# Patient Record
Sex: FEMALE | Race: WHITE | Employment: OTHER | RURAL
[De-identification: names, ages, dates, MRNs, and addresses within clinical notes are randomized per-mention and may not be internally consistent; named-entity substitution may affect disease eponyms.]

---

## 2017-01-27 ENCOUNTER — CLINICAL SUPPORT (OUTPATIENT)
Dept: FAMILY MEDICINE CLINIC | Age: 82
End: 2017-01-27

## 2017-01-27 DIAGNOSIS — R74.8 ELEVATED LIVER ENZYMES: Primary | ICD-10-CM

## 2017-01-27 NOTE — MR AVS SNAPSHOT
Visit Information Date & Time Provider Department Dept. Phone Encounter #  
 1/27/2017  1:00 PM [facility] [address] [facility] [phone] [id number] Your Appointments 5/9/2017 10:20 AM  
ESTABLISHED PATIENT with Alethea Leon MD  
[address] [place] [facility]) Appt Note: 7 mo fu $0cp [address] [facility] 67 [address] [phone]  
  
   
 [address] [address]  
  
    
 6/21/2017 10:30 AM  
ESTABLISHED PATIENT with Trinh Umana NP  
[address] ([facility]) Appt Note: 6 mo F/U  
 [address] [address]  
[address] [facility] [address] [address] Upcoming Health Maintenance Date Due ZOSTER VACCINE AGE 60> 3/7/1990 GLAUCOMA SCREENING Q2Y 3/7/1995 OSTEOPOROSIS SCREENING (DEXA) 3/7/1995 MEDICARE YEARLY EXAM 3/7/1995 DTaP/Tdap/Td series (1 - Tdap) 11/30/2011 Pneumococcal 65+ Low/Medium Risk (2 of 2 - PPSV23) 11/11/2016 Allergies as of 1/27/2017  Review Complete On: 12/20/2016 By: Ashley Arteaga RN Severity Noted Reaction Type Reactions Ambien [Zolpidem]  03/08/2016    Other (comments) Legs got heavy Bactrim [Sulfamethoprim Ds]  12/20/2016    Unknown (comments) Patient cant remember her reaction Hydroxyzine  06/23/2016    Other (comments) Legs got heavy Current Immunizations  Never Reviewed Name Date Influenza High Dose Vaccine PF 10/3/2016 Pneumococcal Conjugate (PCV-13) 11/11/2015 Td 11/29/2011 Not reviewed this visit You Were Diagnosed With   
  
 Codes Comments Elevated liver enzymes    -  Primary ICD-10-CM: R74.8 ICD-9-CM: 790.5 Vitals OB Status Smoking Status Menopause Never Smoker Preferred Pharmacy Pharmacy Name Phone [facility], [place] - [address] [phone] Your Updated Medication List  
  
   
This list is accurate as of: 1/27/17  1:05 PM.  Always use your most recent med list.  
  
  
  
  
 aspirin 81 mg chewable tablet Take 1 Tab by mouth daily. atenolol 50 mg tablet Commonly known as:  TENORMIN Take 1 Tab by mouth daily. biotin 2,500 mcg Tab Take  by mouth. BOSWELLIA AVA XT (BULK) Take  by mouth. CALCITRATE-VITAMIN D PO Take  by mouth. cetirizine 10 mg tablet Commonly known as:  ZYRTEC Take  by mouth. CO Q-10 100 mg capsule Generic drug:  co-enzyme Q-10 Take 100 mg by mouth daily. lovastatin 20 mg tablet Commonly known as:  MEVACOR Take 1 Tab by mouth nightly.  
  
 magnesium 250 mg Tab Take  by mouth. Pt thinks she takes 200 mg  
  
 melatonin 3 mg tablet Take  by mouth. METAMUCIL 0.52 gram capsule Generic drug:  psyllium Take 1-2 Caps by mouth daily. Indications: CONSTIPATION  
  
 multivitamin tablet Commonly known as:  ONE A DAY Take 1 Tab by mouth daily. naproxen sodium 220 mg Cap Take  by mouth as needed. Omega-3-DHA-EPA-Fish Oil 1,000 mg (120 mg-180 mg) Cap Take  by mouth. OTHER  
VEGGIES FOR LIFE FRUIT FOR LIFE  
  
 PROBIOTIC AND ACIDOPHILUS PO Take  by mouth.  
  
 triamcinolone 0.5 % topical cream  
Commonly known as:  ARISTOCORT Apply  to affected area two (2) times a day. As needed for itching VITAMIN B-12 1,000 mcg tablet Generic drug:  cyanocobalamin Take 1,000 mcg by mouth daily. VITAMIN C WITH YOUSIF HIPS PO Take  by mouth. VITAMIN D3 2,000 unit Tab Generic drug:  cholecalciferol (vitamin D3) Take  by mouth. We Performed the Following METABOLIC PANEL, COMPREHENSIVE [07788 CPT(R)] DE COLLECTION VENOUS BLOOD,VENIPUNCTURE [id number] CPT(R)] Introducing \[facility] [place] [facility]\"" & HEALTH SERVICES! Shani Guajardo introduces Hipmunk patient portal. Now you can access parts of your medical record, email your doctor's office, and request medication refills online. 1. In your internet browser, go to [email or web address] 2. Click on the First Time User? Click Here link in the Sign In box. You will see the New Member Sign Up page. 3. Enter your Hipmunk Access Code exactly as it appears below. You will not need to use this code after youve completed the sign-up process. If you do not sign up before the expiration date, you must request a new code. · Hipmunk Access Code: [email or web address] Expires: 4/27/2017  1:00 PM 
 
4. Enter the last four digits of your Social Security Number (xxxx) and Date of Birth (mm/dd/yyyy) as indicated and click Submit. You will be taken to the next sign-up page. 5. Create a Hipmunk ID. This will be your Hipmunk login ID and cannot be changed, so think of one that is secure and easy to remember. 6. Create a Hipmunk password. You can change your password at any time. 7. Enter your Password Reset Question and Answer. This can be used at a later time if you forget your password. 8. Enter your e-mail address. You will receive e-mail notification when new information is available in [address] 9. Click Sign Up. You can now view and download portions of your medical record. 10. Click the Download Summary menu link to download a portable copy of your medical information. If you have questions, please visit the Frequently Asked Questions section of the Hipmunk website. Remember, Hipmunk is NOT to be used for urgent needs. For medical emergencies, dial 911. Now available from your iPhone and Android! Please provide this summary of care documentation to your next provider. Your primary care clinician is listed as Alcides Wilcox. If you have any questions after today's visit, please call [phone].

## 2017-01-28 LAB
ALBUMIN SERPL-MCNC: 4 G/DL (ref 3.5–4.7)
ALBUMIN/GLOB SERPL: 1.3 {RATIO} (ref 1.1–2.5)
ALP SERPL-CCNC: 137 IU/L (ref 39–117)
ALT SERPL-CCNC: 16 IU/L (ref 0–32)
AST SERPL-CCNC: 26 IU/L (ref 0–40)
BILIRUB SERPL-MCNC: 0.5 MG/DL (ref 0–1.2)
BUN SERPL-MCNC: 13 MG/DL (ref 8–27)
BUN/CREAT SERPL: 19 (ref 11–26)
CALCIUM SERPL-MCNC: 9.4 MG/DL (ref 8.7–10.3)
CHLORIDE SERPL-SCNC: 99 MMOL/L (ref 96–106)
CO2 SERPL-SCNC: 25 MMOL/L (ref 18–29)
CREAT SERPL-MCNC: 0.67 MG/DL (ref 0.57–1)
GLOBULIN SER CALC-MCNC: 3.1 G/DL (ref 1.5–4.5)
GLUCOSE SERPL-MCNC: 109 MG/DL (ref 65–99)
POTASSIUM SERPL-SCNC: 4 MMOL/L (ref 3.5–5.2)
PROT SERPL-MCNC: 7.1 G/DL (ref 6–8.5)
SODIUM SERPL-SCNC: 139 MMOL/L (ref 134–144)

## 2017-01-30 NOTE — PROGRESS NOTES
Glucose remains elevated 109  Liver enzyme improving  Healthy Eating Plan  A healthy eating plan gives your body the nutrients it needs every day while staying within your daily calorie goal for weight loss. A healthy eating plan also will lower your risk for heart disease and other health conditions.    A healthy eating plan:  \" Emphasizes vegetables, fruits, whole grains, and fat-free or low-fat dairy products  \" Includes lean meats, poultry, fish, beans, eggs, and nuts  \" Limits saturated and trans fats, sodium, and added sugars  \" Controls portion sizes

## 2017-02-14 ENCOUNTER — TELEPHONE (OUTPATIENT)
Dept: FAMILY MEDICINE CLINIC | Age: 82
End: 2017-02-14

## 2017-02-14 DIAGNOSIS — E78.00 PURE HYPERCHOLESTEROLEMIA: ICD-10-CM

## 2017-02-14 DIAGNOSIS — I10 ESSENTIAL HYPERTENSION: ICD-10-CM

## 2017-02-14 RX ORDER — ATENOLOL 50 MG/1
50 TABLET ORAL DAILY
Qty: 90 TAB | Refills: 1 | Status: SHIPPED | OUTPATIENT
Start: 2017-02-14 | End: 2017-06-21 | Stop reason: SDUPTHER

## 2017-02-14 RX ORDER — LOVASTATIN 20 MG/1
20 TABLET ORAL
Qty: 90 TAB | Refills: 1 | Status: SHIPPED | OUTPATIENT
Start: 2017-02-14 | End: 2017-06-21 | Stop reason: SDUPTHER

## 2017-05-09 ENCOUNTER — OFFICE VISIT (OUTPATIENT)
Dept: CARDIOLOGY CLINIC | Age: 82
End: 2017-05-09

## 2017-05-09 VITALS
RESPIRATION RATE: 18 BRPM | OXYGEN SATURATION: 97 % | BODY MASS INDEX: 27.83 KG/M2 | DIASTOLIC BLOOD PRESSURE: 86 MMHG | HEART RATE: 54 BPM | WEIGHT: 163 LBS | HEIGHT: 64 IN | SYSTOLIC BLOOD PRESSURE: 122 MMHG

## 2017-05-09 DIAGNOSIS — E78.00 PURE HYPERCHOLESTEROLEMIA: ICD-10-CM

## 2017-05-09 DIAGNOSIS — I49.3 PVC'S (PREMATURE VENTRICULAR CONTRACTIONS): ICD-10-CM

## 2017-05-09 DIAGNOSIS — I10 ESSENTIAL HYPERTENSION: ICD-10-CM

## 2017-05-09 DIAGNOSIS — M19.91 PRIMARY OSTEOARTHRITIS, UNSPECIFIED SITE: ICD-10-CM

## 2017-05-09 DIAGNOSIS — I49.3 VENTRICULAR ECTOPY: ICD-10-CM

## 2017-05-09 DIAGNOSIS — I49.1 PAC (PREMATURE ATRIAL CONTRACTION): Primary | ICD-10-CM

## 2017-05-09 NOTE — PROGRESS NOTES
Lavera Favre Spindle is a 80 y.o. female is here for routine f/u. Hx hypertension, dyslipidemia, PVC's, PAC's, DJD without known hx CAD/MI/CHF/valvular heart disease. Hx DJD, s/p prior TKR and hip fracture, s/p sgy. Had pre-op EKG last fall abnormal (PAC's, PVC's, possible old ASMI. Has intermittent CP, non-exertional/atypical episode about 6 weeks ago at night, brief. Occasional palpitations. Had Cori Dopp MPI l10/16 with minimally reversible apical defect, normal LVEF. Occasional palpitations. The patient denies chest pain/ shortness of breath, orthopnea, PND, LE edema, syncope, presyncope or fatigue.        Patient Active Problem List    Diagnosis Date Noted    Abnormal EKG 10/13/2016    PAC (premature atrial contraction) 10/13/2016    Primary osteoarthritis 10/03/2016    HTN (hypertension)     DJD (degenerative joint disease)     Colitis     Ventricular ectopy     Hyperlipemia       Carlos Pollock NP  Past Medical History:   Diagnosis Date    Colitis 2013    C diff; no recent flare, normal colonoscopy 2014    DJD (degenerative joint disease)     HTN (hypertension)     Hyperlipemia     Ventricular ectopy     no symptoms      Past Surgical History:   Procedure Laterality Date    HX CATARACT REMOVAL      bilat    HX COLONOSCOPY  2014    WNL    HX ORTHOPAEDIC  2005    right femur fx    HX ORTHOPAEDIC  2012    Lt TKA     Allergies   Allergen Reactions    Ambien [Zolpidem] Other (comments)     Legs got heavy    Bactrim [Sulfamethoprim Ds] Unknown (comments)     Patient cant remember her reaction    Hydroxyzine Other (comments)     Legs got heavy      Family History   Problem Relation Age of Onset    Cancer Mother      Leukemia    Heart Disease Mother     Cancer Sister      Ovarian    No Known Problems Brother     Heart Failure Brother     Cancer Brother      Brain tumor      Social History     Social History    Marital status:      Spouse name: N/A    Number of children: N/A    Years of education: N/A     Occupational History    Not on file. Social History Main Topics    Smoking status: Never Smoker    Smokeless tobacco: Not on file    Alcohol use No    Drug use: Not on file    Sexual activity: Not on file     Other Topics Concern    Not on file     Social History Narrative      Current Outpatient Prescriptions   Medication Sig    OTHER Folate po    LORATADINE (CLARITIN PO) Take  by mouth as needed.  MELATONIN PO Take  by mouth.  atenolol (TENORMIN) 50 mg tablet Take 1 Tab by mouth daily.  lovastatin (MEVACOR) 20 mg tablet Take 1 Tab by mouth nightly.  triamcinolone (ARISTOCORT) 0.5 % topical cream Apply  to affected area two (2) times a day. As needed for itching    co-enzyme Q-10 (CO Q-10) 100 mg capsule Take 100 mg by mouth daily.  multivitamin (ONE A DAY) tablet Take 1 Tab by mouth daily.  cholecalciferol, vitamin D3, (VITAMIN D3) 2,000 unit tab Take  by mouth.  cyanocobalamin (VITAMIN B-12) 1,000 mcg tablet Take 1,000 mcg by mouth daily.  BOSWELLIA AVA EXTRACT (BOSWELLIA AVA XT, BULK,) Take  by mouth.  OTHER VEGGIES FOR LIFE  FRUIT FOR LIFE    magnesium 250 mg tab Take  by mouth. Pt thinks she takes 200 mg    CALCIUM CITRATE/VITAMIN D3 (CALCITRATE-VITAMIN D PO) Take  by mouth.  Omega-3-DHA-EPA-Fish Oil 1,000 mg (120 mg-180 mg) cap Take  by mouth.  ASCORBIC ACID (VITAMIN C WITH YOUSIF HIPS PO) Take  by mouth.  LACTOBAC CMB #3/FOS/PANTETHINE (PROBIOTIC AND ACIDOPHILUS PO) Take  by mouth.  biotin 2,500 mcg tab Take  by mouth.  aspirin 81 mg chewable tablet Take 1 Tab by mouth daily. No current facility-administered medications for this visit. Review of Symptoms:    CONST  No weight change. No fever, chills, sweats    ENT No visual changes, URI sx, sore throat    CV  See HPI   RESP  No cough, or sputum, wheezing. Also see HPI   GI  No abdominal pain or change in bowel habits. No heartburn or dysphagia.    No melena or rectal bleeding.   No dysuria, urgency, frequency, hematuria   MSKEL  No joint pain, swelling. No muscle pain. SKIN  No rash or lesions. NEURO  No headache, syncope, or seizure. No weakness, loss of sensation, or paresthesias. PSYCH  No low mood or depression  No anxiety. HE/LYMPH  No easy bruising, abnormal bleeding, or enlarged glands. Physical ExamPhysical Exam:    Visit Vitals    /86 (BP 1 Location: Left arm, BP Patient Position: Sitting)    Pulse (!) 54    Resp 18    Ht 5' 4\" (1.626 m)    Wt 163 lb (73.9 kg)    SpO2 97%    BMI 27.98 kg/m2     Gen: NAD  HEENT:  PERRL, throat clear  Neck: no mass or thyromegaly, no JVD   Heart:  sl irregular,Nl S1S2,  no murmur, gallop or rub.   Lungs:  clear  Abdomen:   Soft, non-tender, bowel sounds are active.   Extremities:  No edema  Pulse: symmetric  Neuro: A&O times 3, WNL      Cardiographics    ECG: NSR, PVC's, PRWP, no acute changes    CARDIAC TESTING:    Lexiscan MPI 10/17/16--minimal reversible apical defect, LVEF 55%.       Labs:   Lab Results   Component Value Date/Time    Sodium 139 01/27/2017 01:02 PM    Sodium 141 12/20/2016 11:12 AM    Sodium 141 06/23/2016 10:23 AM    Potassium 4.0 01/27/2017 01:02 PM    Potassium 4.0 12/20/2016 11:12 AM    Potassium 4.9 06/23/2016 10:23 AM    Chloride 99 01/27/2017 01:02 PM    Chloride 100 12/20/2016 11:12 AM    Chloride 101 06/23/2016 10:23 AM    CO2 25 01/27/2017 01:02 PM    CO2 25 12/20/2016 11:12 AM    CO2 27 06/23/2016 10:23 AM    Glucose 109 01/27/2017 01:02 PM    Glucose 108 12/20/2016 11:12 AM    Glucose 104 06/23/2016 10:23 AM    BUN 13 01/27/2017 01:02 PM    BUN 15 12/20/2016 11:12 AM    BUN 17 06/23/2016 10:23 AM    Creatinine 0.67 01/27/2017 01:02 PM    Creatinine 0.72 12/20/2016 11:12 AM    Creatinine 0.79 06/23/2016 10:23 AM    BUN/Creatinine ratio 19 01/27/2017 01:02 PM    BUN/Creatinine ratio 21 12/20/2016 11:12 AM    BUN/Creatinine ratio 22 06/23/2016 10:23 AM GFR est AA 92 01/27/2017 01:02 PM    GFR est AA 88 12/20/2016 11:12 AM    GFR est AA 78 06/23/2016 10:23 AM    GFR est non-AA 80 01/27/2017 01:02 PM    GFR est non-AA 76 12/20/2016 11:12 AM    GFR est non-AA 68 06/23/2016 10:23 AM    Calcium 9.4 01/27/2017 01:02 PM    Calcium 9.4 12/20/2016 11:12 AM    Calcium 9.5 06/23/2016 10:23 AM    Bilirubin, total 0.5 01/27/2017 01:02 PM    Bilirubin, total 0.5 12/20/2016 11:12 AM    Bilirubin, total 0.5 06/23/2016 10:23 AM    AST (SGOT) 26 01/27/2017 01:02 PM    AST (SGOT) 22 12/20/2016 11:12 AM    AST (SGOT) 20 06/23/2016 10:23 AM    Alk. phosphatase 137 01/27/2017 01:02 PM    Alk. phosphatase 154 12/20/2016 11:12 AM    Alk. phosphatase 97 06/23/2016 10:23 AM    Protein, total 7.1 01/27/2017 01:02 PM    Protein, total 7.1 12/20/2016 11:12 AM    Protein, total 7.2 06/23/2016 10:23 AM    Albumin 4.0 01/27/2017 01:02 PM    Albumin 4.0 12/20/2016 11:12 AM    Albumin 4.3 06/23/2016 10:23 AM    A-G Ratio 1.3 01/27/2017 01:02 PM    A-G Ratio 1.3 12/20/2016 11:12 AM    A-G Ratio 1.5 06/23/2016 10:23 AM    ALT (SGPT) 16 01/27/2017 01:02 PM    ALT (SGPT) 17 12/20/2016 11:12 AM    ALT (SGPT) 14 06/23/2016 10:23 AM     No results found for: CPK, CPKX, CPX  Lab Results   Component Value Date/Time    Cholesterol, total 159 12/20/2016 11:12 AM    Cholesterol, total 178 06/23/2016 10:23 AM    HDL Cholesterol 45 12/20/2016 11:12 AM    HDL Cholesterol 45 06/23/2016 10:23 AM    LDL, calculated 94 12/20/2016 11:12 AM    LDL, calculated 112 06/23/2016 10:23 AM    Triglyceride 100 12/20/2016 11:12 AM    Triglyceride 104 06/23/2016 10:23 AM     No results found for this or any previous visit.     Assessment:         Patient Active Problem List    Diagnosis Date Noted    Abnormal EKG 10/13/2016    PAC (premature atrial contraction) 10/13/2016    Primary osteoarthritis 10/03/2016    HTN (hypertension)     DJD (degenerative joint disease)     Colitis     Ventricular ectopy     Hyperlipemia Plan:     Doing well with no adverse cardiac symptoms. Lipids and labs followed by PCP. Continue current care and f/u in 6 months.     Monique Montalvo MD

## 2017-05-09 NOTE — MR AVS SNAPSHOT
Visit Information Date & Time Provider Department Dept. Phone Encounter #  
 5/9/2017 10:20 AM Mary Mejia, [address] [place] [address] Cardiology [phone] Follow-up Instructions Return in about 6 months (around 11/9/2017). Follow-up and Disposition History Your Appointments 6/20/2017  8:00 AM  
ESTABLISHED PATIENT with Gina Tolbert NP  
[address] [facility] Appt Note: 6 mo F/U; 6 mo F/U  
 [address] [address]  
[address] [facility] [address] [address] Upcoming Health Maintenance Date Due ZOSTER VACCINE AGE 60> 3/7/1990 GLAUCOMA SCREENING Q2Y 3/7/1995 OSTEOPOROSIS SCREENING (DEXA) 3/7/1995 MEDICARE YEARLY EXAM 3/7/1995 DTaP/Tdap/Td series (1 - Tdap) 11/30/2011 Pneumococcal 65+ Low/Medium Risk (2 of 2 - PPSV23) 11/11/2016 INFLUENZA AGE 9 TO ADULT 8/1/2017 Allergies as of 5/9/2017  Review Complete On: 5/9/2017 By: Mary Mejia MD  
  
 Severity Noted Reaction Type Reactions Ambien [Zolpidem]  03/08/2016    Other (comments) Legs got heavy Bactrim [Sulfamethoprim Ds]  12/20/2016    Unknown (comments) Patient cant remember her reaction Hydroxyzine  06/23/2016    Other (comments) Legs got heavy Current Immunizations  Never Reviewed Name Date Influenza High Dose Vaccine PF 10/3/2016 Pneumococcal Conjugate (PCV-13) 11/11/2015 Td 11/29/2011 Not reviewed this visit You Were Diagnosed With   
  
 Codes Comments PAC (premature atrial contraction)    -  Primary ICD-10-CM: I49.1 ICD-9-CM: 427.61 Essential hypertension     ICD-10-CM: I10 
ICD-9-CM: 401.9 Ventricular ectopy     ICD-10-CM: I49.3 ICD-9-CM: 427.69 PVC's (premature ventricular contractions)     ICD-10-CM: I49.3 ICD-9-CM: 427.69 Pure hypercholesterolemia     ICD-10-CM: E78.00 ICD-9-CM: 272.0 Primary osteoarthritis, unspecified site     ICD-10-CM: M19.91 
ICD-9-CM: 715.10 Vitals BP Pulse Resp Height(growth percentile) Weight(growth percentile) SpO2  
 122/86 (BP 1 Location: Left arm, BP Patient Position: Sitting) (!) 54 18 5' 4\" (1.626 m) 163 lb (73.9 kg) 97% BMI OB Status Smoking Status 27.98 kg/m2 Menopause Never Smoker Vitals History BMI and BSA Data Body Mass Index Body Surface Area  
 27.98 kg/m 2 1.83 m 2 Preferred Pharmacy Pharmacy Name Phone [address] [facility], [address] [facility] [address] [place] Your Updated Medication List  
  
   
This list is accurate as of: 5/9/17 10:51 AM.  Always use your most recent med list.  
  
  
  
  
 aspirin 81 mg chewable tablet Take 1 Tab by mouth daily. atenolol 50 mg tablet Commonly known as:  TENORMIN Take 1 Tab by mouth daily. biotin 2,500 mcg Tab Take  by mouth. BOSWELLIA AVA XT (BULK) Take  by mouth. CALCITRATE-VITAMIN D PO Take  by mouth. CLARITIN PO Take  by mouth as needed. CO Q-10 100 mg capsule Generic drug:  co-enzyme Q-10 Take 100 mg by mouth daily. lovastatin 20 mg tablet Commonly known as:  MEVACOR Take 1 Tab by mouth nightly.  
  
 magnesium 250 mg Tab Take  by mouth. Pt thinks she takes 200 mg  
  
 MELATONIN PO Take  by mouth.  
  
 multivitamin tablet Commonly known as:  ONE A DAY Take 1 Tab by mouth daily. Omega-3-DHA-EPA-Fish Oil 1,000 mg (120 mg-180 mg) Cap Take  by mouth. * OTHER  
VEGGIES FOR LIFE FRUIT FOR LIFE  
  
 * OTHER Folate po PROBIOTIC AND ACIDOPHILUS PO Take  by mouth.  
  
 triamcinolone 0.5 % topical cream  
Commonly known as:  ARISTOCORT Apply  to affected area two (2) times a day. As needed for itching VITAMIN B-12 1,000 mcg tablet Generic drug:  cyanocobalamin Take 1,000 mcg by mouth daily.   
  
 VITAMIN C WITH YOUSIF HIPS PO  
 Take  by mouth. VITAMIN D3 2,000 unit Tab Generic drug:  cholecalciferol (vitamin D3) Take  by mouth. * Notice: This list has 2 medication(s) that are the same as other medications prescribed for you. Read the directions carefully, and ask your doctor or other care provider to review them with you. We Performed the Following AMB POC EKG ROUTINE W/ 12 LEADS, INTER & REP [71021 CPT(R)] Follow-up Instructions Return in about 6 months (around 11/9/2017). Introducing [facility] [place] & HEALTH SERVICES! [employer] introduces Anonymous You patient portal. Now you can access parts of your medical record, email your doctor's office, and request medication refills online. 1. In your internet browser, go to [email or web address] 2. Click on the First Time User? Click Here link in the Sign In box. You will see the New Member Sign Up page. 3. Enter your Anonymous You Access Code exactly as it appears below. You will not need to use this code after youve completed the sign-up process. If you do not sign up before the expiration date, you must request a new code. · Anonymous You Access Code: [email or web address] Expires: 8/7/2017 10:51 AM 
 
4. Enter the last four digits of your Social Security Number (xxxx) and Date of Birth (mm/dd/yyyy) as indicated and click Submit. You will be taken to the next sign-up page. 5. Create a Anonymous You ID. This will be your Anonymous You login ID and cannot be changed, so think of one that is secure and easy to remember. 6. Create a Anonymous You password. You can change your password at any time. 7. Enter your Password Reset Question and Answer. This can be used at a later time if you forget your password. 8. Enter your e-mail address. You will receive e-mail notification when new information is available in [address] 9. Click Sign Up. You can now view and download portions of your medical record.  
10. Click the Download Summary menu link to download a portable copy of your medical information. If you have questions, please visit the Frequently Asked Questions section of the [email or web address] website. Remember, Bit9 is NOT to be used for urgent needs. For medical emergencies, dial 911. Now available from your iPhone and Android! Please provide this summary of care documentation to your next provider. Your primary care clinician is listed as Yelena Gentile. If you have any questions after today's visit, please call [phone].

## 2017-05-09 NOTE — PROGRESS NOTES
Verified patient with two patient identifiers. Medications reviewed/approved by Dr. Ila Maher. Verbal from Dr. Ila Maher to remove the medications that were deleted during the visit.

## 2017-06-20 ENCOUNTER — OFFICE VISIT (OUTPATIENT)
Dept: FAMILY MEDICINE CLINIC | Age: 82
End: 2017-06-20

## 2017-06-20 VITALS
WEIGHT: 161 LBS | BODY MASS INDEX: 27.49 KG/M2 | HEART RATE: 68 BPM | TEMPERATURE: 45.3 F | RESPIRATION RATE: 18 BRPM | SYSTOLIC BLOOD PRESSURE: 130 MMHG | OXYGEN SATURATION: 96 % | HEIGHT: 64 IN | DIASTOLIC BLOOD PRESSURE: 86 MMHG

## 2017-06-20 DIAGNOSIS — H61.23 BILATERAL IMPACTED CERUMEN: ICD-10-CM

## 2017-06-20 DIAGNOSIS — I10 ESSENTIAL HYPERTENSION: ICD-10-CM

## 2017-06-20 DIAGNOSIS — E78.00 PURE HYPERCHOLESTEROLEMIA: ICD-10-CM

## 2017-06-20 DIAGNOSIS — Z23 ENCOUNTER FOR IMMUNIZATION: Primary | ICD-10-CM

## 2017-06-20 DIAGNOSIS — Z00.00 ENCOUNTER FOR MEDICARE ANNUAL WELLNESS EXAM: ICD-10-CM

## 2017-06-20 DIAGNOSIS — K52.9 COLITIS: ICD-10-CM

## 2017-06-20 NOTE — MR AVS SNAPSHOT
Visit Information Date & Time Provider Department Dept. Phone Encounter #  
 6/20/2017  8:00 AM Herberth Medrano NP [facility] [address] [facility] [phone] [id number] Your Appointments 11/15/2017 10:20 AM  
ESTABLISHED PATIENT with Galina Reeder MD  
[id number] [facility] [place] [address]) Appt Note: 6 MO FU $0CP  
 [address] [id number] [phone]  
  
   
 [address] [address] Upcoming Health Maintenance Date Due ZOSTER VACCINE AGE 60> 3/7/1990 GLAUCOMA SCREENING Q2Y 3/7/1995 Pneumococcal 65+ Low/Medium Risk (2 of 2 - PPSV23) 11/11/2016 INFLUENZA AGE 9 TO ADULT 8/1/2017 MEDICARE YEARLY EXAM 6/21/2018 DTaP/Tdap/Td series (2 - Td) 6/20/2027 Allergies as of 6/20/2017  Review Complete On: 6/20/2017 By: Wendi Tavera RN Severity Noted Reaction Type Reactions Ambien [Zolpidem]  03/08/2016    Other (comments) Legs got heavy Bactrim [Sulfamethoprim Ds]  12/20/2016    Unknown (comments) Patient cant remember her reaction Hydroxyzine  06/23/2016    Other (comments) Legs got heavy Current Immunizations  Never Reviewed Name Date Influenza High Dose Vaccine PF 10/3/2016 Pneumococcal Conjugate (PCV-13) 11/11/2015 Pneumococcal Polysaccharide (PPSV-23) 6/20/2017 Td 11/29/2011 Not reviewed this visit You Were Diagnosed With   
  
 Codes Comments Encounter for immunization    -  Primary ICD-10-CM: P62 ICD-9-CM: V03.89 Encounter for Medicare annual wellness exam     ICD-10-CM: Z00.00 ICD-9-CM: V70.0 Essential hypertension     ICD-10-CM: I10 
ICD-9-CM: 401.9 Colitis     ICD-10-CM: K52.9 ICD-9-CM: 558.9 Pure hypercholesterolemia     ICD-10-CM: E78.00 ICD-9-CM: 272.0 Bilateral impacted cerumen     ICD-10-CM: H61.23 
ICD-9-CM: 380.4 Vitals BP Pulse Temp Resp Height(growth percentile) Weight(growth percentile) 130/86 (BP 1 Location: Left arm, BP Patient Position: Sitting) 68 (!) 45.3 °F (7.4 °C) (Temporal) 18 5' 4\" (1.626 m) 161 lb (73 kg) SpO2 BMI OB Status Smoking Status 96% 27.64 kg/m2 Menopause Never Smoker BMI and BSA Data Body Mass Index Body Surface Area  
 27.64 kg/m 2 1.82 m 2 Preferred Pharmacy Pharmacy Name Phone [address] [facility], [address] [facility] [address] [place] Your Updated Medication List  
  
   
This list is accurate as of: 6/20/17  9:36 AM.  Always use your most recent med list.  
  
  
  
  
 aspirin 81 mg chewable tablet Take 1 Tab by mouth daily. atenolol 50 mg tablet Commonly known as:  TENORMIN Take 1 Tab by mouth daily. biotin 2,500 mcg Tab Take  by mouth. BOSWELLIA AVA XT (BULK) Take  by mouth. CALCITRATE-VITAMIN D PO Take  by mouth. CLARITIN PO Take  by mouth as needed. CO Q-10 100 mg capsule Generic drug:  co-enzyme Q-10 Take 100 mg by mouth daily. lovastatin 20 mg tablet Commonly known as:  MEVACOR Take 1 Tab by mouth nightly.  
  
 magnesium 250 mg Tab Take  by mouth. Pt thinks she takes 200 mg  
  
 MELATONIN PO Take  by mouth.  
  
 multivitamin tablet Commonly known as:  ONE A DAY Take 1 Tab by mouth daily. Omega-3-DHA-EPA-Fish Oil 1,000 mg (120 mg-180 mg) Cap Take  by mouth. * OTHER  
VEGGIES FOR LIFE FRUIT FOR LIFE  
  
 * OTHER Folate po PROBIOTIC AND ACIDOPHILUS PO Take  by mouth.  
  
 triamcinolone 0.5 % topical cream  
Commonly known as:  ARISTOCORT Apply  to affected area two (2) times a day. As needed for itching  
  
 varicella zoster vacine live 19,400 unit/0.65 mL Susr injection Commonly known as:  varicella-zoster vacine live 1 Vial by SubCUTAneous route once for 1 dose. VITAMIN B-12 1,000 mcg tablet Generic drug:  cyanocobalamin Take 1,000 mcg by mouth daily. VITAMIN C WITH YOUSIF HIPS PO Take  by mouth. VITAMIN D3 2,000 unit Tab Generic drug:  cholecalciferol (vitamin D3) Take  by mouth. * Notice: This list has 2 medication(s) that are the same as other medications prescribed for you. Read the directions carefully, and ask your doctor or other care provider to review them with you. Prescriptions Printed Refills  
 varicella zoster vacine live (VARICELLA-ZOSTER VACINE LIVE) 19,400 unit/0.65 mL susr injection 0 Si Vial by SubCUTAneous route once for 1 dose. Class: Print Route: SubCUTAneous We Performed the Following CBC WITH AUTOMATED DIFF [67186 CPT(R)] COLLECTION VENOUS BLOOD,VENIPUNCTURE D041387 CPT(R)] LIPID PANEL [55099 CPT(R)] METABOLIC PANEL, COMPREHENSIVE [90654 CPT(R)] PNEUMOCOCCAL POLYSACCHARIDE VACCINE, 23-VALENT, ADULT OR IMMUNOSUPPRESSED PT DOSE, [75043 CPT(R)] ME IMMUNIZ ADMIN,1 SINGLE/COMB VAC/TOXOID [id number] CPT(R)] REMOVAL IMPACTED CERUMEN IRRIGATION/LVG UNILAT [id number] CPT(R)] TSH 3RD GENERATION [75161 CPT(R)] Patient Instructions Irritable Bowel Syndrome: Care Instructions Your Care Instructions Irritable bowel syndrome, or IBS, is a problem with the intestines that causes belly pain, bloating, gas, constipation, and diarrhea. The cause of IBS is not well known. IBS can last for many years, but it does not get worse over time or lead to serious disease. Most people can control their symptoms by changing their diet and reducing stress. Follow-up care is a key part of your treatment and safety. Be sure to make and go to all appointments, and call your doctor if you are having problems. It's also a good idea to know your test results and keep a list of the medicines you take. How can you care for yourself at home? · For constipation: ¨ Include fruits, vegetables, beans, and whole grains in your diet each day. These foods are high in fiber. ¨ Drink plenty of fluids, enough so that your urine is light yellow or clear like water. If you have kidney, heart, or liver disease and have to limit fluids, talk with your doctor before you increase the amount of fluids you drink. ¨ Get some exercise every day. Build up slowly to 30 to 60 minutes a day on 5 or more days of the week. ¨ Take a fiber supplement, such as Citrucel or Metamucil, every day if needed. Read and follow all instructions on the label. ¨ Schedule time each day for a bowel movement. Having a daily routine may help. Take your time and do not strain when having a bowel movement. · If you often have diarrhea, limit foods and drinks that make it worse. These are different for each person but may include caffeine (found in coffee, tea, chocolate, and cola drinks), alcohol, fatty foods, gas-producing foods (such as beans, cabbage, and broccoli), some dairy products, and spicy foods. Do not eat candy or gum that contains sorbitol. · Keep a daily diary of what you eat and what symptoms you have. This may help find foods that cause you problems. · Eat slowly. Try to make mealtime relaxing. · Find ways to reduce stress. · Get at least 30 minutes of exercise on most days of the week. Exercise can help reduce tension and prevent constipation. Walking is a good choice. You also may want to do other activities, such as running, swimming, cycling, or playing tennis or team sports. When should you call for help? Call your doctor now or seek immediate medical care if: 
· Your pain is different than usual or occurs with fever. · You lose weight without trying, or you lose your appetite and you do not know why. · Your symptoms often wake you from sleep. · Your stools are black and tarlike or have streaks of blood.  
Watch closely for changes in your health, and be sure to contact your doctor if: 
· Your IBS symptoms get worse or begin to disrupt your day-to-day life. · You become more tired than usual. 
· Your home treatment stops working. Where can you learn more? Go to http://julia-carmina.info/. Enter R829 in the search box to learn more about \"Irritable Bowel Syndrome: Care Instructions. \" Current as of: August 9, 2016 Content Version: 11.3 © 2017-2035 TechShop. Care instructions adapted under license by WePow (which disclaims liability or warranty for this information). If you have questions about a medical condition or this instruction, always ask your healthcare professional. [address] [address] any warranty or liability for your use of this information. Diet for Irritable Bowel Syndrome: Care Instructions Your Care Instructions Irritable bowel syndrome, or IBS, is a problem with the intestines. IBS can cause belly pain, bloating, gas, constipation, and diarrhea. Most people can control their symptoms by changing their diet and easing stress. No specific foods cause everyone with IBS to have symptoms. Doctors don't offer a specific diet to manage symptoms. But many people find that they feel better when they stop eating certain foods. A high-fiber diet may help if you have constipation. Follow-up care is a key part of your treatment and safety. Be sure to make and go to all appointments, and call your doctor if you are having problems. It's also a good idea to know your test results and keep a list of the medicines you take. How can you care for yourself at home? To reduce constipation · Include fruits, vegetables, beans, and whole grains in your diet each day. These foods are high in fiber. Slowly increase the amount of fiber you eat. This helps you avoid a lot of gas.  
· Drink plenty of fluids, enough so that your urine is light yellow or clear like water. If you have kidney, heart, or liver disease and have to limit fluids, talk with your doctor before you increase the amount of fluids you drink. · Get some exercise every day. Build up slowly to 30 to 60 minutes a day on 5 or more days of the week. · Take a fiber supplement, such as Citrucel or Metamucil, every day if needed. Read and follow all instructions on the label. · Schedule time each day for a bowel movement. Having a daily routine may help. Take your time and do not strain when having a bowel movement. · Check with your doctor before you increase the amount of fiber in your diet. For some people who have IBS, eating more fiber may make some symptoms worse. This includes bloating. To reduce diarrhea You may try giving up foods or drinks one at a time to see whether symptoms improve. Limit or avoid the following: · Alcohol · Caffeine, which is found in coffee, tea, cola drinks, and chocolate · Nicotine, from smoking or chewing tobacco 
· Gas-producing foods, such as beans, broccoli, cabbage, and apples · Dairy products that contain lactose (milk sugar), such as ice cream, milk, cheese, and sour cream 
· Foods and drinks high in sugar, especially fruit juice, soda, candy, and other packaged sweets (such as cookies) · Foods high in fat, including esposito, sausage, butter, oils, and anything deep-fried · Sorbitol and xylitol, artificial sweeteners found in some sugarless candies and chewing gum Keep track of foods · Some people with IBS use a daily food diary to keep track of what they eat and whether they have any symptoms after eating certain foods. The diary also can be a good way to record what is going on in your life. · Stress plays a role in IBS. So if you are aware that certain stresses bring on symptoms, you can try to reduce those stresses. Keep mealtimes pleasant · Try to maintain a pleasant environment when you eat.  This may reduce stress that can make symptoms likely to occur. · Give yourself plenty of time to eat, rather than eating on the go. Chew your food slowly. Try not to swallow air, which can cause bloating. Where can you learn more? Go to http://julia-carmina.info/. Enter E684 in the search box to learn more about \"Diet for Irritable Bowel Syndrome: Care Instructions. \" Current as of: July 26, 2016 Content Version: 11.3 © 6289-0094 ItrybeforeIbuy. Care instructions adapted under license by Tugg (which disclaims liability or warranty for this information). If you have questions about a medical condition or this instruction, always ask your healthcare professional. [address] any warranty or liability for your use of this information. Introducing [facility]! Alvaro Cunha introduces [employer] patient portal. Now you can access parts of your medical record, email your doctor's office, and request medication refills online. 1. In your internet browser, go to [email or web address] 2. Click on the First Time User? Click Here link in the Sign In box. You will see the New Member Sign Up page. 3. Enter your [employer] Access Code exactly as it appears below. You will not need to use this code after youve completed the sign-up process. If you do not sign up before the expiration date, you must request a new code. · [employer] Access Code: [email or web address] Expires: 8/7/2017 10:51 AM 
 
4. Enter the last four digits of your Social Security Number (xxxx) and Date of Birth (mm/dd/yyyy) as indicated and click Submit. You will be taken to the next sign-up page. 5. Create a [employer] ID. This will be your [employer] login ID and cannot be changed, so think of one that is secure and easy to remember. 6. Create a [employer] password. You can change your password at any time. 7. Enter your Password Reset Question and Answer.  This can be used at a later time if you forget your password. 8. Enter your e-mail address. You will receive e-mail notification when new information is available in [address] 9. Click Sign Up. You can now view and download portions of your medical record. 10. Click the Download Summary menu link to download a portable copy of your medical information. If you have questions, please visit the Frequently Asked Questions section of the Contacts+ website. Remember, Contacts+ is NOT to be used for urgent needs. For medical emergencies, dial 911. Now available from your iPhone and Android! Please provide this summary of care documentation to your next provider. Your primary care clinician is listed as Katelin Robins. If you have any questions after today's visit, please call [phone].

## 2017-06-20 NOTE — LETTER
6/22/2017 9:56 AM 
 
[facility] 
[address] [place] [facility]: 
 
Please find your most recent results below. Resulted Orders METABOLIC PANEL, COMPREHENSIVE Result Value Ref Range Glucose 105 (H) 65 - 99 mg/dL BUN 21 8 - 27 mg/dL Creatinine 0.74 0.57 - 1.00 mg/dL GFR est non-AA 73 >59 mL/min/1.73 GFR est AA 84 >59 mL/min/1.73  
 BUN/Creatinine ratio 28 12 - 28 Sodium 141 134 - 144 mmol/L Potassium 4.3 3.5 - 5.2 mmol/L Chloride 104 96 - 106 mmol/L  
 CO2 25 18 - 29 mmol/L Calcium 9.0 8.7 - 10.3 mg/dL Protein, total 7.0 6.0 - 8.5 g/dL Albumin 4.3 3.5 - 4.7 g/dL GLOBULIN, TOTAL 2.7 1.5 - 4.5 g/dL A-G Ratio 1.6 1.2 - 2.2 Bilirubin, total 0.6 0.0 - 1.2 mg/dL Alk. phosphatase 101 39 - 117 IU/L  
 AST (SGOT) 21 0 - 40 IU/L  
 ALT (SGPT) 20 0 - 32 IU/L Narrative Performed at:  [address] [place] [address]  [id number] : Elizabeth Noe MD, Phone:  [phone] LIPID PANEL Result Value Ref Range Cholesterol, total 154 100 - 199 mg/dL Triglyceride 98 0 - 149 mg/dL HDL Cholesterol 47 >39 mg/dL VLDL, calculated 20 5 - 40 mg/dL LDL, calculated 87 0 - 99 mg/dL Narrative Performed at:  [id number] [place] [address]  [id number] : Elizabeth Noe MD, Phone:  [phone] TSH 3RD GENERATION Result Value Ref Range TSH 1.370 0.450 - 4.500 uIU/mL Narrative Performed at:  [address] [address]  [id number] : Elizabeth Noe MD, Phone:  [phone] CBC WITH AUTOMATED DIFF Result Value Ref Range WBC 8.4 3.4 - 10.8 x10E3/uL  
 RBC 3.91 3.77 - 5.28 x10E6/uL HGB 12.1 11.1 - 15.9 g/dL HCT 36.9 34.0 - 46.6 % MCV 94 79 - 97 fL  
 MCH 30.9 26.6 - 33.0 pg  
 MCHC 32.8 31.5 - 35.7 g/dL  
 RDW 14.0 12.3 - 15.4 % PLATELET 549 726 - 074 x10E3/uL NEUTROPHILS 66 % Lymphocytes 27 % MONOCYTES 5 % EOSINOPHILS 2 % BASOPHILS 0 %  
 ABS. NEUTROPHILS 5.6 1.4 - 7.0 x10E3/uL Abs Lymphocytes 2.2 0.7 - 3.1 x10E3/uL  
 ABS. MONOCYTES 0.4 0.1 - 0.9 x10E3/uL  
 ABS. EOSINOPHILS 0.2 0.0 - 0.4 x10E3/uL  
 ABS. BASOPHILS 0.0 0.0 - 0.2 x10E3/uL IMMATURE GRANULOCYTES 0 %  
 ABS. IMM. GRANS. 0.0 0.0 - 0.1 x10E3/uL Narrative Performed at:  [address]  [id number] : Nisha Castro MD, Phone:  [phone] RECOMMENDATIONS: 
 
CBC no anemia Lipid panel WNL  
TSH WNL no changes Metabolic panel liver and kidneys are good Please call me if you have any questions: [phone] Sincerely, 
 
 
Lashonda Maguire NP

## 2017-06-20 NOTE — ACP (ADVANCE CARE PLANNING)
Advance care planning discussed with patient has one and instructed to bring a copy to file in chart.

## 2017-06-20 NOTE — PATIENT INSTRUCTIONS
Irritable Bowel Syndrome: Care Instructions  Your Care Instructions  Irritable bowel syndrome, or IBS, is a problem with the intestines that causes belly pain, bloating, gas, constipation, and diarrhea. The cause of IBS is not well known. IBS can last for many years, but it does not get worse over time or lead to serious disease. Most people can control their symptoms by changing their diet and reducing stress. Follow-up care is a key part of your treatment and safety. Be sure to make and go to all appointments, and call your doctor if you are having problems. It's also a good idea to know your test results and keep a list of the medicines you take. How can you care for yourself at home? · For constipation:  ¨ Include fruits, vegetables, beans, and whole grains in your diet each day. These foods are high in fiber. ¨ Drink plenty of fluids, enough so that your urine is light yellow or clear like water. If you have kidney, heart, or liver disease and have to limit fluids, talk with your doctor before you increase the amount of fluids you drink. ¨ Get some exercise every day. Build up slowly to 30 to 60 minutes a day on 5 or more days of the week. ¨ Take a fiber supplement, such as Citrucel or Metamucil, every day if needed. Read and follow all instructions on the label. ¨ Schedule time each day for a bowel movement. Having a daily routine may help. Take your time and do not strain when having a bowel movement. · If you often have diarrhea, limit foods and drinks that make it worse. These are different for each person but may include caffeine (found in coffee, tea, chocolate, and cola drinks), alcohol, fatty foods, gas-producing foods (such as beans, cabbage, and broccoli), some dairy products, and spicy foods. Do not eat candy or gum that contains sorbitol. · Keep a daily diary of what you eat and what symptoms you have. This may help find foods that cause you problems. · Eat slowly.  Try to make mealtime relaxing. · Find ways to reduce stress. · Get at least 30 minutes of exercise on most days of the week. Exercise can help reduce tension and prevent constipation. Walking is a good choice. You also may want to do other activities, such as running, swimming, cycling, or playing tennis or team sports. When should you call for help? Call your doctor now or seek immediate medical care if:  · Your pain is different than usual or occurs with fever. · You lose weight without trying, or you lose your appetite and you do not know why. · Your symptoms often wake you from sleep. · Your stools are black and tarlike or have streaks of blood. Watch closely for changes in your health, and be sure to contact your doctor if:  · Your IBS symptoms get worse or begin to disrupt your day-to-day life. · You become more tired than usual.  · Your home treatment stops working. Where can you learn more? Go to http://juliaRevolt Technologycarmina.info/. Enter L538 in the search box to learn more about \"Irritable Bowel Syndrome: Care Instructions. \"  Current as of: August 9, 2016  Content Version: 11.3  © 4489-1814 Foodcloud. Care instructions adapted under license by Uni-Pixel (which disclaims liability or warranty for this information). If you have questions about a medical condition or this instruction, always ask your healthcare professional. [address] any warranty or liability for your use of this information. Diet for Irritable Bowel Syndrome: Care Instructions  Your Care Instructions  Irritable bowel syndrome, or IBS, is a problem with the intestines. IBS can cause belly pain, bloating, gas, constipation, and diarrhea. Most people can control their symptoms by changing their diet and easing stress. No specific foods cause everyone with IBS to have symptoms. Doctors don't offer a specific diet to manage symptoms.  But many people find that they feel better when they stop eating certain foods. A high-fiber diet may help if you have constipation. Follow-up care is a key part of your treatment and safety. Be sure to make and go to all appointments, and call your doctor if you are having problems. It's also a good idea to know your test results and keep a list of the medicines you take. How can you care for yourself at home? To reduce constipation  · Include fruits, vegetables, beans, and whole grains in your diet each day. These foods are high in fiber. Slowly increase the amount of fiber you eat. This helps you avoid a lot of gas. · Drink plenty of fluids, enough so that your urine is light yellow or clear like water. If you have kidney, heart, or liver disease and have to limit fluids, talk with your doctor before you increase the amount of fluids you drink. · Get some exercise every day. Build up slowly to 30 to 60 minutes a day on 5 or more days of the week. · Take a fiber supplement, such as Citrucel or Metamucil, every day if needed. Read and follow all instructions on the label. · Schedule time each day for a bowel movement. Having a daily routine may help. Take your time and do not strain when having a bowel movement. · Check with your doctor before you increase the amount of fiber in your diet. For some people who have IBS, eating more fiber may make some symptoms worse. This includes bloating. To reduce diarrhea  You may try giving up foods or drinks one at a time to see whether symptoms improve.  Limit or avoid the following:  · Alcohol  · Caffeine, which is found in coffee, tea, cola drinks, and chocolate  · Nicotine, from smoking or chewing tobacco  · Gas-producing foods, such as beans, broccoli, cabbage, and apples  · Dairy products that contain lactose (milk sugar), such as ice cream, milk, cheese, and sour cream  · Foods and drinks high in sugar, especially fruit juice, soda, candy, and other packaged sweets (such as cookies)  · Foods high in fat, including esposito, sausage, butter, oils, and anything deep-fried  · Sorbitol and xylitol, artificial sweeteners found in some sugarless candies and chewing gum  Keep track of foods  · Some people with IBS use a daily food diary to keep track of what they eat and whether they have any symptoms after eating certain foods. The diary also can be a good way to record what is going on in your life. · Stress plays a role in IBS. So if you are aware that certain stresses bring on symptoms, you can try to reduce those stresses. Keep mealtimes pleasant  · Try to maintain a pleasant environment when you eat. This may reduce stress that can make symptoms likely to occur. · Give yourself plenty of time to eat, rather than eating on the go. Chew your food slowly. Try not to swallow air, which can cause bloating. Where can you learn more? Go to http://julia-carmina.info/. Enter S164 in the search box to learn more about \"Diet for Irritable Bowel Syndrome: Care Instructions. \"  Current as of: July 26, 2016  Content Version: 11.3  © 6731-5067 Reds10. Care instructions adapted under license by Udacity (which disclaims liability or warranty for this information). If you have questions about a medical condition or this instruction, always ask your healthcare professional. [address] any warranty or liability for your use of this information.

## 2017-06-21 DIAGNOSIS — E78.00 PURE HYPERCHOLESTEROLEMIA: ICD-10-CM

## 2017-06-21 DIAGNOSIS — I10 ESSENTIAL HYPERTENSION: ICD-10-CM

## 2017-06-21 LAB
ALBUMIN SERPL-MCNC: 4.3 G/DL (ref 3.5–4.7)
ALBUMIN/GLOB SERPL: 1.6 {RATIO} (ref 1.2–2.2)
ALP SERPL-CCNC: 101 IU/L (ref 39–117)
ALT SERPL-CCNC: 20 IU/L (ref 0–32)
AST SERPL-CCNC: 21 IU/L (ref 0–40)
BASOPHILS # BLD AUTO: 0 X10E3/UL (ref 0–0.2)
BASOPHILS NFR BLD AUTO: 0 %
BILIRUB SERPL-MCNC: 0.6 MG/DL (ref 0–1.2)
BUN SERPL-MCNC: 21 MG/DL (ref 8–27)
BUN/CREAT SERPL: 28 (ref 12–28)
CALCIUM SERPL-MCNC: 9 MG/DL (ref 8.7–10.3)
CHLORIDE SERPL-SCNC: 104 MMOL/L (ref 96–106)
CHOLEST SERPL-MCNC: 154 MG/DL (ref 100–199)
CO2 SERPL-SCNC: 25 MMOL/L (ref 18–29)
CREAT SERPL-MCNC: 0.74 MG/DL (ref 0.57–1)
EOSINOPHIL # BLD AUTO: 0.2 X10E3/UL (ref 0–0.4)
EOSINOPHIL NFR BLD AUTO: 2 %
ERYTHROCYTE [DISTWIDTH] IN BLOOD BY AUTOMATED COUNT: 14 % (ref 12.3–15.4)
GLOBULIN SER CALC-MCNC: 2.7 G/DL (ref 1.5–4.5)
GLUCOSE SERPL-MCNC: 105 MG/DL (ref 65–99)
HCT VFR BLD AUTO: 36.9 % (ref 34–46.6)
HDLC SERPL-MCNC: 47 MG/DL
HGB BLD-MCNC: 12.1 G/DL (ref 11.1–15.9)
IMM GRANULOCYTES # BLD: 0 X10E3/UL (ref 0–0.1)
IMM GRANULOCYTES NFR BLD: 0 %
LDLC SERPL CALC-MCNC: 87 MG/DL (ref 0–99)
LYMPHOCYTES # BLD AUTO: 2.2 X10E3/UL (ref 0.7–3.1)
LYMPHOCYTES NFR BLD AUTO: 27 %
MCH RBC QN AUTO: 30.9 PG (ref 26.6–33)
MCHC RBC AUTO-ENTMCNC: 32.8 G/DL (ref 31.5–35.7)
MCV RBC AUTO: 94 FL (ref 79–97)
MONOCYTES # BLD AUTO: 0.4 X10E3/UL (ref 0.1–0.9)
MONOCYTES NFR BLD AUTO: 5 %
NEUTROPHILS # BLD AUTO: 5.6 X10E3/UL (ref 1.4–7)
NEUTROPHILS NFR BLD AUTO: 66 %
PLATELET # BLD AUTO: 231 X10E3/UL (ref 150–379)
POTASSIUM SERPL-SCNC: 4.3 MMOL/L (ref 3.5–5.2)
PROT SERPL-MCNC: 7 G/DL (ref 6–8.5)
RBC # BLD AUTO: 3.91 X10E6/UL (ref 3.77–5.28)
SODIUM SERPL-SCNC: 141 MMOL/L (ref 134–144)
TRIGL SERPL-MCNC: 98 MG/DL (ref 0–149)
TSH SERPL DL<=0.005 MIU/L-ACNC: 1.37 UIU/ML (ref 0.45–4.5)
VLDLC SERPL CALC-MCNC: 20 MG/DL (ref 5–40)
WBC # BLD AUTO: 8.4 X10E3/UL (ref 3.4–10.8)

## 2017-06-21 RX ORDER — ATENOLOL 50 MG/1
TABLET ORAL
Qty: 90 TAB | Refills: 1 | Status: SHIPPED | OUTPATIENT
Start: 2017-06-21 | End: 2017-12-25 | Stop reason: SDUPTHER

## 2017-06-21 RX ORDER — LOVASTATIN 20 MG/1
TABLET ORAL
Qty: 90 TAB | Refills: 1 | Status: SHIPPED | OUTPATIENT
Start: 2017-06-21 | End: 2017-12-25 | Stop reason: SDUPTHER

## 2017-06-21 NOTE — PROGRESS NOTES
Subjective:     Mary Inman is a 80 y.o. female who presents today with the following:  Chief Complaint   Patient presents with    Annual Wellness Visit     subsequent   6 month f/u for chronic medical conditions. Samantha Pan enjoys spending time with her grand children and daughter. COMPLIANT WITH MEDICATION:     HTN; Denies chest pain, dyspnea, palpitations, headache and blurred vision. Blood pressure  Some readings  are normotensive . IBS: No recent flare ups. Discussed diet and s/s to seek urgent care. HM: followed by Dr. Nehemias Lubin will request records  Declines Tdap. Will get pneumonia vaccine today and RX for zoster vaccine. ROS:  Gen: denies fever, chills, fatigue, weight loss, weight gain  HEENT:denies blurry vision, nasal congestion, sore throat  Resp: denies dypsnea, cough, wheezing  CV: denies chest pain radiating to the jaws or arms, palpitations, lower extremity edema  Abd: denies nausea, vomiting, diarrhea, constipation  Neuro: denies numbness/tingling  Endo: denies polyuria, polydipsia, heat/cold intolerance  Heme: no lymphadenopathy    Allergies   Allergen Reactions    Ambien [Zolpidem] Other (comments)     Legs got heavy    Bactrim [Sulfamethoprim Ds] Unknown (comments)     Patient cant remember her reaction    Hydroxyzine Other (comments)     Legs got heavy         Current Outpatient Prescriptions:     varicella zoster vacine live (VARICELLA-ZOSTER VACINE LIVE) 19,400 unit/0.65 mL susr injection, 1 Vial by SubCUTAneous route once for 1 dose., Disp: 0.65 mL, Rfl: 0    OTHER, Folate po, Disp: , Rfl:     LORATADINE (CLARITIN PO), Take  by mouth as needed. , Disp: , Rfl:     MELATONIN PO, Take  by mouth., Disp: , Rfl:     atenolol (TENORMIN) 50 mg tablet, Take 1 Tab by mouth daily. , Disp: 90 Tab, Rfl: 1    lovastatin (MEVACOR) 20 mg tablet, Take 1 Tab by mouth nightly., Disp: 90 Tab, Rfl: 1    triamcinolone (ARISTOCORT) 0.5 % topical cream, Apply  to affected area two (2) times a day. As needed for itching, Disp: 45 g, Rfl: 0    co-enzyme Q-10 (CO Q-10) 100 mg capsule, Take 100 mg by mouth daily. , Disp: , Rfl:     multivitamin (ONE A DAY) tablet, Take 1 Tab by mouth daily. , Disp: , Rfl:     cholecalciferol, vitamin D3, (VITAMIN D3) 2,000 unit tab, Take  by mouth., Disp: , Rfl:     cyanocobalamin (VITAMIN B-12) 1,000 mcg tablet, Take 1,000 mcg by mouth daily. , Disp: , Rfl:     BOSWELLIA AVA EXTRACT (BOSWELLIA AVA XT, BULK,), Take  by mouth., Disp: , Rfl:     OTHER, VEGGIES FOR LIFE FRUIT FOR LIFE, Disp: , Rfl:     magnesium 250 mg tab, Take  by mouth. Pt thinks she takes 200 mg, Disp: , Rfl:     CALCIUM CITRATE/VITAMIN D3 (CALCITRATE-VITAMIN D PO), Take  by mouth., Disp: , Rfl:     Omega-3-DHA-EPA-Fish Oil 1,000 mg (120 mg-180 mg) cap, Take  by mouth., Disp: , Rfl:     ASCORBIC ACID (VITAMIN C WITH YOUSIF HIPS PO), Take  by mouth., Disp: , Rfl:     LACTOBAC CMB #3/FOS/PANTETHINE (PROBIOTIC AND ACIDOPHILUS PO), Take  by mouth., Disp: , Rfl:     biotin 2,500 mcg tab, Take  by mouth., Disp: , Rfl:     aspirin 81 mg chewable tablet, Take 1 Tab by mouth daily. , Disp: 100 Tab, Rfl: 2    Past Medical History:   Diagnosis Date    Colitis 2013    C diff; no recent flare, normal colonoscopy 2014    DJD (degenerative joint disease)     HTN (hypertension)     Hyperlipemia     Ventricular ectopy     no symptoms       Past Surgical History:   Procedure Laterality Date    HX CATARACT REMOVAL      bilat    HX COLONOSCOPY  2014    WNL    HX ORTHOPAEDIC  2005    right femur fx    HX ORTHOPAEDIC  2012    Lt TKA    HX REFRACTIVE SURGERY  06/15/2017    rt       History   Smoking Status    Never Smoker   Smokeless Tobacco    Not on file       Social History     Social History    Marital status:      Spouse name: N/A    Number of children: N/A    Years of education: N/A     Social History Main Topics    Smoking status: Never Smoker    Smokeless tobacco: None    Alcohol use No    Drug use: None    Sexual activity: Not Asked     Other Topics Concern     Service No    Blood Transfusions Yes    Caffeine Concern No    Occupational Exposure No    Hobby Hazards No    Sleep Concern Yes     insomnia    Stress Concern No    Weight Concern Yes     over weight    Special Diet No    Back Care No    Exercise Yes    Bike Helmet No    Seat Belt Yes    Self-Exams Yes     Social History Narrative       Family History   Problem Relation Age of Onset    Cancer Mother      Leukemia    Heart Disease Mother     Cancer Sister      Ovarian    No Known Problems Brother     Heart Failure Brother     Cancer Brother      Brain tumor         Objective:     Visit Vitals    /86 (BP 1 Location: Left arm, BP Patient Position: Sitting)    Pulse 68    Temp (!) 45.3 °F (7.4 °C) (Temporal)    Resp 18    Ht 5' 4\" (1.626 m)    Wt 161 lb (73 kg)    SpO2 96%    BMI 27.64 kg/m2     Body mass index is 27.64 kg/(m^2). General: Alert and oriented. No acute distress. Well nourished  HEENT :  Ears:TMs are normal. Canals are clear. Eyes: pupils equal, round, react to light and accommodation. Extra ocular movements intact. Nose: patent. Mouth and throat is clear. Neck:supple full range of motion no thyromegaly. Trachea midline, No carotid bruits. No significant lymphadenopathy  Lungs[de-identified] clear to auscultation without wheezes, rales, or rhonchi. Heart :RRR, S1 & S2 are normal intensity. No murmur; no gallop  Abdomen: bowel sounds active. No tenderness, guarding, rebound, masses, hepatic or spleen enlargement  Back: no CVA tenderness. Extremities: without clubbing, cyanosis, or edema  Pulses: radial and femoral pulses are normal  Neuro: HMF intact.  Cranial nerves II through XII grossly normal.  Motor: is 5 over 5 and symmetrical.   Deep tendon reflexes: +2 equal    Results for orders placed or performed in visit on 70/45/21   METABOLIC PANEL, COMPREHENSIVE   Result Value Ref Range Glucose 109 (H) 65 - 99 mg/dL    BUN 13 8 - 27 mg/dL    Creatinine 0.67 0.57 - 1.00 mg/dL    GFR est non-AA 80 >59 mL/min/1.73    GFR est AA 92 >59 mL/min/1.73    BUN/Creatinine ratio 19 11 - 26    Sodium 139 134 - 144 mmol/L    Potassium 4.0 3.5 - 5.2 mmol/L    Chloride 99 96 - 106 mmol/L    CO2 25 18 - 29 mmol/L    Calcium 9.4 8.7 - 10.3 mg/dL    Protein, total 7.1 6.0 - 8.5 g/dL    Albumin 4.0 3.5 - 4.7 g/dL    GLOBULIN, TOTAL 3.1 1.5 - 4.5 g/dL    A-G Ratio 1.3 1.1 - 2.5    Bilirubin, total 0.5 0.0 - 1.2 mg/dL    Alk. phosphatase 137 (H) 39 - 117 IU/L    AST (SGOT) 26 0 - 40 IU/L    ALT (SGPT) 16 0 - 32 IU/L       No results found for this visit on 06/20/17. Assessment/ Plan:     Mariia Torres was seen today for annual wellness visit.     Diagnoses and all orders for this visit:    Encounter for immunization  -     PNEUMOCOCCAL POLYSACCHARIDE VACCINE, 23-VALENT, ADULT OR IMMUNOSUPPRESSED PT DOSE,  -     NV IMMUNIZ ADMIN,1 SINGLE/COMB VAC/TOXOID  -     METABOLIC PANEL, COMPREHENSIVE  -     LIPID PANEL  -     TSH 3RD GENERATION  -     COLLECTION VENOUS BLOOD,VENIPUNCTURE    Encounter for Medicare annual wellness exam  -     PNEUMOCOCCAL POLYSACCHARIDE VACCINE, 23-VALENT, ADULT OR IMMUNOSUPPRESSED PT DOSE,  -     NV IMMUNIZ ADMIN,1 SINGLE/COMB VAC/TOXOID  -     METABOLIC PANEL, COMPREHENSIVE  -     LIPID PANEL  -     TSH 3RD GENERATION  -     COLLECTION VENOUS BLOOD,VENIPUNCTURE    Essential hypertension  -     METABOLIC PANEL, COMPREHENSIVE  -     LIPID PANEL  -     TSH 3RD GENERATION  -     COLLECTION VENOUS BLOOD,VENIPUNCTURE    Colitis  -     METABOLIC PANEL, COMPREHENSIVE  -     LIPID PANEL  -     TSH 3RD GENERATION  -     COLLECTION VENOUS BLOOD,VENIPUNCTURE  -     CBC WITH AUTOMATED DIFF    Pure hypercholesterolemia  -     METABOLIC PANEL, COMPREHENSIVE  -     LIPID PANEL  -     TSH 3RD GENERATION  -     COLLECTION VENOUS BLOOD,VENIPUNCTURE    Bilateral impacted cerumen  -     REMOVAL IMPACTED CERUMEN IRRIGATION/LVG UNILAT    Other orders  -     varicella zoster vacine live (VARICELLA-ZOSTER VACINE LIVE) 19,400 unit/0.65 mL susr injection; 1 Vial by SubCUTAneous route once for 1 dose. 1. Encounter for immunization    2. Encounter for Medicare annual wellness exam    3. Essential hypertension    4. Colitis    5. Pure hypercholesterolemia    6. Bilateral impacted cerumen        Orders Placed This Encounter    COLLECTION VENOUS BLOOD,VENIPUNCTURE    REMOVAL IMPACTED CERUMEN IRRIGATION/LVG UNILAT    PNEUMOCOCCAL POLYSACCHARIDE VACCINE, 23-VALENT, ADULT OR IMMUNOSUPPRESSED PT DOSE,    METABOLIC PANEL, COMPREHENSIVE    LIPID PANEL    TSH 3RD GENERATION    CBC WITH AUTOMATED DIFF    MT IMMUNIZ ADMIN,1 SINGLE/COMB VAC/TOXOID    varicella zoster vacine live (VARICELLA-ZOSTER VACINE LIVE) 19,400 unit/0.65 mL susr injection     Si Vial by SubCUTAneous route once for 1 dose. Dispense:  0.65 mL     Refill:  0         Verbal and written instructions (see AVS) provided.  Patient expresses understanding of diagnosis and treatment plan. Ediliachanning WrightDIGNA king        Dennise Inman is a 80 y.o. female and presents for annual Medicare Wellness Visit. Problem List: Reviewed with patient and discussed risk factors.     Patient Active Problem List   Diagnosis Code    HTN (hypertension) I10    DJD (degenerative joint disease) M19.90    Colitis K52.9    Ventricular ectopy I49.3    Hyperlipemia E78.5    Primary osteoarthritis M19.91    Abnormal EKG R94.31    PAC (premature atrial contraction) I49.1       Current medical providers:  Patient Care Team:  Poncho Clark NP as PCP - General (Nurse Practitioner)  Milo Ellis MD (Cardiology)  Gaurav Quezada MD (Orthopedic Surgery)    [address] [place] [address] [place]: Reviewed with patient  Past Surgical History:   Procedure Laterality Date    HX CATARACT REMOVAL      bilat    HX COLONOSCOPY      WNL    HX ORTHOPAEDIC      right femur fx    HX ORTHOPAEDIC      Lt TKA    HX REFRACTIVE SURGERY  06/15/2017    rt        SH: Reviewed with patient  Social History   Substance Use Topics    Smoking status: Never Smoker    Smokeless tobacco: None    Alcohol use No       FH: Reviewed with patient  Family History   Problem Relation Age of Onset    Cancer Mother      Leukemia    Heart Disease Mother     Cancer Sister      Ovarian    No Known Problems Brother     Heart Failure Brother     Cancer Brother      Brain tumor       Medications/Allergies: Reviewed with patient  Current Outpatient Prescriptions on File Prior to Visit   Medication Sig Dispense Refill    OTHER Folate po      LORATADINE (CLARITIN PO) Take  by mouth as needed.  MELATONIN PO Take  by mouth.  atenolol (TENORMIN) 50 mg tablet Take 1 Tab by mouth daily. 90 Tab 1    lovastatin (MEVACOR) 20 mg tablet Take 1 Tab by mouth nightly. 90 Tab 1    triamcinolone (ARISTOCORT) 0.5 % topical cream Apply  to affected area two (2) times a day. As needed for itching 45 g 0    co-enzyme Q-10 (CO Q-10) 100 mg capsule Take 100 mg by mouth daily.  multivitamin (ONE A DAY) tablet Take 1 Tab by mouth daily.  cholecalciferol, vitamin D3, (VITAMIN D3) 2,000 unit tab Take  by mouth.  cyanocobalamin (VITAMIN B-12) 1,000 mcg tablet Take 1,000 mcg by mouth daily.  BOSWELLIA AVA EXTRACT (BOSWELLIA AVA XT, BULK,) Take  by mouth.  OTHER VEGGIES FOR LIFE  FRUIT FOR LIFE      magnesium 250 mg tab Take  by mouth. Pt thinks she takes 200 mg      CALCIUM CITRATE/VITAMIN D3 (CALCITRATE-VITAMIN D PO) Take  by mouth.  Omega-3-DHA-EPA-Fish Oil 1,000 mg (120 mg-180 mg) cap Take  by mouth.  ASCORBIC ACID (VITAMIN C WITH YOUSIF HIPS PO) Take  by mouth.  LACTOBAC CMB #3/FOS/PANTETHINE (PROBIOTIC AND ACIDOPHILUS PO) Take  by mouth.  biotin 2,500 mcg tab Take  by mouth.  aspirin 81 mg chewable tablet Take 1 Tab by mouth daily.  100 Tab 2     No current facility-administered medications on file prior to visit. Allergies   Allergen Reactions    Ambien [Zolpidem] Other (comments)     Legs got heavy    Bactrim [Sulfamethoprim Ds] Unknown (comments)     Patient cant remember her reaction    Hydroxyzine Other (comments)     Legs got heavy       Objective:  Visit Vitals    /86 (BP 1 Location: Left arm, BP Patient Position: Sitting)    Pulse 68    Temp (!) 45.3 °F (7.4 °C) (Temporal)    Resp 18    Ht 5' 4\" (1.626 m)    Wt 161 lb (73 kg)    SpO2 96%    BMI 27.64 kg/m2    Body mass index is 27.64 kg/(m^2). Assessment of cognitive impairment: Alert and oriented x 3    Depression Screen:   PHQ over the last two weeks 6/20/2017   Little interest or pleasure in doing things Not at all   Feeling down, depressed or hopeless Not at all   Total Score PHQ 2 0       Fall Risk Assessment:    Fall Risk Assessment, last 12 mths 6/20/2017   Able to walk? Yes   Fall in past 12 months? No       Functional Ability:   Does the patient exhibit a steady gait? yes   How long did it take the patient to get up and walk from a sitting position? 4 seconds   Is the patient self reliant?  (ie can do own laundry, meals, household chores)  yes     Does the patient handle his/her own medications? yes     Does the patient handle his/her own money? yes     Is the patients home safe (ie good lighting, handrails on stairs and bath, etc.)? yes     Did you notice or did patient express any hearing difficulties? no     Did you notice or did patient express any vision difficulties? yes   recent laser surgery rt eye   Were distance and reading eye charts used? no       Advance Care Planning:   Patient was offered the opportunity to discuss advance care planning:  yes     Does patient have an Advance Directive:  no   If no, did you provide information on C[facility] Connections?   yes       Plan:      Orders Placed This Encounter    COLLECTION VENOUS BLOOD,VENIPUNCTURE    REMOVAL IMPACTED CERUMEN IRRIGATION/LVG UNILAT    PNEUMOCOCCAL POLYSACCHARIDE VACCINE, 23-VALENT, ADULT OR IMMUNOSUPPRESSED PT DOSE,    METABOLIC PANEL, COMPREHENSIVE    LIPID PANEL    TSH 3RD GENERATION    CBC WITH AUTOMATED DIFF    PA IMMUNIZ ADMIN,1 SINGLE/COMB VAC/TOXOID    varicella zoster vacine live (VARICELLA-ZOSTER VACINE LIVE) 19,400 unit/0.65 mL susr injection       Health Maintenance   Topic Date Due    ZOSTER VACCINE AGE 60>  03/07/1990    GLAUCOMA SCREENING Q2Y  03/07/1995    Pneumococcal 65+ Low/Medium Risk (2 of 2 - PPSV23) 11/11/2016    INFLUENZA AGE 9 TO ADULT  08/01/2017    MEDICARE YEARLY EXAM  06/21/2018    DTaP/Tdap/Td series (2 - Td) 06/20/2027    OSTEOPOROSIS SCREENING (DEXA)  Addressed       *Patient verbalized understanding and agreement with the plan. A copy of the After Visit Summary with personalized health plan was given to the patient today. RTO in 6 months or sooner as needed.     [employer] FNP-C

## 2017-06-21 NOTE — PROGRESS NOTES
Bilateral cerumen impaction irrigation with  partial clearing. Discussed using debrox with f/u nursing visit.      [employer] NP-C

## 2017-10-27 ENCOUNTER — CLINICAL SUPPORT (OUTPATIENT)
Dept: FAMILY MEDICINE CLINIC | Age: 82
End: 2017-10-27

## 2017-10-27 DIAGNOSIS — Z23 ENCOUNTER FOR IMMUNIZATION: ICD-10-CM

## 2017-10-27 NOTE — PATIENT INSTRUCTIONS
Vaccine Information Statement    Influenza (Flu) Vaccine (Inactivated or Recombinant): What you need to know    Many Vaccine Information Statements are available in [place] and other languages. See www.immunize.org/vis  Hojas de Información Sobre Vacunas están disponibles en Español y en muchos otros idiomas. Visite www.immunize.org/vis    1. Why get vaccinated? Influenza (flu) is a contagious disease that spreads around the [place] every year, usually between October and May. Flu is caused by influenza viruses, and is spread mainly by coughing, sneezing, and close contact. Anyone can get flu. Flu strikes suddenly and can last several days. Symptoms vary by age, but can include:   fever/chills   sore throat   muscle aches   fatigue   cough   headache    runny or stuffy nose    Flu can also lead to pneumonia and blood infections, and cause diarrhea and seizures in children. If you have a medical condition, such as heart or lung disease, flu can make it worse. Flu is more dangerous for some people. Infants and young children, people 72years of age and older, pregnant women, and people with certain health conditions or a weakened immune system are at greatest risk. Each year thousands of people in the [facility] die from flu, and many more are hospitalized. Flu vaccine can:   keep you from getting flu,   make flu less severe if you do get it, and   keep you from spreading flu to your family and other people. 2. Inactivated and recombinant flu vaccines    A dose of flu vaccine is recommended every flu season. Children 6 months through 6years of age may need two doses during the same flu season. Everyone else needs only one dose each flu season.        Some inactivated flu vaccines contain a very small amount of a mercury-based preservative called thimerosal. Studies have not shown thimerosal in vaccines to be harmful, but flu vaccines that do not contain thimerosal are available. There is no live flu virus in flu shots. They cannot cause the flu. There are many flu viruses, and they are always changing. Each year a new flu vaccine is made to protect against three or four viruses that are likely to cause disease in the upcoming flu season. But even when the vaccine doesnt exactly match these viruses, it may still provide some protection    Flu vaccine cannot prevent:   flu that is caused by a virus not covered by the vaccine, or   illnesses that look like flu but are not. It takes about 2 weeks for protection to develop after vaccination, and protection lasts through the flu season. 3. Some people should not get this vaccine    Tell the person who is giving you the vaccine:     If you have any severe, life-threatening allergies. If you ever had a life-threatening allergic reaction after a dose of flu vaccine, or have a severe allergy to any part of this vaccine, you may be advised not to get vaccinated. Most, but not all, types of flu vaccine contain a small amount of egg protein.  If you ever had Guillain-Barré Syndrome (also called GBS). Some people with a history of GBS should not get this vaccine. This should be discussed with your doctor.  If you are not feeling well. It is usually okay to get flu vaccine when you have a mild illness, but you might be asked to come back when you feel better. 4. Risks of a vaccine reaction    With any medicine, including vaccines, there is a chance of reactions. These are usually mild and go away on their own, but serious reactions are also possible. Most people who get a flu shot do not have any problems with it.      Minor problems following a flu shot include:    soreness, redness, or swelling where the shot was given     hoarseness   sore, red or itchy eyes   cough   fever   aches   headache   itching   fatigue  If these problems occur, they usually begin soon after the shot and last 1 or 2 days. More serious problems following a flu shot can include the following:     There may be a small increased risk of Guillain-Barré Syndrome (GBS) after inactivated flu vaccine. This risk has been estimated at 1 or 2 additional cases per million people vaccinated. This is much lower than the risk of severe complications from flu, which can be prevented by flu vaccine.  Young children who get the flu shot along with pneumococcal vaccine (PCV13) and/or DTaP vaccine at the same time might be slightly more likely to have a seizure caused by fever. Ask your doctor for more information. Tell your doctor if a child who is getting flu vaccine has ever had a seizure. Problems that could happen after any injected vaccine:      People sometimes faint after a medical procedure, including vaccination. Sitting or lying down for about 15 minutes can help prevent fainting, and injuries caused by a fall. Tell your doctor if you feel dizzy, or have vision changes or ringing in the ears.  Some people get severe pain in the shoulder and have difficulty moving the arm where a shot was given. This happens very rarely.  Any medication can cause a severe allergic reaction. Such reactions from a vaccine are very rare, estimated at about 1 in a million doses, and would happen within a few minutes to a few hours after the vaccination. As with any medicine, there is a very remote chance of a vaccine causing a serious injury or death. The safety of vaccines is always being monitored. For more information, visit: www.cdc.gov/vaccinesafety/    5. What if there is a serious reaction? What should I look for?  Look for anything that concerns you, such as signs of a severe allergic reaction, very high fever, or unusual behavior.     Signs of a severe allergic reaction can include hives, swelling of the face and throat, difficulty breathing, a fast heartbeat, dizziness, and weakness - usually within a few minutes to a few hours after the vaccination. What should I do?  If you think it is a severe allergic reaction or other emergency that cant wait, call 9-1-1 and get the person to the nearest hospital. Otherwise, call your doctor.  Reactions should be reported to the Vaccine Adverse Event Reporting System (VAERS). Your doctor should file this report, or you can do it yourself through  the VAERS web site at www.vaers. [facility].gov, or by calling 8-717.937.2506. VAERS does not give medical advice. 6. The National Vaccine Injury Compensation Program    The [facility] Vaccine Injury Compensation Program (VICP) is a federal program that was created to compensate people who may have been injured by certain vaccines. Persons who believe they may have been injured by a vaccine can learn about the program and about filing a claim by calling 9-831.492.7359 or visiting the [employer] website at www.[facility].gov/vaccinecompensation. There is a time limit to file a claim for compensation. 7. How can I learn more?  Ask your healthcare provider. He or she can give you the vaccine package insert or suggest other sources of information.  Call your local or state health department.  Contact the Centers for Disease Control and Prevention (CDC):  - Call 3-958.507.5496 (1-800-CDC-INFO) or  - Visit CDCs website at www.cdc.gov/flu    Vaccine Information Statement   Inactivated Influenza Vaccine   8/7/2015  [id number]    Department of Health and Human Services  Centers for Disease Control and Prevention    Office Use Only

## 2017-10-27 NOTE — MR AVS SNAPSHOT
Visit Information Date & Time Provider Department Dept. Phone Encounter #  
 10/27/2017  1:00 PM [facility] [address] [facility] [phone] [id number] Your Appointments 11/15/2017 10:20 AM  
ESTABLISHED PATIENT with Alethea Leon MD  
[address] [facility] [place] [facility] [address]) Appt Note: 6 MO FU $0CP  
 [address] [facility] [address] [id number] [phone]  
  
   
 [address] [place] [address] [address] [address]  
  
    
 12/19/2017  8:30 AM  
ESTABLISHED PATIENT with Trinh Umana NP  
[address] [place] ([address]) Appt Note: 6 mo F/U  
 [address] [address]  
[address] [facility] [address] [facility] [address] Upcoming Health Maintenance Date Due ZOSTER VACCINE AGE 60> 1/7/1990 INFLUENZA AGE 9 TO ADULT 8/1/2017 MEDICARE YEARLY EXAM 6/21/2018 GLAUCOMA SCREENING Q2Y 6/2/2019 DTaP/Tdap/Td series (2 - Td) 6/20/2027 Allergies as of 10/27/2017  Review Complete On: 6/20/2017 By: Trinh Umana NP Severity Noted Reaction Type Reactions Ambien [Zolpidem]  03/08/2016    Other (comments) Legs got heavy Bactrim [Sulfamethoprim Ds]  12/20/2016    Unknown (comments) Patient cant remember her reaction Hydroxyzine  06/23/2016    Other (comments) Legs got heavy Current Immunizations  Never Reviewed Name Date Influenza High Dose Vaccine PF 10/3/2016, 10/9/2015 12:00 AM  
 Influenza Vaccine 10/6/2014 12:00 AM, 9/18/2013 12:00 AM, 11/26/2012 12:00 AM, 10/2/2012 12:00 AM, 11/10/2011 12:00 AM, 10/26/2010 12:00 AM, 9/17/2009 12:00 AM, 10/8/2008 12:00 AM  
 Pneumococcal Conjugate (PCV-13) 11/11/2015 12:00 AM  
 Pneumococcal Polysaccharide (PPSV-23) 6/20/2017, 11/11/2011 12:00 AM, 4/11/2011 12:00 AM  
 Td 11/29/2011 Tdap 11/29/2011 12:00 AM  
  
 Not reviewed this visit Vitals OB Status Smoking Status Menopause Never Smoker Your Updated Medication List  
  
   
This list is accurate as of: 10/27/17  1:11 PM.  Always use your most recent med list.  
  
  
  
  
 aspirin 81 mg chewable tablet Take 1 Tab by mouth daily. atenolol 50 mg tablet Commonly known as:  TENORMIN Take 1 tablet by mouth  daily  
  
 biotin 2,500 mcg Tab Take  by mouth. BOSWELLIA AVA XT (BULK) Take  by mouth. CALCITRATE-VITAMIN D PO Take  by mouth. CLARITIN PO Take  by mouth as needed. CO Q-10 100 mg capsule Generic drug:  co-enzyme Q-10 Take 100 mg by mouth daily. lovastatin 20 mg tablet Commonly known as:  MEVACOR Take 1 tablet by mouth  nightly  
  
 magnesium 250 mg Tab Take  by mouth. Pt thinks she takes 200 mg  
  
 MELATONIN PO Take  by mouth.  
  
 multivitamin tablet Commonly known as:  ONE A DAY Take 1 Tab by mouth daily. Omega-3-DHA-EPA-Fish Oil 1,000 mg (120 mg-180 mg) Cap Take  by mouth. * OTHER  
VEGGIES FOR LIFE FRUIT FOR LIFE  
  
 * OTHER Folate po PROBIOTIC AND ACIDOPHILUS PO Take  by mouth.  
  
 triamcinolone 0.5 % topical cream  
Commonly known as:  ARISTOCORT Apply  to affected area two (2) times a day. As needed for itching VITAMIN B-12 1,000 mcg tablet Generic drug:  cyanocobalamin Take 1,000 mcg by mouth daily. VITAMIN C WITH YOUSIF HIPS PO Take  by mouth. VITAMIN D3 2,000 unit Tab Generic drug:  cholecalciferol (vitamin D3) Take  by mouth. * Notice: This list has 2 medication(s) that are the same as other medications prescribed for you. Read the directions carefully, and ask your doctor or other care provider to review them with you. Introducing [facility] & HEALTH SERVICES! Brett Glover introduces Voxel.pl patient portal. Now you can access parts of your medical record, email your doctor's office, and request medication refills online. 1. In your internet browser, go to [email or web address] 2. Click on the First Time User? Click Here link in the Sign In box. You will see the New Member Sign Up page. 3. Enter your Sifteo Access Code exactly as it appears below. You will not need to use this code after youve completed the sign-up process. If you do not sign up before the expiration date, you must request a new code. · Sifteo Access Code: [email or web address] Expires: 1/25/2018  1:11 PM 
 
4. Enter the last four digits of your Social Security Number (xxxx) and Date of Birth (mm/dd/yyyy) as indicated and click Submit. You will be taken to the next sign-up page. 5. Create a Yurpyt ID. This will be your Sifteo login ID and cannot be changed, so think of one that is secure and easy to remember. 6. Create a Sifteo password. You can change your password at any time. 7. Enter your Password Reset Question and Answer. This can be used at a later time if you forget your password. 8. Enter your e-mail address. You will receive e-mail notification when new information is available in [address] 9. Click Sign Up. You can now view and download portions of your medical record. 10. Click the Download Summary menu link to download a portable copy of your medical information. If you have questions, please visit the Frequently Asked Questions section of the Sifteo website. Remember, Sifteo is NOT to be used for urgent needs. For medical emergencies, dial 911. Now available from your iPhone and Android! Please provide this summary of care documentation to your next provider. Your primary care clinician is listed as Alonso Ochoa. If you have any questions after today's visit, please call [phone].

## 2017-11-15 ENCOUNTER — OFFICE VISIT (OUTPATIENT)
Dept: CARDIOLOGY CLINIC | Age: 82
End: 2017-11-15

## 2017-11-15 VITALS
HEIGHT: 64 IN | SYSTOLIC BLOOD PRESSURE: 134 MMHG | WEIGHT: 164 LBS | DIASTOLIC BLOOD PRESSURE: 82 MMHG | RESPIRATION RATE: 14 BRPM | HEART RATE: 64 BPM | BODY MASS INDEX: 28 KG/M2 | OXYGEN SATURATION: 97 %

## 2017-11-15 DIAGNOSIS — R94.31 ABNORMAL EKG: ICD-10-CM

## 2017-11-15 DIAGNOSIS — I49.3 PVC'S (PREMATURE VENTRICULAR CONTRACTIONS): ICD-10-CM

## 2017-11-15 DIAGNOSIS — M19.91 PRIMARY OSTEOARTHRITIS, UNSPECIFIED SITE: ICD-10-CM

## 2017-11-15 DIAGNOSIS — I10 ESSENTIAL HYPERTENSION: Primary | ICD-10-CM

## 2017-11-15 DIAGNOSIS — E78.00 PURE HYPERCHOLESTEROLEMIA: ICD-10-CM

## 2017-11-15 DIAGNOSIS — I49.1 PAC (PREMATURE ATRIAL CONTRACTION): ICD-10-CM

## 2017-11-15 NOTE — PROGRESS NOTES
Ashley Inman is a 80 y.o. female is here for routine f/u. Hx hypertension, dyslipidemia, PVC's, PAC's, DJD without known hx CAD/MI/CHF/valvular heart disease. Hx DJD, s/p prior TKR and hip fracture, s/p sgy. Had pre-op EKG last fall abnormal (PAC's, PVC's, possible old ASMI. Has rare, brief intermittent CP, non-exertional/atypical episode. Occasional palpitations. Had [place] MPI l10/16 with minimally reversible apical defect, normal LVEF. . The patient denies exertional chest pain,  shortness of breath, orthopnea, PND, LE edema, syncope, presyncope or fatigue.        Patient Active Problem List    Diagnosis Date Noted    Abnormal EKG 10/13/2016    PAC (premature atrial contraction) 10/13/2016    Primary osteoarthritis 10/03/2016    HTN (hypertension)     DJD (degenerative joint disease)     Colitis     Ventricular ectopy     Hyperlipemia       Dev Leahy, HANNAH  Past Medical History:   Diagnosis Date    Colitis 2013    C diff; no recent flare, normal colonoscopy 2014    DJD (degenerative joint disease)     HTN (hypertension)     Hyperlipemia     Ventricular ectopy     no symptoms      Past Surgical History:   Procedure Laterality Date    HX CATARACT REMOVAL      bilat    HX COLONOSCOPY  2014    WNL    HX ORTHOPAEDIC  2005    right femur fx    HX ORTHOPAEDIC  2012    Lt TKA    HX REFRACTIVE SURGERY  06/15/2017    rt     Allergies   Allergen Reactions    Ambien [Zolpidem] Other (comments)     Legs got heavy    Bactrim [Sulfamethoprim Ds] Unknown (comments)     Patient cant remember her reaction    Hydroxyzine Other (comments)     Legs got heavy      Family History   Problem Relation Age of Onset    Cancer Mother      Leukemia    Heart Disease Mother     Cancer Sister      Ovarian    No Known Problems Brother     Heart Failure Brother     Cancer Brother      Brain tumor      Social History     Social History    Marital status:      Spouse name: N/A    Number of children: N/A    Years of education: N/A     Occupational History    Not on file. Social History Main Topics    Smoking status: Never Smoker    Smokeless tobacco: Not on file    Alcohol use No    Drug use: Not on file    Sexual activity: Not on file     Other Topics Concern     Service No    Blood Transfusions Yes    Caffeine Concern No    Occupational Exposure No    Hobby Hazards No    Sleep Concern Yes     insomnia    Stress Concern No    Weight Concern Yes     over weight    Special Diet No    Back Care No    Exercise Yes    Bike Helmet No    Seat Belt Yes    Self-Exams Yes     Social History Narrative      Current Outpatient Prescriptions   Medication Sig    atenolol (TENORMIN) 50 mg tablet Take 1 tablet by mouth  daily    lovastatin (MEVACOR) 20 mg tablet Take 1 tablet by mouth  nightly    OTHER daily. Folate po     LORATADINE (CLARITIN PO) Take  by mouth as needed.  MELATONIN PO Take  by mouth.  triamcinolone (ARISTOCORT) 0.5 % topical cream Apply  to affected area two (2) times a day. As needed for itching    co-enzyme Q-10 (CO Q-10) 100 mg capsule Take 100 mg by mouth daily.  multivitamin (ONE A DAY) tablet Take 1 Tab by mouth daily.  cholecalciferol, vitamin D3, (VITAMIN D3) 2,000 unit tab Take  by mouth daily.  cyanocobalamin (VITAMIN B-12) 1,000 mcg tablet Take 1,000 mcg by mouth daily.  BOSWELLIA AVA EXTRACT (BOSWELLIA AVA XT, BULK,) Take  by mouth daily.  OTHER VEGGIES FOR LIFE AND FRUIT FOR LIFE , TAKES ONE A DAY EACH    magnesium 250 mg tab Take  by mouth. Pt thinks she takes 200 mg    CALCIUM CITRATE/VITAMIN D3 (CALCITRATE-VITAMIN D PO) Take  by mouth daily.  Omega-3-DHA-EPA-Fish Oil 1,000 mg (120 mg-180 mg) cap Take  by mouth daily.  ASCORBIC ACID (VITAMIN C WITH YOUSIF HIPS PO) Take  by mouth daily.  biotin 2,500 mcg tab Take  by mouth daily.  aspirin 81 mg chewable tablet Take 1 Tab by mouth daily.      No current facility-administered medications for this visit. Review of Symptoms:    CONST  No weight change. No fever, chills, sweats    ENT No visual changes, URI sx, sore throat    CV  See HPI   RESP  No cough, or sputum, wheezing. Also see HPI   GI  No abdominal pain or change in bowel habits. No heartburn or dysphagia. No melena or rectal bleeding.   No dysuria, urgency, frequency, hematuria   MSKEL  No joint pain, swelling. No muscle pain. SKIN  No rash or lesions. NEURO  No headache, syncope, or seizure. No weakness, loss of sensation, or paresthesias. PSYCH  No low mood or depression  No anxiety. HE/LYMPH  No easy bruising, abnormal bleeding, or enlarged glands.         Physical ExamPhysical Exam:    Visit Vitals    Ht 5' 4\" (1.626 m)     Gen: NAD  HEENT:  PERRL, throat clear  Neck: no adenopathy, no thyromegaly, no JVD   Heart:  Regular,Nl S1S2,  no murmur, gallop or rub.   Lungs:  clear  Abdomen:   Soft, non-tender, bowel sounds are active.   Extremities:  No edema  Pulse: symmetric  Neuro: A&O times 3, No focal neuro deficits    Labs:   Lab Results   Component Value Date/Time    Sodium 141 06/20/2017 09:29 AM    Sodium 139 01/27/2017 01:02 PM    Sodium 141 12/20/2016 11:12 AM    Sodium 141 06/23/2016 10:23 AM    Potassium 4.3 06/20/2017 09:29 AM    Potassium 4.0 01/27/2017 01:02 PM    Potassium 4.0 12/20/2016 11:12 AM    Potassium 4.9 06/23/2016 10:23 AM    Chloride 104 06/20/2017 09:29 AM    Chloride 99 01/27/2017 01:02 PM    Chloride 100 12/20/2016 11:12 AM    Chloride 101 06/23/2016 10:23 AM    CO2 25 06/20/2017 09:29 AM    CO2 25 01/27/2017 01:02 PM    CO2 25 12/20/2016 11:12 AM    CO2 27 06/23/2016 10:23 AM    Glucose 105 06/20/2017 09:29 AM    Glucose 109 01/27/2017 01:02 PM    Glucose 108 12/20/2016 11:12 AM    Glucose 104 06/23/2016 10:23 AM    BUN 21 06/20/2017 09:29 AM    BUN 13 01/27/2017 01:02 PM    BUN 15 12/20/2016 11:12 AM    BUN 17 06/23/2016 10:23 AM    Creatinine 0.74 06/20/2017 09:29 AM    Creatinine 0.67 01/27/2017 01:02 PM    Creatinine 0.72 12/20/2016 11:12 AM    Creatinine 0.79 06/23/2016 10:23 AM    BUN/Creatinine ratio 28 06/20/2017 09:29 AM    BUN/Creatinine ratio 19 01/27/2017 01:02 PM    BUN/Creatinine ratio 21 12/20/2016 11:12 AM    BUN/Creatinine ratio 22 06/23/2016 10:23 AM    GFR est AA 84 06/20/2017 09:29 AM    GFR est AA 92 01/27/2017 01:02 PM    GFR est AA 88 12/20/2016 11:12 AM    GFR est AA 78 06/23/2016 10:23 AM    GFR est non-AA 73 06/20/2017 09:29 AM    GFR est non-AA 80 01/27/2017 01:02 PM    GFR est non-AA 76 12/20/2016 11:12 AM    GFR est non-AA 68 06/23/2016 10:23 AM    Calcium 9.0 06/20/2017 09:29 AM    Calcium 9.4 01/27/2017 01:02 PM    Calcium 9.4 12/20/2016 11:12 AM    Calcium 9.5 06/23/2016 10:23 AM    Bilirubin, total 0.6 06/20/2017 09:29 AM    Bilirubin, total 0.5 01/27/2017 01:02 PM    Bilirubin, total 0.5 12/20/2016 11:12 AM    Bilirubin, total 0.5 06/23/2016 10:23 AM    AST (SGOT) 21 06/20/2017 09:29 AM    AST (SGOT) 26 01/27/2017 01:02 PM    AST (SGOT) 22 12/20/2016 11:12 AM    AST (SGOT) 20 06/23/2016 10:23 AM    Alk. phosphatase 101 06/20/2017 09:29 AM    Alk. phosphatase 137 01/27/2017 01:02 PM    Alk. phosphatase 154 12/20/2016 11:12 AM    Alk.  phosphatase 97 06/23/2016 10:23 AM    Protein, total 7.0 06/20/2017 09:29 AM    Protein, total 7.1 01/27/2017 01:02 PM    Protein, total 7.1 12/20/2016 11:12 AM    Protein, total 7.2 06/23/2016 10:23 AM    Albumin 4.3 06/20/2017 09:29 AM    Albumin 4.0 01/27/2017 01:02 PM    Albumin 4.0 12/20/2016 11:12 AM    Albumin 4.3 06/23/2016 10:23 AM    A-G Ratio 1.6 06/20/2017 09:29 AM    A-G Ratio 1.3 01/27/2017 01:02 PM    A-G Ratio 1.3 12/20/2016 11:12 AM    A-G Ratio 1.5 06/23/2016 10:23 AM    ALT (SGPT) 20 06/20/2017 09:29 AM    ALT (SGPT) 16 01/27/2017 01:02 PM    ALT (SGPT) 17 12/20/2016 11:12 AM    ALT (SGPT) 14 06/23/2016 10:23 AM     No results found for: CPK, CPKX, CPX  Lab Results Component Value Date/Time    Cholesterol, total 154 06/20/2017 09:29 AM    Cholesterol, total 159 12/20/2016 11:12 AM    Cholesterol, total 178 06/23/2016 10:23 AM    HDL Cholesterol 47 06/20/2017 09:29 AM    HDL Cholesterol 45 12/20/2016 11:12 AM    HDL Cholesterol 45 06/23/2016 10:23 AM    LDL, calculated 87 06/20/2017 09:29 AM    LDL, calculated 94 12/20/2016 11:12 AM    LDL, calculated 112 06/23/2016 10:23 AM    Triglyceride 98 06/20/2017 09:29 AM    Triglyceride 100 12/20/2016 11:12 AM    Triglyceride 104 06/23/2016 10:23 AM     No results found for this or any previous visit. Assessment:         Patient Active Problem List    Diagnosis Date Noted    Abnormal EKG 10/13/2016    PAC (premature atrial contraction) 10/13/2016    Primary osteoarthritis 10/03/2016    HTN (hypertension)     DJD (degenerative joint disease)     Colitis     Ventricular ectopy     Hyperlipemia      Hx hypertension, dyslipidemia, PVC's, PAC's, DJD without known hx CAD/MI/CHF/valvular heart disease. Hx DJD, s/p prior TKR and hip fracture, s/p sgy. Had pre-op EKG last fall abnormal (PAC's, PVC's, possible old ASMI. Has rare, brief intermittent CP, non-exertional/atypical episode. Occasional palpitations. Had [place] MPI l10/16 with minimally reversible apical defect, normal LVEF. Plan:     Doing well with no adverse cardiac symptoms. Lipids and labs followed by PCP. Continue current care and f/u in 6 months. Ok to stop ASA (bleeding/bruising).      Chelsi Aguirre MD

## 2017-11-15 NOTE — MR AVS SNAPSHOT
Visit Information Date & Time Provider Department Dept. Phone Encounter #  
 11/15/2017 10:20 AM Dania Degroot, [address] [facility]  Follow-up Instructions Return in about 6 months (around 5/15/2018). Follow-up and Disposition History Your Appointments 12/19/2017  8:30 AM  
ESTABLISHED PATIENT with Soledad Silva NP  
[address] [place] ([address]) Appt Note: 6 mo F/U  
 [address] [address]  
[address] [facility] [address] [address] Upcoming Health Maintenance Date Due ZOSTER VACCINE AGE 60> 1/7/1990 MEDICARE YEARLY EXAM 6/21/2018 GLAUCOMA SCREENING Q2Y 6/2/2019 DTaP/Tdap/Td series (2 - Td) 6/20/2027 Allergies as of 11/15/2017  Review Complete On: 11/15/2017 By: Dania Degroot MD  
  
 Severity Noted Reaction Type Reactions Ambien [Zolpidem]  03/08/2016    Other (comments) Legs got heavy Bactrim [Sulfamethoprim Ds]  12/20/2016    Unknown (comments) Patient cant remember her reaction Hydroxyzine  06/23/2016    Other (comments) Legs got heavy Current Immunizations  Never Reviewed Name Date Influenza High Dose Vaccine PF 10/27/2017, 10/3/2016, 10/9/2015 12:00 AM  
 Influenza Vaccine 10/6/2014 12:00 AM, 9/18/2013 12:00 AM, 11/26/2012 12:00 AM, 10/2/2012 12:00 AM, 11/10/2011 12:00 AM, 10/26/2010 12:00 AM, 9/17/2009 12:00 AM, 10/8/2008 12:00 AM  
 Pneumococcal Conjugate (PCV-13) 11/11/2015 12:00 AM  
 Pneumococcal Polysaccharide (PPSV-23) 6/20/2017, 11/11/2011 12:00 AM, 4/11/2011 12:00 AM  
 Td 11/29/2011 Tdap 11/29/2011 12:00 AM  
  
 Not reviewed this visit You Were Diagnosed With   
  
 Codes Comments Essential hypertension    -  Primary ICD-10-CM: I10 
ICD-9-CM: 401.9 PAC (premature atrial contraction)     ICD-10-CM: I49.1 ICD-9-CM: 427.61   
 Abnormal EKG     ICD-10-CM: R94.31 
ICD-9-CM: 794.31 PVC's (premature ventricular contractions)     ICD-10-CM: I49.3 ICD-9-CM: 427.69 Pure hypercholesterolemia     ICD-10-CM: E78.00 ICD-9-CM: 272.0 Primary osteoarthritis, unspecified site     ICD-10-CM: M19.91 
ICD-9-CM: 715.10 Vitals BP Pulse Resp Height(growth percentile) Weight(growth percentile) SpO2  
 134/82 (BP 1 Location: Left arm, BP Patient Position: Sitting) 64 14 5' 4\" (1.626 m) 164 lb (74.4 kg) 97% BMI OB Status Smoking Status 28.15 kg/m2 Menopause Never Smoker Vitals History BMI and BSA Data Body Mass Index Body Surface Area  
 28.15 kg/m 2 1.83 m 2 Preferred Pharmacy Pharmacy Name Phone [address] [facility], [address] [place] [address] [address] Your Updated Medication List  
  
   
This list is accurate as of: 11/15/17 11:01 AM.  Always use your most recent med list.  
  
  
  
  
 aspirin 81 mg chewable tablet Take 1 Tab by mouth daily. atenolol 50 mg tablet Commonly known as:  TENORMIN Take 1 tablet by mouth  daily  
  
 biotin 2,500 mcg Tab Take  by mouth daily. BOSWELLIA AVA XT (BULK) Take  by mouth daily. CALCITRATE-VITAMIN D PO Take  by mouth daily. CLARITIN PO Take  by mouth as needed. CO Q-10 100 mg capsule Generic drug:  co-enzyme Q-10 Take 100 mg by mouth daily. lovastatin 20 mg tablet Commonly known as:  MEVACOR Take 1 tablet by mouth  nightly  
  
 magnesium 250 mg Tab Take  by mouth. Pt thinks she takes 200 mg  
  
 MELATONIN PO Take  by mouth.  
  
 multivitamin tablet Commonly known as:  ONE A DAY Take 1 Tab by mouth daily. Omega-3-DHA-EPA-Fish Oil 1,000 mg (120 mg-180 mg) Cap Take  by mouth daily. * OTHER  
VEGGIES FOR LIFE AND FRUIT FOR LIFE , TAKES ONE A DAY EACH  
  
 * OTHER  
daily.  Folate po  
  
 triamcinolone 0.5 % topical cream  
 Commonly known as:  ARISTOCORT Apply  to affected area two (2) times a day. As needed for itching VITAMIN B-12 1,000 mcg tablet Generic drug:  cyanocobalamin Take 1,000 mcg by mouth daily. VITAMIN C WITH YOUSIF HIPS PO Take  by mouth daily. VITAMIN D3 2,000 unit Tab Generic drug:  cholecalciferol (vitamin D3) Take  by mouth daily. * Notice: This list has 2 medication(s) that are the same as other medications prescribed for you. Read the directions carefully, and ask your doctor or other care provider to review them with you. Follow-up Instructions Return in about 6 months (around 5/15/2018). Introducing [facility] & HEALTH SERVICES! Heather Gannon introduces [employer]ound patient portal. Now you can access parts of your medical record, email your doctor's office, and request medication refills online. 1. In your internet browser, go to [email or web address] 2. Click on the First Time User? Click Here link in the Sign In box. You will see the New Member Sign Up page. 3. Enter your NanoCompound Access Code exactly as it appears below. You will not need to use this code after youve completed the sign-up process. If you do not sign up before the expiration date, you must request a new code. · Nano[employer]ound Access Code: [email or web address] Expires: 1/25/2018 12:11 PM 
 
4. Enter the last four digits of your Social Security Number (xxxx) and Date of Birth (mm/dd/yyyy) as indicated and click Submit. You will be taken to the next sign-up page. 5. Create a Yellow Pagest ID. This will be your NanoCompound login ID and cannot be changed, so think of one that is secure and easy to remember. 6. Create a NanoCompound password. You can change your password at any time. 7. Enter your Password Reset Question and Answer. This can be used at a later time if you forget your password. 8. Enter your e-mail address. You will receive e-mail notification when new information is available in [address] 9. Click Sign Up. You can now view and download portions of your medical record. 10. Click the Download Summary menu link to download a portable copy of your medical information. If you have questions, please visit the Frequently Asked Questions section of the [employer] Materials website. Remember, [employer] is NOT to be used for urgent needs. For medical emergencies, dial 911. Now available from your iPhone and Android! Please provide this summary of care documentation to your next provider. Your primary care clinician is listed as Zay Leal. If you have any questions after today's visit, please call [phone].

## 2017-11-15 NOTE — PROGRESS NOTES
PATIENT ID VERIFIED WITH TWO PATIENT IDENTIFIERS. MEDICATION REVIEWED AND APPROVED BY DR. Darlyn Yap.

## 2017-12-19 ENCOUNTER — OFFICE VISIT (OUTPATIENT)
Dept: FAMILY MEDICINE CLINIC | Age: 82
End: 2017-12-19

## 2017-12-19 VITALS
DIASTOLIC BLOOD PRESSURE: 92 MMHG | HEART RATE: 59 BPM | RESPIRATION RATE: 20 BRPM | HEIGHT: 64 IN | WEIGHT: 162.4 LBS | SYSTOLIC BLOOD PRESSURE: 160 MMHG | OXYGEN SATURATION: 97 % | TEMPERATURE: 97.1 F | BODY MASS INDEX: 27.72 KG/M2

## 2017-12-19 DIAGNOSIS — E78.00 PURE HYPERCHOLESTEROLEMIA: ICD-10-CM

## 2017-12-19 DIAGNOSIS — I10 ESSENTIAL HYPERTENSION: Primary | ICD-10-CM

## 2017-12-19 NOTE — MR AVS SNAPSHOT
Visit Information Date & Time Provider Department Dept. Phone Encounter #  
 12/19/2017  8:30 AM Jennie Humphrey NP [address] [place] [address] [phone] [id number] Your Appointments 6/18/2018 11:00 AM  
ESTABLISHED PATIENT with Tiana Real MD  
[address] [facility] Decker - [facility] [place] [facility] [facility]) Appt Note: 6 MO F/U $0CP  
 [address] [id number] [phone]  
  
   
 [address] [place] [address] Upcoming Health Maintenance Date Due ZOSTER VACCINE AGE 60> 1[birth year]7/1990 MEDICARE YEARLY EXAM 6/21/2018 GLAUCOMA SCREENING Q2Y 6/2/2019 DTaP/Tdap/Td series (2 - Td) 6/20/2027 Allergies as of 12/19/2017  Review Complete On: 12/19/2017 By: Jennie Humphrey NP Severity Noted Reaction Type Reactions Ambien [Zolpidem]  03/08/2016    Other (comments) Legs got heavy Bactrim [Sulfamethoprim Ds]  12/20/2016    Unknown (comments) Patient cant remember her reaction Hydroxyzine  06/23/2016    Other (comments) Legs got heavy Current Immunizations  Never Reviewed Name Date Influenza High Dose Vaccine PF 10/27/2017, 10/3/2016, 10/9/2015 12:00 AM  
 Influenza Vaccine 10/6/2014 12:00 AM, 9/18/2013 12:00 AM, 11/26/2012 12:00 AM, 10/2/2012 12:00 AM, 11/10/2011 12:00 AM, 10/26/2010 12:00 AM, 9/17/2009 12:00 AM, 10/8/2008 12:00 AM  
 Pneumococcal Conjugate (PCV-13) 11/11/2015 12:00 AM  
 Pneumococcal Polysaccharide (PPSV-23) 6/20/2017, 11/11/2011 12:00 AM, 4/11/2011 12:00 AM  
 Td 11/29/2011 Tdap 11/29/2011 12:00 AM  
  
 Not reviewed this visit You Were Diagnosed With   
  
 Codes Comments Essential hypertension    -  Primary ICD-10-CM: I10 
ICD-9-CM: 401.9 BMI 27.0-27.9,adult     ICD-10-CM: C06.92 ICD-9-CM: V85.23 Pure hypercholesterolemia     ICD-10-CM: E78.00 ICD-9-CM: 272.0 Vitals BP Pulse Temp Resp Height(growth percentile) (!) 160/92 (BP 1 Location: Left arm, BP Patient Position: Sitting) (!) 59 97.1 °F (36.2 °C) (Temporal) 20 5' 4\" (1.626 m) Weight(growth percentile) SpO2 BMI OB Status Smoking Status 162 lb 6.4 oz (73.7 kg) 97% 27.88 kg/m2 Menopause Never Smoker Vitals History BMI and BSA Data Body Mass Index Body Surface Area  
 27.88 kg/m 2 1.82 m 2 Preferred Pharmacy Pharmacy Name Phone [address] [facility], [address] [address] Your Updated Medication List  
  
   
This list is accurate as of: 12/19/17  9:17 AM.  Always use your most recent med list.  
  
  
  
  
 aspirin 81 mg chewable tablet Take 1 Tab by mouth daily. atenolol 50 mg tablet Commonly known as:  TENORMIN Take 1 tablet by mouth  daily  
  
 biotin 2,500 mcg Tab Take  by mouth daily. BOSWELLIA AVA XT (BULK) Take  by mouth daily. CALCITRATE-VITAMIN D PO Take  by mouth daily. CLARITIN PO Take  by mouth as needed. CO Q-10 100 mg capsule Generic drug:  co-enzyme Q-10 Take 100 mg by mouth daily. lovastatin 20 mg tablet Commonly known as:  MEVACOR Take 1 tablet by mouth  nightly  
  
 magnesium 250 mg Tab Take  by mouth. Pt thinks she takes 200 mg  
  
 MELATONIN PO Take  by mouth.  
  
 multivitamin tablet Commonly known as:  ONE A DAY Take 1 Tab by mouth daily. Omega-3-DHA-EPA-Fish Oil 1,000 mg (120 mg-180 mg) Cap Take  by mouth daily. * OTHER  
VEGGIES FOR LIFE AND FRUIT FOR LIFE , TAKES ONE A DAY EACH  
  
 * OTHER  
daily. Folate po  
  
 triamcinolone 0.5 % topical cream  
Commonly known as:  ARISTOCORT Apply  to affected area two (2) times a day. As needed for itching VITAMIN B-12 1,000 mcg tablet Generic drug:  cyanocobalamin Take 1,000 mcg by mouth daily. VITAMIN C WITH YOUSIF HIPS PO Take  by mouth daily. VITAMIN D3 2,000 unit Tab Generic drug:  cholecalciferol (vitamin D3) Take  by mouth daily. * Notice: This list has 2 medication(s) that are the same as other medications prescribed for you. Read the directions carefully, and ask your doctor or other care provider to review them with you. We Performed the Following COLLECTION VENOUS BLOOD,VENIPUNCTURE N1548340 CPT(R)] LIPID PANEL [23426 CPT(R)] METABOLIC PANEL, COMPREHENSIVE [71346 CPT(R)] Introducing [facility] SERVICES! Filippo Iraheta introduces [employer] patient portal. Now you can access parts of your medical record, email your doctor's office, and request medication refills online. 1. In your internet browser, go to [email or web address] 2. Click on the First Time User? Click Here link in the Sign In box. You will see the New Member Sign Up page. 3. Enter your [employer] Access Code exactly as it appears below. You will not need to use this code after youve completed the sign-up process. If you do not sign up before the expiration date, you must request a new code. · [employer] Access Code: [email or web address] Expires: 1/25/2018 12:11 PM 
 
4. Enter the last four digits of your Social Security Number (xxxx) and Date of Birth (mm/dd/yyyy) as indicated and click Submit. You will be taken to the next sign-up page. 5. Create a [employer] ID. This will be your [employer] login ID and cannot be changed, so think of one that is secure and easy to remember. 6. Create a [employer] password. You can change your password at any time. 7. Enter your Password Reset Question and Answer. This can be used at a later time if you forget your password. 8. Enter your e-mail address. You will receive e-mail notification when new information is available in [address] 9. Click Sign Up. You can now view and download portions of your medical record. 10. Click the Download Summary menu link to download a portable copy of your medical information. If you have questions, please visit the Frequently Asked Questions section of the [employer] website. Remember, [employer] is NOT to be used for urgent needs. For medical emergencies, dial 911. Now available from your iPhone and Android! Please provide this summary of care documentation to your next provider. Your primary care clinician is listed as Leslie Young. If you have any questions after today's visit, please call [phone].

## 2017-12-20 LAB
ALBUMIN SERPL-MCNC: 4.4 G/DL (ref 3.5–4.7)
ALBUMIN/GLOB SERPL: 1.6 {RATIO} (ref 1.2–2.2)
ALP SERPL-CCNC: 96 IU/L (ref 39–117)
ALT SERPL-CCNC: 25 IU/L (ref 0–32)
AST SERPL-CCNC: 28 IU/L (ref 0–40)
BILIRUB SERPL-MCNC: 0.8 MG/DL (ref 0–1.2)
BUN SERPL-MCNC: 18 MG/DL (ref 8–27)
BUN/CREAT SERPL: 24 (ref 12–28)
CALCIUM SERPL-MCNC: 9.2 MG/DL (ref 8.7–10.3)
CHLORIDE SERPL-SCNC: 101 MMOL/L (ref 96–106)
CHOLEST SERPL-MCNC: 163 MG/DL (ref 100–199)
CO2 SERPL-SCNC: 25 MMOL/L (ref 18–29)
CREAT SERPL-MCNC: 0.76 MG/DL (ref 0.57–1)
GLOBULIN SER CALC-MCNC: 2.8 G/DL (ref 1.5–4.5)
GLUCOSE SERPL-MCNC: 104 MG/DL (ref 65–99)
HDLC SERPL-MCNC: 51 MG/DL
LDLC SERPL CALC-MCNC: 95 MG/DL (ref 0–99)
POTASSIUM SERPL-SCNC: 4.8 MMOL/L (ref 3.5–5.2)
PROT SERPL-MCNC: 7.2 G/DL (ref 6–8.5)
SODIUM SERPL-SCNC: 139 MMOL/L (ref 134–144)
TRIGL SERPL-MCNC: 85 MG/DL (ref 0–149)
VLDLC SERPL CALC-MCNC: 17 MG/DL (ref 5–40)

## 2017-12-21 NOTE — PROGRESS NOTES
Lipid panel cholesterol levels look great! Keep moving in the right direction! Liver and kidney functions look good!

## 2017-12-25 DIAGNOSIS — E78.00 PURE HYPERCHOLESTEROLEMIA: ICD-10-CM

## 2017-12-25 DIAGNOSIS — I10 ESSENTIAL HYPERTENSION: ICD-10-CM

## 2017-12-25 RX ORDER — LOVASTATIN 20 MG/1
TABLET ORAL
Qty: 90 TAB | Refills: 1 | Status: SHIPPED | OUTPATIENT
Start: 2017-12-25 | End: 2018-01-17 | Stop reason: SDUPTHER

## 2017-12-25 RX ORDER — ATENOLOL 50 MG/1
TABLET ORAL
Qty: 90 TAB | Refills: 1 | Status: SHIPPED | OUTPATIENT
Start: 2017-12-25 | End: 2018-01-17 | Stop reason: SDUPTHER

## 2017-12-25 NOTE — PROGRESS NOTES
Subjective:     Sudheer Inman is a 80 y.o. female who presents today with the following:  Chief Complaint   Patient presents with    Hypertension    Cholesterol Problem     Haley Inman presents for follow up for chronic medical conditions. Considering taking Gingko Balboa. Discussed reviewing medication interactions prior to starting the herbal supplement. COMPLIANT WITH MEDICATION:   HTN; Denies chest pain, dyspnea, palpitations, headache and blurred vision. Blood pressure usually normotensive elevated today. Discussed checking BP at home for 2 weeks . Bring readings to the office. BP are usually 711'F systolic and 62-62 diastolic. Hyperlipidemia: on Mevacor check fasting labs today. BMI:Discussed the patient's BMI with her. The BMI follow up plan is as follows:     dietary management education, guidance, and counseling  encourage exercise  monitor weight  prescribed dietary intake    An After Visit Summary was printed and given to the patient. ROS:  Gen: denies fever, chills, fatigue, weight loss, weight gain  HEENT:denies blurry vision, nasal congestion, sore throat  Resp: denies dypsnea, cough, wheezing  CV: denies chest pain radiating to the jaws or arms, palpitations, lower extremity edema  Abd: denies nausea, vomiting, diarrhea, constipation  Neuro: denies numbness/tingling  Endo: denies polyuria, polydipsia, heat/cold intolerance  Heme: no lymphadenopathy    Allergies   Allergen Reactions    Ambien [Zolpidem] Other (comments)     Legs got heavy    Bactrim [Sulfamethoprim Ds] Unknown (comments)     Patient cant remember her reaction    Hydroxyzine Other (comments)     Legs got heavy         Current Outpatient Prescriptions:     OTHER, daily. Folate po , Disp: , Rfl:     LORATADINE (CLARITIN PO), Take  by mouth as needed. , Disp: , Rfl:     MELATONIN PO, Take  by mouth., Disp: , Rfl:     triamcinolone (ARISTOCORT) 0.5 % topical cream, Apply  to affected area two (2) times a day. As needed for itching, Disp: 45 g, Rfl: 0    co-enzyme Q-10 (CO Q-10) 100 mg capsule, Take 100 mg by mouth daily. , Disp: , Rfl:     multivitamin (ONE A DAY) tablet, Take 1 Tab by mouth daily. , Disp: , Rfl:     cholecalciferol, vitamin D3, (VITAMIN D3) 2,000 unit tab, Take  by mouth daily. , Disp: , Rfl:     cyanocobalamin (VITAMIN B-12) 1,000 mcg tablet, Take 1,000 mcg by mouth daily. , Disp: , Rfl:     BOSWELLIA AVA EXTRACT (BOSWELLIA AVA XT, BULK,), Take  by mouth daily. , Disp: , Rfl:     OTHER, VEGGIES FOR LIFE AND FRUIT FOR LIFE , TAKES ONE A DAY EACH, Disp: , Rfl:     magnesium 250 mg tab, Take  by mouth. Pt thinks she takes 200 mg, Disp: , Rfl:     CALCIUM CITRATE/VITAMIN D3 (CALCITRATE-VITAMIN D PO), Take  by mouth daily. , Disp: , Rfl:     Omega-3-DHA-EPA-Fish Oil 1,000 mg (120 mg-180 mg) cap, Take  by mouth daily. , Disp: , Rfl:     ASCORBIC ACID (VITAMIN C WITH YOUSIF HIPS PO), Take  by mouth daily. , Disp: , Rfl:     biotin 2,500 mcg tab, Take  by mouth daily. , Disp: , Rfl:     atenolol (TENORMIN) 50 mg tablet, TAKE 1 TABLET BY MOUTH  DAILY, Disp: 90 Tab, Rfl: 1    lovastatin (MEVACOR) 20 mg tablet, TAKE 1 TABLET BY MOUTH  NIGHTLY, Disp: 90 Tab, Rfl: 1    aspirin 81 mg chewable tablet, Take 1 Tab by mouth daily. , Disp: 100 Tab, Rfl: 2    Past Medical History:   Diagnosis Date    Colitis 2013    C diff; no recent flare, normal colonoscopy 2014    DJD (degenerative joint disease)     HTN (hypertension)     Hyperlipemia     Ventricular ectopy     no symptoms       Past Surgical History:   Procedure Laterality Date    HX CATARACT REMOVAL      bilat    HX COLONOSCOPY  2014    WNL    HX ORTHOPAEDIC  2005    right femur fx    HX ORTHOPAEDIC  2012    Lt TKA    HX REFRACTIVE SURGERY  06/15/2017    rt       History   Smoking Status    Never Smoker   Smokeless Tobacco    Never Used       Social History     Social History    Marital status:      Spouse name: N/A    Number of children: N/A    Years of education: N/A     Social History Main Topics    Smoking status: Never Smoker    Smokeless tobacco: Never Used    Alcohol use No    Drug use: None    Sexual activity: Not Asked     Other Topics Concern     Service No    Blood Transfusions Yes    Caffeine Concern No    Occupational Exposure No    Hobby Hazards No    Sleep Concern Yes     insomnia    Stress Concern No    Weight Concern Yes     over weight    Special Diet No    Back Care No    Exercise Yes    Bike Helmet No    Seat Belt Yes    Self-Exams Yes     Social History Narrative       Family History   Problem Relation Age of Onset    Cancer Mother      Leukemia    Heart Disease Mother     Cancer Sister      Ovarian    No Known Problems Brother     Heart Failure Brother     Cancer Brother      Brain tumor         Objective:     Visit Vitals    BP (!) 160/92 (BP 1 Location: Left arm, BP Patient Position: Sitting)    Pulse (!) 59    Temp 97.1 °F (36.2 °C) (Temporal)    Resp 20    Ht 5' 4\" (1.626 m)    Wt 162 lb 6.4 oz (73.7 kg)    SpO2 97%    BMI 27.88 kg/m2     Body mass index is 27.88 kg/(m^2). General: Alert and oriented. No acute distress. Well nourished  HEENT :  Ears:TMs are normal. Canals are clear. Eyes: pupils equal, round, react to light and accommodation. Extra ocular movements intact. Nose: patent. Mouth and throat is clear. Neck:supple full range of motion no thyromegaly. Trachea midline, No carotid bruits. No significant lymphadenopathy  Lungs[de-identified] clear to auscultation without wheezes, rales, or rhonchi. Heart :RRR, S1 & S2 are normal intensity. No murmur; no gallop  Abdomen: bowel sounds active. No tenderness, guarding, rebound, masses, hepatic or spleen enlargement  Back: no CVA tenderness. Extremities: without clubbing, cyanosis, or edema  Pulses: radial and femoral pulses are normal  Neuro: HMF intact.  Cranial nerves II through XII grossly normal.  Motor: is 5 over 5 and symmetrical.   Deep tendon reflexes: +2 equal    Results for orders placed or performed in visit on 12/19/17   LIPID PANEL   Result Value Ref Range    Cholesterol, total 163 100 - 199 mg/dL    Triglyceride 85 0 - 149 mg/dL    HDL Cholesterol 51 >39 mg/dL    VLDL, calculated 17 5 - 40 mg/dL    LDL, calculated 95 0 - 99 mg/dL   METABOLIC PANEL, COMPREHENSIVE   Result Value Ref Range    Glucose 104 (H) 65 - 99 mg/dL    BUN 18 8 - 27 mg/dL    Creatinine 0.76 0.57 - 1.00 mg/dL    GFR est non-AA 71 >59 mL/min/1.73    GFR est AA 82 >59 mL/min/1.73    BUN/Creatinine ratio 24 12 - 28    Sodium 139 134 - 144 mmol/L    Potassium 4.8 3.5 - 5.2 mmol/L    Chloride 101 96 - 106 mmol/L    CO2 25 18 - 29 mmol/L    Calcium 9.2 8.7 - 10.3 mg/dL    Protein, total 7.2 6.0 - 8.5 g/dL    Albumin 4.4 3.5 - 4.7 g/dL    GLOBULIN, TOTAL 2.8 1.5 - 4.5 g/dL    A-G Ratio 1.6 1.2 - 2.2    Bilirubin, total 0.8 0.0 - 1.2 mg/dL    Alk.  phosphatase 96 39 - 117 IU/L    AST (SGOT) 28 0 - 40 IU/L    ALT (SGPT) 25 0 - 32 IU/L       Results for orders placed or performed in visit on 12/19/17   LIPID PANEL   Result Value Ref Range    Cholesterol, total 163 100 - 199 mg/dL    Triglyceride 85 0 - 149 mg/dL    HDL Cholesterol 51 >39 mg/dL    VLDL, calculated 17 5 - 40 mg/dL    LDL, calculated 95 0 - 99 mg/dL    Narrative    Performed at:  [address]  [id number]  : Nguyen Galan MD, Phone:  [phone]   METABOLIC PANEL, COMPREHENSIVE   Result Value Ref Range    Glucose 104 (H) 65 - 99 mg/dL    BUN 18 8 - 27 mg/dL    Creatinine 0.76 0.57 - 1.00 mg/dL    GFR est non-AA 71 >59 mL/min/1.73    GFR est AA 82 >59 mL/min/1.73    BUN/Creatinine ratio 24 12 - 28    Sodium 139 134 - 144 mmol/L    Potassium 4.8 3.5 - 5.2 mmol/L    Chloride 101 96 - 106 mmol/L    CO2 25 18 - 29 mmol/L    Calcium 9.2 8.7 - 10.3 mg/dL    Protein, total 7.2 6.0 - 8.5 g/dL    Albumin 4.4 3.5 - 4.7 g/dL    GLOBULIN, TOTAL 2.8 1.5 - 4.5 g/dL    A-G Ratio 1.6 1.2 - 2.2    Bilirubin, total 0.8 0.0 - 1.2 mg/dL    Alk. phosphatase 96 39 - 117 IU/L    AST (SGOT) 28 0 - 40 IU/L    ALT (SGPT) 25 0 - 32 IU/L    Narrative    Performed at:  [address]  [id number]  : Leonor Arzola MD, Phone:  [phone]       Assessment/ Plan:     Diagnoses and all orders for this visit:    1. Essential hypertension  -     LIPID PANEL  -     METABOLIC PANEL, COMPREHENSIVE  -     COLLECTION VENOUS BLOOD,VENIPUNCTURE    2. BMI 27.0-27.9,adult  -     LIPID PANEL  -     METABOLIC PANEL, COMPREHENSIVE  -     COLLECTION VENOUS BLOOD,VENIPUNCTURE    3. Pure hypercholesterolemia  -     LIPID PANEL  -     METABOLIC PANEL, COMPREHENSIVE  -     COLLECTION VENOUS BLOOD,VENIPUNCTURE         1. Essential hypertension    2. BMI 27.0-27.9,adult    3. Pure hypercholesterolemia        Orders Placed This Encounter    COLLECTION VENOUS BLOOD,VENIPUNCTURE    LIPID PANEL    METABOLIC PANEL, COMPREHENSIVE         Verbal and written instructions (see AVS) provided.  Patient expresses understanding of diagnosis and treatment plan. Follow-up Disposition:  Return in about 6 months (around 6/19/2018). Sooner if BP readings are elevated at home.        DIGNA Baptiste

## 2018-01-17 DIAGNOSIS — E78.00 PURE HYPERCHOLESTEROLEMIA: ICD-10-CM

## 2018-01-17 DIAGNOSIS — I10 ESSENTIAL HYPERTENSION: ICD-10-CM

## 2018-01-17 RX ORDER — LOVASTATIN 20 MG/1
TABLET ORAL
Qty: 90 TAB | Refills: 3 | Status: SHIPPED | OUTPATIENT
Start: 2018-01-17 | End: 2019-03-05 | Stop reason: SDUPTHER

## 2018-01-17 RX ORDER — ATENOLOL 50 MG/1
TABLET ORAL
Qty: 90 TAB | Refills: 3 | Status: SHIPPED | OUTPATIENT
Start: 2018-01-17 | End: 2018-03-07 | Stop reason: SDUPTHER

## 2018-01-25 ENCOUNTER — TELEPHONE (OUTPATIENT)
Dept: CARDIOLOGY CLINIC | Age: 83
End: 2018-01-25

## 2018-01-25 NOTE — TELEPHONE ENCOUNTER
Pt notified (after 2 identifiers) per Dr Duglas Bermudez \"Let's try increasing the atenolol to 50mg bid, continue to monitor\". Pt is to also keep record of daily am and pm BP readings as well as HR. Pt acknowledged understanding and had no further questions.

## 2018-01-25 NOTE — TELEPHONE ENCOUNTER
Pt called stating concerns of elevated BP readings - worse in p.m. Night time readings have been 177/75, 140/95, 160/82, 165/75. She says it is still high in the mornings, but after taking morning medication, it goes down to readings of 121/73, 140/74. She said reading this morning prior to taking med was 156/96. Pt currently on Atenolol 50 mg - once daily dosing. She takes this in the mornings. Requesting c/b.     [facility]

## 2018-02-19 ENCOUNTER — OFFICE VISIT (OUTPATIENT)
Dept: CARDIOLOGY CLINIC | Age: 83
End: 2018-02-19

## 2018-02-19 VITALS
BODY MASS INDEX: 26.82 KG/M2 | OXYGEN SATURATION: 98 % | RESPIRATION RATE: 18 BRPM | WEIGHT: 161 LBS | DIASTOLIC BLOOD PRESSURE: 82 MMHG | HEIGHT: 65 IN | SYSTOLIC BLOOD PRESSURE: 142 MMHG | HEART RATE: 64 BPM

## 2018-02-19 DIAGNOSIS — I10 ESSENTIAL HYPERTENSION: Primary | ICD-10-CM

## 2018-02-19 NOTE — PROGRESS NOTES
Anna Inman is a 80 y.o. female is here for routine f/u. Hx hypertension, dyslipidemia, PVC's, PAC's, DJD without known hx CAD/MI/CHF/valvular heart disease. Hx DJD, s/p prior TKR and hip fracture, s/p sgy. Had pre-op EKG last fall abnormal (PAC's, PVC's, possible old ASMI. Has intermittent CP, non-exertional/atypical episode about 6 weeks ago at night, brief. Occasional palpitations. Had [facility] MPI l10/16 with minimally reversible apical defect, normal LVEF. Recent BP elevations--called and advised to increase the atenolol to 50mg bid and seen in ED on 2/13/18 with /90's (checking BP up to 20x/day). HCTZ 12.5mg added. The patient denies chest pain/ shortness of breath, orthopnea, PND, LE edema, syncope, presyncope or fatigue.        Patient Active Problem List    Diagnosis Date Noted    Abnormal EKG 10/13/2016    PAC (premature atrial contraction) 10/13/2016    Primary osteoarthritis 10/03/2016    HTN (hypertension)     DJD (degenerative joint disease)     Colitis     Ventricular ectopy     Hyperlipemia       Geraline Ratel, NP  Past Medical History:   Diagnosis Date    Colitis 2013    C diff; no recent flare, normal colonoscopy 2014    DJD (degenerative joint disease)     HTN (hypertension)     Hyperlipemia     Ventricular ectopy     no symptoms      Past Surgical History:   Procedure Laterality Date    HX CATARACT REMOVAL      bilat    HX COLONOSCOPY  2014    WNL    HX ORTHOPAEDIC  2005    right femur fx    HX ORTHOPAEDIC  2012    Lt TKA    HX REFRACTIVE SURGERY  06/15/2017    rt     Allergies   Allergen Reactions    Ambien [Zolpidem] Other (comments)     Legs got heavy    Bactrim [Sulfamethoprim Ds] Unknown (comments)     Patient cant remember her reaction    Hydroxyzine Other (comments)     Legs got heavy      Family History   Problem Relation Age of Onset    Cancer Mother      Leukemia    Heart Disease Mother     Cancer Sister      Ovarian    No Known Problems Brother     Heart Failure Brother     Cancer Brother      Brain tumor      Social History     Social History    Marital status:      Spouse name: N/A    Number of children: N/A    Years of education: N/A     Occupational History    Not on file. Social History Main Topics    Smoking status: Never Smoker    Smokeless tobacco: Never Used    Alcohol use No    Drug use: Not on file    Sexual activity: Not on file     Other Topics Concern                                                                                     Social History Narrative      Current Outpatient Prescriptions   Medication Sig    hydroCHLOROthiazide (HYDRODIURIL) 25 mg tablet Take 0.5 Tabs by mouth daily for 30 days. Indications: hypertension    atenolol (TENORMIN) 50 mg tablet TAKE 1 TABLET BY MOUTH  DAILY    lovastatin (MEVACOR) 20 mg tablet TAKE 1 TABLET BY MOUTH  NIGHTLY    OTHER daily. Folate po     LORATADINE (CLARITIN PO) Take  by mouth as needed.  MELATONIN PO Take  by mouth.  triamcinolone (ARISTOCORT) 0.5 % topical cream Apply  to affected area two (2) times a day. As needed for itching    co-enzyme Q-10 (CO Q-10) 100 mg capsule Take 100 mg by mouth daily.  multivitamin (ONE A DAY) tablet Take 1 Tab by mouth daily.  cholecalciferol, vitamin D3, (VITAMIN D3) 2,000 unit tab Take  by mouth daily.  cyanocobalamin (VITAMIN B-12) 1,000 mcg tablet Take 1,000 mcg by mouth daily.  BOSWELLIA AVA EXTRACT (BOSWELLIA AVA XT, BULK,) Take  by mouth daily.  OTHER VEGGIES FOR LIFE AND FRUIT FOR LIFE , TAKES ONE A DAY EACH    magnesium 250 mg tab Take  by mouth. Pt thinks she takes 200 mg    CALCIUM CITRATE/VITAMIN D3 (CALCITRATE-VITAMIN D PO) Take  by mouth daily.  Omega-3-DHA-EPA-Fish Oil 1,000 mg (120 mg-180 mg) cap Take  by mouth daily.  ASCORBIC ACID (VITAMIN C WITH YOUSIF HIPS PO) Take  by mouth daily.  biotin 2,500 mcg tab Take  by mouth daily.     aspirin 81 mg chewable tablet Take 1 Tab by mouth daily. No current facility-administered medications for this visit. Review of Symptoms:    CONST  No weight change. No fever, chills, sweats    ENT No visual changes, URI sx, sore throat    CV  See HPI   RESP  No cough, or sputum, wheezing. Also see HPI   GI  No abdominal pain or change in bowel habits. No heartburn or dysphagia. No melena or rectal bleeding.   No dysuria, urgency, frequency, hematuria   MSKEL  No joint pain, swelling. No muscle pain. SKIN  No rash or lesions. NEURO  No headache, syncope, or seizure. No weakness, loss of sensation, or paresthesias. PSYCH  No low mood or depression  No anxiety. HE/LYMPH  No easy bruising, abnormal bleeding, or enlarged glands.         Physical ExamPhysical Exam:    Visit Vitals    Resp 18    Ht 5' 5\" (1.651 m)    Wt 161 lb (73 kg)    BMI 26.79 kg/m2     Gen: NAD  HEENT:  PERRL, throat clear  Neck: no adenopathy, no thyromegaly, no JVD   Heart:  Regular,Nl S1S2,  no murmur, gallop or rub.   Lungs:  clear  Abdomen:   Soft, non-tender, bowel sounds are active.   Extremities:  No edema  Pulse: symmetric  Neuro: A&O times 3, No focal neuro deficits    Cardiographics    Labs:   Lab Results   Component Value Date/Time    Sodium 139 12/19/2017 09:09 AM    Sodium 141 06/20/2017 09:29 AM    Sodium 139 01/27/2017 01:02 PM    Sodium 141 12/20/2016 11:12 AM    Sodium 141 06/23/2016 10:23 AM    Potassium 4.8 12/19/2017 09:09 AM    Potassium 4.3 06/20/2017 09:29 AM    Potassium 4.0 01/27/2017 01:02 PM    Potassium 4.0 12/20/2016 11:12 AM    Potassium 4.9 06/23/2016 10:23 AM    Chloride 101 12/19/2017 09:09 AM    Chloride 104 06/20/2017 09:29 AM    Chloride 99 01/27/2017 01:02 PM    Chloride 100 12/20/2016 11:12 AM    Chloride 101 06/23/2016 10:23 AM    CO2 25 12/19/2017 09:09 AM    CO2 25 06/20/2017 09:29 AM    CO2 25 01/27/2017 01:02 PM    CO2 25 12/20/2016 11:12 AM    CO2 27 06/23/2016 10:23 AM    Glucose 104 (H) 12/19/2017 09:09 AM    Glucose 105 (H) 06/20/2017 09:29 AM    Glucose 109 (H) 01/27/2017 01:02 PM    Glucose 108 (H) 12/20/2016 11:12 AM    Glucose 104 (H) 06/23/2016 10:23 AM    BUN 18 12/19/2017 09:09 AM    BUN 21 06/20/2017 09:29 AM    BUN 13 01/27/2017 01:02 PM    BUN 15 12/20/2016 11:12 AM    BUN 17 06/23/2016 10:23 AM    Creatinine 0.76 12/19/2017 09:09 AM    Creatinine 0.74 06/20/2017 09:29 AM    Creatinine 0.67 01/27/2017 01:02 PM    Creatinine 0.72 12/20/2016 11:12 AM    Creatinine 0.79 06/23/2016 10:23 AM    BUN/Creatinine ratio 24 12/19/2017 09:09 AM    BUN/Creatinine ratio 28 06/20/2017 09:29 AM    BUN/Creatinine ratio 19 01/27/2017 01:02 PM    BUN/Creatinine ratio 21 12/20/2016 11:12 AM    BUN/Creatinine ratio 22 06/23/2016 10:23 AM    GFR est AA 82 12/19/2017 09:09 AM    GFR est AA 84 06/20/2017 09:29 AM    GFR est AA 92 01/27/2017 01:02 PM    GFR est AA 88 12/20/2016 11:12 AM    GFR est AA 78 06/23/2016 10:23 AM    GFR est non-AA 71 12/19/2017 09:09 AM    GFR est non-AA 73 06/20/2017 09:29 AM    GFR est non-AA 80 01/27/2017 01:02 PM    GFR est non-AA 76 12/20/2016 11:12 AM    GFR est non-AA 68 06/23/2016 10:23 AM    Calcium 9.2 12/19/2017 09:09 AM    Calcium 9.0 06/20/2017 09:29 AM    Calcium 9.4 01/27/2017 01:02 PM    Calcium 9.4 12/20/2016 11:12 AM    Calcium 9.5 06/23/2016 10:23 AM    Bilirubin, total 0.8 12/19/2017 09:09 AM    Bilirubin, total 0.6 06/20/2017 09:29 AM    Bilirubin, total 0.5 01/27/2017 01:02 PM    Bilirubin, total 0.5 12/20/2016 11:12 AM    Bilirubin, total 0.5 06/23/2016 10:23 AM    AST (SGOT) 28 12/19/2017 09:09 AM    AST (SGOT) 21 06/20/2017 09:29 AM    AST (SGOT) 26 01/27/2017 01:02 PM    AST (SGOT) 22 12/20/2016 11:12 AM    AST (SGOT) 20 06/23/2016 10:23 AM    Alk. phosphatase 96 12/19/2017 09:09 AM    Alk. phosphatase 101 06/20/2017 09:29 AM    Alk. phosphatase 137 (H) 01/27/2017 01:02 PM    Alk. phosphatase 154 (H) 12/20/2016 11:12 AM    Alk.  phosphatase 97 06/23/2016 10:23 AM Protein, total 7.2 12/19/2017 09:09 AM    Protein, total 7.0 06/20/2017 09:29 AM    Protein, total 7.1 01/27/2017 01:02 PM    Protein, total 7.1 12/20/2016 11:12 AM    Protein, total 7.2 06/23/2016 10:23 AM    Albumin 4.4 12/19/2017 09:09 AM    Albumin 4.3 06/20/2017 09:29 AM    Albumin 4.0 01/27/2017 01:02 PM    Albumin 4.0 12/20/2016 11:12 AM    Albumin 4.3 06/23/2016 10:23 AM    A-G Ratio 1.6 12/19/2017 09:09 AM    A-G Ratio 1.6 06/20/2017 09:29 AM    A-G Ratio 1.3 01/27/2017 01:02 PM    A-G Ratio 1.3 12/20/2016 11:12 AM    A-G Ratio 1.5 06/23/2016 10:23 AM    ALT (SGPT) 25 12/19/2017 09:09 AM    ALT (SGPT) 20 06/20/2017 09:29 AM    ALT (SGPT) 16 01/27/2017 01:02 PM    ALT (SGPT) 17 12/20/2016 11:12 AM    ALT (SGPT) 14 06/23/2016 10:23 AM     No results found for: CPK, CPKX, CPX  Lab Results   Component Value Date/Time    Cholesterol, total 163 12/19/2017 09:09 AM    Cholesterol, total 154 06/20/2017 09:29 AM    Cholesterol, total 159 12/20/2016 11:12 AM    Cholesterol, total 178 06/23/2016 10:23 AM    HDL Cholesterol 51 12/19/2017 09:09 AM    HDL Cholesterol 47 06/20/2017 09:29 AM    HDL Cholesterol 45 12/20/2016 11:12 AM    HDL Cholesterol 45 06/23/2016 10:23 AM    LDL, calculated 95 12/19/2017 09:09 AM    LDL, calculated 87 06/20/2017 09:29 AM    LDL, calculated 94 12/20/2016 11:12 AM    LDL, calculated 112 (H) 06/23/2016 10:23 AM    Triglyceride 85 12/19/2017 09:09 AM    Triglyceride 98 06/20/2017 09:29 AM    Triglyceride 100 12/20/2016 11:12 AM    Triglyceride 104 06/23/2016 10:23 AM     No results found for this or any previous visit.     Assessment:         Patient Active Problem List    Diagnosis Date Noted    Abnormal EKG 10/13/2016    PAC (premature atrial contraction) 10/13/2016    Primary osteoarthritis 10/03/2016    HTN (hypertension)     DJD (degenerative joint disease)     Colitis     Ventricular ectopy     Hyperlipemia         Plan:     Home BP readings reviewed--will continue the HCTZ 12.5 and atenolol 50mg bid  Will continue current rx for now  May need to add low dose ACEI or ARB if remains above target (currently 244'W to 624'R systolic, target 196)    Stephanie Bryant MD

## 2018-02-19 NOTE — PROGRESS NOTES
Verified patient with two patient identifiers. Medications reviewed/approved by Dr. Ruel Kulkarni. Verbal from Dr. Ruel Kulkarni to remove the medications that were deleted during the visit. Chief Complaint   Patient presents with    Hypertension     Follow up post [facility] ER     1. Have you been to the ER, urgent care clinic since your last visit? Hospitalized since your last visit? Yes, [facility].    2. Have you seen or consulted any other health care providers outside of the [address] [place] [address] since your last visit? Include any pap smears or colon screening.  No.

## 2018-02-19 NOTE — MR AVS SNAPSHOT
[address] 
 
 
 [address] [id number] [phone] Patient: Samaria Inman MRN: [id number] SGN:[birth year] Visit Information Date & Time Provider Department Dept. Phone Encounter #  
 2/19/2018  9:20 AM Mark Jaramillo, [address] [facility] Cardiology [facility] [id number] Your Appointments 6/13/2018  9:30 AM  
ESTABLISHED PATIENT with Gris Hammond NP  
[address] ([facility]) Appt Note: 6 mo F/U  
 [address] [address]  
[address] [facility] [address] [address] 6/18/2018 11:00 AM  
ESTABLISHED PATIENT with Mark Jaramillo MD  
[address] [facility]) Appt Note: 6 MO F/U $0CP  
 [address] [facility] [phone]  
  
   
 [address] [place] [address] Upcoming Health Maintenance Date Due ZOSTER VACCINE AGE 60> 1[birth year]7/1990 MEDICARE YEARLY EXAM 6/21/2018 GLAUCOMA SCREENING Q2Y 6/2/2019 DTaP/Tdap/Td series (2 - Td) 6/20/2027 Allergies as of 2/19/2018  Review Complete On: 2/19/2018 By: Mark Jaramillo MD  
  
 Severity Noted Reaction Type Reactions Ambien [Zolpidem]  03/08/2016    Other (comments) Legs got heavy Bactrim [Sulfamethoprim Ds]  12/20/2016    Unknown (comments) Patient cant remember her reaction Hydroxyzine  06/23/2016    Other (comments) Legs got heavy Current Immunizations  Never Reviewed Name Date  Influenza High Dose Vaccine PF 10/27/2017, 10/3/2016, 10/9/2015 12:00 AM  
 Influenza Vaccine 10/6/2014 12:00 AM, 9/18/2013 12:00 AM, 11/26/2012 12:00 AM, 10/2/2012 12:00 AM, 11/10/2011 12:00 AM, 10/26/2010 12:00 AM, 9/17/2009 12:00 AM, 10/8/2008 12:00 AM  
 Pneumococcal Conjugate (PCV-13) 11/11/2015 12:00 AM  
 Pneumococcal Polysaccharide (PPSV-23) 6/20/2017, 11/11/2011 12:00 AM, 4/11/2011 12:00 AM  
 Td 11/29/2011 Tdap 11/29/2011 12:00 AM  
  
 Not reviewed this visit You Were Diagnosed With   
  
 Codes Comments Essential hypertension    -  Primary ICD-10-CM: I10 
ICD-9-CM: 401.9 Vitals BP Pulse Resp Height(growth percentile) Weight(growth percentile) SpO2  
 142/82 (BP 1 Location: Right arm, BP Patient Position: Sitting) 64 18 5' 5\" (1.651 m) 161 lb (73 kg) 98% BMI OB Status Smoking Status 26.79 kg/m2 Menopause Never Smoker Vitals History BMI and BSA Data Body Mass Index Body Surface Area  
 26.79 kg/m 2 1.83 m 2 Preferred Pharmacy Pharmacy Name Phone  [address] [phone] Your Updated Medication List  
  
   
This list is accurate as of: 2/19/18  9:53 AM.  Always use your most recent med list.  
  
  
  
  
 atenolol 50 mg tablet Commonly known as:  TENORMIN  
TAKE 1 TABLET BY MOUTH  DAILY  
  
 biotin 2,500 mcg Tab Take  by mouth daily. BOSWELLIA AVA XT (BULK) Take  by mouth daily. CALCITRATE-VITAMIN D PO Take  by mouth daily. CO Q-10 100 mg capsule Generic drug:  co-enzyme Q-10 Take 100 mg by mouth daily. hydroCHLOROthiazide 25 mg tablet Commonly known as:  HYDRODIURIL Take 0.5 Tabs by mouth daily for 30 days. Indications: hypertension  
  
 lovastatin 20 mg tablet Commonly known as:  MEVACOR  
TAKE 1 TABLET BY MOUTH  NIGHTLY  
  
 magnesium 250 mg Tab Take  by mouth. Pt thinks she takes 200 mg  
  
 MELATONIN PO Take  by mouth. Omega-3-DHA-EPA-Fish Oil 1,000 mg (120 mg-180 mg) Cap Take  by mouth daily. * OTHER  
VEGGIES FOR LIFE AND FRUIT FOR LIFE , TAKES ONE A DAY EACH  
  
 * OTHER  
daily. Folate po PEPPERMINT OIL Take  by mouth. PROBIOTIC PO Take  by mouth.  
  
 triamcinolone 0.5 % topical cream  
 Commonly known as:  ARISTOCORT Apply  to affected area two (2) times a day. As needed for itching TYLENOL PM PO Take  by mouth. VITAMIN B-12 1,000 mcg tablet Generic drug:  cyanocobalamin Take 1,000 mcg by mouth daily. VITAMIN C WITH YOUSIF HIPS PO Take  by mouth daily. VITAMIN D3 2,000 unit Tab Generic drug:  cholecalciferol (vitamin D3) Take  by mouth daily. * Notice: This list has 2 medication(s) that are the same as other medications prescribed for you. Read the directions carefully, and ask your doctor or other care provider to review them with you. Introducing [facility] & HEALTH SERVICES! [employer] introduces [employer] patient portal. Now you can access parts of your medical record, email your doctor's office, and request medication refills online. 1. In your internet browser, go to [email or web address] 2. Click on the First Time User? Click Here link in the Sign In box. You will see the New Member Sign Up page. 3. Enter your VitalTrax Access Code exactly as it appears below. You will not need to use this code after youve completed the sign-up process. If you do not sign up before the expiration date, you must request a new code. · Vital[employer]x Access Code: [email or web address] Expires: 5/14/2018  3:28 AM 
 
4. Enter the last four digits of your Social Security Number (xxxx) and Date of Birth (mm/dd/yyyy) as indicated and click Submit. You will be taken to the next sign-up page. 5. Create a Zoonat ID. This will be your VitalTrax login ID and cannot be changed, so think of one that is secure and easy to remember. 6. Create a VitalTrax password. You can change your password at any time. 7. Enter your Password Reset Question and Answer. This can be used at a later time if you forget your password. 8. Enter your e-mail address. You will receive e-mail notification when new information is available in [address] 9. Click Sign Up. You can now view and download portions of your medical record. 10. Click the Download Summary menu link to download a portable copy of your medical information. If you have questions, please visit the Frequently Asked Questions section of the KaChing! website. Remember, [employer]Ch[employer]! is NOT to be used for urgent needs. For medical emergencies, dial 911. Now available from your iPhone and Android! Please provide this summary of care documentation to your next provider. Your primary care clinician is listed as Maria A Reyez. If you have any questions after today's visit, please call [phone].

## 2018-02-27 ENCOUNTER — OFFICE VISIT (OUTPATIENT)
Dept: FAMILY MEDICINE CLINIC | Age: 83
End: 2018-02-27

## 2018-02-27 VITALS
TEMPERATURE: 97.5 F | DIASTOLIC BLOOD PRESSURE: 84 MMHG | RESPIRATION RATE: 24 BRPM | OXYGEN SATURATION: 97 % | BODY MASS INDEX: 26.66 KG/M2 | SYSTOLIC BLOOD PRESSURE: 166 MMHG | WEIGHT: 160 LBS | HEIGHT: 65 IN | HEART RATE: 60 BPM

## 2018-02-27 DIAGNOSIS — I10 ESSENTIAL HYPERTENSION: Primary | ICD-10-CM

## 2018-02-27 DIAGNOSIS — E78.5 HYPERLIPIDEMIA, UNSPECIFIED HYPERLIPIDEMIA TYPE: ICD-10-CM

## 2018-02-27 DIAGNOSIS — K52.9 COLITIS: ICD-10-CM

## 2018-02-27 NOTE — MR AVS SNAPSHOT
[address] [facility] 
 
 
 [address] 
[phone] Patient: Rosario Inman MRN: [id number] [facility]:[birth year] Visit Information Date & Time Provider Department Dept. Phone Encounter #  
 2/27/2018  2:00 PM Naty Barnes NP [facility] [address] [facility] [phone] [id number] Your Appointments 3/27/2018 10:15 AM  
ESTABLISHED PATIENT with Naty Barnes, NP  
[address] ([facility]) Appt Note: 1 mo f/u  
 [address] [address]  
30[address] [facility] [address] [address] 6/13/2018  9:30 AM  
ESTABLISHED PATIENT with Naty Barnes, NP  
[address] ([facility]) Appt Note: 6 mo F/U  
 [address]  
[phone]  
  
    
 6/18/2018 11:00 AM  
ESTABLISHED PATIENT with Stephanie Bryant MD  
[address] [place] [facility]) Appt Note: 6 MO F/U $0CP  
 [address] [facility] [address] [phone]  
  
   
 [address] [place] [address] Upcoming Health Maintenance Date Due ZOSTER VACCINE AGE 60> 1/7/1990 MEDICARE YEARLY EXAM 6/21/2018 GLAUCOMA SCREENING Q2Y 6/2/2019 DTaP/Tdap/Td series (2 - Td) 6/20/2027 Allergies as of 2/27/2018  Review Complete On: 2/27/2018 By: Bony Claudio RN Severity Noted Reaction Type Reactions Ambien [Zolpidem]  03/08/2016    Other (comments) Legs got heavy Bactrim [Sulfamethoprim Ds]  12/20/2016    Unknown (comments) Patient cant remember her reaction Hydroxyzine  06/23/2016    Other (comments) Legs got heavy Current Immunizations  Never Reviewed Name Date  Influenza High Dose Vaccine PF 10/27/2017, 10/3/2016, 10/9/2015 12:00 AM  
 Influenza Vaccine 10/6/2014 12:00 AM, 9/18/2013 12:00 AM, 11/26/2012 12:00 AM, 10/2/2012 12:00 AM, 11/10/2011 12:00 AM, 10/26/2010 12:00 AM, 9/17/2009 12:00 AM, 10/8/2008 12:00 AM  
 Pneumococcal Conjugate (PCV-13) 11/11/2015 12:00 AM  
 Pneumococcal Polysaccharide (PPSV-23) 6/20/2017, 11/11/2011 12:00 AM, 4/11/2011 12:00 AM  
 Td 11/29/2011 Tdap 11/29/2011 12:00 AM  
  
 Not reviewed this visit You Were Diagnosed With   
  
 Codes Comments BMI 26.0-26.9,adult    -  Primary ICD-10-CM: Y51.60 
ICD-9-CM: V85.22 Vitals BP Pulse Temp Resp Height(growth percentile) 166/84 (BP 1 Location: Left arm, BP Patient Position: Sitting) 60 97.5 °F (36.4 °C) (Temporal) 24 5' 5\" (1.651 m) Weight(growth percentile) SpO2 BMI OB Status Smoking Status 160 lb (72.6 kg) 97% 26.63 kg/m2 Menopause Never Smoker BMI and BSA Data Body Mass Index Body Surface Area  
 26.63 kg/m 2 1.82 m 2 Preferred Pharmacy Pharmacy Name Phone  [address] [phone] Your Updated Medication List  
  
   
This list is accurate as of 2/27/18  3:21 PM.  Always use your most recent med list.  
  
  
  
  
 atenolol 50 mg tablet Commonly known as:  TENORMIN  
TAKE 1 TABLET BY MOUTH  DAILY  
  
 biotin 2,500 mcg Tab Take  by mouth daily. BOSWELLIA AVA XT (BULK) Take  by mouth daily. CALCITRATE-VITAMIN D PO Take  by mouth daily. CO Q-10 100 mg capsule Generic drug:  co-enzyme Q-10 Take 100 mg by mouth daily. hydroCHLOROthiazide 25 mg tablet Commonly known as:  HYDRODIURIL Take 0.5 Tabs by mouth daily for 30 days. Indications: hypertension  
  
 lovastatin 20 mg tablet Commonly known as:  MEVACOR  
TAKE 1 TABLET BY MOUTH  NIGHTLY  
  
 magnesium 250 mg Tab Take  by mouth. Pt thinks she takes 200 mg  
  
 MELATONIN PO Take  by mouth. Omega-3-DHA-EPA-Fish Oil 1,000 mg (120 mg-180 mg) Cap Take  by mouth daily. * OTHER  
VEGGIES FOR LIFE AND FRUIT FOR LIFE , TAKES ONE A DAY EACH  
  
 * OTHER  
daily. Folate po PEPPERMINT OIL Take  by mouth. PROBIOTIC 4X 10-15 mg Tbec Generic drug:  B.infantis-B.ani-B.long-B.bifi Take  by mouth. PROBIOTIC PO Take  by mouth.  
  
 triamcinolone 0.5 % topical cream  
Commonly known as:  ARISTOCORT Apply  to affected area two (2) times a day. As needed for itching TYLENOL PM PO Take  by mouth. VITAMIN B-12 1,000 mcg tablet Generic drug:  cyanocobalamin Take 1,000 mcg by mouth daily. VITAMIN C WITH YOUSIF HIPS PO Take  by mouth daily. VITAMIN D3 2,000 unit Tab Generic drug:  cholecalciferol (vitamin D3) Take  by mouth daily. * Notice: This list has 2 medication(s) that are the same as other medications prescribed for you. Read the directions carefully, and ask your doctor or other care provider to review them with you. Introducing [facility] & HEALTH SERVICES! [employer]ef[employer] Patel introduces [employer] patient portal. Now you can access parts of your medical record, email your doctor's office, and request medication refills online. 1. In your internet browser, go to [email or web address] 2. Click on the First Time User? Click Here link in the Sign In box. You will see the New Member Sign Up page. 3. Enter your FastHealth Access Code exactly as it appears below. You will not need to use this code after youve completed the sign-up process. If you do not sign up before the expiration date, you must request a new code. · [employer] Access Code: [email or web address] Expires: 5/14/2018  3:28 AM 
 
4. Enter the last four digits of your Social Security Number (xxxx) and Date of Birth (mm/dd/yyyy) as indicated and click Submit. You will be taken to the next sign-up page. 5. Create a Teamleadert ID. This will be your FastHealth login ID and cannot be changed, so think of one that is secure and easy to remember. 6. Create a Spectrum Bridge password. You can change your password at any time. 7. Enter your Password Reset Question and Answer. This can be used at a later time if you forget your password. 8. Enter your e-mail address. You will receive e-mail notification when new information is available in [address] 9. Click Sign Up. You can now view and download portions of your medical record. 10. Click the Download Summary menu link to download a portable copy of your medical information. If you have questions, please visit the Frequently Asked Questions section of the Spectrum [employer] website. Remember, [employer] is NOT to be used for urgent needs. For medical emergencies, dial 911. Now available from your iPhone and Android! Please provide this summary of care documentation to your next provider. Your primary care clinician is listed as Melanie Primrose. If you have any questions after today's visit, please call [phone].

## 2018-02-27 NOTE — PROGRESS NOTES
Subjective:     Zahraa Inman is a 80 y.o. female who presents today with the following:  Chief Complaint   Patient presents with    Hypertension     f/u [facility] ER 2-       Patient Active Problem List   Diagnosis Code    HTN (hypertension) I10    DJD (degenerative joint disease) M19.90    Colitis K52.9    Ventricular ectopy I49.3    Hyperlipemia E78.5    Primary osteoarthritis M19.91    Abnormal EKG R94.31    PAC (premature atrial contraction) I49.1         COMPLIANT WITH MEDICATION:   HTN; Denies chest pain, dyspnea, palpitations, headache and blurred vision. Blood pressure elevated today. Checking at home since ER visit systolic 981'O to 154 diastolic in the 54'K . Taking BP at home twice a day. Colitis: diarrhea in the early hours. Would like to try decreasing gluten and wheat intake for a few weeks. BMI:Discussed the patient's BMI with her.     ROS:  Gen: denies fever, chills, fatigue, weight loss, weight gain  HEENT:denies blurry vision, nasal congestion, sore throat  Resp: denies dypsnea, cough, wheezing  CV: denies chest pain radiating to the jaws or arms, palpitations, lower extremity edema  Abd: denies nausea, vomiting, diarrhea, constipation  Neuro: denies numbness/tingling  Endo: denies polyuria, polydipsia, heat/cold intolerance  Heme: no lymphadenopathy    Allergies   Allergen Reactions    Ambien [Zolpidem] Other (comments)     Legs got heavy    Bactrim [Sulfamethoprim Ds] Unknown (comments)     Patient cant remember her reaction    Hydroxyzine Other (comments)     Legs got heavy         Current Outpatient Prescriptions:     B.infantis-B.ani-B.long-B.bifi (PROBIOTIC 4X) 10-15 mg TbEC, Take  by mouth., Disp: , Rfl:     LACTOBACILLUS ACIDOPHILUS (PROBIOTIC PO), Take  by mouth., Disp: , Rfl:     PEPPERMINT OIL, Take  by mouth., Disp: , Rfl:     ACETAMINOPHEN/DIPHENHYDRAMINE (TYLENOL PM PO), Take  by mouth., Disp: , Rfl:     hydroCHLOROthiazide (HYDRODIURIL) 25 mg tablet, Take 0.5 Tabs by mouth daily for 30 days. Indications: hypertension, Disp: 15 Tab, Rfl: 0    atenolol (TENORMIN) 50 mg tablet, TAKE 1 TABLET BY MOUTH  DAILY (Patient taking differently: two (2) times a day. TAKE 1 TABLET BY MOUTH  DAILY), Disp: 90 Tab, Rfl: 3    lovastatin (MEVACOR) 20 mg tablet, TAKE 1 TABLET BY MOUTH  NIGHTLY, Disp: 90 Tab, Rfl: 3    OTHER, daily. Folate po , Disp: , Rfl:     MELATONIN PO, Take  by mouth., Disp: , Rfl:     triamcinolone (ARISTOCORT) 0.5 % topical cream, Apply  to affected area two (2) times a day. As needed for itching, Disp: 45 g, Rfl: 0    co-enzyme Q-10 (CO Q-10) 100 mg capsule, Take 100 mg by mouth daily. , Disp: , Rfl:     cholecalciferol, vitamin D3, (VITAMIN D3) 2,000 unit tab, Take  by mouth daily. , Disp: , Rfl:     cyanocobalamin (VITAMIN B-12) 1,000 mcg tablet, Take 1,000 mcg by mouth daily. , Disp: , Rfl:     BOSWELLIA AVA EXTRACT (BOSWELLIA AVA XT, BULK,), Take  by mouth daily. , Disp: , Rfl:     OTHER, VEGGIES FOR LIFE AND FRUIT FOR LIFE , TAKES ONE A DAY EACH, Disp: , Rfl:     magnesium 250 mg tab, Take  by mouth. Pt thinks she takes 200 mg, Disp: , Rfl:     CALCIUM CITRATE/VITAMIN D3 (CALCITRATE-VITAMIN D PO), Take  by mouth daily. , Disp: , Rfl:     Omega-3-DHA-EPA-Fish Oil 1,000 mg (120 mg-180 mg) cap, Take  by mouth daily. , Disp: , Rfl:     ASCORBIC ACID (VITAMIN C WITH YOUSIF HIPS PO), Take  by mouth daily. , Disp: , Rfl:     biotin 2,500 mcg tab, Take  by mouth daily. , Disp: , Rfl:     Past Medical History:   Diagnosis Date    Colitis 2013    C diff; no recent flare, normal colonoscopy 2014    DJD (degenerative joint disease)     HTN (hypertension)     Hyperlipemia     Ventricular ectopy     no symptoms       Past Surgical History:   Procedure Laterality Date    HX CATARACT REMOVAL      bilat    HX COLONOSCOPY  2014    WNL    HX ORTHOPAEDIC  2005    right femur fx    HX ORTHOPAEDIC  2012    Lt TKA    HX REFRACTIVE SURGERY  06/15/2017 rt       History   Smoking Status    Never Smoker   Smokeless Tobacco    Never Used       Social History     Social History    Marital status:      Spouse name: N/A    Number of children: N/A    Years of education: N/A     Social History Main Topics    Smoking status: Never Smoker    Smokeless tobacco: Never Used    Alcohol use No    Drug use: None    Sexual activity: Not Asked     Other Topics Concern     Service No    Blood Transfusions Yes    Caffeine Concern No    Occupational Exposure No    Hobby Hazards No    Sleep Concern Yes     insomnia    Stress Concern No    Weight Concern Yes     over weight    Special Diet No    Back Care No    Exercise Yes    Bike Helmet No    Seat Belt Yes    Self-Exams Yes     Social History Narrative       Family History   Problem Relation Age of Onset    Cancer Mother      Leukemia    Heart Disease Mother     Cancer Sister      Ovarian    No Known Problems Brother     Heart Failure Brother     Cancer Brother      Brain tumor         Objective:     Visit Vitals    /84 (BP 1 Location: Left arm, BP Patient Position: Sitting)    Pulse 60    Temp 97.5 °F (36.4 °C) (Temporal)    Resp 24    Ht 5' 5\" (1.651 m)    Wt 160 lb (72.6 kg)    SpO2 97%    BMI 26.63 kg/m2     Body mass index is 26.63 kg/(m^2). General: Alert and oriented. No acute distress. Well nourished  HEENT :  Ears:TMs are normal. Canals are clear. Eyes: pupils equal, round, react to light and accommodation. Extra ocular movements intact. Nose: patent. Mouth and throat is clear. Neck:supple full range of motion no thyromegaly. Trachea midline, No carotid bruits. No significant lymphadenopathy  Lungs[de-identified] clear to auscultation without wheezes, rales, or rhonchi. Heart :RRR, S1 & S2 are normal intensity. No murmur; no gallop  Abdomen: bowel sounds active. No tenderness, guarding, rebound, masses, hepatic or spleen enlargement  Back: no CVA tenderness.   Extremities: without clubbing, cyanosis, or edema  Pulses: radial and femoral pulses are normal  Neuro: HMF intact. Cranial nerves II through XII grossly normal.  Motor: is 5 over 5 and symmetrical.   Deep tendon reflexes: +2 equal    Results for orders placed or performed in visit on 12/19/17   LIPID PANEL   Result Value Ref Range    Cholesterol, total 163 100 - 199 mg/dL    Triglyceride 85 0 - 149 mg/dL    HDL Cholesterol 51 >39 mg/dL    VLDL, calculated 17 5 - 40 mg/dL    LDL, calculated 95 0 - 99 mg/dL   METABOLIC PANEL, COMPREHENSIVE   Result Value Ref Range    Glucose 104 (H) 65 - 99 mg/dL    BUN 18 8 - 27 mg/dL    Creatinine 0.76 0.57 - 1.00 mg/dL    GFR est non-AA 71 >59 mL/min/1.73    GFR est AA 82 >59 mL/min/1.73    BUN/Creatinine ratio 24 12 - 28    Sodium 139 134 - 144 mmol/L    Potassium 4.8 3.5 - 5.2 mmol/L    Chloride 101 96 - 106 mmol/L    CO2 25 18 - 29 mmol/L    Calcium 9.2 8.7 - 10.3 mg/dL    Protein, total 7.2 6.0 - 8.5 g/dL    Albumin 4.4 3.5 - 4.7 g/dL    GLOBULIN, TOTAL 2.8 1.5 - 4.5 g/dL    A-G Ratio 1.6 1.2 - 2.2    Bilirubin, total 0.8 0.0 - 1.2 mg/dL    Alk. phosphatase 96 39 - 117 IU/L    AST (SGOT) 28 0 - 40 IU/L    ALT (SGPT) 25 0 - 32 IU/L       No results found for this visit on 02/27/18. Assessment/ Plan:     1. BMI 26.0-26.9,adult  The BMI follow up plan is as follows:     dietary management education, guidance, and counseling  encourage exercise  monitor weight  prescribed dietary intake    An After Visit Summary was printed and given to the patient. 2. Essential hypertension  Continue to monitor BP 2 x per day    3. Colitis  Decreasing gluten in diet. 4. Hyperlipidemia, unspecified hyperlipidemia type  Reinforced grapefruit and grapefruit juice should not be taken with Mevacor. Orders Placed This Encounter    B.infantis-B.ani-B.long-B.bifi (PROBIOTIC 4X) 10-15 mg TbEC     Sig: Take  by mouth.          Verbal and written instructions (see AVS) provided.  Patient expresses understanding of diagnosis and treatment plan. Health Maintenance Due   Topic Date Due    ZOSTER VACCINE AGE 60>  01[birth year]07/1990         Follow-up Disposition:  Return in about 3 months (around 5/27/2018). to evaluate BP and gluten free diet.       DIGNA Sherman

## 2018-02-27 NOTE — ACP (ADVANCE CARE PLANNING)
Has advanced medical directive scanned into chart. Reviewed at this visit. No changes.   Mikey Runner NP-C

## 2018-03-07 DIAGNOSIS — I10 ESSENTIAL HYPERTENSION: ICD-10-CM

## 2018-03-08 RX ORDER — ATENOLOL 50 MG/1
50 TABLET ORAL 2 TIMES DAILY
Qty: 60 TAB | Refills: 3 | Status: SHIPPED | OUTPATIENT
Start: 2018-03-08 | End: 2018-03-27 | Stop reason: SDUPTHER

## 2018-03-08 RX ORDER — HYDROCHLOROTHIAZIDE 25 MG/1
12.5 TABLET ORAL DAILY
Qty: 15 TAB | Refills: 3 | Status: SHIPPED | OUTPATIENT
Start: 2018-03-08 | End: 2018-04-07

## 2018-03-27 ENCOUNTER — OFFICE VISIT (OUTPATIENT)
Dept: FAMILY MEDICINE CLINIC | Age: 83
End: 2018-03-27

## 2018-03-27 VITALS
HEIGHT: 65 IN | BODY MASS INDEX: 26.27 KG/M2 | HEART RATE: 68 BPM | SYSTOLIC BLOOD PRESSURE: 150 MMHG | RESPIRATION RATE: 18 BRPM | OXYGEN SATURATION: 98 % | TEMPERATURE: 97.2 F | WEIGHT: 157.7 LBS | DIASTOLIC BLOOD PRESSURE: 80 MMHG

## 2018-03-27 DIAGNOSIS — I10 ESSENTIAL HYPERTENSION: Primary | ICD-10-CM

## 2018-03-27 DIAGNOSIS — K52.9 COLITIS: ICD-10-CM

## 2018-03-27 DIAGNOSIS — K59.04 CHRONIC IDIOPATHIC CONSTIPATION: ICD-10-CM

## 2018-03-27 RX ORDER — ATENOLOL 50 MG/1
50 TABLET ORAL DAILY
Qty: 90 TAB | Refills: 3
Start: 2018-03-27 | End: 2018-06-21 | Stop reason: SDUPTHER

## 2018-03-27 NOTE — MR AVS SNAPSHOT
[facility] 
 
 
 [address] [employer] [address] 
[phone] Patient: Fernando Inman MRN: [id number] Visit Information Date & Time Provider Department Dept. Phone Encounter #  
 3/27/2018 10:15 AM Sharla Banuelos NP [facility] [address] [facility] [phone] [id number] Your Appointments 6/13/2018  9:30 AM  
ESTABLISHED PATIENT with Sharla Banuelos NP  
[address] [place] ([address]) Appt Note: 6 mo F/U  
 [address] [address]  
[address] [facility] [address] [address] 6/18/2018 11:00 AM  
ESTABLISHED PATIENT with Ysabel Hernandez MD  
[address] [facility] [address]) Appt Note: 6 MO F/U $0CP  
 [address] [facility] [address] [id number] [phone]  
  
   
 [address] [place] [address] Upcoming Health Maintenance Date Due ZOSTER VACCINE AGE 60> 1[birth year]7/1990 MEDICARE YEARLY EXAM 6/21/2018 GLAUCOMA SCREENING Q2Y 6/2/2019 DTaP/Tdap/Td series (2 - Td) 6/20/2027 Allergies as of 3/27/2018  Review Complete On: 3/27/2018 By: Sharla Banuelos NP Severity Noted Reaction Type Reactions Ambien [Zolpidem]  03/08/2016    Other (comments) Legs got heavy Bactrim [Sulfamethoprim Ds]  12/20/2016    Unknown (comments) Patient cant remember her reaction Hydroxyzine  06/23/2016    Other (comments) Legs got heavy Current Immunizations  Never Reviewed Name Date  Influenza High Dose Vaccine PF 10/27/2017, 10/3/2016, 10/9/2015 12:00 AM  
 Influenza Vaccine 10/6/2014 12:00 AM, 9/18/2013 12:00 AM, 11/26/2012 12:00 AM, 10/2/2012 12:00 AM, 11/10/2011 12:00 AM, 10/26/2010 12:00 AM, 9/17/2009 12:00 AM, 10/8/2008 12:00 AM  
 Pneumococcal Conjugate (PCV-13) 11/11/2015 12:00 AM  
 Pneumococcal Polysaccharide (PPSV-23) 6/20/2017, 11/11/2011 12:00 AM, 4/11/2011 12:00 AM  
 Td 11/29/2011 Tdap 11/29/2011 12:00 AM  
  
 Not reviewed this visit You Were Diagnosed With   
  
 Codes Comments Essential hypertension    -  Primary ICD-10-CM: I10 
ICD-9-CM: 401.9 BMI 26.0-26.9,adult     ICD-10-CM: C59.02 
ICD-9-CM: V85.22 Vitals BP Pulse Temp Resp Height(growth percentile) 150/80 (BP 1 Location: Right arm, BP Patient Position: Sitting) 68 97.2 °F (36.2 °C) (Temporal) 18 5' 5\" (1.651 m) Weight(growth percentile) SpO2 BMI OB Status Smoking Status 157 lb 11.2 oz (71.5 kg) 98% 26.24 kg/m2 Menopause Never Smoker Vitals History BMI and BSA Data Body Mass Index Body Surface Area  
 26.24 kg/m 2 1.81 m 2 Preferred Pharmacy Pharmacy Name Phone [id number], [address] [phone] Your Updated Medication List  
  
   
This list is accurate as of 3/27/18 11:05 AM.  Always use your most recent med list.  
  
  
  
  
 atenolol 50 mg tablet Commonly known as:  TENORMIN Take 1 Tab by mouth daily. biotin 2,500 mcg Tab Take  by mouth daily. BOSWELLIA AVA XT (BULK) Take  by mouth daily. CALCITRATE-VITAMIN D PO Take  by mouth daily. CO Q-10 100 mg capsule Generic drug:  co-enzyme Q-10 Take 100 mg by mouth daily. hydroCHLOROthiazide 25 mg tablet Commonly known as:  HYDRODIURIL Take 0.5 Tabs by mouth daily for 30 days. Indications: hypertension  
  
 lovastatin 20 mg tablet Commonly known as:  MEVACOR  
TAKE 1 TABLET BY MOUTH  NIGHTLY  
  
 magnesium 250 mg Tab Take  by mouth. Pt thinks she takes 200 mg  
  
 MELATONIN PO Take  by mouth. omega 3-DHA-EPA-fish oil 1,000 mg (120 mg-180 mg) capsule Take  by mouth daily. * OTHER  
VEGGIES FOR LIFE AND FRUIT FOR LIFE , TAKES ONE A DAY EACH  
  
 * OTHER  
daily. Folate po  PEPPERMINT OIL  
 Take  by mouth. PROBIOTIC 4X 10-15 mg Tbec Generic drug:  B.infantis-B.ani-B.long-B.bifi Take  by mouth. PROBIOTIC PO Take  by mouth.  
  
 triamcinolone 0.5 % topical cream  
Commonly known as:  ARISTOCORT Apply  to affected area two (2) times a day. As needed for itching TYLENOL PM PO Take  by mouth. VITAMIN B-12 1,000 mcg tablet Generic drug:  cyanocobalamin Take 1,000 mcg by mouth daily. VITAMIN C WITH YOUSIF HIPS PO Take  by mouth daily. VITAMIN D3 2,000 unit Tab Generic drug:  cholecalciferol (vitamin D3) Take  by mouth daily. * Notice: This list has 2 medication(s) that are the same as other medications prescribed for you. Read the directions carefully, and ask your doctor or other care provider to review them with you. Introducing [facility]! [facility] introduces Clipik patient portal. Now you can access parts of your medical record, email your doctor's office, and request medication refills online. 1. In your internet browser, go to [email or web address] 2. Click on the First Time User? Click Here link in the Sign In box. You will see the New Member Sign Up page. 3. Enter your Clipik Access Code exactly as it appears below. You will not need to use this code after youve completed the sign-up process. If you do not sign up before the expiration date, you must request a new code. · Clipik Access Code: [email or web address] Expires: 5/14/2018  4:28 AM 
 
4. Enter the last four digits of your Social Security Number (xxxx) and Date of Birth (mm/dd/yyyy) as indicated and click Submit. You will be taken to the next sign-up page. 5. Create a CardioFocust ID. This will be your Clipik login ID and cannot be changed, so think of one that is secure and easy to remember. 6. Create a Clipik password. You can change your password at any time. 7. Enter your Password Reset Question and Answer.  This can be used at a later time if you forget your password. 8. Enter your e-mail address. You will receive e-mail notification when new information is available in [address] 9. Click Sign Up. You can now view and download portions of your medical record. 10. Click the Download Summary menu link to download a portable copy of your medical information. If you have questions, please visit the Frequently Asked Questions section of the [employer] website. Remember, [employer] is NOT to be used for urgent needs. For medical emergencies, dial 911. Now available from your iPhone and Android! Please provide this summary of care documentation to your next provider. Your primary care clinician is listed as Melanie Primrose. If you have any questions after today's visit, please call [phone].

## 2018-03-27 NOTE — ACP (ADVANCE CARE PLANNING)
Has advanced medical directive scanned into chart. Reviewed at this visit. No changes.     Henri LAMAR

## 2018-03-28 NOTE — PROGRESS NOTES
Subjective:     Graham Inman is a 80 y.o. female who presents today with the following:  Chief Complaint   Patient presents with    Hypertension     f/u       Patient Active Problem List   Diagnosis Code    HTN (hypertension) I10    DJD (degenerative joint disease) M19.90    Colitis K52.9    Ventricular ectopy I49.3    Hyperlipemia E78.5    Primary osteoarthritis M19.91    Abnormal EKG R94.31    PAC (premature atrial contraction) I49.1         COMPLIANT WITH MEDICATION:   HTN; Denies chest pain, dyspnea, palpitations, headache and blurred vision. Blood pressure  A little elevated today not normotensive. BMI Discussed the patient's BMI with her. The BMI follow up plan is as follows:     dietary management education, guidance, and counseling  encourage exercise  monitor weight  prescribed dietary intake    An After Visit Summary was printed and given to the patient. Colitis/constipation: trial of gluten free diet was successful will take metamucil to prevent constipation. Aristocort for pruritic skin          ROS:  Gen: denies fever, chills, fatigue, weight loss, weight gain  HEENT:denies blurry vision, nasal congestion, sore throat  Resp: denies dypsnea, cough, wheezing  CV: denies chest pain radiating to the jaws or arms, palpitations, lower extremity edema  Abd: denies nausea, vomiting, diarrhea, constipation  Neuro: denies numbness/tingling  Endo: denies polyuria, polydipsia, heat/cold intolerance  Heme: no lymphadenopathy    Allergies   Allergen Reactions    Ambien [Zolpidem] Other (comments)     Legs got heavy    Bactrim [Sulfamethoprim Ds] Unknown (comments)     Patient cant remember her reaction    Hydroxyzine Other (comments)     Legs got heavy         Current Outpatient Prescriptions:     atenolol (TENORMIN) 50 mg tablet, Take 1 Tab by mouth daily. , Disp: 90 Tab, Rfl: 3    hydroCHLOROthiazide (HYDRODIURIL) 25 mg tablet, Take 0.5 Tabs by mouth daily for 30 days.  Indications: hypertension, Disp: 15 Tab, Rfl: 3    B.infantis-B.ani-B.long-B.bifi (PROBIOTIC 4X) 10-15 mg TbEC, Take  by mouth., Disp: , Rfl:     LACTOBACILLUS ACIDOPHILUS (PROBIOTIC PO), Take  by mouth., Disp: , Rfl:     PEPPERMINT OIL, Take  by mouth., Disp: , Rfl:     ACETAMINOPHEN/DIPHENHYDRAMINE (TYLENOL PM PO), Take  by mouth., Disp: , Rfl:     lovastatin (MEVACOR) 20 mg tablet, TAKE 1 TABLET BY MOUTH  NIGHTLY, Disp: 90 Tab, Rfl: 3    OTHER, daily. Folate po , Disp: , Rfl:     MELATONIN PO, Take  by mouth., Disp: , Rfl:     triamcinolone (ARISTOCORT) 0.5 % topical cream, Apply  to affected area two (2) times a day. As needed for itching, Disp: 45 g, Rfl: 0    co-enzyme Q-10 (CO Q-10) 100 mg capsule, Take 100 mg by mouth daily. , Disp: , Rfl:     cholecalciferol, vitamin D3, (VITAMIN D3) 2,000 unit tab, Take  by mouth daily. , Disp: , Rfl:     cyanocobalamin (VITAMIN B-12) 1,000 mcg tablet, Take 1,000 mcg by mouth daily. , Disp: , Rfl:     BOSWELLIA AVA EXTRACT (BOSWELLIA AVA XT, BULK,), Take  by mouth daily. , Disp: , Rfl:     OTHER, VEGGIES FOR LIFE AND FRUIT FOR LIFE , TAKES ONE A DAY EACH, Disp: , Rfl:     magnesium 250 mg tab, Take  by mouth. Pt thinks she takes 200 mg, Disp: , Rfl:     CALCIUM CITRATE/VITAMIN D3 (CALCITRATE-VITAMIN D PO), Take  by mouth daily. , Disp: , Rfl:     Omega-3-DHA-EPA-Fish Oil 1,000 mg (120 mg-180 mg) cap, Take  by mouth daily. , Disp: , Rfl:     ASCORBIC ACID (VITAMIN C WITH YOUSIF HIPS PO), Take  by mouth daily. , Disp: , Rfl:     biotin 2,500 mcg tab, Take  by mouth daily. , Disp: , Rfl:     Past Medical History:   Diagnosis Date    Colitis 2013    C diff; no recent flare, normal colonoscopy 2014    DJD (degenerative joint disease)     HTN (hypertension)     Hyperlipemia     Ventricular ectopy     no symptoms       Past Surgical History:   Procedure Laterality Date    HX CATARACT REMOVAL      bilat    HX COLONOSCOPY  2014    WNL    HX ORTHOPAEDIC  2005    right femur fx    HX ORTHOPAEDIC  2012    Lt TKA    HX REFRACTIVE SURGERY  06/15/2017    rt       History   Smoking Status    Never Smoker   Smokeless Tobacco    Never Used       Social History     Social History    Marital status:      Spouse name: N/A    Number of children: N/A    Years of education: N/A     Social History Main Topics    Smoking status: Never Smoker    Smokeless tobacco: Never Used    Alcohol use No    Drug use: Not on file    Sexual activity: Not on file     Other Topics Concern     Service No    Blood Transfusions Yes    Caffeine Concern No    Occupational Exposure No    Hobby Hazards No    Sleep Concern Yes     insomnia    Stress Concern No    Weight Concern Yes     over weight    Special Diet No    Back Care No    Exercise Yes    Bike Helmet No    Seat Belt Yes    Self-Exams Yes     Social History Narrative       Family History   Problem Relation Age of Onset    Cancer Mother      Leukemia    Heart Disease Mother     Cancer Sister      Ovarian    No Known Problems Brother     Heart Failure Brother     Cancer Brother      Brain tumor         Objective:     Visit Vitals    /80 (BP 1 Location: Right arm, BP Patient Position: Sitting)    Pulse 68    Temp 97.2 °F (36.2 °C) (Temporal)    Resp 18    Ht 5' 5\" (1.651 m)    Wt 157 lb 11.2 oz (71.5 kg)    SpO2 98%    BMI 26.24 kg/m2     Body mass index is 26.24 kg/(m^2). General: Alert and oriented. No acute distress. Well nourished  HEENT :  Ears:TMs are normal. Canals are clear. Eyes: pupils equal, round, react to light and accommodation. Extra ocular movements intact. Nose: patent. Mouth and throat is clear. Neck:supple full range of motion no thyromegaly. Trachea midline, No carotid bruits. No significant lymphadenopathy  Lungs[de-identified] clear to auscultation without wheezes, rales, or rhonchi. Heart :RRR, S1 & S2 are normal intensity. No murmur; no gallop  Abdomen: bowel sounds active.  No tenderness, guarding, rebound, masses, hepatic or spleen enlargement  Back: no CVA tenderness. Extremities: without clubbing, cyanosis, or edema  Pulses: radial and femoral pulses are normal  Neuro: HMF intact. Cranial nerves II through XII grossly normal.  Motor: is 5 over 5 and symmetrical.   Deep tendon reflexes: +2 equal    Results for orders placed or performed in visit on 12/19/17   LIPID PANEL   Result Value Ref Range    Cholesterol, total 163 100 - 199 mg/dL    Triglyceride 85 0 - 149 mg/dL    HDL Cholesterol 51 >39 mg/dL    VLDL, calculated 17 5 - 40 mg/dL    LDL, calculated 95 0 - 99 mg/dL   METABOLIC PANEL, COMPREHENSIVE   Result Value Ref Range    Glucose 104 (H) 65 - 99 mg/dL    BUN 18 8 - 27 mg/dL    Creatinine 0.76 0.57 - 1.00 mg/dL    GFR est non-AA 71 >59 mL/min/1.73    GFR est AA 82 >59 mL/min/1.73    BUN/Creatinine ratio 24 12 - 28    Sodium 139 134 - 144 mmol/L    Potassium 4.8 3.5 - 5.2 mmol/L    Chloride 101 96 - 106 mmol/L    CO2 25 18 - 29 mmol/L    Calcium 9.2 8.7 - 10.3 mg/dL    Protein, total 7.2 6.0 - 8.5 g/dL    Albumin 4.4 3.5 - 4.7 g/dL    GLOBULIN, TOTAL 2.8 1.5 - 4.5 g/dL    A-G Ratio 1.6 1.2 - 2.2    Bilirubin, total 0.8 0.0 - 1.2 mg/dL    Alk. phosphatase 96 39 - 117 IU/L    AST (SGOT) 28 0 - 40 IU/L    ALT (SGPT) 25 0 - 32 IU/L       No results found for this visit on 03/27/18. Assessment/ Plan:     1. BMI 26.0-26.9,adult  Discussed the patient's BMI with her. The BMI follow up plan is as follows:     dietary management education, guidance, and counseling  encourage exercise  monitor weight  prescribed dietary intake    An After Visit Summary was printed and given to the patient. 2. Essential hypertension    - atenolol (TENORMIN) 50 mg tablet; Take 1 Tab by mouth daily. Dispense: 90 Tab; Refill: 3      Orders Placed This Encounter    atenolol (TENORMIN) 50 mg tablet     Sig: Take 1 Tab by mouth daily.      Dispense:  90 Tab     Refill:  3     PATIENT ID : [id number] Verbal and written instructions (see AVS) provided.  Patient expresses understanding of diagnosis and treatment plan.     Health Maintenance Due   Topic Date Due    ZOSTER VACCINE AGE 60>  01/07/1990         Follow-up Disposition: Not on File      DIGNA Aceves

## 2018-05-01 ENCOUNTER — TELEPHONE (OUTPATIENT)
Dept: CARDIOLOGY CLINIC | Age: 83
End: 2018-05-01

## 2018-05-01 NOTE — TELEPHONE ENCOUNTER
Pt called with BP readings that Dr Raymond Vallejo requested. 3/12/18       144/72   HR 56    10:58 am                                   124/69   HR  49     9:10 pm    4/29/18        125/84   HR  79    10:15 am                        129/73   HR  86     5:20 pm    States PCP has taken her \"off\" of one dose of Atenolol.

## 2018-05-01 NOTE — TELEPHONE ENCOUNTER
Pt notified (after 2 identifiers) per Dr Mikhail Bull \" Reviewed, looks ok. Arnoldo Small with reduced dose of atenolol per PCP\". Pt acknowledged understanding and had no further questions.

## 2018-06-13 ENCOUNTER — OFFICE VISIT (OUTPATIENT)
Dept: FAMILY MEDICINE CLINIC | Age: 83
End: 2018-06-13

## 2018-06-13 VITALS
WEIGHT: 155 LBS | OXYGEN SATURATION: 97 % | SYSTOLIC BLOOD PRESSURE: 140 MMHG | BODY MASS INDEX: 25.83 KG/M2 | DIASTOLIC BLOOD PRESSURE: 82 MMHG | HEART RATE: 78 BPM | TEMPERATURE: 97.8 F | HEIGHT: 65 IN

## 2018-06-13 DIAGNOSIS — M79.674 PAIN IN TOES OF BOTH FEET: ICD-10-CM

## 2018-06-13 DIAGNOSIS — I10 ESSENTIAL HYPERTENSION: Primary | ICD-10-CM

## 2018-06-13 DIAGNOSIS — Z00.00 ENCOUNTER FOR MEDICARE ANNUAL WELLNESS EXAM: ICD-10-CM

## 2018-06-13 DIAGNOSIS — E78.5 HYPERLIPIDEMIA, UNSPECIFIED HYPERLIPIDEMIA TYPE: ICD-10-CM

## 2018-06-13 DIAGNOSIS — M79.675 PAIN IN TOES OF BOTH FEET: ICD-10-CM

## 2018-06-13 NOTE — MR AVS SNAPSHOT
[address] [place] [address] 
 
 
 [address] 
[phone] Patient: Cheng Inman MRN: [id number] [facility]:[birth year] Visit Information Date & Time Provider Department Dept. Phone Encounter #  
 6/13/2018  9:30 AM Emeli Mora NP [facility] [address] [facility] [phone] [id number] Your Appointments 6/18/2018 11:00 AM  
ESTABLISHED PATIENT with Lauren Murphy MD  
[address] [facility]) Appt Note: 6 MO F/U $0CP  
 [address] [facility] [phone]  
  
   
 [address] [place] [id number] Upcoming Health Maintenance Date Due ZOSTER VACCINE AGE 60> 1/7/1990 MEDICARE YEARLY EXAM 6/21/2018 Influenza Age 5 to Adult 8/1/2018 GLAUCOMA SCREENING Q2Y 6/2/2019 DTaP/Tdap/Td series (2 - Td) 6/20/2027 Allergies as of 6/13/2018  Review Complete On: 6/13/2018 By: Cecil Laurent Severity Noted Reaction Type Reactions Ambien [Zolpidem]  03/08/2016    Other (comments) Legs got heavy Bactrim [Sulfamethoprim Ds]  12/20/2016    Unknown (comments) Patient cant remember her reaction Hydroxyzine  06/23/2016    Other (comments) Legs got heavy Current Immunizations  Never Reviewed Name Date Influenza High Dose Vaccine PF 10/27/2017, 10/3/2016, 10/9/2015 12:00 AM  
 Influenza Vaccine 10/6/2014 12:00 AM, 9/18/2013 12:00 AM, 11/26/2012 12:00 AM, 10/2/2012 12:00 AM, 11/10/2011 12:00 AM, 10/26/2010 12:00 AM, 9/17/2009 12:00 AM, 10/8/2008 12:00 AM  
 Pneumococcal Conjugate (PCV-13) 11/11/2015 12:00 AM  
 Pneumococcal Polysaccharide (PPSV-23) 6/20/2017, 11/11/2011 12:00 AM, 4/11/2011 12:00 AM  
 Td 11/29/2011 Tdap 11/29/2011 12:00 AM  
  
 Not reviewed this visit You Were Diagnosed With   
  
 Codes Comments Essential hypertension    -  Primary ICD-10-CM: I10 
ICD-9-CM: 401.9 BMI 25.0-25.9,adult     ICD-10-CM: R98.05 ICD-9-CM: V85.21 Hyperlipidemia, unspecified hyperlipidemia type     ICD-10-CM: E78.5 ICD-9-CM: 272.4 Pain in toes of both feet     ICD-10-CM: M79.674, Y65.917 ICD-9-CM: 729.5 Vitals BP Pulse Temp Height(growth percentile) Weight(growth percentile) SpO2  
 140/82 (BP 1 Location: Right arm, BP Patient Position: Sitting) 78 97.8 °F (36.6 °C) (Oral) 5' 5\" (1.651 m) 155 lb (70.3 kg) 97% BMI OB Status Smoking Status 25.79 kg/m2 Menopause Never Smoker BMI and BSA Data Body Mass Index Body Surface Area 25.79 kg/m 2 1.8 m 2 Preferred Pharmacy Pharmacy Name Phone [id number] [address] [phone] Your Updated Medication List  
  
   
This list is accurate as of 6/13/18 10:30 AM.  Always use your most recent med list.  
  
  
  
  
 atenolol 50 mg tablet Commonly known as:  TENORMIN Take 1 Tab by mouth daily. biotin 2,500 mcg Tab Take  by mouth daily. BOSWELLIA AVA XT (BULK) Take  by mouth daily. CALCITRATE-VITAMIN D PO Take  by mouth daily. CO Q-10 100 mg capsule Generic drug:  co-enzyme Q-10 Take 100 mg by mouth daily. lovastatin 20 mg tablet Commonly known as:  MEVACOR  
TAKE 1 TABLET BY MOUTH  NIGHTLY  
  
 magnesium 250 mg Tab Take  by mouth. Pt thinks she takes 200 mg  
  
 MELATONIN PO Take  by mouth. omega 3-DHA-EPA-fish oil 1,000 mg (120 mg-180 mg) capsule Take  by mouth daily. * OTHER  
VEGGIES FOR LIFE AND FRUIT FOR LIFE , TAKES ONE A DAY EACH  
  
 * OTHER  
daily. Folate po PEPPERMINT OIL Take  by mouth. PROBIOTIC 4X 10-15 mg Tbec Generic drug:  B.infantis-B.ani-B.long-B.bifi Take  by mouth. PROBIOTIC PO Take  by mouth.  
  
 triamcinolone 0.5 % topical cream  
Commonly known as:  ARISTOCORT  
 Apply  to affected area two (2) times a day. As needed for itching TYLENOL PM PO Take  by mouth. VITAMIN B-12 1,000 mcg tablet Generic drug:  cyanocobalamin Take 1,000 mcg by mouth daily. VITAMIN C WITH YOUSIF HIPS PO Take  by mouth daily. VITAMIN D3 2,000 unit Tab Generic drug:  cholecalciferol (vitamin D3) Take  by mouth daily. * Notice: This list has 2 medication(s) that are the same as other medications prescribed for you. Read the directions carefully, and ask your doctor or other care provider to review them with you. We Performed the Following CBC WITH AUTOMATED DIFF [04401 CPT(R)] COLLECTION VENOUS BLOOD,VENIPUNCTURE [id number] CPT(R)] METABOLIC PANEL, COMPREHENSIVE [72566 CPT(R)] REFERRAL TO PODIATRY [REF90 Custom] Comments:  
 Dr. Aditya Schuster or Iván Bryan first available Patient cannot come on a Tuesday or Thursday Xrays from [facility] Does patient need to bring to appointment? TSH 3RD GENERATION [38877 CPT(R)] To-Do List   
 06/13/2018 Imaging:  XR FOOT LT MIN 3 V   
  
 06/13/2018 Imaging:  XR FOOT RT MIN 3 V Introducing [facility]! Concepcion Lawson introduces [employer] patient portal. Now you can access parts of your medical record, email your doctor's office, and request medication refills online. 1. In your internet browser, go to [email or web address] 2. Click on the First Time User? Click Here link in the Sign In box. You will see the New Member Sign Up page. 3. Enter your [employer] Access Code exactly as it appears below. You will not need to use this code after youve completed the sign-up process. If you do not sign up before the expiration date, you must request a new code. · [employer] Access Code: [email or web address] Expires: 9/11/2018  9:34 AM 
 
4. Enter the last four digits of your Social Security Number (xxxx) and Date of Birth (mm/dd/yyyy) as indicated and click Submit.  You will be taken to the next sign-up page. 5. Create a [employer] ID. This will be your [employer] login ID and cannot be changed, so think of one that is secure and easy to remember. 6. Create a [employer] password. You can change your password at any time. 7. Enter your Password Reset Question and Answer. This can be used at a later time if you forget your password. 8. Enter your e-mail address. You will receive e-mail notification when new information is available in [address] 9. Click Sign Up. You can now view and download portions of your medical record. 10. Click the Download Summary menu link to download a portable copy of your medical information. If you have questions, please visit the Frequently Asked Questions section of the [employer] website. Remember, [employer] is NOT to be used for urgent needs. For medical emergencies, dial 911. Now available from your iPhone and Android! Please provide this summary of care documentation to your next provider. Your primary care clinician is listed as Brian Ivory. If you have any questions after today's visit, please call [phone].

## 2018-06-13 NOTE — LETTER
6/15/2018 10:31 AM 
 
Ms. Bertha Inman 
[address] [place]: 
 
Please find your most recent results below. Resulted Orders CBC WITH AUTOMATED DIFF Result Value Ref Range WBC 8.3 3.4 - 10.8 x10E3/uL  
 RBC 4.23 3.77 - 5.28 x10E6/uL HGB 13.5 11.1 - 15.9 g/dL HCT 40.0 34.0 - 46.6 % MCV 95 79 - 97 fL  
 MCH 31.9 26.6 - 33.0 pg  
 MCHC 33.8 31.5 - 35.7 g/dL  
 RDW 13.9 12.3 - 15.4 % PLATELET 775 591 - 928 x10E3/uL NEUTROPHILS 61 Not Estab. % Lymphocytes 28 Not Estab. % MONOCYTES 8 Not Estab. % EOSINOPHILS 3 Not Estab. % BASOPHILS 0 Not Estab. %  
 ABS. NEUTROPHILS 5.1 1.4 - 7.0 x10E3/uL Abs Lymphocytes 2.3 0.7 - 3.1 x10E3/uL  
 ABS. MONOCYTES 0.6 0.1 - 0.9 x10E3/uL  
 ABS. EOSINOPHILS 0.2 0.0 - 0.4 x10E3/uL  
 ABS. BASOPHILS 0.0 0.0 - 0.2 x10E3/uL IMMATURE GRANULOCYTES 0 Not Estab. %  
 ABS. IMM. GRANS. 0.0 0.0 - 0.1 x10E3/uL Narrative Performed at:  [address]  [id number] : Roula Lowry MD, Phone:  [phone] METABOLIC PANEL, COMPREHENSIVE Result Value Ref Range Glucose 107 (H) 65 - 99 mg/dL BUN 19 8 - 27 mg/dL Creatinine 0.79 0.57 - 1.00 mg/dL GFR est non-AA 67 >59 mL/min/1.73 GFR est AA 77 >59 mL/min/1.73  
 BUN/Creatinine ratio 24 12 - 28 Sodium 141 134 - 144 mmol/L Potassium 4.7 3.5 - 5.2 mmol/L Chloride 99 96 - 106 mmol/L  
 CO2 26 20 - 29 mmol/L Comment: **Please note reference interval change** Calcium 9.4 8.7 - 10.3 mg/dL Protein, total 7.2 6.0 - 8.5 g/dL Albumin 4.3 3.5 - 4.7 g/dL GLOBULIN, TOTAL 2.9 1.5 - 4.5 g/dL A-G Ratio 1.5 1.2 - 2.2 Bilirubin, total 0.9 0.0 - 1.2 mg/dL Alk. phosphatase 86 39 - 117 IU/L  
 AST (SGOT) 33 0 - 40 IU/L  
 ALT (SGPT) 25 0 - 32 IU/L Narrative Performed at:  [address]  [id number] : Hanna Duran MD, Phone:  [phone] TSH 3RD GENERATION Result Value Ref Range TSH 1.770 0.450 - 4.500 uIU/mL Narrative Performed at:  [address]  [id number] : Hanna Duran MD, Phone:  [phone] RECOMMENDATIONS: 
Good news! CBC no anemia Thyroid level within normal limits no changes Metabolic panel good liver and kidney functions Please call me if you have any questions: [phone] Sincerely, 
 
 
Esha Hinojosa, NP

## 2018-06-14 LAB
ALBUMIN SERPL-MCNC: 4.3 G/DL (ref 3.5–4.7)
ALBUMIN/GLOB SERPL: 1.5 {RATIO} (ref 1.2–2.2)
ALP SERPL-CCNC: 86 IU/L (ref 39–117)
ALT SERPL-CCNC: 25 IU/L (ref 0–32)
AST SERPL-CCNC: 33 IU/L (ref 0–40)
BASOPHILS # BLD AUTO: 0 X10E3/UL (ref 0–0.2)
BASOPHILS NFR BLD AUTO: 0 %
BILIRUB SERPL-MCNC: 0.9 MG/DL (ref 0–1.2)
BUN SERPL-MCNC: 19 MG/DL (ref 8–27)
BUN/CREAT SERPL: 24 (ref 12–28)
CALCIUM SERPL-MCNC: 9.4 MG/DL (ref 8.7–10.3)
CHLORIDE SERPL-SCNC: 99 MMOL/L (ref 96–106)
CO2 SERPL-SCNC: 26 MMOL/L (ref 20–29)
CREAT SERPL-MCNC: 0.79 MG/DL (ref 0.57–1)
EOSINOPHIL # BLD AUTO: 0.2 X10E3/UL (ref 0–0.4)
EOSINOPHIL NFR BLD AUTO: 3 %
ERYTHROCYTE [DISTWIDTH] IN BLOOD BY AUTOMATED COUNT: 13.9 % (ref 12.3–15.4)
GFR SERPLBLD CREATININE-BSD FMLA CKD-EPI: 67 ML/MIN/1.73
GFR SERPLBLD CREATININE-BSD FMLA CKD-EPI: 77 ML/MIN/1.73
GLOBULIN SER CALC-MCNC: 2.9 G/DL (ref 1.5–4.5)
GLUCOSE SERPL-MCNC: 107 MG/DL (ref 65–99)
HCT VFR BLD AUTO: 40 % (ref 34–46.6)
HGB BLD-MCNC: 13.5 G/DL (ref 11.1–15.9)
IMM GRANULOCYTES # BLD: 0 X10E3/UL (ref 0–0.1)
IMM GRANULOCYTES NFR BLD: 0 %
LYMPHOCYTES # BLD AUTO: 2.3 X10E3/UL (ref 0.7–3.1)
LYMPHOCYTES NFR BLD AUTO: 28 %
MCH RBC QN AUTO: 31.9 PG (ref 26.6–33)
MCHC RBC AUTO-ENTMCNC: 33.8 G/DL (ref 31.5–35.7)
MCV RBC AUTO: 95 FL (ref 79–97)
MONOCYTES # BLD AUTO: 0.6 X10E3/UL (ref 0.1–0.9)
MONOCYTES NFR BLD AUTO: 8 %
NEUTROPHILS # BLD AUTO: 5.1 X10E3/UL (ref 1.4–7)
NEUTROPHILS NFR BLD AUTO: 61 %
PLATELET # BLD AUTO: 227 X10E3/UL (ref 150–379)
POTASSIUM SERPL-SCNC: 4.7 MMOL/L (ref 3.5–5.2)
PROT SERPL-MCNC: 7.2 G/DL (ref 6–8.5)
RBC # BLD AUTO: 4.23 X10E6/UL (ref 3.77–5.28)
SODIUM SERPL-SCNC: 141 MMOL/L (ref 134–144)
TSH SERPL DL<=0.005 MIU/L-ACNC: 1.77 UIU/ML (ref 0.45–4.5)
WBC # BLD AUTO: 8.3 X10E3/UL (ref 3.4–10.8)

## 2018-06-15 ENCOUNTER — TELEPHONE (OUTPATIENT)
Dept: FAMILY MEDICINE CLINIC | Age: 83
End: 2018-06-15

## 2018-06-15 NOTE — PROGRESS NOTES
Good news!   CBC no anemia  Thyroid level within normal limits no changes  Metabolic panel good liver and kidney functions

## 2018-06-15 NOTE — TELEPHONE ENCOUNTER
[address] to add on the Lipid panel  ([id number]). She stated that it has been done and the office will received the add on confirmation form with 24 to 48 hours.

## 2018-06-15 NOTE — PROGRESS NOTES
This is the Subsequent Medicare Annual Wellness Exam, performed 12 months or more after the Initial AWV or the last Subsequent AWV    I have reviewed the patient's medical history in detail and updated the computerized patient record. History     Past Medical History:   Diagnosis Date    Colitis 2013    C diff; no recent flare, normal colonoscopy 2014    DJD (degenerative joint disease)     Fracture, femoral ([address] 09/2005    HTN (hypertension)     Hyperlipemia     Ventricular ectopy     no symptoms      Past Surgical History:   Procedure Laterality Date    HX CATARACT REMOVAL      bilat    HX COLONOSCOPY  2014    WNL    HX ORTHOPAEDIC  2005    right femur fx    HX ORTHOPAEDIC  2012    Lt TKA    HX REFRACTIVE SURGERY  06/15/2017    rt     Current Outpatient Prescriptions   Medication Sig Dispense Refill    atenolol (TENORMIN) 50 mg tablet Take 1 Tab by mouth daily. 90 Tab 3    B.infantis-B.ani-B.long-B.bifi (PROBIOTIC 4X) 10-15 mg TbEC Take  by mouth.  LACTOBACILLUS ACIDOPHILUS (PROBIOTIC PO) Take  by mouth.  PEPPERMINT OIL Take  by mouth.  ACETAMINOPHEN/DIPHENHYDRAMINE (TYLENOL PM PO) Take  by mouth.  lovastatin (MEVACOR) 20 mg tablet TAKE 1 TABLET BY MOUTH  NIGHTLY 90 Tab 3    OTHER daily. Folate po       MELATONIN PO Take  by mouth.  co-enzyme Q-10 (CO Q-10) 100 mg capsule Take 100 mg by mouth daily.  cholecalciferol, vitamin D3, (VITAMIN D3) 2,000 unit tab Take  by mouth daily.  cyanocobalamin (VITAMIN B-12) 1,000 mcg tablet Take 1,000 mcg by mouth daily.  BOSWELLIA AVA EXTRACT (BOSWELLIA AVA XT, BULK,) Take  by mouth daily.  OTHER VEGGIES FOR LIFE AND FRUIT FOR LIFE , TAKES ONE A DAY EACH      magnesium 250 mg tab Take  by mouth. Pt thinks she takes 200 mg      CALCIUM CITRATE/VITAMIN D3 (CALCITRATE-VITAMIN D PO) Take  by mouth daily.  Omega-3-DHA-EPA-Fish Oil 1,000 mg (120 mg-180 mg) cap Take  by mouth daily.       ASCORBIC ACID (VITAMIN C WITH YOUSIF HIPS PO) Take  by mouth daily.  biotin 2,500 mcg tab Take  by mouth daily.  triamcinolone (ARISTOCORT) 0.5 % topical cream Apply  to affected area two (2) times a day. As needed for itching 45 g 0     Allergies   Allergen Reactions    Ambien [Zolpidem] Other (comments)     Legs got heavy    Bactrim [Sulfamethoprim Ds] Unknown (comments)     Patient cant remember her reaction    Hydroxyzine Other (comments)     Legs got heavy     Family History   Problem Relation Age of Onset    Cancer Mother      Leukemia    Heart Disease Mother     Cancer Sister      Ovarian    No Known Problems Brother     Heart Failure Brother     Cancer Brother      Brain tumor     Social History   Substance Use Topics    Smoking status: Never Smoker    Smokeless tobacco: Never Used    Alcohol use No     Patient Active Problem List   Diagnosis Code    HTN (hypertension) I10    DJD (degenerative joint disease) M19.90    Colitis K52.9    Ventricular ectopy I49.3    Hyperlipemia E78.5    Primary osteoarthritis M19.91    Abnormal EKG R94.31    PAC (premature atrial contraction) I49.1       Depression Risk Factor Screening:     PHQ over the last two weeks 6/13/2018   PHQ Not Done Medical Reason (indicate in comments)   Little interest or pleasure in doing things Not at all   Feeling down, depressed or hopeless Not at all   Total Score PHQ 2 0     Alcohol Risk Factor Screening: You do not drink alcohol or very rarely. Functional Ability and Level of Safety:   Hearing Loss  Hearing is good. Activities of Daily Living  The home contains: no safety equipment. Patient does total self care    Fall Risk  Fall Risk Assessment, last 12 mths 6/13/2018   Able to walk? Yes   Fall in past 12 months?  No       Abuse Screen  Patient is not abused    Cognitive Screening   Evaluation of Cognitive Function:  Has your family/caregiver stated any concerns about your memory: no  Normal    Patient Care Team Patient Care Team:  Neelima Castro NP as PCP - General (Nurse Practitioner)  Florencia Davis MD (Cardiology)  Ruby Houston MD (Orthopedic Surgery)    Assessment/Plan   Education and counseling provided:  Are appropriate based on today's review and evaluation    Diagnoses and all orders for this visit:    1. Essential hypertension  -     REFERRAL TO PODIATRY  -     XR FOOT RT MIN 3 V; Future  -     XR FOOT LT MIN 3 V; Future  -     CBC WITH AUTOMATED DIFF  -     METABOLIC PANEL, COMPREHENSIVE  -     COLLECTION VENOUS BLOOD,VENIPUNCTURE  -     TSH 3RD GENERATION  -     LIPID PANEL; Future    2. BMI 25.0-25.9,adult  -     REFERRAL TO PODIATRY  -     XR FOOT RT MIN 3 V; Future  -     XR FOOT LT MIN 3 V; Future  -     CBC WITH AUTOMATED DIFF  -     METABOLIC PANEL, COMPREHENSIVE  -     COLLECTION VENOUS BLOOD,VENIPUNCTURE  -     TSH 3RD GENERATION  -     LIPID PANEL; Future    3. Hyperlipidemia, unspecified hyperlipidemia type  -     REFERRAL TO PODIATRY  -     XR FOOT RT MIN 3 V; Future  -     XR FOOT LT MIN 3 V; Future  -     CBC WITH AUTOMATED DIFF  -     METABOLIC PANEL, COMPREHENSIVE  -     COLLECTION VENOUS BLOOD,VENIPUNCTURE  -     TSH 3RD GENERATION  -     LIPID PANEL; Future    4.  Pain in toes of both feet  -     REFERRAL TO PODIATRY  -     XR FOOT RT MIN 3 V; Future    Starr Sosa NP-C    Health Maintenance Due   Topic Date Due    ZOSTER VACCINE AGE 60>  01/07/1990

## 2018-06-15 NOTE — TELEPHONE ENCOUNTER
Patient was called to be informed that a lipid panel was accidentally not ordered with the rest of her blood work and that Colette Elio wanted her to come in and have it done. As I was speaking with Ms Inman, I remembered that the test could be added to her blood specimen that is already at the Lab if she was fasting. She stated that she was fasting on the 6/13/2018. I stated to her that we should have time to just add the lab. She stated OK.

## 2018-06-15 NOTE — ACP (ADVANCE CARE PLANNING)
Has advanced medical directive scanned into chart. Reviewed at this visit. No changes.   Reyna LAMAR

## 2018-06-15 NOTE — PROGRESS NOTES
Subjective:     Khushi Inman is a 80 y.o. female who presents today with the following:  Chief Complaint   Patient presents with    Follow-up     6mth check up       Patient Active Problem List   Diagnosis Code    HTN (hypertension) I10    DJD (degenerative joint disease) M19.90    Colitis K52.9    Ventricular ectopy I49.3    Hyperlipemia E78.5    Primary osteoarthritis M19.91    Abnormal EKG R94.31    PAC (premature atrial contraction) I49.1         COMPLIANT WITH MEDICATION:   HTN; Denies chest pain, dyspnea, palpitations, headache and blurred vision. Blood pressure normotensive. Requests to hold BP and cholesterol medication. Followed by Dr. Dr. Tana Clark pain bilaterally. Stubbed her small toe on right foot one week ago. ROS:  Gen: denies fever, chills, fatigue, weight loss, weight gain  HEENT:denies blurry vision, nasal congestion, sore throat  Resp: denies dypsnea, cough, wheezing  CV: denies chest pain radiating to the jaws or arms, palpitations, lower extremity edema  Abd: denies nausea, vomiting, diarrhea, constipation  Neuro: denies numbness/tingling  Endo: denies polyuria, polydipsia, heat/cold intolerance  Heme: no lymphadenopathy    Allergies   Allergen Reactions    Ambien [Zolpidem] Other (comments)     Legs got heavy    Bactrim [Sulfamethoprim Ds] Unknown (comments)     Patient cant remember her reaction    Hydroxyzine Other (comments)     Legs got heavy         Current Outpatient Prescriptions:     atenolol (TENORMIN) 50 mg tablet, Take 1 Tab by mouth daily. , Disp: 90 Tab, Rfl: 3    B.infantis-B.ani-B.long-B.bifi (PROBIOTIC 4X) 10-15 mg TbEC, Take  by mouth., Disp: , Rfl:     LACTOBACILLUS ACIDOPHILUS (PROBIOTIC PO), Take  by mouth., Disp: , Rfl:     PEPPERMINT OIL, Take  by mouth., Disp: , Rfl:     ACETAMINOPHEN/DIPHENHYDRAMINE (TYLENOL PM PO), Take  by mouth., Disp: , Rfl:     lovastatin (MEVACOR) 20 mg tablet, TAKE 1 TABLET BY MOUTH  NIGHTLY, Disp: 90 Tab, Rfl: 3    OTHER, daily. Folate po , Disp: , Rfl:     MELATONIN PO, Take  by mouth., Disp: , Rfl:     co-enzyme Q-10 (CO Q-10) 100 mg capsule, Take 100 mg by mouth daily. , Disp: , Rfl:     cholecalciferol, vitamin D3, (VITAMIN D3) 2,000 unit tab, Take  by mouth daily. , Disp: , Rfl:     cyanocobalamin (VITAMIN B-12) 1,000 mcg tablet, Take 1,000 mcg by mouth daily. , Disp: , Rfl:     BOSWELLIA AVA EXTRACT (BOSWELLIA AVA XT, BULK,), Take  by mouth daily. , Disp: , Rfl:     OTHER, VEGGIES FOR LIFE AND FRUIT FOR LIFE , TAKES ONE A DAY EACH, Disp: , Rfl:     magnesium 250 mg tab, Take  by mouth. Pt thinks she takes 200 mg, Disp: , Rfl:     CALCIUM CITRATE/VITAMIN D3 (CALCITRATE-VITAMIN D PO), Take  by mouth daily. , Disp: , Rfl:     Omega-3-DHA-EPA-Fish Oil 1,000 mg (120 mg-180 mg) cap, Take  by mouth daily. , Disp: , Rfl:     ASCORBIC ACID (VITAMIN C WITH YOUSIF HIPS PO), Take  by mouth daily. , Disp: , Rfl:     biotin 2,500 mcg tab, Take  by mouth daily. , Disp: , Rfl:     triamcinolone (ARISTOCORT) 0.5 % topical cream, Apply  to affected area two (2) times a day.  As needed for itching, Disp: 45 g, Rfl: 0    Past Medical History:   Diagnosis Date    Colitis 2013    C diff; no recent flare, normal colonoscopy 2014    DJD (degenerative joint disease)     Fracture, femoral ([address] 09/2005    HTN (hypertension)     Hyperlipemia     Ventricular ectopy     no symptoms       Past Surgical History:   Procedure Laterality Date    HX CATARACT REMOVAL      bilat    HX COLONOSCOPY  2014    WNL    HX ORTHOPAEDIC  2005    right femur fx    HX ORTHOPAEDIC  2012    Lt TKA    HX REFRACTIVE SURGERY  06/15/2017    rt       History   Smoking Status    Never Smoker   Smokeless Tobacco    Never Used       Social History     Social History    Marital status:      Spouse name: N/A    Number of children: N/A    Years of education: N/A     Social History Main Topics    Smoking status: Never Smoker    Smokeless tobacco: Never Used    Alcohol use No    Drug use: None    Sexual activity: Not Asked     Other Topics Concern     Service No    Blood Transfusions Yes    Caffeine Concern No    Occupational Exposure No    Hobby Hazards No    Sleep Concern Yes     insomnia    Stress Concern No    Weight Concern Yes     over weight    Special Diet No    Back Care No    Exercise Yes    Bike Helmet No    Seat Belt Yes    Self-Exams Yes     Social History Narrative       Family History   Problem Relation Age of Onset    Cancer Mother      Leukemia    Heart Disease Mother     Cancer Sister      Ovarian    No Known Problems Brother     Heart Failure Brother     Cancer Brother      Brain tumor         Objective:     Visit Vitals    /82 (BP 1 Location: Right arm, BP Patient Position: Sitting)    Pulse 78    Temp 97.8 °F (36.6 °C) (Oral)    Ht 5' 5\" (1.651 m)    Wt 155 lb (70.3 kg)    SpO2 97%    BMI 25.79 kg/m2     Body mass index is 25.79 kg/(m^2). General: Alert and oriented. No acute distress. Well nourished  HEENT :  Ears:TMs are normal. Canals are clear. Eyes: pupils equal, round, react to light and accommodation. Extra ocular movements intact. Nose: patent. Mouth and throat is clear. Neck:supple full range of motion no thyromegaly. Trachea midline, No carotid bruits. No significant lymphadenopathy  Lungs[de-identified] clear to auscultation without wheezes, rales, or rhonchi. Heart :RRR, S1 & S2 are normal intensity. No murmur; no gallop  Abdomen: bowel sounds active. No tenderness, guarding, rebound, masses, hepatic or spleen enlargement  Back: no CVA tenderness. Extremities: without clubbing, cyanosis, or edema  Pulses: radial and femoral pulses are normal  Neuro: HMF intact. Cranial nerves II through XII grossly normal.  Motor: is 5 over 5 and symmetrical.   Deep tendon reflexes: +2 equal  Right and left feet ? Hammertoes on right and left 2nd toes.      Results for orders placed or performed in visit on 06/13/18   CBC WITH AUTOMATED DIFF   Result Value Ref Range    WBC 8.3 3.4 - 10.8 x10E3/uL    RBC 4.23 3.77 - 5.28 x10E6/uL    HGB 13.5 11.1 - 15.9 g/dL    HCT 40.0 34.0 - 46.6 %    MCV 95 79 - 97 fL    MCH 31.9 26.6 - 33.0 pg    MCHC 33.8 31.5 - 35.7 g/dL    RDW 13.9 12.3 - 15.4 %    PLATELET 160 389 - 588 x10E3/uL    NEUTROPHILS 61 Not Estab. %    Lymphocytes 28 Not Estab. %    MONOCYTES 8 Not Estab. %    EOSINOPHILS 3 Not Estab. %    BASOPHILS 0 Not Estab. %    ABS. NEUTROPHILS 5.1 1.4 - 7.0 x10E3/uL    Abs Lymphocytes 2.3 0.7 - 3.1 x10E3/uL    ABS. MONOCYTES 0.6 0.1 - 0.9 x10E3/uL    ABS. EOSINOPHILS 0.2 0.0 - 0.4 x10E3/uL    ABS. BASOPHILS 0.0 0.0 - 0.2 x10E3/uL    IMMATURE GRANULOCYTES 0 Not Estab. %    ABS. IMM. GRANS. 0.0 0.0 - 0.1 [id number]/DX   METABOLIC PANEL, COMPREHENSIVE   Result Value Ref Range    Glucose 107 (H) 65 - 99 mg/dL    BUN 19 8 - 27 mg/dL    Creatinine 0.79 0.57 - 1.00 mg/dL    GFR est non-AA 67 >59 mL/min/1.73    GFR est AA 77 >59 mL/min/1.73    BUN/Creatinine ratio 24 12 - 28    Sodium 141 134 - 144 mmol/L    Potassium 4.7 3.5 - 5.2 mmol/L    Chloride 99 96 - 106 mmol/L    CO2 26 20 - 29 mmol/L    Calcium 9.4 8.7 - 10.3 mg/dL    Protein, total 7.2 6.0 - 8.5 g/dL    Albumin 4.3 3.5 - 4.7 g/dL    GLOBULIN, TOTAL 2.9 1.5 - 4.5 g/dL    A-G Ratio 1.5 1.2 - 2.2    Bilirubin, total 0.9 0.0 - 1.2 mg/dL    Alk.  phosphatase 86 39 - 117 IU/L    AST (SGOT) 33 0 - 40 IU/L    ALT (SGPT) 25 0 - 32 IU/L   TSH 3RD GENERATION   Result Value Ref Range    TSH 1.770 0.450 - 4.500 uIU/mL       Results for orders placed or performed in visit on 06/13/18   CBC WITH AUTOMATED DIFF   Result Value Ref Range    WBC 8.3 3.4 - 10.8 x10E3/uL    RBC 4.23 3.77 - 5.28 x10E6/uL    HGB 13.5 11.1 - 15.9 g/dL    HCT 40.0 34.0 - 46.6 %    MCV 95 79 - 97 fL    MCH 31.9 26.6 - 33.0 pg    MCHC 33.8 31.5 - 35.7 g/dL    RDW 13.9 12.3 - 15.4 %    PLATELET 611 238 - 452 x10E3/uL    NEUTROPHILS 61 Not Estab. % Lymphocytes 28 Not Estab. %    MONOCYTES 8 Not Estab. %    EOSINOPHILS 3 Not Estab. %    BASOPHILS 0 Not Estab. %    ABS. NEUTROPHILS 5.1 1.4 - 7.0 x10E3/uL    Abs Lymphocytes 2.3 0.7 - 3.1 x10E3/uL    ABS. MONOCYTES 0.6 0.1 - 0.9 x10E3/uL    ABS. EOSINOPHILS 0.2 0.0 - 0.4 x10E3/uL    ABS. BASOPHILS 0.0 0.0 - 0.2 x10E3/uL    IMMATURE GRANULOCYTES 0 Not Estab. %    ABS. IMM. GRANS. 0.0 0.0 - 0.1 x10E3/uL    Narrative    Performed at:  [address]  [id number]  : Yonny Dill MD, Phone:  [phone]   METABOLIC PANEL, COMPREHENSIVE   Result Value Ref Range    Glucose 107 (H) 65 - 99 mg/dL    BUN 19 8 - 27 mg/dL    Creatinine 0.79 0.57 - 1.00 mg/dL    GFR est non-AA 67 >59 mL/min/1.73    GFR est AA 77 >59 mL/min/1.73    BUN/Creatinine ratio 24 12 - 28    Sodium 141 134 - 144 mmol/L    Potassium 4.7 3.5 - 5.2 mmol/L    Chloride 99 96 - 106 mmol/L    CO2 26 20 - 29 mmol/L    Calcium 9.4 8.7 - 10.3 mg/dL    Protein, total 7.2 6.0 - 8.5 g/dL    Albumin 4.3 3.5 - 4.7 g/dL    GLOBULIN, TOTAL 2.9 1.5 - 4.5 g/dL    A-G Ratio 1.5 1.2 - 2.2    Bilirubin, total 0.9 0.0 - 1.2 mg/dL    Alk. phosphatase 86 39 - 117 IU/L    AST (SGOT) 33 0 - 40 IU/L    ALT (SGPT) 25 0 - 32 IU/L    Narrative    Performed at:  [address]  [id number]  : Yonny Dill MD, Phone:  [phone]   TSH 3RD GENERATION   Result Value Ref Range    TSH 1.770 0.450 - 4.500 uIU/mL    Narrative    Performed at:  [address]  [id number]  : Yonny Dill MD, Phone:  [phone]       Assessment/ Plan:     1. BMI 25.0-25.9,adult    -  - CBC WITH AUTOMATED DIFF  - METABOLIC PANEL, COMPREHENSIVE  - COLLECTION VENOUS BLOOD,VENIPUNCTURE  - TSH 3RD GENERATION  - LIPID PANEL; Future    2.  Essential hypertension  BP in goal    - CBC WITH AUTOMATED DIFF  - METABOLIC PANEL, COMPREHENSIVE  - COLLECTION VENOUS BLOOD,VENIPUNCTURE  - TSH 3RD GENERATION  - LIPID PANEL; Future    3. Hyperlipidemia, unspecified hyperlipidemia type    -  - CBC WITH AUTOMATED DIFF  - METABOLIC PANEL, COMPREHENSIVE  - COLLECTION VENOUS BLOOD,VENIPUNCTURE  - TSH 3RD GENERATION  - LIPID PANEL; Future    4. Pain in toes of both feet    - REFERRAL TO PODIATRY  - XR FOOT RT MIN 3 V; Future      Orders Placed This Encounter    COLLECTION VENOUS BLOOD,VENIPUNCTURE    XR FOOT RT MIN 3 V     Standing Status:   Future     Number of Occurrences:   1     Standing Expiration Date:   7/13/2019     Order Specific Question:   Reason for Exam     Answer:   5th toe stubbed toe on cabinet  2 weeks ago  pain across top of foot     Order Specific Question:   Which facility to perform procedure? Answer:   [facility]    XR FOOT LT MIN 3 V     Standing Status:   Future     Number of Occurrences:   1     Standing Expiration Date:   7/13/2019     Order Specific Question:   Reason for Exam     Answer:   ? hammer toe     Order Specific Question:   Which facility to perform procedure? Answer:   [facility] CBC WITH AUTOMATED DIFF    METABOLIC PANEL, COMPREHENSIVE    TSH 3RD GENERATION    LIPID PANEL     Standing Status:   Future     Standing Expiration Date:   8/15/2018    REFERRAL TO PODIATRY         Verbal and written instructions (see AVS) provided.  Patient expresses understanding of diagnosis and treatment plan. Health Maintenance Due   Topic Date Due    ZOSTER VACCINE AGE 60>  01/07/1990         Follow-up Disposition:  Return in about 6 months (around 12/13/2018).       DIGNA Johnson

## 2018-06-18 ENCOUNTER — OFFICE VISIT (OUTPATIENT)
Dept: CARDIOLOGY CLINIC | Age: 83
End: 2018-06-18

## 2018-06-18 VITALS
SYSTOLIC BLOOD PRESSURE: 124 MMHG | BODY MASS INDEX: 25.49 KG/M2 | OXYGEN SATURATION: 98 % | HEART RATE: 74 BPM | HEIGHT: 65 IN | DIASTOLIC BLOOD PRESSURE: 88 MMHG | WEIGHT: 153 LBS | RESPIRATION RATE: 18 BRPM

## 2018-06-18 DIAGNOSIS — E78.00 PURE HYPERCHOLESTEROLEMIA: ICD-10-CM

## 2018-06-18 DIAGNOSIS — I49.1 PAC (PREMATURE ATRIAL CONTRACTION): ICD-10-CM

## 2018-06-18 DIAGNOSIS — I49.3 VENTRICULAR ECTOPY: ICD-10-CM

## 2018-06-18 DIAGNOSIS — I77.9 CAROTID ARTERY DISEASE, UNSPECIFIED LATERALITY (HCC): ICD-10-CM

## 2018-06-18 DIAGNOSIS — I48.0 PAROXYSMAL ATRIAL FIBRILLATION (HCC): Primary | ICD-10-CM

## 2018-06-18 DIAGNOSIS — I10 ESSENTIAL HYPERTENSION: ICD-10-CM

## 2018-06-18 DIAGNOSIS — M19.91 PRIMARY OSTEOARTHRITIS, UNSPECIFIED SITE: ICD-10-CM

## 2018-06-18 RX ORDER — HYDROCHLOROTHIAZIDE 25 MG/1
12.5 TABLET ORAL DAILY
Refills: 3 | COMMUNITY
Start: 2018-05-31 | End: 2018-06-21 | Stop reason: SDUPTHER

## 2018-06-18 NOTE — MR AVS SNAPSHOT
[address] [facility] 
 
 
 [address] [facility] [address] [address] [phone] Patient: Aldon Phoenix Spindle MRN: [id number] PII:[id number] Visit Information Date & Time Provider Department Dept. Phone Encounter #  
 6/18/2018 11:00 AM Diana Ye, [address] [facility] [phone] [id number] Follow-up Instructions Return in about 2 weeks (around 7/2/2018). Follow-up and Disposition History Your Appointments 6/21/2018  3:00 PM  
PROCEDURE with [address] [facility], [facility] ([address]) Appt Note: 24 hr holter per mensah $0cp [address] [facility] [address] [address] [phone]  
  
   
 [address] [address]  
  
    
 12/12/2018  8:30 AM  
ESTABLISHED PATIENT with Blu Richards, NP  
[address] [place] ([address]) Appt Note: 6 mo F/U and labs [address] [address]  
[address] [facility] [address] [address] Upcoming Health Maintenance Date Due ZOSTER VACCINE AGE 60> 1/7/1990 Influenza Age 5 to Adult 8/1/2018 GLAUCOMA SCREENING Q2Y 6/2/2019 MEDICARE YEARLY EXAM 6/14/2019 DTaP/Tdap/Td series (2 - Td) 6/20/2027 Allergies as of 6/18/2018  Review Complete On: 6/18/2018 By: Diana Ye MD  
  
 Severity Noted Reaction Type Reactions Ambien [Zolpidem]  03/08/2016    Other (comments) Legs got heavy Bactrim [Sulfamethoprim Ds]  12/20/2016    Unknown (comments) Patient cant remember her reaction Hydroxyzine  06/23/2016    Other (comments) Legs got heavy Current Immunizations  Never Reviewed Name Date  Influenza High Dose Vaccine PF 10/27/2017, 10/3/2016, 10/9/2015 12:00 AM  
 Influenza Vaccine 10/6/2014 12:00 AM, 9/18/2013 12:00 AM, 11/26/2012 12:00 AM, 10/2/2012 12:00 AM, 11/10/2011 12:00 AM, 10/26/2010 12:00 AM, 9/17/2009 12:00 AM, 10/8/2008 12:00 AM  
 Pneumococcal Conjugate (PCV-13) 11/11/2015 12:00 AM  
 Pneumococcal Polysaccharide (PPSV-23) 6/20/2017, 11/11/2011 12:00 AM, 4/11/2011 12:00 AM  
 Td 11/29/2011 Tdap 11/29/2011 12:00 AM  
  
 Not reviewed this visit You Were Diagnosed With   
  
 Codes Comments Paroxysmal atrial fibrillation (HCC)    -  Primary ICD-10-CM: I48.0 ICD-9-CM: 427.31   
 PAC (premature atrial contraction)     ICD-10-CM: I49.1 ICD-9-CM: 427.61 Essential hypertension     ICD-10-CM: I10 
ICD-9-CM: 401.9 Pure hypercholesterolemia     ICD-10-CM: E78.00 ICD-9-CM: 272.0 Primary osteoarthritis, unspecified site     ICD-10-CM: M19.91 
ICD-9-CM: 715.10 Ventricular ectopy     ICD-10-CM: I49.3 ICD-9-CM: 427.69 Carotid artery disease, unspecified laterality ([facility]     ICD-10-CM: I77.9 ICD-9-CM: 333. 9 Vitals BP Pulse Resp Height(growth percentile) Weight(growth percentile) SpO2  
 124/88 (BP 1 Location: Right arm, BP Patient Position: Sitting) 74 18 5' 5\" (1.651 m) 153 lb (69.4 kg) 98% BMI OB Status Smoking Status 25.46 kg/m2 Menopause Never Smoker Vitals History BMI and BSA Data Body Mass Index Body Surface Area  
 25.46 kg/m 2 1.78 m 2 Preferred Pharmacy Pharmacy Name Phone [id number] [place], [address] [facility] [address] [phone] Your Updated Medication List  
  
   
This list is accurate as of 6/18/18 11:55 AM.  Always use your most recent med list.  
  
  
  
  
 apixaban 2.5 mg tablet Commonly known as:  Dori Lonnie Take 1 Tab by mouth two (2) times a day for 56 doses. atenolol 50 mg tablet Commonly known as:  TENORMIN Take 1 Tab by mouth daily. biotin 2,500 mcg Tab Take  by mouth daily. BOSWELLIA AVA XT (BULK) Take  by mouth daily.   
  
 CALCITRATE-VITAMIN D PO  
 Take  by mouth daily. CO Q-10 100 mg capsule Generic drug:  co-enzyme Q-10 Take 100 mg by mouth daily. hydroCHLOROthiazide 25 mg tablet Commonly known as:  HYDRODIURIL Take 12.5 mg by mouth daily. lovastatin 20 mg tablet Commonly known as:  MEVACOR  
TAKE 1 TABLET BY MOUTH  NIGHTLY  
  
 magnesium 250 mg Tab Take  by mouth. Pt thinks she takes 200 mg  
  
 MELATONIN PO Take  by mouth. omega 3-DHA-EPA-fish oil 1,000 mg (120 mg-180 mg) capsule Take  by mouth daily. * OTHER  
VEGGIES FOR LIFE AND FRUIT FOR LIFE , TAKES ONE A DAY EACH  
  
 * OTHER  
daily. Folate po PEPPERMINT OIL Take  by mouth. PROBIOTIC 4X 10-15 mg Tbec Generic drug:  B.infantis-B.ani-B.long-B.bifi Take  by mouth. PROBIOTIC PO Take  by mouth.  
  
 triamcinolone 0.5 % topical cream  
Commonly known as:  ARISTOCORT Apply  to affected area two (2) times a day. As needed for itching TYLENOL PM PO Take  by mouth. VITAMIN B-12 1,000 mcg tablet Generic drug:  cyanocobalamin Take 1,000 mcg by mouth daily. VITAMIN C WITH YOUSIF HIPS PO Take  by mouth daily. VITAMIN D3 2,000 unit Tab Generic drug:  cholecalciferol (vitamin D3) Take  by mouth daily. * Notice: This list has 2 medication(s) that are the same as other medications prescribed for you. Read the directions carefully, and ask your doctor or other care provider to review them with you. We Performed the Following AMB POC EKG ROUTINE W/ 12 LEADS, INTER & REP [76715 CPT(R)] Follow-up Instructions Return in about 2 weeks (around 7/2/2018). To-Do List   
 Around 06/21/2018 ECG:  CARDIAC HOLTER MONITOR, 24 HOURS Around 06/22/2018 ECHO:  2D ECHO COMPLETE ADULT (TTE) W OR WO CONTR Around 06/22/2018 Imaging:  DUPLEX CAROTID BILATERAL Introducing [facility] HE[facility]!    
 [facility] introduces [facility] patient portal. Now you can access parts of your medical record, email your doctor's office, and request medication refills online. 1. In your internet browser, go to [email or web address] 2. Click on the First Time User? Click Here link in the Sign In box. You will see the New Member Sign Up page. 3. Enter your [employer] Access Code exactly as it appears below. You will not need to use this code after youve completed the sign-up process. If you do not sign up before the expiration date, you must request a new code. · [employer] Access Code: [email or web address] Expires: 9/11/2018  9:34 AM 
 
4. Enter the last four digits of your Social Security Number (xxxx) and Date of Birth (mm/dd/yyyy) as indicated and click Submit. You will be taken to the next sign-up page. 5. Create a [employer] ID. This will be your [employer] login ID and cannot be changed, so think of one that is secure and easy to remember. 6. Create a [employer] password. You can change your password at any time. 7. Enter your Password Reset Question and Answer. This can be used at a later time if you forget your password. 8. Enter your e-mail address. You will receive e-mail notification when new information is available in [address] 9. Click Sign Up. You can now view and download portions of your medical record. 10. Click the Download Summary menu link to download a portable copy of your medical information. If you have questions, please visit the Frequently Asked Questions section of the [employer] website. Remember, [employer] is NOT to be used for urgent needs. For medical emergencies, dial 911. Now available from your iPhone and Android! Please provide this summary of care documentation to your next provider. Your primary care clinician is listed as Theresa Soriano. If you have any questions after today's visit, please call [phone].

## 2018-06-18 NOTE — PROGRESS NOTES
Latrice Inman is a 80 y.o. female is here for routine f/u. No CV sx or complaints. Occasional palpitations--has had PAC\"s, PVC\"s in past, actually IN AFIB today (rate controlled) with PVC's. The patient denies chest pain/ shortness of breath, orthopnea, PND, LE edema, , syncope, presyncope or fatigue. Patient Active Problem List    Diagnosis Date Noted    Abnormal EKG 10/13/2016    PAC (premature atrial contraction) 10/13/2016    Primary osteoarthritis 10/03/2016    HTN (hypertension)     DJD (degenerative joint disease)     Colitis     Ventricular ectopy     Hyperlipemia       Sarah Ivan NP  Past Medical History:   Diagnosis Date    Colitis 2013    C diff; no recent flare, normal colonoscopy 2014    DJD (degenerative joint disease)     Fracture, femoral ([address] 09/2005    HTN (hypertension)     Hyperlipemia     Ventricular ectopy     no symptoms      Past Surgical History:   Procedure Laterality Date    HX CATARACT REMOVAL      bilat    HX COLONOSCOPY  2014    WNL    HX ORTHOPAEDIC  2005    right femur fx    HX ORTHOPAEDIC  2012    Lt TKA    HX REFRACTIVE SURGERY  06/15/2017    rt     Allergies   Allergen Reactions    Ambien [Zolpidem] Other (comments)     Legs got heavy    Bactrim [Sulfamethoprim Ds] Unknown (comments)     Patient cant remember her reaction    Hydroxyzine Other (comments)     Legs got heavy      Family History   Problem Relation Age of Onset    Cancer Mother      Leukemia    Heart Disease Mother     Cancer Sister      Ovarian    No Known Problems Brother     Heart Failure Brother     Cancer Brother      Brain tumor      Social History     Social History    Marital status:      Spouse name: N/A    Number of children: N/A    Years of education: N/A     Occupational History    Not on file.      Social History Main Topics    Smoking status: Never Smoker    Smokeless tobacco: Never Used    Alcohol use No    Drug use: Not on file    Sexual activity: Not on file     Other Topics Concern     Service No    Blood Transfusions Yes    Caffeine Concern No    Occupational Exposure No    Hobby Hazards No    Sleep Concern Yes     insomnia    Stress Concern No    Weight Concern Yes     over weight    Special Diet No    Back Care No    Exercise Yes    Bike Helmet No    Seat Belt Yes    Self-Exams Yes     Social History Narrative      Current Outpatient Prescriptions   Medication Sig    hydroCHLOROthiazide (HYDRODIURIL) 25 mg tablet Take 12.5 mg by mouth daily.  atenolol (TENORMIN) 50 mg tablet Take 1 Tab by mouth daily.  B.infantis-B.ani-B.long-B.bifi (PROBIOTIC 4X) 10-15 mg TbEC Take  by mouth.  LACTOBACILLUS ACIDOPHILUS (PROBIOTIC PO) Take  by mouth.  PEPPERMINT OIL Take  by mouth.  ACETAMINOPHEN/DIPHENHYDRAMINE (TYLENOL PM PO) Take  by mouth.  lovastatin (MEVACOR) 20 mg tablet TAKE 1 TABLET BY MOUTH  NIGHTLY    OTHER daily. Folate po     MELATONIN PO Take  by mouth.  triamcinolone (ARISTOCORT) 0.5 % topical cream Apply  to affected area two (2) times a day. As needed for itching    co-enzyme Q-10 (CO Q-10) 100 mg capsule Take 100 mg by mouth daily.  cholecalciferol, vitamin D3, (VITAMIN D3) 2,000 unit tab Take  by mouth daily.  cyanocobalamin (VITAMIN B-12) 1,000 mcg tablet Take 1,000 mcg by mouth daily.  BOSWELLIA AVA EXTRACT (BOSWELLIA AVA XT, BULK,) Take  by mouth daily.  OTHER VEGGIES FOR LIFE AND FRUIT FOR LIFE , TAKES ONE A DAY EACH    magnesium 250 mg tab Take  by mouth. Pt thinks she takes 200 mg    CALCIUM CITRATE/VITAMIN D3 (CALCITRATE-VITAMIN D PO) Take  by mouth daily.  Omega-3-DHA-EPA-Fish Oil 1,000 mg (120 mg-180 mg) cap Take  by mouth daily.  ASCORBIC ACID (VITAMIN C WITH YOUSIF HIPS PO) Take  by mouth daily.  biotin 2,500 mcg tab Take  by mouth daily. No current facility-administered medications for this visit.           Review of Symptoms:    CONST  No weight change. No fever, chills, sweats    ENT No visual changes, URI sx, sore throat    CV  See HPI   RESP  No cough, or sputum, wheezing. Also see HPI   GI  No abdominal pain or change in bowel habits. No heartburn or dysphagia. No melena or rectal bleeding.   No dysuria, urgency, frequency, hematuria   MSKEL  No joint pain, swelling. No muscle pain. SKIN  No rash or lesions. NEURO  No headache, syncope, or seizure. No weakness, loss of sensation, or paresthesias. PSYCH  No low mood or depression  No anxiety. HE/LYMPH  No easy bruising, abnormal bleeding, or enlarged glands.         Physical ExamPhysical Exam:    Visit Vitals    /88 (BP 1 Location: Right arm, BP Patient Position: Sitting)    Pulse 74    Resp 18    Ht 5' 5\" (1.651 m)    Wt 153 lb (69.4 kg)    SpO2 98%    BMI 25.46 kg/m2     Gen: NAD  HEENT:  PERRL, throat clear  Neck: no adenopathy, no thyromegaly, no JVD , faint bruit on R   Heart:  irregular,Nl S1S2,  no murmur, gallop or rub.   Lungs:  clear  Abdomen:   Soft, non-tender, bowel sounds are active.   Extremities:  No edema  Pulse: symmetric  Neuro: A&O times 3, No focal neuro deficits    Cardiographics    ECG: afib, rate controlled w/ PVC's    Labs:   Lab Results   Component Value Date/Time    Sodium 141 06/13/2018 10:26 AM    Sodium 139 12/19/2017 09:09 AM    Sodium 141 06/20/2017 09:29 AM    Sodium 139 01/27/2017 01:02 PM    Sodium 141 12/20/2016 11:12 AM    Potassium 4.7 06/13/2018 10:26 AM    Potassium 4.8 12/19/2017 09:09 AM    Potassium 4.3 06/20/2017 09:29 AM    Potassium 4.0 01/27/2017 01:02 PM    Potassium 4.0 12/20/2016 11:12 AM    Chloride 99 06/13/2018 10:26 AM    Chloride 101 12/19/2017 09:09 AM    Chloride 104 06/20/2017 09:29 AM    Chloride 99 01/27/2017 01:02 PM    Chloride 100 12/20/2016 11:12 AM    CO2 26 06/13/2018 10:26 AM    CO2 25 12/19/2017 09:09 AM    CO2 25 06/20/2017 09:29 AM    CO2 25 01/27/2017 01:02 PM    CO2 25 12/20/2016 11:12 AM Glucose 107 (H) 06/13/2018 10:26 AM    Glucose 104 (H) 12/19/2017 09:09 AM    Glucose 105 (H) 06/20/2017 09:29 AM    Glucose 109 (H) 01/27/2017 01:02 PM    Glucose 108 (H) 12/20/2016 11:12 AM    BUN 19 06/13/2018 10:26 AM    BUN 18 12/19/2017 09:09 AM    BUN 21 06/20/2017 09:29 AM    BUN 13 01/27/2017 01:02 PM    BUN 15 12/20/2016 11:12 AM    Creatinine 0.79 06/13/2018 10:26 AM    Creatinine 0.76 12/19/2017 09:09 AM    Creatinine 0.74 06/20/2017 09:29 AM    Creatinine 0.67 01/27/2017 01:02 PM    Creatinine 0.72 12/20/2016 11:12 AM    BUN/Creatinine ratio 24 06/13/2018 10:26 AM    BUN/Creatinine ratio 24 12/19/2017 09:09 AM    BUN/Creatinine ratio 28 06/20/2017 09:29 AM    BUN/Creatinine ratio 19 01/27/2017 01:02 PM    BUN/Creatinine ratio 21 12/20/2016 11:12 AM    GFR est AA 77 06/13/2018 10:26 AM    GFR est AA 82 12/19/2017 09:09 AM    GFR est AA 84 06/20/2017 09:29 AM    GFR est AA 92 01/27/2017 01:02 PM    GFR est AA 88 12/20/2016 11:12 AM    GFR est non-AA 67 06/13/2018 10:26 AM    GFR est non-AA 71 12/19/2017 09:09 AM    GFR est non-AA 73 06/20/2017 09:29 AM    GFR est non-AA 80 01/27/2017 01:02 PM    GFR est non-AA 76 12/20/2016 11:12 AM    Calcium 9.4 06/13/2018 10:26 AM    Calcium 9.2 12/19/2017 09:09 AM    Calcium 9.0 06/20/2017 09:29 AM    Calcium 9.4 01/27/2017 01:02 PM    Calcium 9.4 12/20/2016 11:12 AM    Bilirubin, total 0.9 06/13/2018 10:26 AM    Bilirubin, total 0.8 12/19/2017 09:09 AM    Bilirubin, total 0.6 06/20/2017 09:29 AM    Bilirubin, total 0.5 01/27/2017 01:02 PM    Bilirubin, total 0.5 12/20/2016 11:12 AM    AST (SGOT) 33 06/13/2018 10:26 AM    AST (SGOT) 28 12/19/2017 09:09 AM    AST (SGOT) 21 06/20/2017 09:29 AM    AST (SGOT) 26 01/27/2017 01:02 PM    AST (SGOT) 22 12/20/2016 11:12 AM    Alk. phosphatase 86 06/13/2018 10:26 AM    Alk. phosphatase 96 12/19/2017 09:09 AM    Alk. phosphatase 101 06/20/2017 09:29 AM    Alk. phosphatase 137 (H) 01/27/2017 01:02 PM    Alk.  phosphatase 154 (H) 12/20/2016 11:12 AM    Protein, total 7.2 06/13/2018 10:26 AM    Protein, total 7.2 12/19/2017 09:09 AM    Protein, total 7.0 06/20/2017 09:29 AM    Protein, total 7.1 01/27/2017 01:02 PM    Protein, total 7.1 12/20/2016 11:12 AM    Albumin 4.3 06/13/2018 10:26 AM    Albumin 4.4 12/19/2017 09:09 AM    Albumin 4.3 06/20/2017 09:29 AM    Albumin 4.0 01/27/2017 01:02 PM    Albumin 4.0 12/20/2016 11:12 AM    A-G Ratio 1.5 06/13/2018 10:26 AM    A-G Ratio 1.6 12/19/2017 09:09 AM    A-G Ratio 1.6 06/20/2017 09:29 AM    A-G Ratio 1.3 01/27/2017 01:02 PM    A-G Ratio 1.3 12/20/2016 11:12 AM    ALT (SGPT) 25 06/13/2018 10:26 AM    ALT (SGPT) 25 12/19/2017 09:09 AM    ALT (SGPT) 20 06/20/2017 09:29 AM    ALT (SGPT) 16 01/27/2017 01:02 PM    ALT (SGPT) 17 12/20/2016 11:12 AM     No results found for: CPK, CPKX, CPX  Lab Results   Component Value Date/Time    Cholesterol, total 163 12/19/2017 09:09 AM    Cholesterol, total 154 06/20/2017 09:29 AM    Cholesterol, total 159 12/20/2016 11:12 AM    Cholesterol, total 178 06/23/2016 10:23 AM    HDL Cholesterol 51 12/19/2017 09:09 AM    HDL Cholesterol 47 06/20/2017 09:29 AM    HDL Cholesterol 45 12/20/2016 11:12 AM    HDL Cholesterol 45 06/23/2016 10:23 AM    LDL, calculated 95 12/19/2017 09:09 AM    LDL, calculated 87 06/20/2017 09:29 AM    LDL, calculated 94 12/20/2016 11:12 AM    LDL, calculated 112 (H) 06/23/2016 10:23 AM    Triglyceride 85 12/19/2017 09:09 AM    Triglyceride 98 06/20/2017 09:29 AM    Triglyceride 100 12/20/2016 11:12 AM    Triglyceride 104 06/23/2016 10:23 AM     No results found for this or any previous visit. Assessment:         Patient Active Problem List    Diagnosis Date Noted    Abnormal EKG 10/13/2016    PAC (premature atrial contraction) 10/13/2016    Primary osteoarthritis 10/03/2016    HTN (hypertension)     DJD (degenerative joint disease)     Colitis     Ventricular ectopy     Hyperlipemia       No CV sx or complaints.  Occasional palpitations--has had PAC\"s, PVC\"s in past, actually IN AFIB today (rate controlled) with PVC's.       Plan:     Echo/doppler  Carotid dopplers  Holter monitor (newly recognized afib)  eliquis 2.5mg bid (CHADs2-VASC 4)--samples given x 2 weeks to try  F/u after testing    Fahad Sanders MD

## 2018-06-18 NOTE — PROGRESS NOTES
Verified patient with two patient identifiers. Medications reviewed/approved by Dr. Delia Giron. Verbal from Dr. Delia Giron to remove the medications that were deleted during the visit. Chief Complaint   Patient presents with    Hypertension     4 MONTH FOLLOW UP    Irregular Heart Beat     1. Have you been to the ER, urgent care clinic since your last visit? Hospitalized since your last visit? NO    2. Have you seen or consulted any other health care providers outside of the [address] [place] [address] since your last visit? Include any pap smears or colon screening. NO

## 2018-06-19 LAB
CHOLEST SERPL-MCNC: 159 MG/DL (ref 100–199)
HDLC SERPL-MCNC: 48 MG/DL
LDLC SERPL CALC-MCNC: 92 MG/DL (ref 0–99)
SPECIMEN STATUS REPORT, ROLRST: NORMAL
TRIGL SERPL-MCNC: 95 MG/DL (ref 0–149)
VLDLC SERPL CALC-MCNC: 19 MG/DL (ref 5–40)

## 2018-06-21 DIAGNOSIS — E78.00 PURE HYPERCHOLESTEROLEMIA: ICD-10-CM

## 2018-06-21 DIAGNOSIS — I10 ESSENTIAL HYPERTENSION: ICD-10-CM

## 2018-06-21 RX ORDER — HYDROCHLOROTHIAZIDE 25 MG/1
12.5 TABLET ORAL DAILY
Qty: 90 TAB | Refills: 3 | Status: SHIPPED | OUTPATIENT
Start: 2018-06-21 | End: 2018-07-26

## 2018-06-21 RX ORDER — ATENOLOL 50 MG/1
50 TABLET ORAL DAILY
Qty: 90 TAB | Refills: 3 | Status: ON HOLD | OUTPATIENT
Start: 2018-06-21 | End: 2018-07-26

## 2018-06-21 NOTE — TELEPHONE ENCOUNTER
----- Message from [facility] sent at 6/21/2018  3:42 PM EDT -----  Regarding: HANNAH Huerta/Telephone  Pt had a question about her recent lab results because she did not see any results on her cholesterol?   Pt best contact [phone]

## 2018-06-21 NOTE — TELEPHONE ENCOUNTER
She is asking for a refill on her cholesterol medication. Patient would like a callback. [facility].

## 2018-06-25 ENCOUNTER — CLINICAL SUPPORT (OUTPATIENT)
Dept: CARDIOLOGY CLINIC | Age: 83
End: 2018-06-25

## 2018-06-25 ENCOUNTER — TELEPHONE (OUTPATIENT)
Dept: CARDIOLOGY CLINIC | Age: 83
End: 2018-06-25

## 2018-06-25 ENCOUNTER — DOCUMENTATION ONLY (OUTPATIENT)
Dept: CARDIOLOGY CLINIC | Age: 83
End: 2018-06-25

## 2018-06-25 DIAGNOSIS — I49.3 VENTRICULAR ECTOPY: ICD-10-CM

## 2018-06-25 DIAGNOSIS — I49.1 PAC (PREMATURE ATRIAL CONTRACTION): ICD-10-CM

## 2018-06-25 DIAGNOSIS — I48.0 PAROXYSMAL ATRIAL FIBRILLATION (HCC): ICD-10-CM

## 2018-06-25 RX ORDER — LOVASTATIN 20 MG/1
TABLET ORAL
Qty: 90 TAB | Refills: 3 | OUTPATIENT
Start: 2018-06-25

## 2018-06-25 NOTE — TELEPHONE ENCOUNTER
----- Message from Kiesha Rose MD sent at 6/25/2018  8:44 AM EDT -----  Regarding: tests  Advise carotid dopplers show mild bilateral carotid plaque. Echo shows normal LV pumping function, only mild valve abnormalities, overall ok  (left atrium dilated due to afib).   Thanks [facility]

## 2018-06-25 NOTE — PROGRESS NOTES
24 hour Holter monitor only. Verified patient with two patient identifiers. Pt verbalized understanding of its use. Ordering BRIDGETT Horn  Reason:  paroxysmal atrial fibrillation  Start time:  4:39pm  Return date: 6/26/18 (pt cannot return on the 26th but will return on the 27th)        No LOS.

## 2018-06-25 NOTE — PROGRESS NOTES
Pt asked while I was applying the holter today if she could come off the statin due to \"excellent lipid results. \"    Discussed with Dr. Donavon Callander. Per Dr. 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

## 2018-06-27 ENCOUNTER — DOCUMENTATION ONLY (OUTPATIENT)
Dept: CARDIOLOGY CLINIC | Age: 83
End: 2018-06-27

## 2018-07-06 ENCOUNTER — TELEPHONE (OUTPATIENT)
Dept: CARDIOLOGY CLINIC | Age: 83
End: 2018-07-06

## 2018-07-06 NOTE — TELEPHONE ENCOUNTER
Pt notified (after 2 identifiers) per Dr Ade Herrera throughout with controlled HR, some PVC\"s--should continue anticoag (eliquis 2.5mg bid) and current meds, f/u as planned. \"    Pt acknowledged understanding and had no further questions. Confirmed f/u riley't w/dr Edu Fitch on 7/20/18 - arrival 9 a.m.

## 2018-07-06 NOTE — TELEPHONE ENCOUNTER
----- Message from Alicia Crook MD sent at 7/6/2018  9:56 AM EDT -----  Regarding: holter  Advise afib throughout with controlled HR, some PVC\"s--should continue anticoag (eliquis 2.5mg bid) and current meds, f/u as planned.   Thanks [facility]

## 2018-07-20 ENCOUNTER — OFFICE VISIT (OUTPATIENT)
Dept: CARDIOLOGY CLINIC | Age: 83
End: 2018-07-20

## 2018-07-20 VITALS
SYSTOLIC BLOOD PRESSURE: 148 MMHG | RESPIRATION RATE: 12 BRPM | DIASTOLIC BLOOD PRESSURE: 86 MMHG | OXYGEN SATURATION: 98 % | HEART RATE: 73 BPM | WEIGHT: 157 LBS | HEIGHT: 65 IN | BODY MASS INDEX: 26.16 KG/M2

## 2018-07-20 DIAGNOSIS — M15.9 PRIMARY OSTEOARTHRITIS INVOLVING MULTIPLE JOINTS: ICD-10-CM

## 2018-07-20 DIAGNOSIS — I77.9 CAROTID ARTERY DISEASE, UNSPECIFIED LATERALITY (HCC): ICD-10-CM

## 2018-07-20 DIAGNOSIS — M19.91 PRIMARY OSTEOARTHRITIS, UNSPECIFIED SITE: ICD-10-CM

## 2018-07-20 DIAGNOSIS — I10 ESSENTIAL HYPERTENSION: ICD-10-CM

## 2018-07-20 DIAGNOSIS — I49.3 VENTRICULAR ECTOPY: ICD-10-CM

## 2018-07-20 DIAGNOSIS — I48.19 PERSISTENT ATRIAL FIBRILLATION (HCC): Primary | ICD-10-CM

## 2018-07-20 DIAGNOSIS — E78.2 MIXED HYPERLIPIDEMIA: ICD-10-CM

## 2018-07-20 NOTE — PROGRESS NOTES
PATIENT ID VERIFIED WITH TWO PATIENT IDENTIFIERS. PATIENT MEDICATIONS REVIEWED AND APPROVED BY DR. Niko Washington. MEDICATIONS THAT WERE REMOVED FROM THIS VISIT HAVE BEEN APPROVED BY DR. Niko Washington. Chief Complaint   Patient presents with    Results     f/u cardiac testing echo, carotid and holter       1. Have you been to the ER, urgent care clinic since your last visit? Hospitalized since your last visit? No    2.  Have you seen or consulted any other health care providers outside of the [facility] since your last visit? no

## 2018-07-20 NOTE — PROGRESS NOTES
Shilpi Inman is a 80 y.o. female is here for routine f/u. Echo 6/22 with LVEF 65-70, mild LAE, mild AV sclerosis. Holter with persistent afib throughout, HR , frequent PVC's. Occasional palpitations. The patient denies chest pain/ shortness of breath, orthopnea, PND, LE edema, syncope, presyncope or fatigue. Patient Active Problem List    Diagnosis Date Noted    Abnormal EKG 10/13/2016    PAC (premature atrial contraction) 10/13/2016    Primary osteoarthritis 10/03/2016    HTN (hypertension)     DJD (degenerative joint disease)     Colitis     Ventricular ectopy     Hyperlipemia       Donato Jennings NP  Past Medical History:   Diagnosis Date    Colitis 2013    C diff; no recent flare, normal colonoscopy 2014    DJD (degenerative joint disease)     Fracture, femoral ([address] 09/2005    HTN (hypertension)     Hyperlipemia     Ventricular ectopy     no symptoms      Past Surgical History:   Procedure Laterality Date    HX CATARACT REMOVAL      bilat    HX COLONOSCOPY  2014    WNL    HX ORTHOPAEDIC  2005    right femur fx    HX ORTHOPAEDIC  2012    Lt TKA    HX REFRACTIVE SURGERY  06/15/2017    rt     Allergies   Allergen Reactions    Ambien [Zolpidem] Other (comments)     Legs got heavy    Bactrim [Sulfamethoprim Ds] Unknown (comments)     Patient cant remember her reaction    Hydroxyzine Other (comments)     Legs got heavy      Family History   Problem Relation Age of Onset    Cancer Mother      Leukemia    Heart Disease Mother     Cancer Sister      Ovarian    No Known Problems Brother     Heart Failure Brother     Cancer Brother      Brain tumor         Current Outpatient Prescriptions   Medication Sig    atenolol (TENORMIN) 50 mg tablet Take 1 Tab by mouth daily.  hydroCHLOROthiazide (HYDRODIURIL) 25 mg tablet Take 0.5 Tabs by mouth daily. Indications: hypertension    apixaban (ELIQUIS) 2.5 mg tablet Take 1 Tab by mouth two (2) times a day.     LACTOBACILLUS ACIDOPHILUS (PROBIOTIC PO) Take  by mouth as needed.  ACETAMINOPHEN/DIPHENHYDRAMINE (TYLENOL PM PO) Take  by mouth as needed.  lovastatin (MEVACOR) 20 mg tablet TAKE 1 TABLET BY MOUTH  NIGHTLY    OTHER daily. Folate po     MELATONIN PO Take  by mouth as needed.  triamcinolone (ARISTOCORT) 0.5 % topical cream Apply  to affected area two (2) times a day. As needed for itching    co-enzyme Q-10 (CO Q-10) 100 mg capsule Take 100 mg by mouth daily.  cholecalciferol, vitamin D3, (VITAMIN D3) 2,000 unit tab Take  by mouth daily.  cyanocobalamin (VITAMIN B-12) 1,000 mcg tablet Take 1,000 mcg by mouth daily.  BOSWELLIA AVA EXTRACT (BOSWELLIA AVA XT, BULK,) Take  by mouth daily.  OTHER VEGGIES FOR LIFE AND FRUIT FOR LIFE , TAKES ONE A DAY EACH    magnesium 250 mg tab Take  by mouth daily. Pt thinks she takes 200 mg     CALCIUM CITRATE/VITAMIN D3 (CALCITRATE-VITAMIN D PO) Take  by mouth daily.  ASCORBIC ACID (VITAMIN C WITH YOUSIF HIPS PO) Take  by mouth daily.  biotin 2,500 mcg tab Take  by mouth daily.  PEPPERMINT OIL Take  by mouth.  Omega-3-DHA-EPA-Fish Oil 1,000 mg (120 mg-180 mg) cap Take  by mouth daily. No current facility-administered medications for this visit. Review of Symptoms:    CONST  No weight change. No fever, chills, sweats    ENT No visual changes, URI sx, sore throat    CV  See HPI   RESP  No cough, or sputum, wheezing. Also see HPI   GI  No abdominal pain or change in bowel habits. No heartburn or dysphagia. No melena or rectal bleeding.   No dysuria, urgency, frequency, hematuria   MSKEL  No joint pain, swelling. No muscle pain. SKIN  No rash or lesions. NEURO  No headache, syncope, or seizure. No weakness, loss of sensation, or paresthesias. PSYCH  No low mood or depression  No anxiety. HE/LYMPH  No easy bruising, abnormal bleeding, or enlarged glands.         Physical ExamPhysical Exam:    Visit Vitals    /86 (BP 1 Location: Right arm, BP Patient Position: Sitting)    Pulse 73    Resp 12    Ht 5' 5\" (1.651 m)    Wt 157 lb (71.2 kg)    SpO2 98%    BMI 26.13 kg/m2     Gen: NAD  HEENT:  PERRL, throat clear  Neck: no adenopathy, no thyromegaly, no JVD   Heart:  irregular,Nl S1S2,  I/VI murmur, no gallop or rub.   Lungs:  clear  Abdomen:   Soft, non-tender, bowel sounds are active.   Extremities:  No edema  Pulse: symmetric  Neuro: A&O times 3, No focal neuro deficits    Cardiographics    Labs:   Lab Results   Component Value Date/Time    Sodium 141 06/13/2018 10:26 AM    Sodium 139 12/19/2017 09:09 AM    Sodium 141 06/20/2017 09:29 AM    Sodium 139 01/27/2017 01:02 PM    Sodium 141 12/20/2016 11:12 AM    Potassium 4.7 06/13/2018 10:26 AM    Potassium 4.8 12/19/2017 09:09 AM    Potassium 4.3 06/20/2017 09:29 AM    Potassium 4.0 01/27/2017 01:02 PM    Potassium 4.0 12/20/2016 11:12 AM    Chloride 99 06/13/2018 10:26 AM    Chloride 101 12/19/2017 09:09 AM    Chloride 104 06/20/2017 09:29 AM    Chloride 99 01/27/2017 01:02 PM    Chloride 100 12/20/2016 11:12 AM    CO2 26 06/13/2018 10:26 AM    CO2 25 12/19/2017 09:09 AM    CO2 25 06/20/2017 09:29 AM    CO2 25 01/27/2017 01:02 PM    CO2 25 12/20/2016 11:12 AM    Glucose 107 (H) 06/13/2018 10:26 AM    Glucose 104 (H) 12/19/2017 09:09 AM    Glucose 105 (H) 06/20/2017 09:29 AM    Glucose 109 (H) 01/27/2017 01:02 PM    Glucose 108 (H) 12/20/2016 11:12 AM    BUN 19 06/13/2018 10:26 AM    BUN 18 12/19/2017 09:09 AM    BUN 21 06/20/2017 09:29 AM    BUN 13 01/27/2017 01:02 PM    BUN 15 12/20/2016 11:12 AM    Creatinine 0.79 06/13/2018 10:26 AM    Creatinine 0.76 12/19/2017 09:09 AM    Creatinine 0.74 06/20/2017 09:29 AM    Creatinine 0.67 01/27/2017 01:02 PM    Creatinine 0.72 12/20/2016 11:12 AM    BUN/Creatinine ratio 24 06/13/2018 10:26 AM    BUN/Creatinine ratio 24 12/19/2017 09:09 AM    BUN/Creatinine ratio 28 06/20/2017 09:29 AM    BUN/Creatinine ratio 19 01/27/2017 01:02 PM BUN/Creatinine ratio 21 12/20/2016 11:12 AM    GFR est AA 77 06/13/2018 10:26 AM    GFR est AA 82 12/19/2017 09:09 AM    GFR est AA 84 06/20/2017 09:29 AM    GFR est AA 92 01/27/2017 01:02 PM    GFR est AA 88 12/20/2016 11:12 AM    GFR est non-AA 67 06/13/2018 10:26 AM    GFR est non-AA 71 12/19/2017 09:09 AM    GFR est non-AA 73 06/20/2017 09:29 AM    GFR est non-AA 80 01/27/2017 01:02 PM    GFR est non-AA 76 12/20/2016 11:12 AM    Calcium 9.4 06/13/2018 10:26 AM    Calcium 9.2 12/19/2017 09:09 AM    Calcium 9.0 06/20/2017 09:29 AM    Calcium 9.4 01/27/2017 01:02 PM    Calcium 9.4 12/20/2016 11:12 AM    Bilirubin, total 0.9 06/13/2018 10:26 AM    Bilirubin, total 0.8 12/19/2017 09:09 AM    Bilirubin, total 0.6 06/20/2017 09:29 AM    Bilirubin, total 0.5 01/27/2017 01:02 PM    Bilirubin, total 0.5 12/20/2016 11:12 AM    AST (SGOT) 33 06/13/2018 10:26 AM    AST (SGOT) 28 12/19/2017 09:09 AM    AST (SGOT) 21 06/20/2017 09:29 AM    AST (SGOT) 26 01/27/2017 01:02 PM    AST (SGOT) 22 12/20/2016 11:12 AM    Alk. phosphatase 86 06/13/2018 10:26 AM    Alk. phosphatase 96 12/19/2017 09:09 AM    Alk. phosphatase 101 06/20/2017 09:29 AM    Alk. phosphatase 137 (H) 01/27/2017 01:02 PM    Alk.  phosphatase 154 (H) 12/20/2016 11:12 AM    Protein, total 7.2 06/13/2018 10:26 AM    Protein, total 7.2 12/19/2017 09:09 AM    Protein, total 7.0 06/20/2017 09:29 AM    Protein, total 7.1 01/27/2017 01:02 PM    Protein, total 7.1 12/20/2016 11:12 AM    Albumin 4.3 06/13/2018 10:26 AM    Albumin 4.4 12/19/2017 09:09 AM    Albumin 4.3 06/20/2017 09:29 AM    Albumin 4.0 01/27/2017 01:02 PM    Albumin 4.0 12/20/2016 11:12 AM    A-G Ratio 1.5 06/13/2018 10:26 AM    A-G Ratio 1.6 12/19/2017 09:09 AM    A-G Ratio 1.6 06/20/2017 09:29 AM    A-G Ratio 1.3 01/27/2017 01:02 PM    A-G Ratio 1.3 12/20/2016 11:12 AM    ALT (SGPT) 25 06/13/2018 10:26 AM    ALT (SGPT) 25 12/19/2017 09:09 AM    ALT (SGPT) 20 06/20/2017 09:29 AM    ALT (SGPT) 16 01/27/2017 01:02 PM    ALT (SGPT) 17 12/20/2016 11:12 AM     No results found for: CPK, CPKX, CPX  Lab Results   Component Value Date/Time    Cholesterol, total 159 06/13/2018 10:26 AM    Cholesterol, total 163 12/19/2017 09:09 AM    Cholesterol, total 154 06/20/2017 09:29 AM    Cholesterol, total 159 12/20/2016 11:12 AM    Cholesterol, total 178 06/23/2016 10:23 AM    HDL Cholesterol 48 06/13/2018 10:26 AM    HDL Cholesterol 51 12/19/2017 09:09 AM    HDL Cholesterol 47 06/20/2017 09:29 AM    HDL Cholesterol 45 12/20/2016 11:12 AM    HDL Cholesterol 45 06/23/2016 10:23 AM    LDL, calculated 92 06/13/2018 10:26 AM    LDL, calculated 95 12/19/2017 09:09 AM    LDL, calculated 87 06/20/2017 09:29 AM    LDL, calculated 94 12/20/2016 11:12 AM    LDL, calculated 112 (H) 06/23/2016 10:23 AM    Triglyceride 95 06/13/2018 10:26 AM    Triglyceride 85 12/19/2017 09:09 AM    Triglyceride 98 06/20/2017 09:29 AM    Triglyceride 100 12/20/2016 11:12 AM    Triglyceride 104 06/23/2016 10:23 AM     No results found for this or any previous visit. Assessment:         Patient Active Problem List    Diagnosis Date Noted    Abnormal EKG 10/13/2016    PAC (premature atrial contraction) 10/13/2016    Primary osteoarthritis 10/03/2016    HTN (hypertension)     DJD (degenerative joint disease)     Colitis     Ventricular ectopy     Hyperlipemia       Echo 6/22 with LVEF 65-70, mild LAE, mild AV sclerosis. Holter with persistent afib throughout, HR , frequent PVC's. Occasional palpitations. Plan:     Doing well with no adverse cardiac symptoms. SBP elevated, but ok at home. Will continue eliquis  Stop fishoil  Continue other meds  When needs refill for the HCTZ can send for 12.5 (currently cutting 25's in 1/2)   Lipids and labs followed by PCP. Continue current care and f/u in 6 months.     Britt Tolliver MD

## 2018-07-20 NOTE — MR AVS SNAPSHOT
[address] [facility] 
 
 
 [address] [facility] [address] [address] [phone] Patient: Ender Inman MRN: [id number] [facility]:4[birth year] Visit Information Date & Time Provider Department Dept. Phone Encounter #  
 7/20/2018  9:20 AM Venice Gracia, 1024 [facility] [phone] [id number] Follow-up Instructions Return in about 6 months (around 1/20/2019). Follow-up and Disposition History Your Appointments 12/12/2018  8:30 AM  
ESTABLISHED PATIENT with Dagoberto Cornea, NP  
[address] [place] ([facility]) Appt Note: 6 mo F/U and labs [address] [facility] [address]  
[phone]  
  
   
 [facility] 53 [address]  
  
    
 12/27/2018 10:20 AM  
ESTABLISHED PATIENT with Venice Gracia MD  
[address] [facility] [place] [facility] Appt Note: 6 mo fu $0cp 13[address] [facility] [address] [phone]  
  
   
 [address] [place] [address] Upcoming Health Maintenance Date Due ZOSTER VACCINE AGE 60> 1/7/1990 Influenza Age 5 to Adult 8/1/2018 GLAUCOMA SCREENING Q2Y 6/2/2019 MEDICARE YEARLY EXAM 6/14/2019 DTaP/Tdap/Td series (2 - Td) 6/20/2027 Allergies as of 7/20/2018  Review Complete On: 7/20/2018 By: Venice Gracia MD  
  
 Severity Noted Reaction Type Reactions Ambien [Zolpidem]  03/08/2016    Other (comments) Legs got heavy Bactrim [Sulfamethoprim Ds]  12/20/2016    Unknown (comments) Patient cant remember her reaction Hydroxyzine  06/23/2016    Other (comments) Legs got heavy Current Immunizations  Never Reviewed Name Date  Influenza High Dose Vaccine PF 10/27/2017, 10/3/2016, 10/9/2015 12:00 AM  
 Influenza Vaccine 10/6/2014 12:00 AM, 9/18/2013 12:00 AM, 11/26/2012 12:00 AM, 10/2/2012 12:00 AM, 11/10/2011 12:00 AM, 10/26/2010 12:00 AM, 9/17/2009 12:00 AM, 10/8/2008 12:00 AM  
 Pneumococcal Conjugate (PCV-13) 11/11/2015 12:00 AM  
 Pneumococcal Polysaccharide (PPSV-23) 6/20/2017, 11/11/2011 12:00 AM, 4/11/2011 12:00 AM  
 Td 11/29/2011 Tdap 11/29/2011 12:00 AM  
  
 Not reviewed this visit You Were Diagnosed With   
  
 Codes Comments Persistent atrial fibrillation (HCC)    -  Primary ICD-10-CM: I48.1 ICD-9-CM: 427.31 Essential hypertension     ICD-10-CM: I10 
ICD-9-CM: 401.9 Ventricular ectopy     ICD-10-CM: I49.3 ICD-9-CM: 427.69 Primary osteoarthritis, unspecified site     ICD-10-CM: M19.91 
ICD-9-CM: 715.10 Carotid artery disease, unspecified laterality ([email or web address] 75.)     ICD-10-CM: I77.9 ICD-9-CM: 447.9 Mixed hyperlipidemia     ICD-10-CM: E78.2 ICD-9-CM: 272.2 Primary osteoarthritis involving multiple joints     ICD-10-CM: M15.0 ICD-9-CM: 715.09 Vitals BP Pulse Resp Height(growth percentile) Weight(growth percentile) SpO2  
 148/86 (BP 1 Location: Right arm, BP Patient Position: Sitting) 73 12 5' 5\" (1.651 m) 157 lb (71.2 kg) 98% BMI OB Status Smoking Status 26.13 kg/m2 Menopause Never Smoker Vitals History BMI and BSA Data Body Mass Index Body Surface Area  
 26.13 kg/m 2 1.81 m 2 Preferred Pharmacy Pharmacy Name Phone  [address] [phone] Your Updated Medication List  
  
   
This list is accurate as of 7/20/18 10:10 AM.  Always use your most recent med list.  
  
  
  
  
 apixaban 2.5 mg tablet Commonly known as:  Natalie Shield Take 1 Tab by mouth two (2) times a day. atenolol 50 mg tablet Commonly known as:  TENORMIN Take 1 Tab by mouth daily. biotin 2,500 mcg Tab Take  by mouth daily. BOSWELLIA AVA XT (BULK) Take  by mouth daily. CALCITRATE-VITAMIN D PO Take  by mouth daily. CO Q-10 100 mg capsule Generic drug:  co-enzyme Q-10 Take 100 mg by mouth daily. hydroCHLOROthiazide 25 mg tablet Commonly known as:  HYDRODIURIL Take 0.5 Tabs by mouth daily. Indications: hypertension  
  
 lovastatin 20 mg tablet Commonly known as:  MEVACOR  
TAKE 1 TABLET BY MOUTH  NIGHTLY  
  
 magnesium 250 mg Tab Take  by mouth daily. Pt thinks she takes 200 mg  
  
 MELATONIN PO Take  by mouth as needed. omega 3-DHA-EPA-fish oil 1,000 mg (120 mg-180 mg) capsule Take  by mouth daily. * OTHER  
VEGGIES FOR LIFE AND FRUIT FOR LIFE , TAKES ONE A DAY EACH  
  
 * OTHER  
daily. Folate po PEPPERMINT OIL Take  by mouth. PROBIOTIC PO Take  by mouth as needed. triamcinolone 0.5 % topical cream  
Commonly known as:  ARISTOCORT Apply  to affected area two (2) times a day. As needed for itching TYLENOL PM PO Take  by mouth as needed. VITAMIN B-12 1,000 mcg tablet Generic drug:  cyanocobalamin Take 1,000 mcg by mouth daily. VITAMIN C WITH YOUSIF HIPS PO Take  by mouth daily. VITAMIN D3 2,000 unit Tab Generic drug:  cholecalciferol (vitamin D3) Take  by mouth daily. * Notice: This list has 2 medication(s) that are the same as other medications prescribed for you. Read the directions carefully, and ask your doctor or other care provider to review them with you. Follow-up Instructions Return in about 6 months (around 1/20/2019). Introducing [facility]! Ashanti Llanos introduces [employer] patient portal. Now you can access parts of your medical record, email your doctor's office, and request medication refills online. 1. In your internet browser, go to [email or web address] 2. Click on the First Time User? Click Here link in the Sign In box. You will see the New Member Sign Up page. 3. Enter your [employer] Access Code exactly as it appears below.  You will not need to use this code after youve completed the sign-up process. If you do not sign up before the expiration date, you must request a new code. · [employer] Access Code: [email or web address] Expires: 9/11/2018  9:34 AM 
 
4. Enter the last four digits of your Social Security Number (xxxx) and Date of Birth (mm/dd/yyyy) as indicated and click Submit. You will be taken to the next sign-up page. 5. Create a [employer] ID. This will be your [employer] login ID and cannot be changed, so think of one that is secure and easy to remember. 6. Create a [employer] password. You can change your password at any time. 7. Enter your Password Reset Question and Answer. This can be used at a later time if you forget your password. 8. Enter your e-mail address. You will receive e-mail notification when new information is available in [address] 9. Click Sign Up. You can now view and download portions of your medical record. 10. Click the Download Summary menu link to download a portable copy of your medical information. If you have questions, please visit the Frequently Asked Questions section of the [employer] website. Remember, [employer] is NOT to be used for urgent needs. For medical emergencies, dial 911. Now available from your iPhone and Android! Please provide this summary of care documentation to your next provider. Your primary care clinician is listed as Eric Lobo. If you have any questions after today's visit, please call [phone].

## 2018-07-22 PROBLEM — I48.19 PERSISTENT ATRIAL FIBRILLATION (HCC): Chronic | Status: ACTIVE | Noted: 2018-07-22

## 2018-07-23 PROBLEM — K85.90 ACUTE PANCREATITIS: Status: ACTIVE | Noted: 2018-07-23

## 2018-07-31 ENCOUNTER — OFFICE VISIT (OUTPATIENT)
Dept: FAMILY MEDICINE CLINIC | Age: 83
End: 2018-07-31

## 2018-07-31 VITALS
BODY MASS INDEX: 25.89 KG/M2 | WEIGHT: 155.4 LBS | HEIGHT: 65 IN | HEART RATE: 84 BPM | TEMPERATURE: 98.1 F | SYSTOLIC BLOOD PRESSURE: 124 MMHG | OXYGEN SATURATION: 98 % | RESPIRATION RATE: 17 BRPM | DIASTOLIC BLOOD PRESSURE: 62 MMHG

## 2018-07-31 DIAGNOSIS — K52.9 COLITIS: ICD-10-CM

## 2018-07-31 DIAGNOSIS — L84 CORNS AND CALLUS: ICD-10-CM

## 2018-07-31 DIAGNOSIS — I10 ESSENTIAL HYPERTENSION: Primary | Chronic | ICD-10-CM

## 2018-07-31 RX ORDER — FUROSEMIDE 20 MG/1
TABLET ORAL
Qty: 90 TAB | Refills: 1 | Status: SHIPPED | OUTPATIENT
Start: 2018-07-31 | End: 2019-08-02 | Stop reason: ALTCHOICE

## 2018-07-31 NOTE — PROGRESS NOTES
1. Have you been to the ER, urgent care clinic since your last visit? Hospitalized since your last visit? Yes, [facility] for gallbladder and pancreatitis about 1 week ago. 2. Have you seen or consulted any other health care providers outside of the [address] [place] [address] since your last visit? Include any pap smears or colon screening.  No

## 2018-07-31 NOTE — MR AVS SNAPSHOT
303 [facility] 
 
 
 [address] [facility] [address] 
[phone] Patient: Jim Inman MRN: [id number] HD Visit Information Date & Time Provider Department Dept. Phone Encounter #  
 2018 11:00 AM Enoc Quick NP [facility] 1340 [facility] [phone] [id number] Your Appointments 2018  8:30 AM  
ESTABLISHED PATIENT with Enoc Quick NP  
[address] [place] ([address]) Appt Note: 6 mo F/U and labs [address] [place] [address]  
[phone]  
  
   
 [facility] 53 [address]  
  
    
 2018 10:00 AM  
ESTABLISHED PATIENT with Emelia Pichardo MD  
[address] [place] [facility] [place] [address]) Appt Note: 6 mo fu $0cp; '  
 [address] [facility] [facility] [address] [phone]  
  
   
 [address] [place] [address] Upcoming Health Maintenance Date Due ZOSTER VACCINE AGE 60> 1990 Influenza Age 5 to Adult 2018 GLAUCOMA SCREENING Q2Y 2019 MEDICARE YEARLY EXAM 2019 DTaP/Tdap/Td series (2 - Td) 2027 Allergies as of 2018  Review Complete On: 2018 By: Enoc Quick NP Severity Noted Reaction Type Reactions Ambien [Zolpidem]  2016    Other (comments) Legs got heavy Bactrim [Sulfamethoprim Ds]  2016    Unknown (comments) Patient cant remember her reaction Hydroxyzine  2016    Other (comments) Legs got heavy Current Immunizations  Never Reviewed Name Date  Influenza High Dose Vaccine PF 10/27/2017, 10/3/2016, 10/9/2015 12:00 AM  
 Influenza Vaccine 10/6/2014 12:00 AM, 2013 12:00 AM, 2012 12:00 AM, 10/2/2012 12:00 AM, 11/10/2011 12:00 AM, 10/26/2010 12:00 AM, 2009 12:00 AM, 10/8/2008 12:00 AM  
 Pneumococcal Conjugate (PCV-13) 2015 12:00 AM  
 Pneumococcal Polysaccharide (PPSV-23) 6/20/2017, 11/11/2011 12:00 AM, 4/11/2011 12:00 AM  
 Td 11/29/2011 Tdap 11/29/2011 12:00 AM  
  
 Not reviewed this visit You Were Diagnosed With   
  
 Codes Comments Essential hypertension    -  Primary ICD-10-CM: I10 
ICD-9-CM: 401.9 BMI 25.0-25.9,adult     ICD-10-CM: T64.37 ICD-9-CM: V85.21 Corns and callus     ICD-10-CM: L84 
ICD-9-CM: 974 Colitis     ICD-10-CM: K52.9 ICD-9-CM: 558. 9 Vitals BP Pulse Temp Resp Height(growth percentile) Weight(growth percentile) 124/62 (BP 1 Location: Left arm, BP Patient Position: Sitting) 84 98.1 °F (36.7 °C) (Oral) 17 5' 5\" (1.651 m) 155 lb 6.4 oz (70.5 kg) SpO2 BMI OB Status Smoking Status 98% 25.86 kg/m2 Menopause Never Smoker BMI and BSA Data Body Mass Index Body Surface Area  
 25.86 kg/m 2 1.8 m 2 Preferred Pharmacy Pharmacy Name Phone  [address] [phone] Your Updated Medication List  
  
   
This list is accurate as of 7/31/18 12:05 PM.  Always use your most recent med list.  
  
  
  
  
 apixaban 2.5 mg tablet Commonly known as:  Althea Can Take 1 Tab by mouth two (2) times a day. atenolol 50 mg tablet Commonly known as:  TENORMIN Take 2 Tabs by mouth daily. biotin 2,500 mcg Tab Take  by mouth daily. BOSWELLIA AVA XT (BULK) Take  by mouth daily. CALCITRATE-VITAMIN D PO Take  by mouth daily. CO Q-10 100 mg capsule Generic drug:  co-enzyme Q-10 Take 100 mg by mouth daily. furosemide 20 mg tablet Commonly known as:  LASIX Take daily as needed for edema  Indications: Edema, hypertension  
  
 lovastatin 20 mg tablet Commonly known as:  MEVACOR  
TAKE 1 TABLET BY MOUTH  NIGHTLY  
  
 magnesium 250 mg Tab Take  by mouth daily. Pt thinks she takes 200 mg  
  
 MELATONIN PO Take  by mouth as needed. omega 3-DHA-EPA-fish oil 1,000 mg (120 mg-180 mg) capsule Take  by mouth daily. * OTHER  
VEGGIES FOR LIFE AND FRUIT FOR LIFE , TAKES ONE A DAY EACH  
  
 * OTHER  
daily. Folate po PEPPERMINT OIL Take  by mouth. PROBIOTIC PO Take  by mouth as needed. triamcinolone 0.5 % topical cream  
Commonly known as:  ARISTOCORT Apply  to affected area two (2) times a day. As needed for itching TYLENOL PM PO Take  by mouth as needed. VITAMIN B-12 1,000 mcg tablet Generic drug:  cyanocobalamin Take 1,000 mcg by mouth daily. VITAMIN C WITH YOUSIF HIPS PO Take  by mouth daily. VITAMIN D3 2,000 unit Tab Generic drug:  cholecalciferol (vitamin D3) Take  by mouth daily. * Notice: This list has 2 medication(s) that are the same as other medications prescribed for you. Read the directions carefully, and ask your doctor or other care provider to review them with you. Prescriptions Sent to Pharmacy Refills  
 furosemide (LASIX) 20 mg tablet 1 Sig: Take daily as needed for edema  Indications: Edema, hypertension Class: Normal  
 Pharmacy: [facility] [address] Ph #: [phone] We Performed the Following REFERRAL TO PODIATRY [REF90 Custom] Comments:  
 Corns and calluses Introducing [facility]! Ramsey Etienne introduces [email or web address] patient portal. Now you can access parts of your medical record, email your doctor's office, and request medication refills online. 1. In your internet browser, go to [email or web address] 2. Click on the First Time User? Click Here link in the Sign In box. You will see the New Member Sign Up page. 3. Enter your [email or web address] Access Code exactly as it appears below. You will not need to use this code after youve completed the sign-up process. If you do not sign up before the expiration date, you must request a new code. · [employer] Access Code: [email or web address] Expires: 9/11/2018  9:34 AM 
 
4. Enter the last four digits of your Social Security Number (xxxx) and Date of Birth (mm/dd/yyyy) as indicated and click Submit. You will be taken to the next sign-up page. 5. Create a [employer] ID. This will be your [employer] login ID and cannot be changed, so think of one that is secure and easy to remember. 6. Create a [employer] password. You can change your password at any time. 7. Enter your Password Reset Question and Answer. This can be used at a later time if you forget your password. 8. Enter your e-mail address. You will receive e-mail notification when new information is available in [address] 9. Click Sign Up. You can now view and download portions of your medical record. 10. Click the Download Summary menu link to download a portable copy of your medical information. If you have questions, please visit the Frequently Asked Questions section of the [employer] website. Remember, [employer] is NOT to be used for urgent needs. For medical emergencies, dial 911. Now available from your iPhone and Android! Please provide this summary of care documentation to your next provider. Your primary care clinician is listed as Niko Tafoya. If you have any questions after today's visit, please call [phone].

## 2018-08-01 NOTE — PROGRESS NOTES
Subjective:     Jim Inman is a 80 y.o. female who presents today with the following:  Chief Complaint   Patient presents with    Follow-up     From hospital for gallbladder/pancreatitis    Admit date: 7/22/2018  Discharge date and time: 7/26/2018        DISCHARGE DIAGNOSIS:  Acute pancreatitis  Acute respiratory failure  Persistant afib  HTN  Hyperlipidemia           CONSULTATIONS:  IP CONSULT TO CARDIOLOGY  IP CONSULT TO GENERAL SURGERY     Excerpted HPI from H&P of Marco Weber MD:  Rosalina Richards a 80 y.o.  female who presents with acute onset mid-abdominal pain, nausea and vomiting without blood, beginning while cooking supper on the night of presentation. Pain and tenderness located in hypogastric area radiating to left abdomen. Pain constant, no exacerbating or alleviating factors. Symptoms with unchanged course. No chest pain or dyspnea, no fever or chills. Patient has had intermittent episodes of \"gas pains\" and loose stools since episode of C. difficile colitis in 2014, but currrent symptoms are much more severe. She notes food intolerance to broccoli and beans. HCTZ was recently added to her antihypertensive regimen. In ED she has had incomplete resolution of pain with each of 3 morphine injections. WBC 11,200. Lipase >3000. Abd/pelvic CT shows pancreatic atrophy, stranding in the retroperitoneum about the pancreas; gallbladder is at upper limits of normal in size with mild degree of gallbladder wall thickening and/or pericholecystic fluid; no bile duct dilatation.      Patient has persistent atrial fibrillation, was started on Eliquis about 1 month ago. TTE on 6/22/2018 showed LVEF 65-70%, mild LAE, mild AV sclerosis. Holter monitor showed persistent atrial fibrillation    At present doing well .  Stomach still gets upset when she eats too much and feels full.      ___________________________________________      Patient Active Problem List   Diagnosis Code    HTN (hypertension) I10  DJD (degenerative joint disease) M19.90    Colitis K52.9    Ventricular ectopy I49.3    Hyperlipemia E78.5    Primary osteoarthritis M19.91    Abnormal EKG R94.31    PAC (premature atrial contraction) I49.1    Persistent atrial fibrillation (HCC) I48.1    Acute pancreatitis K85.90         COMPLIANT WITH MEDICATION:    Denies chest pain, dyspnea, palpitations, headache and blurred vision. Blood pressure normotensive. ROS:  Gen: denies fever, chills, fatigue, weight loss, weight gain  HEENT:denies blurry vision, nasal congestion, sore throat  Resp: denies dypsnea, cough, wheezing  CV: denies chest pain radiating to the jaws or arms, palpitations, lower extremity edema  Abd: denies nausea, vomiting, diarrhea, constipation  Neuro: denies numbness/tingling  Endo: denies polyuria, polydipsia, heat/cold intolerance  Heme: no lymphadenopathy    Allergies   Allergen Reactions    Ambien [Zolpidem] Other (comments)     Legs got heavy    Bactrim [Sulfamethoprim Ds] Unknown (comments)     Patient cant remember her reaction    Hydroxyzine Other (comments)     Legs got heavy         Current Outpatient Prescriptions:     furosemide (LASIX) 20 mg tablet, Take daily as needed for edema  Indications: Edema, hypertension, Disp: 90 Tab, Rfl: 1    atenolol (TENORMIN) 50 mg tablet, Take 2 Tabs by mouth daily. , Disp: 90 Tab, Rfl: 3    apixaban (ELIQUIS) 2.5 mg tablet, Take 1 Tab by mouth two (2) times a day., Disp: 180 Tab, Rfl: 2    LACTOBACILLUS ACIDOPHILUS (PROBIOTIC PO), Take  by mouth as needed. , Disp: , Rfl:     PEPPERMINT OIL, Take  by mouth., Disp: , Rfl:     ACETAMINOPHEN/DIPHENHYDRAMINE (TYLENOL PM PO), Take  by mouth as needed. , Disp: , Rfl:     lovastatin (MEVACOR) 20 mg tablet, TAKE 1 TABLET BY MOUTH  NIGHTLY, Disp: 90 Tab, Rfl: 3    OTHER, daily. Folate po , Disp: , Rfl:     MELATONIN PO, Take  by mouth as needed. , Disp: , Rfl:     triamcinolone (ARISTOCORT) 0.5 % topical cream, Apply  to affected area two (2) times a day. As needed for itching, Disp: 45 g, Rfl: 0    co-enzyme Q-10 (CO Q-10) 100 mg capsule, Take 100 mg by mouth daily. , Disp: , Rfl:     cholecalciferol, vitamin D3, (VITAMIN D3) 2,000 unit tab, Take  by mouth daily. , Disp: , Rfl:     cyanocobalamin (VITAMIN B-12) 1,000 mcg tablet, Take 1,000 mcg by mouth daily. , Disp: , Rfl:     BOSWELLIA AVA EXTRACT (BOSWELLIA AVA XT, BULK,), Take  by mouth daily. , Disp: , Rfl:     OTHER, VEGGIES FOR LIFE AND FRUIT FOR LIFE , TAKES ONE A DAY EACH, Disp: , Rfl:     magnesium 250 mg tab, Take  by mouth daily. Pt thinks she takes 200 mg , Disp: , Rfl:     CALCIUM CITRATE/VITAMIN D3 (CALCITRATE-VITAMIN D PO), Take  by mouth daily. , Disp: , Rfl:     Omega-3-DHA-EPA-Fish Oil 1,000 mg (120 mg-180 mg) cap, Take  by mouth daily. , Disp: , Rfl:     ASCORBIC ACID (VITAMIN C WITH YOUSIF HIPS PO), Take  by mouth daily. , Disp: , Rfl:     biotin 2,500 mcg tab, Take  by mouth daily. , Disp: , Rfl:     Past Medical History:   Diagnosis Date    Colitis 2013    C diff; no recent flare, normal colonoscopy 2014    DJD (degenerative joint disease)     Fracture, femoral ([address] 09/2005    HTN (hypertension)     Hyperlipemia     Persistent atrial fibrillation ([address] 2018    Ventricular ectopy     no symptoms       Past Surgical History:   Procedure Laterality Date    HX CATARACT REMOVAL      bilat    HX COLONOSCOPY  2014    WNL    HX ORTHOPAEDIC  2005    right femur fx    HX ORTHOPAEDIC  2012    Lt TKA    HX REFRACTIVE SURGERY  06/15/2017    rt       History   Smoking Status    Never Smoker   Smokeless Tobacco    Never Used       Social History     Social History    Marital status:      Spouse name: N/A    Number of children: N/A    Years of education: N/A     Social History Main Topics    Smoking status: Never Smoker    Smokeless tobacco: Never Used    Alcohol use No    Drug use: None    Sexual activity: Not Asked     Other Topics Concern     Service No    Blood Transfusions Yes    Caffeine Concern No    Occupational Exposure No    Hobby Hazards No    Sleep Concern Yes     insomnia    Stress Concern No    Weight Concern Yes     over weight    Special Diet No    Back Care No    Exercise Yes    Bike Helmet No    Seat Belt Yes    Self-Exams Yes     Social History Narrative       Family History   Problem Relation Age of Onset    Cancer Mother      Leukemia    Heart Disease Mother     Cancer Sister      Ovarian    No Known Problems Brother     Heart Failure Brother     Cancer Brother      Brain tumor         Objective:     Visit Vitals    /62 (BP 1 Location: Left arm, BP Patient Position: Sitting)    Pulse 84    Temp 98.1 °F (36.7 °C) (Oral)    Resp 17    Ht 5' 5\" (1.651 m)    Wt 155 lb 6.4 oz (70.5 kg)    SpO2 98%    BMI 25.86 kg/m2     Body mass index is 25.86 kg/(m^2). General: Alert and oriented. No acute distress. Well nourished  HEENT :  Ears:TMs are normal. Canals are clear. Eyes: pupils equal, round, react to light and accommodation. Extra ocular movements intact. Nose: patent. Mouth and throat is clear. Neck:supple full range of motion no thyromegaly. Trachea midline, No carotid bruits. No significant lymphadenopathy  Lungs[de-identified] clear to auscultation without wheezes, rales, or rhonchi. Heart :RRR, S1 & S2 are normal intensity. No murmur; no gallop  Abdomen: bowel sounds active. No tenderness, guarding, rebound, masses, hepatic or spleen enlargement  Back: no CVA tenderness. Extremities: without clubbing, cyanosis, or edema  Pulses: radial and femoral pulses are normal  Neuro: HMF intact.  Cranial nerves II through XII grossly normal.  Motor: is 5 over 5 and symmetrical.   Deep tendon reflexes: +2 equal    Results for orders placed or performed during the hospital encounter of 07/22/18   CBC WITH AUTOMATED DIFF   Result Value Ref Range    WBC 11.2 (H) 3.6 - 11.0 K/uL    RBC 4.24 3.80 - 5.20 M/uL HGB 13.3 11.5 - 16.0 g/dL    HCT 39.6 35.0 - 47.0 %    MCV 93.4 80.0 - 99.0 FL    MCH 31.4 26.0 - 34.0 PG    MCHC 33.6 30.0 - 36.5 g/dL    RDW 13.3 11.5 - 14.5 %    PLATELET 849 492 - 797 K/uL    MPV 11.2 8.9 - 12.9 FL    NRBC 0.0 0  WBC    ABSOLUTE NRBC 0.00 0.00 - 0.01 K/uL    NEUTROPHILS 88 (H) 32 - 75 %    LYMPHOCYTES 8 (L) 12 - 49 %    MONOCYTES 3 (L) 5 - 13 %    EOSINOPHILS 0 0 - 7 %    BASOPHILS 0 0 - 1 %    IMMATURE GRANULOCYTES 0 0.0 - 0.5 %    ABS. NEUTROPHILS 9.9 (H) 1.8 - 8.0 K/UL    ABS. LYMPHOCYTES 0.9 0.8 - 3.5 K/UL    ABS. MONOCYTES 0.3 0.0 - 1.0 K/UL    ABS. EOSINOPHILS 0.0 0.0 - 0.4 K/UL    ABS. BASOPHILS 0.0 0.0 - 0.1 K/UL    ABS. IMM. GRANS. 0.0 0.00 - 0.04 K/UL    DF AUTOMATED     METABOLIC PANEL, COMPREHENSIVE   Result Value Ref Range    Sodium 139 136 - 145 mmol/L    Potassium 3.4 (L) 3.5 - 5.1 mmol/L    Chloride 100 97 - 108 mmol/L    CO2 28 21 - 32 mmol/L    Anion gap 11 5 - 15 mmol/L    Glucose 185 (H) 65 - 100 mg/dL    BUN 24 (H) 6 - 20 MG/DL    Creatinine 0.98 0.55 - 1.02 MG/DL    BUN/Creatinine ratio 24 (H) 12 - 20      GFR est AA >60 >60 ml/min/1.73m2    GFR est non-AA 54 (L) >60 ml/min/1.73m2    Calcium 8.8 8.5 - 10.1 MG/DL    Bilirubin, total 0.9 0.2 - 1.0 MG/DL    ALT (SGPT) 86 (H) 12 - 78 U/L    AST (SGOT) 132 (H) 15 - 37 U/L    Alk.  phosphatase 93 45 - 117 U/L    Protein, total 7.9 6.4 - 8.2 g/dL    Albumin 3.8 3.5 - 5.0 g/dL    Globulin 4.1 (H) 2.0 - 4.0 g/dL    A-G Ratio 0.9 (L) 1.1 - 2.2     LIPASE   Result Value Ref Range    Lipase >3000 (H) 73 - 393 U/L   MAGNESIUM   Result Value Ref Range    Magnesium 2.0 1.6 - 2.4 mg/dL   LACTIC ACID   Result Value Ref Range    Lactic acid 1.9 0.4 - 2.0 MMOL/L   URINALYSIS W/ RFLX MICROSCOPIC   Result Value Ref Range    Color YELLOW/STRAW      Appearance CLEAR CLEAR      Specific gravity 1.020 1.003 - 1.030      pH (UA) 7.5 5.0 - 8.0      Protein 30 (A) NEG mg/dL    Glucose NEGATIVE  NEG mg/dL    Ketone NEGATIVE  NEG mg/dL Bilirubin NEGATIVE  NEG      Blood TRACE (A) NEG      Urobilinogen 0.2 0.2 - 1.0 EU/dL    Nitrites NEGATIVE  NEG      Leukocyte Esterase NEGATIVE  NEG     URINE MICROSCOPIC ONLY   Result Value Ref Range    WBC 0-4 0 - 4 /hpf    RBC 0-5 0 - 5 /hpf    Epithelial cells FEW FEW /lpf    Bacteria NEGATIVE  NEG /hpf   METABOLIC PANEL, BASIC   Result Value Ref Range    Sodium 140 136 - 145 mmol/L    Potassium 3.8 3.5 - 5.1 mmol/L    Chloride 103 97 - 108 mmol/L    CO2 29 21 - 32 mmol/L    Anion gap 8 5 - 15 mmol/L    Glucose 128 (H) 65 - 100 mg/dL    BUN 19 6 - 20 MG/DL    Creatinine 0.86 0.55 - 1.02 MG/DL    BUN/Creatinine ratio 22 (H) 12 - 20      GFR est AA >60 >60 ml/min/1.73m2    GFR est non-AA >60 >60 ml/min/1.73m2    Calcium 8.1 (L) 8.5 - 10.1 MG/DL   LIPID PANEL   Result Value Ref Range    LIPID PROFILE          Cholesterol, total 154 <200 MG/DL    Triglyceride 49 <150 MG/DL    HDL Cholesterol 55 MG/DL    LDL, calculated 89.2 0 - 100 MG/DL    VLDL, calculated 9.8 MG/DL    CHOL/HDL Ratio 2.8 0.0 - 5.0     CBC WITH AUTOMATED DIFF   Result Value Ref Range    WBC 12.5 (H) 3.6 - 11.0 K/uL    RBC 3.87 3.80 - 5.20 M/uL    HGB 12.1 11.5 - 16.0 g/dL    HCT 36.7 35.0 - 47.0 %    MCV 94.8 80.0 - 99.0 FL    MCH 31.3 26.0 - 34.0 PG    MCHC 33.0 30.0 - 36.5 g/dL    RDW 13.6 11.5 - 14.5 %    PLATELET 338 426 - 719 K/uL    MPV 11.0 8.9 - 12.9 FL    NRBC 0.0 0  WBC    ABSOLUTE NRBC 0.00 0.00 - 0.01 K/uL    NEUTROPHILS 83 (H) 32 - 75 %    LYMPHOCYTES 11 (L) 12 - 49 %    MONOCYTES 6 5 - 13 %    EOSINOPHILS 0 0 - 7 %    BASOPHILS 0 0 - 1 %    IMMATURE GRANULOCYTES 0 0.0 - 0.5 %    ABS. NEUTROPHILS 10.4 (H) 1.8 - 8.0 K/UL    ABS. LYMPHOCYTES 1.3 0.8 - 3.5 K/UL    ABS. MONOCYTES 0.7 0.0 - 1.0 K/UL    ABS. EOSINOPHILS 0.0 0.0 - 0.4 K/UL    ABS. BASOPHILS 0.0 0.0 - 0.1 K/UL    ABS. IMM.  GRANS. 0.1 (H) 0.00 - 0.04 K/UL    DF AUTOMATED     LIPASE   Result Value Ref Range    Lipase >3000 (H) 73 - 148 U/L   METABOLIC PANEL, BASIC Result Value Ref Range    Sodium 141 136 - 145 mmol/L    Potassium 4.4 3.5 - 5.1 mmol/L    Chloride 108 97 - 108 mmol/L    CO2 23 21 - 32 mmol/L    Anion gap 10 5 - 15 mmol/L    Glucose 100 65 - 100 mg/dL    BUN 18 6 - 20 MG/DL    Creatinine 0.70 0.55 - 1.02 MG/DL    BUN/Creatinine ratio 26 (H) 12 - 20      GFR est AA >60 >60 ml/min/1.73m2    GFR est non-AA >60 >60 ml/min/1.73m2    Calcium 7.9 (L) 8.5 - 10.1 MG/DL   CBC WITH AUTOMATED DIFF   Result Value Ref Range    WBC 17.8 (H) 3.6 - 11.0 K/uL    RBC 3.58 (L) 3.80 - 5.20 M/uL    HGB 11.3 (L) 11.5 - 16.0 g/dL    HCT 35.7 35.0 - 47.0 %    MCV 99.7 (H) 80.0 - 99.0 FL    MCH 31.6 26.0 - 34.0 PG    MCHC 31.7 30.0 - 36.5 g/dL    RDW 14.2 11.5 - 14.5 %    PLATELET 852 (L) 412 - 400 K/uL    MPV 11.4 8.9 - 12.9 FL    NRBC 0.0 0  WBC    ABSOLUTE NRBC 0.00 0.00 - 0.01 K/uL    NEUTROPHILS 86 (H) 32 - 75 %    LYMPHOCYTES 7 (L) 12 - 49 %    MONOCYTES 6 5 - 13 %    EOSINOPHILS 0 0 - 7 %    BASOPHILS 0 0 - 1 %    IMMATURE GRANULOCYTES 1 (H) 0.0 - 0.5 %    ABS. NEUTROPHILS 15.2 (H) 1.8 - 8.0 K/UL    ABS. LYMPHOCYTES 1.3 0.8 - 3.5 K/UL    ABS. MONOCYTES 1.1 (H) 0.0 - 1.0 K/UL    ABS. EOSINOPHILS 0.0 0.0 - 0.4 K/UL    ABS. BASOPHILS 0.0 0.0 - 0.1 K/UL    ABS. IMM.  GRANS. 0.1 (H) 0.00 - 0.04 K/UL    DF AUTOMATED     LIPASE   Result Value Ref Range    Lipase 353 73 - 393 U/L   BNP   Result Value Ref Range     (H) 0 - 573 pg/mL   METABOLIC PANEL, BASIC   Result Value Ref Range    Sodium 139 136 - 145 mmol/L    Potassium 3.8 3.5 - 5.1 mmol/L    Chloride 105 97 - 108 mmol/L    CO2 27 21 - 32 mmol/L    Anion gap 7 5 - 15 mmol/L    Glucose 94 65 - 100 mg/dL    BUN 17 6 - 20 MG/DL    Creatinine 0.68 0.55 - 1.02 MG/DL    BUN/Creatinine ratio 25 (H) 12 - 20      GFR est AA >60 >60 ml/min/1.73m2    GFR est non-AA >60 >60 ml/min/1.73m2    Calcium 7.9 (L) 8.5 - 10.1 MG/DL   CBC WITH AUTOMATED DIFF   Result Value Ref Range    WBC 14.9 (H) 3.6 - 11.0 K/uL    RBC 3.64 (L) 3.80 - 5.20 M/uL    HGB 11.4 (L) 11.5 - 16.0 g/dL    HCT 35.1 35.0 - 47.0 %    MCV 96.4 80.0 - 99.0 FL    MCH 31.3 26.0 - 34.0 PG    MCHC 32.5 30.0 - 36.5 g/dL    RDW 13.8 11.5 - 14.5 %    PLATELET 465 (L) 046 - 400 K/uL    MPV 11.7 8.9 - 12.9 FL    NRBC 0.0 0  WBC    ABSOLUTE NRBC 0.00 0.00 - 0.01 K/uL    NEUTROPHILS 84 (H) 32 - 75 %    LYMPHOCYTES 8 (L) 12 - 49 %    MONOCYTES 7 5 - 13 %    EOSINOPHILS 0 0 - 7 %    BASOPHILS 0 0 - 1 %    IMMATURE GRANULOCYTES 1 (H) 0.0 - 0.5 %    ABS. NEUTROPHILS 12.5 (H) 1.8 - 8.0 K/UL    ABS. LYMPHOCYTES 1.2 0.8 - 3.5 K/UL    ABS. MONOCYTES 1.0 0.0 - 1.0 K/UL    ABS. EOSINOPHILS 0.0 0.0 - 0.4 K/UL    ABS. BASOPHILS 0.0 0.0 - 0.1 K/UL    ABS. IMM. GRANS. 0.1 (H) 0.00 - 0.04 K/UL    DF AUTOMATED     LIPASE   Result Value Ref Range    Lipase 80 73 - 393 U/L   LIPASE   Result Value Ref Range    Lipase 70 (L) 73 - 393 U/L   CBC WITH AUTOMATED DIFF   Result Value Ref Range    WBC 10.4 3.6 - 11.0 K/uL    RBC 3.85 3.80 - 5.20 M/uL    HGB 12.1 11.5 - 16.0 g/dL    HCT 36.2 35.0 - 47.0 %    MCV 94.0 80.0 - 99.0 FL    MCH 31.4 26.0 - 34.0 PG    MCHC 33.4 30.0 - 36.5 g/dL    RDW 13.9 11.5 - 14.5 %    PLATELET 194 (L) 331 - 400 K/uL    MPV 11.8 8.9 - 12.9 FL    NEUTROPHILS 80 (H) 32 - 75 %    LYMPHOCYTES 10 (L) 12 - 49 %    MONOCYTES 8 5 - 13 %    EOSINOPHILS 2 0 - 7 %    BASOPHILS 0 0 - 1 %    ABS. NEUTROPHILS 8.4 (H) 1.8 - 8.0 K/UL    ABS. LYMPHOCYTES 1.0 0.8 - 3.5 K/UL    ABS. MONOCYTES 0.8 0.0 - 1.0 K/UL    ABS. EOSINOPHILS 0.2 0.0 - 0.4 K/UL    ABS.  BASOPHILS 0.0 0.0 - 0.1 K/UL    XXWBCSUS 0     METABOLIC PANEL, BASIC   Result Value Ref Range    Sodium 141 136 - 145 mmol/L    Potassium 3.2 (L) 3.5 - 5.1 mmol/L    Chloride 105 97 - 108 mmol/L    CO2 27 21 - 32 mmol/L    Anion gap 9 5 - 15 mmol/L    Glucose 92 65 - 100 mg/dL    BUN 17 6 - 20 MG/DL    Creatinine 0.61 0.55 - 1.02 MG/DL    BUN/Creatinine ratio 28 (H) 12 - 20      GFR est AA >60 >60 ml/min/1.73m2    GFR est non-AA >60 >60 ml/min/1.73m2    Calcium 7.9 (L) 8.5 - 10.1 MG/DL   MAGNESIUM   Result Value Ref Range    Magnesium 2.1 1.6 - 2.4 mg/dL   EKG, 12 LEAD, INITIAL   Result Value Ref Range    Ventricular Rate 84 BPM    Atrial Rate 91 BPM    QRS Duration 80 ms    Q-T Interval 378 ms    QTC Calculation (Bezet) 446 ms    Calculated R Axis -10 degrees    Calculated T Axis 32 degrees    Diagnosis       Atrial fibrillation with premature ventricular or aberrantly conducted   complexes  Low voltage QRS  Abnormal ECG  No previous ECGs available  Confirmed by Iona Keane MD, --- ([id number]) on 7/23/2018 5:56:36 AM         No results found for this visit on 07/31/18. Assessment/ Plan:     1. BMI 25.0-25.9,adult      2. Corns and callus    - REFERRAL TO PODIATRY    3. Colitis  Continue current medical plan. 4. Essential hypertension  BP in goal        Orders Placed This Encounter    REFERRAL TO PODIATRY    furosemide (LASIX) 20 mg tablet     Sig: Take daily as needed for edema  Indications: Edema, hypertension     Dispense:  90 Tab     Refill:  1         Verbal and written instructions (see AVS) provided.  Patient expresses understanding of diagnosis and treatment plan. Health Maintenance Due   Topic Date Due    ZOSTER VACCINE AGE 60>  01[birth year]07/1990         Follow-up Disposition:  Return in about 1 month (around 8/31/2018).       DIGNA Shaffer

## 2018-08-02 ENCOUNTER — TELEPHONE (OUTPATIENT)
Dept: FAMILY MEDICINE CLINIC | Age: 83
End: 2018-08-02

## 2018-08-02 NOTE — TELEPHONE ENCOUNTER
Patient says she discussed with Wiley Roberts on her last visit a podiatry referral in [place] and hasn't heard anything yet. I do not see a referral in the system. Patient says she doesn't want to wait to be seen.

## 2018-08-02 NOTE — TELEPHONE ENCOUNTER
Reviewed Deandre Castillo notes she did mention referral to see podiatry but referral was not placed. Please set up appointment and call patient.   Thanks

## 2018-08-20 ENCOUNTER — OFFICE VISIT (OUTPATIENT)
Dept: FAMILY MEDICINE CLINIC | Age: 83
End: 2018-08-20

## 2018-08-20 VITALS
HEIGHT: 65 IN | OXYGEN SATURATION: 97 % | RESPIRATION RATE: 18 BRPM | WEIGHT: 153.6 LBS | HEART RATE: 88 BPM | DIASTOLIC BLOOD PRESSURE: 76 MMHG | SYSTOLIC BLOOD PRESSURE: 112 MMHG | TEMPERATURE: 97 F | BODY MASS INDEX: 25.59 KG/M2

## 2018-08-20 DIAGNOSIS — K52.9 COLITIS: Primary | ICD-10-CM

## 2018-08-20 NOTE — MR AVS SNAPSHOT
[facility] 
 
 
 [address] 
[phone] Patient: Sudheer Inman MRN: [id number] YNX:[id number] Visit Information Date & Time Provider Department Dept. Phone Encounter #  
 8/20/2018 10:30 AM Charla Mota NP [facility] [address] [facility] [phone] [id number] Your Appointments 12/12/2018  8:30 AM  
ESTABLISHED PATIENT with Charla Mota NP  
[address] [place] ([address]) Appt Note: 6 mo F/U and labs [address] [facility] [address]  
[phone]  
  
   
 [facility] [id number]  
  
    
 12/28/2018 10:00 AM  
ESTABLISHED PATIENT with Yovany Galan MD  
[address] [facility] [address]) Appt Note: 6 mo fu $0cp; '  
 [address] [facility] [facility] [address] [phone]  
  
   
 [address] [place] [id number] Upcoming Health Maintenance Date Due ZOSTER VACCINE AGE 60> 1/7/1990 Influenza Age 5 to Adult 8/1/2018 GLAUCOMA SCREENING Q2Y 6/2/2019 MEDICARE YEARLY EXAM 6/14/2019 DTaP/Tdap/Td series (2 - Td) 6/20/2027 Allergies as of 8/20/2018  Review Complete On: 8/20/2018 By: Albertina Rolon RN Severity Noted Reaction Type Reactions Ambien [Zolpidem]  03/08/2016    Other (comments) Legs got heavy Bactrim [Sulfamethoprim Ds]  12/20/2016    Unknown (comments) Patient cant remember her reaction Hydroxyzine  06/23/2016    Other (comments) Legs got heavy Current Immunizations  Never Reviewed Name Date  Influenza High Dose Vaccine PF 10/27/2017, 10/3/2016, 10/9/2015 12:00 AM  
 Influenza Vaccine 10/6/2014 12:00 AM, 9/18/2013 12:00 AM, 11/26/2012 12:00 AM, 10/2/2012 12:00 AM, 11/10/2011 12:00 AM, 10/26/2010 12:00 AM, 9/17/2009 12:00 AM, 10/8/2008 12:00 AM  
 Pneumococcal Conjugate (PCV-13) 11/11/2015 12:00 AM  
 Pneumococcal Polysaccharide (PPSV-23) 6/20/2017, 11/11/2011 12:00 AM, 4/11/2011 12:00 AM  
 Td 11/29/2011 Tdap 11/29/2011 12:00 AM  
  
 Not reviewed this visit You Were Diagnosed With   
  
 Codes Comments BMI 25.0-25.9,adult    -  Primary ICD-10-CM: Y39.84 ICD-9-CM: V85.21 Vitals BP Pulse Temp Resp Height(growth percentile) Weight(growth percentile) 112/76 (BP 1 Location: Left arm, BP Patient Position: Sitting) 88 97 °F (36.1 °C) (Temporal) 18 5' 5\" (1.651 m) 153 lb 9.6 oz (69.7 kg) SpO2 BMI OB Status Smoking Status 97% 25.56 kg/m2 Menopause Never Smoker Vitals History BMI and BSA Data Body Mass Index Body Surface Area 25.56 kg/m 2 1.79 m 2 Preferred Pharmacy Pharmacy Name Phone  [facility] [phone] Your Updated Medication List  
  
   
This list is accurate as of 8/20/18 11:52 AM.  Always use your most recent med list.  
  
  
  
  
 apixaban 2.5 mg tablet Commonly known as:  [place] Christen Take 1 Tab by mouth two (2) times a day. atenolol 50 mg tablet Commonly known as:  TENORMIN Take 2 Tabs by mouth daily. biotin 2,500 mcg Tab Take  by mouth daily. BOSWELLIA AVA XT (BULK) Take  by mouth daily. CALCITRATE-VITAMIN D PO Take  by mouth daily. CO Q-10 100 mg capsule Generic drug:  co-enzyme Q-10 Take 100 mg by mouth daily. furosemide 20 mg tablet Commonly known as:  LASIX Take daily as needed for edema  Indications: Edema, hypertension  
  
 lovastatin 20 mg tablet Commonly known as:  MEVACOR  
TAKE 1 TABLET BY MOUTH  NIGHTLY  
  
 magnesium 250 mg Tab Take  by mouth daily. Pt thinks she takes 200 mg  
  
 MELATONIN PO Take  by mouth as needed. omega 3-DHA-EPA-fish oil 1,000 mg (120 mg-180 mg) capsule Take  by mouth daily. * OTHER  
VEGGIES FOR LIFE AND FRUIT FOR LIFE , TAKES ONE A DAY EACH  
  
 * OTHER  
daily.  Folate po  
  
 PEPPERMINT OIL Take  by mouth. PROBIOTIC PO Take  by mouth as needed. triamcinolone 0.5 % topical cream  
Commonly known as:  ARISTOCORT Apply  to affected area two (2) times a day. As needed for itching TYLENOL PM PO Take  by mouth as needed. VITAMIN B-12 1,000 mcg tablet Generic drug:  cyanocobalamin Take 1,000 mcg by mouth daily. VITAMIN C WITH YOUSIF HIPS PO Take  by mouth daily. VITAMIN D3 2,000 unit Tab Generic drug:  cholecalciferol (vitamin D3) Take  by mouth daily. * Notice: This list has 2 medication(s) that are the same as other medications prescribed for you. Read the directions carefully, and ask your doctor or other care provider to review them with you. Introducing [facility]! [employer] introduces [employer] patient portal. Now you can access parts of your medical record, email your doctor's office, and request medication refills online. 1. In your internet browser, go to [email or web address] 2. Click on the First Time User? Click Here link in the Sign In box. You will see the New Member Sign Up page. 3. Enter your [employer] Access Code exactly as it appears below. You will not need to use this code after youve completed the sign-up process. If you do not sign up before the expiration date, you must request a new code. · [employer] Access Code: [email or web address] Expires: 9/11/2018  9:34 AM 
 
4. Enter the last four digits of your Social Security Number (xxxx) and Date of Birth (mm/dd/yyyy) as indicated and click Submit. You will be taken to the next sign-up page. 5. Create a [employer] ID. This will be your [employer] login ID and cannot be changed, so think of one that is secure and easy to remember. 6. Create a [employer] password. You can change your password at any time. 7. Enter your Password Reset Question and Answer. This can be used at a later time if you forget your password. 8. Enter your e-mail address. You will receive e-mail notification when new information is available in [address] 9. Click Sign Up. You can now view and download portions of your medical record. 10. Click the Download Summary menu link to download a portable copy of your medical information. If you have questions, please visit the Frequently Asked Questions section of the [employer] website. Remember, [employer] is NOT to be used for urgent needs. For medical emergencies, dial 911. Now available from your iPhone and Android! Please provide this summary of care documentation to your next provider. Your primary care clinician is listed as Brook Yepez. If you have any questions after today's visit, please call [phone].

## 2018-08-20 NOTE — PROGRESS NOTES
1. Have you been to the ER, urgent care clinic since your last visit? Hospitalized since your last visit? no    2. Have you seen or consulted any other health care providers outside of the [facility]"" since your last visit? Include any pap smears or colon screening.  no

## 2018-08-25 NOTE — PROGRESS NOTES
Subjective:     Shirley Inman is a 80 y.o. female who presents today with the following:  Chief Complaint   Patient presents with    Epigastric Pain     f/u [facility] ER 7-       Patient Active Problem List   Diagnosis Code    HTN (hypertension) I10    DJD (degenerative joint disease) M19.90    Colitis K52.9    Ventricular ectopy I49.3    Hyperlipemia E78.5    Primary osteoarthritis M19.91    Abnormal EKG R94.31    PAC (premature atrial contraction) I49.1    Persistent atrial fibrillation (HCC) I48.1    Acute pancreatitis K85.90     Doing well no interval problems. No chest pain, no angina no shortness of breath. No orthopnea or PND. No dependent edema. No abdominal pain, change in bowel habits, no blood in stool or black stools. No urinary frequency, urgency, dysuria. No change in voiding pattern or stream, no significant nocturia. COMPLIANT WITH MEDICATION:   HTN; Denies chest pain, dyspnea, palpitations, headache and blurred vision. Blood pressure normotensive. Colitis: improved pepto bismol helping with colitis symptoms.       ROS:  Gen: denies fever, chills, fatigue, weight loss, weight gain  HEENT:denies blurry vision, nasal congestion, sore throat  Resp: denies dypsnea, cough, wheezing  CV: denies chest pain radiating to the jaws or arms, palpitations, lower extremity edema  Abd: denies nausea, vomiting, diarrhea, constipation  Neuro: denies numbness/tingling  Endo: denies polyuria, polydipsia, heat/cold intolerance  Heme: no lymphadenopathy    Allergies   Allergen Reactions    Ambien [Zolpidem] Other (comments)     Legs got heavy    Bactrim [Sulfamethoprim Ds] Unknown (comments)     Patient cant remember her reaction    Hydroxyzine Other (comments)     Legs got heavy         Current Outpatient Prescriptions:     furosemide (LASIX) 20 mg tablet, Take daily as needed for edema  Indications: Edema, hypertension, Disp: 90 Tab, Rfl: 1    atenolol (TENORMIN) 50 mg tablet, Take 2 Tabs by mouth daily. , Disp: 90 Tab, Rfl: 3    apixaban (ELIQUIS) 2.5 mg tablet, Take 1 Tab by mouth two (2) times a day., Disp: 180 Tab, Rfl: 2    LACTOBACILLUS ACIDOPHILUS (PROBIOTIC PO), Take  by mouth as needed. , Disp: , Rfl:     PEPPERMINT OIL, Take  by mouth., Disp: , Rfl:     ACETAMINOPHEN/DIPHENHYDRAMINE (TYLENOL PM PO), Take  by mouth as needed. , Disp: , Rfl:     lovastatin (MEVACOR) 20 mg tablet, TAKE 1 TABLET BY MOUTH  NIGHTLY, Disp: 90 Tab, Rfl: 3    OTHER, daily. Folate po , Disp: , Rfl:     MELATONIN PO, Take  by mouth as needed. , Disp: , Rfl:     triamcinolone (ARISTOCORT) 0.5 % topical cream, Apply  to affected area two (2) times a day. As needed for itching, Disp: 45 g, Rfl: 0    co-enzyme Q-10 (CO Q-10) 100 mg capsule, Take 100 mg by mouth daily. , Disp: , Rfl:     cholecalciferol, vitamin D3, (VITAMIN D3) 2,000 unit tab, Take  by mouth daily. , Disp: , Rfl:     cyanocobalamin (VITAMIN B-12) 1,000 mcg tablet, Take 1,000 mcg by mouth daily. , Disp: , Rfl:     BOSWELLIA AVA EXTRACT (BOSWELLIA AVA XT, BULK,), Take  by mouth daily. , Disp: , Rfl:     OTHER, VEGGIES FOR LIFE AND FRUIT FOR LIFE , TAKES ONE A DAY EACH, Disp: , Rfl:     magnesium 250 mg tab, Take  by mouth daily. Pt thinks she takes 200 mg , Disp: , Rfl:     CALCIUM CITRATE/VITAMIN D3 (CALCITRATE-VITAMIN D PO), Take  by mouth daily. , Disp: , Rfl:     Omega-3-DHA-EPA-Fish Oil 1,000 mg (120 mg-180 mg) cap, Take  by mouth daily. , Disp: , Rfl:     ASCORBIC ACID (VITAMIN C WITH YOUSIF HIPS PO), Take  by mouth daily. , Disp: , Rfl:     biotin 2,500 mcg tab, Take  by mouth daily. , Disp: , Rfl:     Past Medical History:   Diagnosis Date    Colitis 2013    C diff; no recent flare, normal colonoscopy 2014    DJD (degenerative joint disease)     Fracture, femoral ([address] 09/2005    HTN (hypertension)     Hyperlipemia     Persistent atrial fibrillation ([facility] 2018    Ventricular ectopy     no symptoms       Past Surgical History: Procedure Laterality Date    HX CATARACT REMOVAL      bilat    HX COLONOSCOPY  2014    WNL    HX ORTHOPAEDIC  2005    right femur fx    HX ORTHOPAEDIC  2012    Lt TKA    HX REFRACTIVE SURGERY  06/15/2017    rt       History   Smoking Status    Never Smoker   Smokeless Tobacco    Never Used       Social History     Social History    Marital status:      Spouse name: N/A    Number of children: N/A    Years of education: N/A     Social History Main Topics    Smoking status: Never Smoker    Smokeless tobacco: Never Used    Alcohol use No    Drug use: None    Sexual activity: Not Asked     Other Topics Concern     Service No    Blood Transfusions Yes    Caffeine Concern No    Occupational Exposure No    Hobby Hazards No    Sleep Concern Yes     insomnia    Stress Concern No    Weight Concern Yes     over weight    Special Diet No    Back Care No    Exercise Yes    Bike Helmet No    Seat Belt Yes    Self-Exams Yes     Social History Narrative       Family History   Problem Relation Age of Onset    Cancer Mother      Leukemia    Heart Disease Mother     Cancer Sister      Ovarian    No Known Problems Brother     Heart Failure Brother     Cancer Brother      Brain tumor         Objective:     Visit Vitals    /76 (BP 1 Location: Left arm, BP Patient Position: Sitting)    Pulse 88    Temp 97 °F (36.1 °C) (Temporal)    Resp 18    Ht 5' 5\" (1.651 m)    Wt 153 lb 9.6 oz (69.7 kg)    SpO2 97%    BMI 25.56 kg/m2     Body mass index is 25.56 kg/(m^2). General: Alert and oriented. No acute distress. Well nourished  HEENT :  Ears:TMs are normal. Canals are clear. Eyes: pupils equal, round, react to light and accommodation. Extra ocular movements intact. Nose: patent. Mouth and throat is clear. Neck:supple full range of motion no thyromegaly. Trachea midline, No carotid bruits.  No significant lymphadenopathy  Lungs[de-identified] clear to auscultation without wheezes, rales, or rhonchi. Heart :RRR, S1 & S2 are normal intensity. No murmur; no gallop  Abdomen: bowel sounds active. No tenderness, guarding, rebound, masses, hepatic or spleen enlargement  Back: no CVA tenderness. Extremities: without clubbing, cyanosis, or edema  Pulses: radial and femoral pulses are normal  Neuro: HMF intact. Cranial nerves II through XII grossly normal.  Motor: is 5 over 5 and symmetrical.   Deep tendon reflexes: +2 equal    Results for orders placed or performed during the hospital encounter of 07/22/18   CBC WITH AUTOMATED DIFF   Result Value Ref Range    WBC 11.2 (H) 3.6 - 11.0 K/uL    RBC 4.24 3.80 - 5.20 M/uL    HGB 13.3 11.5 - 16.0 g/dL    HCT 39.6 35.0 - 47.0 %    MCV 93.4 80.0 - 99.0 FL    MCH 31.4 26.0 - 34.0 PG    MCHC 33.6 30.0 - 36.5 g/dL    RDW 13.3 11.5 - 14.5 %    PLATELET 002 378 - 302 K/uL    MPV 11.2 8.9 - 12.9 FL    NRBC 0.0 0  WBC    ABSOLUTE NRBC 0.00 0.00 - 0.01 K/uL    NEUTROPHILS 88 (H) 32 - 75 %    LYMPHOCYTES 8 (L) 12 - 49 %    MONOCYTES 3 (L) 5 - 13 %    EOSINOPHILS 0 0 - 7 %    BASOPHILS 0 0 - 1 %    IMMATURE GRANULOCYTES 0 0.0 - 0.5 %    ABS. NEUTROPHILS 9.9 (H) 1.8 - 8.0 K/UL    ABS. LYMPHOCYTES 0.9 0.8 - 3.5 K/UL    ABS. MONOCYTES 0.3 0.0 - 1.0 K/UL    ABS. EOSINOPHILS 0.0 0.0 - 0.4 K/UL    ABS. BASOPHILS 0.0 0.0 - 0.1 K/UL    ABS. IMM. GRANS. 0.0 0.00 - 0.04 K/UL    DF AUTOMATED     METABOLIC PANEL, COMPREHENSIVE   Result Value Ref Range    Sodium 139 136 - 145 mmol/L    Potassium 3.4 (L) 3.5 - 5.1 mmol/L    Chloride 100 97 - 108 mmol/L    CO2 28 21 - 32 mmol/L    Anion gap 11 5 - 15 mmol/L    Glucose 185 (H) 65 - 100 mg/dL    BUN 24 (H) 6 - 20 MG/DL    Creatinine 0.98 0.55 - 1.02 MG/DL    BUN/Creatinine ratio 24 (H) 12 - 20      GFR est AA >60 >60 ml/min/1.73m2    GFR est non-AA 54 (L) >60 ml/min/1.73m2    Calcium 8.8 8.5 - 10.1 MG/DL    Bilirubin, total 0.9 0.2 - 1.0 MG/DL    ALT (SGPT) 86 (H) 12 - 78 U/L    AST (SGOT) 132 (H) 15 - 37 U/L    Alk.  phosphatase 93 45 - 117 U/L    Protein, total 7.9 6.4 - 8.2 g/dL    Albumin 3.8 3.5 - 5.0 g/dL    Globulin 4.1 (H) 2.0 - 4.0 g/dL    A-G Ratio 0.9 (L) 1.1 - 2.2     LIPASE   Result Value Ref Range    Lipase >3000 (H) 73 - 393 U/L   MAGNESIUM   Result Value Ref Range    Magnesium 2.0 1.6 - 2.4 mg/dL   LACTIC ACID   Result Value Ref Range    Lactic acid 1.9 0.4 - 2.0 MMOL/L   URINALYSIS W/ RFLX MICROSCOPIC   Result Value Ref Range    Color YELLOW/STRAW      Appearance CLEAR CLEAR      Specific gravity 1.020 1.003 - 1.030      pH (UA) 7.5 5.0 - 8.0      Protein 30 (A) NEG mg/dL    Glucose NEGATIVE  NEG mg/dL    Ketone NEGATIVE  NEG mg/dL    Bilirubin NEGATIVE  NEG      Blood TRACE (A) NEG      Urobilinogen 0.2 0.2 - 1.0 EU/dL    Nitrites NEGATIVE  NEG      Leukocyte Esterase NEGATIVE  NEG     URINE MICROSCOPIC ONLY   Result Value Ref Range    WBC 0-4 0 - 4 /hpf    RBC 0-5 0 - 5 /hpf    Epithelial cells FEW FEW /lpf    Bacteria NEGATIVE  NEG /hpf   METABOLIC PANEL, BASIC   Result Value Ref Range    Sodium 140 136 - 145 mmol/L    Potassium 3.8 3.5 - 5.1 mmol/L    Chloride 103 97 - 108 mmol/L    CO2 29 21 - 32 mmol/L    Anion gap 8 5 - 15 mmol/L    Glucose 128 (H) 65 - 100 mg/dL    BUN 19 6 - 20 MG/DL    Creatinine 0.86 0.55 - 1.02 MG/DL    BUN/Creatinine ratio 22 (H) 12 - 20      GFR est AA >60 >60 ml/min/1.73m2    GFR est non-AA >60 >60 ml/min/1.73m2    Calcium 8.1 (L) 8.5 - 10.1 MG/DL   LIPID PANEL   Result Value Ref Range    LIPID PROFILE          Cholesterol, total 154 <200 MG/DL    Triglyceride 49 <150 MG/DL    HDL Cholesterol 55 MG/DL    LDL, calculated 89.2 0 - 100 MG/DL    VLDL, calculated 9.8 MG/DL    CHOL/HDL Ratio 2.8 0.0 - 5.0     CBC WITH AUTOMATED DIFF   Result Value Ref Range    WBC 12.5 (H) 3.6 - 11.0 K/uL    RBC 3.87 3.80 - 5.20 M/uL    HGB 12.1 11.5 - 16.0 g/dL    HCT 36.7 35.0 - 47.0 %    MCV 94.8 80.0 - 99.0 FL    MCH 31.3 26.0 - 34.0 PG    MCHC 33.0 30.0 - 36.5 g/dL    RDW 13.6 11.5 - 14.5 %    PLATELET 628 034 - 075 K/uL    MPV 11.0 8.9 - 12.9 FL    NRBC 0.0 0  WBC    ABSOLUTE NRBC 0.00 0.00 - 0.01 K/uL    NEUTROPHILS 83 (H) 32 - 75 %    LYMPHOCYTES 11 (L) 12 - 49 %    MONOCYTES 6 5 - 13 %    EOSINOPHILS 0 0 - 7 %    BASOPHILS 0 0 - 1 %    IMMATURE GRANULOCYTES 0 0.0 - 0.5 %    ABS. NEUTROPHILS 10.4 (H) 1.8 - 8.0 K/UL    ABS. LYMPHOCYTES 1.3 0.8 - 3.5 K/UL    ABS. MONOCYTES 0.7 0.0 - 1.0 K/UL    ABS. EOSINOPHILS 0.0 0.0 - 0.4 K/UL    ABS. BASOPHILS 0.0 0.0 - 0.1 K/UL    ABS. IMM. GRANS. 0.1 (H) 0.00 - 0.04 K/UL    DF AUTOMATED     LIPASE   Result Value Ref Range    Lipase >3000 (H) 73 - 121 U/L   METABOLIC PANEL, BASIC   Result Value Ref Range    Sodium 141 136 - 145 mmol/L    Potassium 4.4 3.5 - 5.1 mmol/L    Chloride 108 97 - 108 mmol/L    CO2 23 21 - 32 mmol/L    Anion gap 10 5 - 15 mmol/L    Glucose 100 65 - 100 mg/dL    BUN 18 6 - 20 MG/DL    Creatinine 0.70 0.55 - 1.02 MG/DL    BUN/Creatinine ratio 26 (H) 12 - 20      GFR est AA >60 >60 ml/min/1.73m2    GFR est non-AA >60 >60 ml/min/1.73m2    Calcium 7.9 (L) 8.5 - 10.1 MG/DL   CBC WITH AUTOMATED DIFF   Result Value Ref Range    WBC 17.8 (H) 3.6 - 11.0 K/uL    RBC 3.58 (L) 3.80 - 5.20 M/uL    HGB 11.3 (L) 11.5 - 16.0 g/dL    HCT 35.7 35.0 - 47.0 %    MCV 99.7 (H) 80.0 - 99.0 FL    MCH 31.6 26.0 - 34.0 PG    MCHC 31.7 30.0 - 36.5 g/dL    RDW 14.2 11.5 - 14.5 %    PLATELET 742 (L) 463 - 400 K/uL    MPV 11.4 8.9 - 12.9 FL    NRBC 0.0 0  WBC    ABSOLUTE NRBC 0.00 0.00 - 0.01 K/uL    NEUTROPHILS 86 (H) 32 - 75 %    LYMPHOCYTES 7 (L) 12 - 49 %    MONOCYTES 6 5 - 13 %    EOSINOPHILS 0 0 - 7 %    BASOPHILS 0 0 - 1 %    IMMATURE GRANULOCYTES 1 (H) 0.0 - 0.5 %    ABS. NEUTROPHILS 15.2 (H) 1.8 - 8.0 K/UL    ABS. LYMPHOCYTES 1.3 0.8 - 3.5 K/UL    ABS. MONOCYTES 1.1 (H) 0.0 - 1.0 K/UL    ABS. EOSINOPHILS 0.0 0.0 - 0.4 K/UL    ABS. BASOPHILS 0.0 0.0 - 0.1 K/UL    ABS. IMM.  GRANS. 0.1 (H) 0.00 - 0.04 K/UL    DF AUTOMATED     LIPASE   Result Value Ref Range    Lipase 353 73 - 393 U/L   BNP   Result Value Ref Range     (H) 0 - 579 pg/mL   METABOLIC PANEL, BASIC   Result Value Ref Range    Sodium 139 136 - 145 mmol/L    Potassium 3.8 3.5 - 5.1 mmol/L    Chloride 105 97 - 108 mmol/L    CO2 27 21 - 32 mmol/L    Anion gap 7 5 - 15 mmol/L    Glucose 94 65 - 100 mg/dL    BUN 17 6 - 20 MG/DL    Creatinine 0.68 0.55 - 1.02 MG/DL    BUN/Creatinine ratio 25 (H) 12 - 20      GFR est AA >60 >60 ml/min/1.73m2    GFR est non-AA >60 >60 ml/min/1.73m2    Calcium 7.9 (L) 8.5 - 10.1 MG/DL   CBC WITH AUTOMATED DIFF   Result Value Ref Range    WBC 14.9 (H) 3.6 - 11.0 K/uL    RBC 3.64 (L) 3.80 - 5.20 M/uL    HGB 11.4 (L) 11.5 - 16.0 g/dL    HCT 35.1 35.0 - 47.0 %    MCV 96.4 80.0 - 99.0 FL    MCH 31.3 26.0 - 34.0 PG    MCHC 32.5 30.0 - 36.5 g/dL    RDW 13.8 11.5 - 14.5 %    PLATELET 050 (L) 797 - 400 K/uL    MPV 11.7 8.9 - 12.9 FL    NRBC 0.0 0  WBC    ABSOLUTE NRBC 0.00 0.00 - 0.01 K/uL    NEUTROPHILS 84 (H) 32 - 75 %    LYMPHOCYTES 8 (L) 12 - 49 %    MONOCYTES 7 5 - 13 %    EOSINOPHILS 0 0 - 7 %    BASOPHILS 0 0 - 1 %    IMMATURE GRANULOCYTES 1 (H) 0.0 - 0.5 %    ABS. NEUTROPHILS 12.5 (H) 1.8 - 8.0 K/UL    ABS. LYMPHOCYTES 1.2 0.8 - 3.5 K/UL    ABS. MONOCYTES 1.0 0.0 - 1.0 K/UL    ABS. EOSINOPHILS 0.0 0.0 - 0.4 K/UL    ABS. BASOPHILS 0.0 0.0 - 0.1 K/UL    ABS. IMM.  GRANS. 0.1 (H) 0.00 - 0.04 K/UL    DF AUTOMATED     LIPASE   Result Value Ref Range    Lipase 80 73 - 393 U/L   LIPASE   Result Value Ref Range    Lipase 70 (L) 73 - 393 U/L   CBC WITH AUTOMATED DIFF   Result Value Ref Range    WBC 10.4 3.6 - 11.0 K/uL    RBC 3.85 3.80 - 5.20 M/uL    HGB 12.1 11.5 - 16.0 g/dL    HCT 36.2 35.0 - 47.0 %    MCV 94.0 80.0 - 99.0 FL    MCH 31.4 26.0 - 34.0 PG    MCHC 33.4 30.0 - 36.5 g/dL    RDW 13.9 11.5 - 14.5 %    PLATELET 155 (L) 788 - 400 K/uL    MPV 11.8 8.9 - 12.9 FL    NEUTROPHILS 80 (H) 32 - 75 %    LYMPHOCYTES 10 (L) 12 - 49 %    MONOCYTES 8 5 - 13 %    EOSINOPHILS 2 0 - 7 %    BASOPHILS 0 0 - 1 %    ABS. NEUTROPHILS 8.4 (H) 1.8 - 8.0 K/UL    ABS. LYMPHOCYTES 1.0 0.8 - 3.5 K/UL    ABS. MONOCYTES 0.8 0.0 - 1.0 K/UL    ABS. EOSINOPHILS 0.2 0.0 - 0.4 K/UL    ABS. BASOPHILS 0.0 0.0 - 0.1 K/UL    XXWBCSUS 0     METABOLIC PANEL, BASIC   Result Value Ref Range    Sodium 141 136 - 145 mmol/L    Potassium 3.2 (L) 3.5 - 5.1 mmol/L    Chloride 105 97 - 108 mmol/L    CO2 27 21 - 32 mmol/L    Anion gap 9 5 - 15 mmol/L    Glucose 92 65 - 100 mg/dL    BUN 17 6 - 20 MG/DL    Creatinine 0.61 0.55 - 1.02 MG/DL    BUN/Creatinine ratio 28 (H) 12 - 20      GFR est AA >60 >60 ml/min/1.73m2    GFR est non-AA >60 >60 ml/min/1.73m2    Calcium 7.9 (L) 8.5 - 10.1 MG/DL   MAGNESIUM   Result Value Ref Range    Magnesium 2.1 1.6 - 2.4 mg/dL   EKG, 12 LEAD, INITIAL   Result Value Ref Range    Ventricular Rate 84 BPM    Atrial Rate 91 BPM    QRS Duration 80 ms    Q-T Interval 378 ms    QTC Calculation (Bezet) 446 ms    Calculated R Axis -10 degrees    Calculated T Axis 32 degrees    Diagnosis       Atrial fibrillation with premature ventricular or aberrantly conducted   complexes  Low voltage QRS  Abnormal ECG  No previous ECGs available  Confirmed by Jose David Leahy., MD, --- ([id number]) on 7/23/2018 5:56:36 AM         No results found for this visit on 08/20/18. Assessment/ Plan:     1. BMI 25.0-25.9,adult  Discussed the patient's BMI with her. The BMI follow up plan is as follows:     dietary management education, guidance, and counseling  encourage exercise  monitor weight  prescribed dietary intake    An After Visit Summary was printed and given to the patient. Colitis: condition improved continue medical plan. No orders of the defined types were placed in this encounter. Verbal and written instructions (see AVS) provided.  Patient expresses understanding of diagnosis and treatment plan.     Health Maintenance Due   Topic Date Due    ZOSTER VACCINE AGE 60>  01/07/1990    Influenza Age 5 to Adult  08/01/2018 Follow-up Disposition:  Return in about 4 months (around 12/20/2018). or sooner as needed.       DIGNA Burrows

## 2018-08-25 NOTE — ACP (ADVANCE CARE PLANNING)
Has advanced medical directive scanned into chart. Reviewed at this visit. No changes.   Lita HEARNC

## 2019-01-16 ENCOUNTER — OFFICE VISIT (OUTPATIENT)
Dept: CARDIOLOGY CLINIC | Age: 84
End: 2019-01-16

## 2019-01-16 VITALS
DIASTOLIC BLOOD PRESSURE: 98 MMHG | OXYGEN SATURATION: 97 % | RESPIRATION RATE: 18 BRPM | WEIGHT: 154 LBS | BODY MASS INDEX: 25.66 KG/M2 | SYSTOLIC BLOOD PRESSURE: 144 MMHG | HEART RATE: 86 BPM | HEIGHT: 65 IN

## 2019-01-16 DIAGNOSIS — M19.91 PRIMARY OSTEOARTHRITIS, UNSPECIFIED SITE: ICD-10-CM

## 2019-01-16 DIAGNOSIS — I77.9 CAROTID ARTERY DISEASE, UNSPECIFIED LATERALITY (HCC): ICD-10-CM

## 2019-01-16 DIAGNOSIS — I48.19 PERSISTENT ATRIAL FIBRILLATION (HCC): Primary | ICD-10-CM

## 2019-01-16 DIAGNOSIS — E78.00 PURE HYPERCHOLESTEROLEMIA: ICD-10-CM

## 2019-01-16 DIAGNOSIS — I10 ESSENTIAL HYPERTENSION: ICD-10-CM

## 2019-01-16 DIAGNOSIS — I49.1 PAC (PREMATURE ATRIAL CONTRACTION): ICD-10-CM

## 2019-01-16 DIAGNOSIS — I49.3 PVC'S (PREMATURE VENTRICULAR CONTRACTIONS): ICD-10-CM

## 2019-01-16 NOTE — PROGRESS NOTES
Verified patient with two patient identifiers. Medications reviewed/approved by Dr. Lisa Shafer. A verbal from Dr. Lisa Shafer was given to remove any medications that were deleted during the visit. Medication(s) removed: none    Chief Complaint   Patient presents with    Irregular Heart Beat     6 month follow up    Medication Evaluation     pt does not want to take warfarin due to travel that's required for inr checks   1. Have you been to the ER, urgent care clinic since your last visit? Hospitalized since your last visit? Yes, \[facility] [place] [facility]\"" admission for acute pancreatitis. 2. Have you seen or consulted any other health care providers outside of the [address] [place] [address] since your last visit? Include any pap smears or colon screening.  no

## 2019-01-16 NOTE — PROGRESS NOTES
Santino Inman is a 80 y.o. female is here for routine f/u. Hx persistent afib, hypertension, dyslipidemia, DJD. Prior cardiac w/u: Echo 6/22 with LVEF 65-70, mild LAE, mild AV sclerosis. Holter with persistent afib throughout, HR , frequent PVC's. Occasional palpitations. Previously on Eliquis, PCP discussed change to warfarin after 1/1 due to cost issues (followed at [facility]). The patient denies chest pain/ shortness of breath, orthopnea, PND, LE edema, palpitations, syncope, presyncope or fatigue.        Patient Active Problem List    Diagnosis Date Noted    Acute pancreatitis 07/23/2018     Priority: 1 - One    Persistent atrial fibrillation ([address] 07/22/2018    Abnormal EKG 10/13/2016    PAC (premature atrial contraction) 10/13/2016    Primary osteoarthritis 10/03/2016    HTN (hypertension)     DJD (degenerative joint disease)     Colitis     Ventricular ectopy     Hyperlipemia       Danny Figueroa NP  Past Medical History:   Diagnosis Date    Colitis 2013    C diff; no recent flare, normal colonoscopy 2014    DJD (degenerative joint disease)     Fracture, femoral ([address] 09/2005    HTN (hypertension)     Hyperlipemia     Persistent atrial fibrillation ([address] 2018    Ventricular ectopy     no symptoms      Past Surgical History:   Procedure Laterality Date    HX CATARACT REMOVAL      bilat    HX COLONOSCOPY  2014    WNL    HX ORTHOPAEDIC  2005    right femur fx    HX ORTHOPAEDIC  2012    Lt TKA    HX REFRACTIVE SURGERY  06/15/2017    rt     Allergies   Allergen Reactions    Ambien [Zolpidem] Other (comments)     Legs got heavy    Bactrim [Sulfamethoprim Ds] Unknown (comments)     Patient cant remember her reaction    Hydroxyzine Other (comments)     Legs got heavy      Family History   Problem Relation Age of Onset    Cancer Mother         Leukemia    Heart Disease Mother     Cancer Sister         Ovarian    No Known Problems Brother     Heart Failure Brother     Cancer Brother         Brain tumor      Social History     Socioeconomic History    Marital status:      Spouse name: Not on file    Number of children: Not on file    Years of education: Not on file    Highest education level: Not on file   Social Needs    Financial resource strain: Not on file    Food insecurity - worry: Not on file    Food insecurity - inability: Not on file   [employer] needs - medical: Not on file   [employer] needs - non-medical: Not on file   Occupational History    Not on file   Tobacco Use    Smoking status: Never Smoker    Smokeless tobacco: Never Used   Substance and Sexual Activity    Alcohol use: No     Alcohol/week: 0.0 oz    Drug use: Not on file    Sexual activity: Not on file   Other Topics Concern     Service No    Blood Transfusions Yes    Caffeine Concern No    Occupational Exposure No    Hobby Hazards No    Sleep Concern Yes     Comment: insomnia    Stress Concern No    Weight Concern Yes     Comment: over weight    Special Diet No    Back Care No    Exercise Yes    Bike Helmet No    Seat Belt Yes    Self-Exams Yes   Social History Narrative    Not on file      Current Outpatient Medications   Medication Sig    atenolol (TENORMIN) 50 mg tablet Take 2 Tabs by mouth daily.  LACTOBACILLUS ACIDOPHILUS (PROBIOTIC PO) Take  by mouth as needed.  PEPPERMINT OIL Take  by mouth.  ACETAMINOPHEN/DIPHENHYDRAMINE (TYLENOL PM PO) Take  by mouth as needed.  lovastatin (MEVACOR) 20 mg tablet TAKE 1 TABLET BY MOUTH  NIGHTLY    OTHER daily. Folate po     MELATONIN PO Take  by mouth as needed.  triamcinolone (ARISTOCORT) 0.5 % topical cream Apply  to affected area two (2) times a day. As needed for itching    cholecalciferol, vitamin D3, (VITAMIN D3) 2,000 unit tab Take  by mouth daily.  cyanocobalamin (VITAMIN B-12) 1,000 mcg tablet Take 1,000 mcg by mouth daily.     BOSWELLIA AVA EXTRACT (BOSWELLIA AVA XT, BULK,) Take  by mouth daily.  OTHER VEGGIES FOR LIFE AND FRUIT FOR LIFE , TAKES ONE A DAY EACH    magnesium 250 mg tab Take  by mouth daily. Pt thinks she takes 200 mg     CALCIUM CITRATE/VITAMIN D3 (CALCITRATE-VITAMIN D PO) Take  by mouth daily.  Omega-3-DHA-EPA-Fish Oil 1,000 mg (120 mg-180 mg) cap Take  by mouth daily.  ASCORBIC ACID (VITAMIN C WITH YOUSIF HIPS PO) Take  by mouth daily.  biotin 2,500 mcg tab Take  by mouth daily.  warfarin (COUMADIN) 1 mg tablet Take 1 Tab by mouth daily.  furosemide (LASIX) 20 mg tablet Take daily as needed for edema  Indications: Edema, hypertension    co-enzyme Q-10 (CO Q-10) 100 mg capsule Take 100 mg by mouth daily. No current facility-administered medications for this visit. Review of Symptoms:    CONST  No weight change. No fever, chills, sweats    ENT No visual changes, URI sx, sore throat    CV  See HPI   RESP  No cough, or sputum, wheezing. Also see HPI   GI  No abdominal pain or change in bowel habits. No heartburn or dysphagia. No melena or rectal bleeding.   No dysuria, urgency, frequency, hematuria   MSKEL  No joint pain, swelling. No muscle pain. SKIN  No rash or lesions. NEURO  No headache, syncope, or seizure. No weakness, loss of sensation, or paresthesias. PSYCH  No low mood or depression  No anxiety. HE/LYMPH  No easy bruising, abnormal bleeding, or enlarged glands.         Physical ExamPhysical Exam:    Visit Vitals  Resp 18   Ht 5' 5\" (1.651 m)   Wt 154 lb (69.9 kg)   BMI 25.63 kg/m²     Gen: NAD  HEENT:  PERRL, throat clear  Neck: no adenopathy, no thyromegaly, no JVD   Heart:  Regular,Nl S1S2,  no murmur, gallop or rub.   Lungs:  clear  Abdomen:   Soft, non-tender, bowel sounds are active.   Extremities:  No edema  Pulse: symmetric  Neuro: A&O times 3, No focal neuro deficits    Cardiographics    ECG: afib, rate controlled, no acute changes    Labs:   Lab Results   Component Value Date/Time Sodium 142 12/12/2018 09:28 AM    Sodium 141 07/26/2018 08:15 AM    Sodium 139 07/25/2018 05:30 AM    Sodium 141 07/24/2018 05:25 AM    Sodium 140 07/23/2018 05:35 AM    Potassium 4.9 12/12/2018 09:28 AM    Potassium 3.2 (L) 07/26/2018 08:15 AM    Potassium 3.8 07/25/2018 05:30 AM    Potassium 4.4 07/24/2018 05:25 AM    Potassium 3.8 07/23/2018 05:35 AM    Chloride 105 12/12/2018 09:28 AM    Chloride 105 07/26/2018 08:15 AM    Chloride 105 07/25/2018 05:30 AM    Chloride 108 07/24/2018 05:25 AM    Chloride 103 07/23/2018 05:35 AM    CO2 27 12/12/2018 09:28 AM    CO2 27 07/26/2018 08:15 AM    CO2 27 07/25/2018 05:30 AM    CO2 23 07/24/2018 05:25 AM    CO2 29 07/23/2018 05:35 AM    Anion gap 9 07/26/2018 08:15 AM    Anion gap 7 07/25/2018 05:30 AM    Anion gap 10 07/24/2018 05:25 AM    Anion gap 8 07/23/2018 05:35 AM    Anion gap 11 07/22/2018 08:00 PM    Glucose 113 (H) 12/12/2018 09:28 AM    Glucose 92 07/26/2018 08:15 AM    Glucose 94 07/25/2018 05:30 AM    Glucose 100 07/24/2018 05:25 AM    Glucose 128 (H) 07/23/2018 05:35 AM    BUN 14 12/12/2018 09:28 AM    BUN 17 07/26/2018 08:15 AM    BUN 17 07/25/2018 05:30 AM    BUN 18 07/24/2018 05:25 AM    BUN 19 07/23/2018 05:35 AM    Creatinine 0.82 12/12/2018 09:28 AM    Creatinine 0.61 07/26/2018 08:15 AM    Creatinine 0.68 07/25/2018 05:30 AM    Creatinine 0.70 07/24/2018 05:25 AM    Creatinine 0.86 07/23/2018 05:35 AM    BUN/Creatinine ratio 17 12/12/2018 09:28 AM    BUN/Creatinine ratio 28 (H) 07/26/2018 08:15 AM    BUN/Creatinine ratio 25 (H) 07/25/2018 05:30 AM    BUN/Creatinine ratio 26 (H) 07/24/2018 05:25 AM    BUN/Creatinine ratio 22 (H) 07/23/2018 05:35 AM    GFR est AA 74 12/12/2018 09:28 AM    GFR est AA >60 07/26/2018 08:15 AM    GFR est AA >60 07/25/2018 05:30 AM    GFR est AA >60 07/24/2018 05:25 AM    GFR est AA >60 07/23/2018 05:35 AM    GFR est non-AA 64 12/12/2018 09:28 AM    GFR est non-AA >60 07/26/2018 08:15 AM    GFR est non-AA >60 07/25/2018 05:30 AM    GFR est non-AA >60 07/24/2018 05:25 AM    GFR est non-AA >60 07/23/2018 05:35 AM    Calcium 9.2 12/12/2018 09:28 AM    Calcium 7.9 (L) 07/26/2018 08:15 AM    Calcium 7.9 (L) 07/25/2018 05:30 AM    Calcium 7.9 (L) 07/24/2018 05:25 AM    Calcium 8.1 (L) 07/23/2018 05:35 AM    Bilirubin, total 0.5 12/12/2018 09:28 AM    Bilirubin, total 0.9 07/22/2018 08:00 PM    Bilirubin, total 0.9 06/13/2018 10:26 AM    Bilirubin, total 0.8 12/19/2017 09:09 AM    Bilirubin, total 0.6 06/20/2017 09:29 AM    AST (SGOT) 28 12/12/2018 09:28 AM    AST (SGOT) 132 (H) 07/22/2018 08:00 PM    AST (SGOT) 33 06/13/2018 10:26 AM    AST (SGOT) 28 12/19/2017 09:09 AM    AST (SGOT) 21 06/20/2017 09:29 AM    Alk. phosphatase 84 12/12/2018 09:28 AM    Alk. phosphatase 93 07/22/2018 08:00 PM    Alk. phosphatase 86 06/13/2018 10:26 AM    Alk. phosphatase 96 12/19/2017 09:09 AM    Alk.  phosphatase 101 06/20/2017 09:29 AM    Protein, total 7.6 12/12/2018 09:28 AM    Protein, total 7.9 07/22/2018 08:00 PM    Protein, total 7.2 06/13/2018 10:26 AM    Protein, total 7.2 12/19/2017 09:09 AM    Protein, total 7.0 06/20/2017 09:29 AM    Albumin 4.3 12/12/2018 09:28 AM    Albumin 3.8 07/22/2018 08:00 PM    Albumin 4.3 06/13/2018 10:26 AM    Albumin 4.4 12/19/2017 09:09 AM    Albumin 4.3 06/20/2017 09:29 AM    Globulin 4.1 (H) 07/22/2018 08:00 PM    A-G Ratio 1.3 12/12/2018 09:28 AM    A-G Ratio 0.9 (L) 07/22/2018 08:00 PM    A-G Ratio 1.5 06/13/2018 10:26 AM    A-G Ratio 1.6 12/19/2017 09:09 AM    A-G Ratio 1.6 06/20/2017 09:29 AM    ALT (SGPT) 19 12/12/2018 09:28 AM    ALT (SGPT) 86 (H) 07/22/2018 08:00 PM    ALT (SGPT) 25 06/13/2018 10:26 AM    ALT (SGPT) 25 12/19/2017 09:09 AM    ALT (SGPT) 20 06/20/2017 09:29 AM     No results found for: CPK, CPKX, CPX  Lab Results   Component Value Date/Time    Cholesterol, total 164 12/12/2018 09:28 AM    Cholesterol, total 154 07/23/2018 05:35 AM    Cholesterol, total 159 06/13/2018 10:26 AM    Cholesterol, total 163 12/19/2017 09:09 AM    Cholesterol, total 154 06/20/2017 09:29 AM    HDL Cholesterol 49 12/12/2018 09:28 AM    HDL Cholesterol 55 07/23/2018 05:35 AM    HDL Cholesterol 48 06/13/2018 10:26 AM    HDL Cholesterol 51 12/19/2017 09:09 AM    HDL Cholesterol 47 06/20/2017 09:29 AM    LDL, calculated 95 12/12/2018 09:28 AM    LDL, calculated 89.2 07/23/2018 05:35 AM    LDL, calculated 92 06/13/2018 10:26 AM    LDL, calculated 95 12/19/2017 09:09 AM    LDL, calculated 87 06/20/2017 09:29 AM    Triglyceride 102 12/12/2018 09:28 AM    Triglyceride 49 07/23/2018 05:35 AM    Triglyceride 95 06/13/2018 10:26 AM    Triglyceride 85 12/19/2017 09:09 AM    Triglyceride 98 06/20/2017 09:29 AM    CHOL/HDL Ratio 2.8 07/23/2018 05:35 AM     No results found for this or any previous visit. Assessment:         Patient Active Problem List    Diagnosis Date Noted    Acute pancreatitis 07/23/2018     Priority: 1 - One    Persistent atrial fibrillation ([facility] [address] 07/22/2018    Abnormal EKG 10/13/2016    PAC (premature atrial contraction) 10/13/2016    Primary osteoarthritis 10/03/2016    HTN (hypertension)     DJD (degenerative joint disease)     Colitis     Ventricular ectopy     Hyperlipemia       Hx persistent afib, hypertension, dyslipidemia, DJD. Prior cardiac w/u: Echo 6/22 with LVEF 65-70, mild LAE, mild AV sclerosis. Holter with persistent afib throughout, HR , frequent PVC's. Occasional palpitations. Previously on Eliquis, PCP discussed change to warfarin after 1/1 due to cost issues (followed at [facility]). Plan:     Doing well with no adverse cardiac symptoms. Will try Xarelto 15mg every day--samples and rx--if does ok with this and cost acceptable will continue   Lipids and labs followed by PCP. Continue current care and f/u in 6 months.     Stephan Luz MD

## 2019-01-21 ENCOUNTER — TELEPHONE (OUTPATIENT)
Dept: CARDIOLOGY CLINIC | Age: 84
End: 2019-01-21

## 2019-01-21 NOTE — TELEPHONE ENCOUNTER
Spoke with the patient. Verified patient with two patient identifiers. Pt called in to report, \"I will not take xarelto due to the cost.  I was able to get the free 30 days but I was informed that the cost would be $539 w/o the savings card and I cannot afford that. \"     I tried to inform the pt that the $539 is most likely without insurance because she got the free trial.  Every time I tried to explain the pricing to the pt better, she would cut me off and not let me finish. Pt has made up her mind that she will NOT take xarelto OR eliquis. Pt rather take the warfarin. Pt has already started the warfarin monitoring with Cesar José NP. Dr. Delia Giron will be informed once he returns to the office. Patient verbalized understanding.

## 2019-08-02 ENCOUNTER — OFFICE VISIT (OUTPATIENT)
Dept: CARDIOLOGY CLINIC | Age: 84
End: 2019-08-02

## 2019-08-02 VITALS
HEIGHT: 65 IN | DIASTOLIC BLOOD PRESSURE: 86 MMHG | WEIGHT: 156 LBS | SYSTOLIC BLOOD PRESSURE: 130 MMHG | BODY MASS INDEX: 25.99 KG/M2 | RESPIRATION RATE: 16 BRPM | OXYGEN SATURATION: 97 % | HEART RATE: 84 BPM

## 2019-08-02 DIAGNOSIS — I48.19 PERSISTENT ATRIAL FIBRILLATION (HCC): Primary | ICD-10-CM

## 2019-08-02 DIAGNOSIS — E78.2 MIXED HYPERLIPIDEMIA: ICD-10-CM

## 2019-08-02 DIAGNOSIS — I49.3 PVC'S (PREMATURE VENTRICULAR CONTRACTIONS): ICD-10-CM

## 2019-08-02 DIAGNOSIS — I10 ESSENTIAL HYPERTENSION: ICD-10-CM

## 2019-08-02 DIAGNOSIS — M15.9 PRIMARY OSTEOARTHRITIS INVOLVING MULTIPLE JOINTS: ICD-10-CM

## 2019-08-02 DIAGNOSIS — I77.9 CAROTID ARTERY DISEASE, UNSPECIFIED LATERALITY (HCC): ICD-10-CM

## 2019-08-02 NOTE — PROGRESS NOTES
Amy Inman is a 80 y.o. female is here for routine f/u. Hx persistent afib, hypertension, dyslipidemia, DJD. Prior cardiac w/u: Echo 6/22 with LVEF 65-70, mild LAE, mild AV sclerosis.  Holter with persistent afib throughout, HR , frequent PVC's.  Occasional palpitations. On anticoagulation--warfarin. Arthritic pains. The patient denies chest pain/ shortness of breath, orthopnea, PND, LE edema, palpitations, syncope, presyncope or fatigue.        Patient Active Problem List    Diagnosis Date Noted    Acute pancreatitis 07/23/2018     Priority: 1 - One    Persistent atrial fibrillation ([address] 07/22/2018    Abnormal EKG 10/13/2016    PAC (premature atrial contraction) 10/13/2016    Primary osteoarthritis 10/03/2016    HTN (hypertension)     DJD (degenerative joint disease)     Colitis     Ventricular ectopy     Hyperlipemia       Laura Deep, NP  Past Medical History:   Diagnosis Date    Colitis 2013    C diff; no recent flare, normal colonoscopy 2014    DJD (degenerative joint disease)     Fracture, femoral ([address] 09/2005    HTN (hypertension)     Hyperlipemia     Persistent atrial fibrillation ([address] 2018    Ventricular ectopy     no symptoms      Past Surgical History:   Procedure Laterality Date    HX CATARACT REMOVAL      bilat    HX COLONOSCOPY  2014    WNL    HX ORTHOPAEDIC  2005    right femur fx    HX ORTHOPAEDIC  2012    Lt TKA    HX REFRACTIVE SURGERY  06/15/2017    rt     Allergies   Allergen Reactions    Ambien [Zolpidem] Other (comments)     Legs got heavy    Bactrim [Sulfamethoprim Ds] Unknown (comments)     Patient cant remember her reaction    Hydroxyzine Other (comments)     Legs got heavy      Family History   Problem Relation Age of Onset    Cancer Mother         Leukemia    Heart Disease Mother     Cancer Sister         Ovarian    No Known Problems Brother     Heart Failure Brother     Cancer Brother         Brain tumor      Social History Socioeconomic History    Marital status:      Spouse name: Not on file    Number of children: Not on file    Years of education: Not on file    Highest education level: Not on file   Occupational History    Not on file   Social Needs    Financial resource strain: Not on file    Food insecurity:     Worry: Not on file     Inability: Not on file    Transportation needs:     Medical: Not on file     Non-medical: Not on file   Tobacco Use    Smoking status: Never Smoker    Smokeless tobacco: Never Used   Substance and Sexual Activity    Alcohol use: No     Alcohol/week: 0.0 standard drinks    Drug use: Not on file    Sexual activity: Not on file   Lifestyle    Physical activity:     Days per week: Not on file     Minutes per session: Not on file    Stress: Not on file   Relationships    Social connections:     Talks on phone: Not on file     Gets together: Not on file     Attends Nondenominational service: Not on file     Active member of club or organization: Not on file     Attends meetings of clubs or organizations: Not on file     Relationship status: Not on file    Intimate partner violence:     Fear of current or ex partner: Not on file     Emotionally abused: Not on file     Physically abused: Not on file     Forced sexual activity: Not on file   Other Topics Concern     Service No    Blood Transfusions Yes    Caffeine Concern No    Occupational Exposure No    Hobby Hazards No    Sleep Concern Yes     Comment: insomnia    Stress Concern No    Weight Concern Yes     Comment: over weight    Special Diet No    Back Care No    Exercise Yes    Bike Helmet No    Seat Belt Yes    Self-Exams Yes   Social History Narrative    Not on file      Current Outpatient Medications   Medication Sig    OTHER high active tart cherry    warfarin (COUMADIN) 5 mg tablet Take 1 Tab by mouth daily.  Indications: Treatment to Prevent Blood Clots in Chronic Atrial Fibrillation    acetaminophen (TYLENOL EXTRA STRENGTH) 500 mg tablet Take  by mouth every six (6) hours as needed for Pain.  lovastatin (MEVACOR) 20 mg tablet TAKE 1 TABLET BY MOUTH  NIGHTLY    triamcinolone (ARISTOCORT) 0.5 % topical cream Apply  to affected area two (2) times a day. As needed for itching    atenolol (TENORMIN) 50 mg tablet Take 2 Tabs by mouth daily.  LACTOBACILLUS ACIDOPHILUS (PROBIOTIC PO) Take  by mouth as needed.  ACETAMINOPHEN/DIPHENHYDRAMINE (TYLENOL PM PO) Take  by mouth as needed.  OTHER daily. Folate po     MELATONIN PO Take  by mouth as needed.  cholecalciferol, vitamin D3, (VITAMIN D3) 2,000 unit tab Take  by mouth daily.  cyanocobalamin (VITAMIN B-12) 1,000 mcg tablet Take 1,000 mcg by mouth daily.  BOSWELLIA AVA EXTRACT (BOSWELLIA AVA XT, BULK,) Take  by mouth daily.  OTHER VEGGIES FOR LIFE AND FRUIT FOR LIFE , TAKES ONE A DAY EACH    magnesium 250 mg tab Take  by mouth daily. Pt thinks she takes 200 mg     CALCIUM CITRATE/VITAMIN D3 (CALCITRATE-VITAMIN D PO) Take  by mouth daily.  ASCORBIC ACID (VITAMIN C WITH YOUSIF HIPS PO) Take  by mouth daily.  biotin 2,500 mcg tab Take  by mouth daily. No current facility-administered medications for this visit. Review of Symptoms:    CONST  No weight change. No fever, chills, sweats    ENT No visual changes, URI sx, sore throat    CV  See HPI   RESP  No cough, or sputum, wheezing. Also see HPI   GI  No abdominal pain or change in bowel habits. No heartburn or dysphagia. No melena or rectal bleeding.   No dysuria, urgency, frequency, hematuria   MSKEL  No joint pain, swelling. No muscle pain. SKIN  No rash or lesions. NEURO  No headache, syncope, or seizure. No weakness, loss of sensation, or paresthesias. PSYCH  No low mood or depression  No anxiety. HE/LYMPH  No easy bruising, abnormal bleeding, or enlarged glands.         Physical ExamPhysical Exam:    Visit Vitals  /86 (BP 1 Location: Right arm, BP Patient Position: Sitting)   Pulse 84   Resp 16   Ht 5' 5\" (1.651 m)   Wt 156 lb (70.8 kg)   SpO2 97% Comment: ra   BMI 25.96 kg/m²     Gen: NAD  HEENT:  PERRL, throat clear  Neck: no adenopathy, no thyromegaly, no JVD   Heart:  irregular,Nl S1S2,  I/VI murmur, no gallop or rub.   Lungs:  clear  Abdomen:   Soft, non-tender, bowel sounds are active.   Extremities:  No edema  Pulse: symmetric  Neuro: A&O times 3, No focal neuro deficits    Cardiographics    ECG: afib, IVCD/rate related bundle, NSST    Labs:   Lab Results   Component Value Date/Time    Sodium 143 07/08/2019 12:39 PM    Sodium 141 06/03/2019 10:58 AM    Sodium 142 12/12/2018 09:28 AM    Sodium 141 07/26/2018 08:15 AM    Sodium 139 07/25/2018 05:30 AM    Potassium 4.5 07/08/2019 12:39 PM    Potassium 4.6 06/03/2019 10:58 AM    Potassium 4.9 12/12/2018 09:28 AM    Potassium 3.2 (L) 07/26/2018 08:15 AM    Potassium 3.8 07/25/2018 05:30 AM    Chloride 106 07/08/2019 12:39 PM    Chloride 102 06/03/2019 10:58 AM    Chloride 105 12/12/2018 09:28 AM    Chloride 105 07/26/2018 08:15 AM    Chloride 105 07/25/2018 05:30 AM    CO2 30 07/08/2019 12:39 PM    CO2 25 06/03/2019 10:58 AM    CO2 27 12/12/2018 09:28 AM    CO2 27 07/26/2018 08:15 AM    CO2 27 07/25/2018 05:30 AM    Anion gap 7 07/08/2019 12:39 PM    Anion gap 9 07/26/2018 08:15 AM    Anion gap 7 07/25/2018 05:30 AM    Anion gap 10 07/24/2018 05:25 AM    Anion gap 8 07/23/2018 05:35 AM    Glucose 105 (H) 07/08/2019 12:39 PM    Glucose 91 06/03/2019 10:58 AM    Glucose 113 (H) 12/12/2018 09:28 AM    Glucose 92 07/26/2018 08:15 AM    Glucose 94 07/25/2018 05:30 AM    BUN 20 07/08/2019 12:39 PM    BUN 17 06/03/2019 10:58 AM    BUN 14 12/12/2018 09:28 AM    BUN 17 07/26/2018 08:15 AM    BUN 17 07/25/2018 05:30 AM    Creatinine 0.99 07/08/2019 12:39 PM    Creatinine 0.82 06/03/2019 10:58 AM    Creatinine 0.82 12/12/2018 09:28 AM    Creatinine 0.61 07/26/2018 08:15 AM    Creatinine 0.68 07/25/2018 05:30 AM    BUN/Creatinine ratio 20 07/08/2019 12:39 PM    BUN/Creatinine ratio 21 06/03/2019 10:58 AM    BUN/Creatinine ratio 17 12/12/2018 09:28 AM    BUN/Creatinine ratio 28 (H) 07/26/2018 08:15 AM    BUN/Creatinine ratio 25 (H) 07/25/2018 05:30 AM    GFR est AA >60 07/08/2019 12:39 PM    GFR est AA 73 06/03/2019 10:58 AM    GFR est AA 74 12/12/2018 09:28 AM    GFR est AA >60 07/26/2018 08:15 AM    GFR est AA >60 07/25/2018 05:30 AM    GFR est non-AA 53 (L) 07/08/2019 12:39 PM    GFR est non-AA 64 06/03/2019 10:58 AM    GFR est non-AA 64 12/12/2018 09:28 AM    GFR est non-AA >60 07/26/2018 08:15 AM    GFR est non-AA >60 07/25/2018 05:30 AM    Calcium 9.2 07/08/2019 12:39 PM    Calcium 9.3 06/03/2019 10:58 AM    Calcium 9.2 12/12/2018 09:28 AM    Calcium 7.9 (L) 07/26/2018 08:15 AM    Calcium 7.9 (L) 07/25/2018 05:30 AM    Bilirubin, total 0.9 06/03/2019 10:58 AM    Bilirubin, total 0.5 12/12/2018 09:28 AM    Bilirubin, total 0.9 07/22/2018 08:00 PM    Bilirubin, total 0.9 06/13/2018 10:26 AM    Bilirubin, total 0.8 12/19/2017 09:09 AM    AST (SGOT) 27 06/03/2019 10:58 AM    AST (SGOT) 28 12/12/2018 09:28 AM    AST (SGOT) 132 (H) 07/22/2018 08:00 PM    AST (SGOT) 33 06/13/2018 10:26 AM    AST (SGOT) 28 12/19/2017 09:09 AM    Alk. phosphatase 84 06/03/2019 10:58 AM    Alk. phosphatase 84 12/12/2018 09:28 AM    Alk. phosphatase 93 07/22/2018 08:00 PM    Alk. phosphatase 86 06/13/2018 10:26 AM    Alk.  phosphatase 96 12/19/2017 09:09 AM    Protein, total 7.2 06/03/2019 10:58 AM    Protein, total 7.6 12/12/2018 09:28 AM    Protein, total 7.9 07/22/2018 08:00 PM    Protein, total 7.2 06/13/2018 10:26 AM    Protein, total 7.2 12/19/2017 09:09 AM    Albumin 4.4 06/03/2019 10:58 AM    Albumin 4.3 12/12/2018 09:28 AM    Albumin 3.8 07/22/2018 08:00 PM    Albumin 4.3 06/13/2018 10:26 AM    Albumin 4.4 12/19/2017 09:09 AM    Globulin 4.1 (H) 07/22/2018 08:00 PM    A-G Ratio 1.6 06/03/2019 10:58 AM    A-G Ratio 1.3 12/12/2018 09:28 AM    A-G Ratio 0.9 (L) 07/22/2018 08:00 PM    A-G Ratio 1.5 06/13/2018 10:26 AM    A-G Ratio 1.6 12/19/2017 09:09 AM    ALT (SGPT) 20 06/03/2019 10:58 AM    ALT (SGPT) 19 12/12/2018 09:28 AM    ALT (SGPT) 86 (H) 07/22/2018 08:00 PM    ALT (SGPT) 25 06/13/2018 10:26 AM    ALT (SGPT) 25 12/19/2017 09:09 AM     No results found for: CPK, CPKX, CPX  Lab Results   Component Value Date/Time    Cholesterol, total 164 12/12/2018 09:28 AM    Cholesterol, total 154 07/23/2018 05:35 AM    Cholesterol, total 159 06/13/2018 10:26 AM    Cholesterol, total 163 12/19/2017 09:09 AM    Cholesterol, total 154 06/20/2017 09:29 AM    HDL Cholesterol 49 12/12/2018 09:28 AM    HDL Cholesterol 55 07/23/2018 05:35 AM    HDL Cholesterol 48 06/13/2018 10:26 AM    HDL Cholesterol 51 12/19/2017 09:09 AM    HDL Cholesterol 47 06/20/2017 09:29 AM    LDL, calculated 95 12/12/2018 09:28 AM    LDL, calculated 89.2 07/23/2018 05:35 AM    LDL, calculated 92 06/13/2018 10:26 AM    LDL, calculated 95 12/19/2017 09:09 AM    LDL, calculated 87 06/20/2017 09:29 AM    Triglyceride 102 12/12/2018 09:28 AM    Triglyceride 49 07/23/2018 05:35 AM    Triglyceride 95 06/13/2018 10:26 AM    Triglyceride 85 12/19/2017 09:09 AM    Triglyceride 98 06/20/2017 09:29 AM    CHOL/HDL Ratio 2.8 07/23/2018 05:35 AM     No results found for this or any previous visit. Assessment:         Patient Active Problem List    Diagnosis Date Noted    Acute pancreatitis 07/23/2018     Priority: 1 - One    Persistent atrial fibrillation ([facility] 07/22/2018    Abnormal EKG 10/13/2016    PAC (premature atrial contraction) 10/13/2016    Primary osteoarthritis 10/03/2016    HTN (hypertension)     DJD (degenerative joint disease)     Colitis     Ventricular ectopy     Hyperlipemia       Hx persistent afib, hypertension, dyslipidemia, DJD.   Prior cardiac w/u: Echo 6/22 with LVEF 65-70, mild LAE, mild AV sclerosis.  Holter with persistent afib throughout, HR , frequent PVC's.  Occasional palpitations. On anticoagulation--warfarin. Plan:     Doing well with no adverse cardiac symptoms. Continue warfarin  Can take arthritis strength tylenol  Will discuss voltaren gel topical with PCP  Continue other meds   Lipids and labs followed by PCP. Continue current care and f/u in 6 months.     Priscila Hampton MD

## 2019-08-02 NOTE — PROGRESS NOTES
Verified patient with two patient identifiers. Medications reviewed/approved by Dr. Harleen Salgado. A verbal from Dr. Harleen Salgado was given to remove any medications that were deleted during the visit. Medication(s) removed:  furosemide (LASIX) 20 mg tablet       Chief Complaint   Patient presents with    Irregular Heart Beat     6 month follow up    Hypertension     1. Have you been to the ER, urgent care clinic since your last visit? Hospitalized since your last visit? yes, back pain 7/2019 [facility] er    2. Have you seen or consulted any other health care providers outside of the [address] [place] [address] since your last visit? Include any pap smears or colon screening.  no

## 2020-02-19 ENCOUNTER — OFFICE VISIT (OUTPATIENT)
Dept: CARDIOLOGY CLINIC | Age: 85
End: 2020-02-19

## 2020-02-19 VITALS
SYSTOLIC BLOOD PRESSURE: 144 MMHG | HEART RATE: 83 BPM | WEIGHT: 158 LBS | DIASTOLIC BLOOD PRESSURE: 80 MMHG | OXYGEN SATURATION: 96 % | RESPIRATION RATE: 14 BRPM | HEIGHT: 65 IN | BODY MASS INDEX: 26.33 KG/M2

## 2020-02-19 DIAGNOSIS — I10 ESSENTIAL HYPERTENSION: Chronic | ICD-10-CM

## 2020-02-19 DIAGNOSIS — E78.2 MIXED HYPERLIPIDEMIA: Chronic | ICD-10-CM

## 2020-02-19 DIAGNOSIS — I48.19 PERSISTENT ATRIAL FIBRILLATION (HCC): Primary | Chronic | ICD-10-CM

## 2020-02-19 DIAGNOSIS — M19.91 PRIMARY OSTEOARTHRITIS, UNSPECIFIED SITE: ICD-10-CM

## 2020-02-19 NOTE — PROGRESS NOTES
Wilner Inman is a 80 y.o. female is here for routine f/u. No specific CV sx or complaints. On warfarin due to cost issues with Eliquis/Xarelto--was wondering if ASA alternative (discussed). The patient denies chest pain/ shortness of breath, orthopnea, PND, LE edema, palpitations, syncope, presyncope or fatigue.        Patient Active Problem List    Diagnosis Date Noted    Acute pancreatitis 07/23/2018     Priority: 1 - One    Persistent atrial fibrillation 07/22/2018    Abnormal EKG 10/13/2016    PAC (premature atrial contraction) 10/13/2016    Primary osteoarthritis 10/03/2016    HTN (hypertension)     DJD (degenerative joint disease)     Colitis     Ventricular ectopy     Hyperlipemia       Eliel Mtz NP  Past Medical History:   Diagnosis Date    Colitis 2013    C diff; no recent flare, normal colonoscopy 2014    DJD (degenerative joint disease)     Fracture, femoral ([facility] [address] 09/2005    HTN (hypertension)     Hyperlipemia     Persistent atrial fibrillation 2018    Ventricular ectopy     no symptoms      Past Surgical History:   Procedure Laterality Date    HX CATARACT REMOVAL      bilat    HX COLONOSCOPY  2014    WNL    HX ORTHOPAEDIC  2005    right femur fx    HX ORTHOPAEDIC  2012    Lt TKA    HX REFRACTIVE SURGERY  06/15/2017    rt     Allergies   Allergen Reactions    Ambien [Zolpidem] Other (comments)     Legs got heavy    Aminobenzoic Acid Unknown (comments)    Bactrim [Sulfamethoprim Ds] Unknown (comments)     Patient cant remember her reaction    Hydroxyzine Other (comments)     Legs got heavy    Sulfamethoxazole-Trimethoprim Unknown (comments)     Other reaction(s): vomiting and abd pain      Family History   Problem Relation Age of Onset    Cancer Mother         Leukemia    Heart Disease Mother     Cancer Sister         Ovarian    No Known Problems Brother     Heart Failure Brother     Cancer Brother         Brain tumor      Social History Socioeconomic History    Marital status:      Spouse name: Not on file    Number of children: Not on file    Years of education: Not on file    Highest education level: Not on file   Occupational History    Not on file   Social Needs    Financial resource strain: Not on file    Food insecurity:     Worry: Not on file     Inability: Not on file    Transportation needs:     Medical: Not on file     Non-medical: Not on file   Tobacco Use    Smoking status: Never Smoker    Smokeless tobacco: Never Used   Substance and Sexual Activity    Alcohol use: No     Alcohol/week: 0.0 standard drinks    Drug use: Not on file    Sexual activity: Not on file   Lifestyle    Physical activity:     Days per week: Not on file     Minutes per session: Not on file    Stress: Not on file   Relationships    Social connections:     Talks on phone: Not on file     Gets together: Not on file     Attends Faith service: Not on file     Active member of club or organization: Not on file     Attends meetings of clubs or organizations: Not on file     Relationship status: Not on file    Intimate partner violence:     Fear of current or ex partner: Not on file     Emotionally abused: Not on file     Physically abused: Not on file     Forced sexual activity: Not on file   Other Topics Concern     Service No    Blood Transfusions Yes    Caffeine Concern No    Occupational Exposure No    Hobby Hazards No    Sleep Concern Yes     Comment: insomnia    Stress Concern No    Weight Concern Yes     Comment: over weight    Special Diet No    Back Care No    Exercise Yes    Bike Helmet No    Seat Belt Yes    Self-Exams Yes   Social History Narrative    Not on file      Current Outpatient Medications   Medication Sig    warfarin (COUMADIN) 1 mg tablet Take 1 Tab by mouth daily.  atenolol (TENORMIN) 50 mg tablet Take 2 Tabs by mouth daily.     C/sourcherry/celery/grape seed (TART CHERRY PO) Take  by mouth.    warfarin (COUMADIN) 5 mg tablet Take 1 Tab by mouth daily. Indications: Treatment to Prevent Blood Clots in Chronic Atrial Fibrillation    OTHER high active tart cherry    acetaminophen (TYLENOL EXTRA STRENGTH) 500 mg tablet Take  by mouth every six (6) hours as needed for Pain.  lovastatin (MEVACOR) 20 mg tablet TAKE 1 TABLET BY MOUTH  NIGHTLY    triamcinolone (ARISTOCORT) 0.5 % topical cream Apply  to affected area two (2) times a day. As needed for itching    ACETAMINOPHEN/DIPHENHYDRAMINE (TYLENOL PM PO) Take  by mouth as needed.  OTHER daily. Folate po     MELATONIN PO Take  by mouth as needed.  cholecalciferol, vitamin D3, (VITAMIN D3) 2,000 unit tab Take  by mouth daily.  BOSWELLIA AVA EXTRACT (BOSWELLIA AVA XT, BULK,) Take  by mouth daily.  OTHER VEGGIES FOR LIFE AND FRUIT FOR LIFE , TAKES ONE A DAY EACH    magnesium 250 mg tab Take  by mouth daily. Pt thinks she takes 200 mg     CALCIUM CITRATE/VITAMIN D3 (CALCITRATE-VITAMIN D PO) Take  by mouth daily.  ASCORBIC ACID (VITAMIN C WITH YOUSIF HIPS PO) Take  by mouth daily.  biotin 2,500 mcg tab Take  by mouth daily.  LACTOBACILLUS ACIDOPHILUS (PROBIOTIC PO) Take  by mouth as needed. Indications: not a current medication    cyanocobalamin (VITAMIN B-12) 1,000 mcg tablet Take 1,000 mcg by mouth daily. Indications: not a current medication     No current facility-administered medications for this visit. Review of Symptoms:    CONST  No weight change. No fever, chills, sweats    ENT No visual changes, URI sx, sore throat    CV  See HPI   RESP  No cough, or sputum, wheezing. Also see HPI   GI  No abdominal pain or change in bowel habits. No heartburn or dysphagia. No melena or rectal bleeding.   No dysuria, urgency, frequency, hematuria   MSKEL  No joint pain, swelling. No muscle pain. SKIN  No rash or lesions. NEURO  No headache, syncope, or seizure.    No weakness, loss of sensation, or paresthesias. PSYCH  No low mood or depression  No anxiety. HE/LYMPH  No easy bruising, abnormal bleeding, or enlarged glands.         Physical ExamPhysical Exam:    Visit Vitals  /80 (BP 1 Location: Left arm, BP Patient Position: Sitting)   Pulse 83   Resp 14   Ht 5' 5\" (1.651 m)   Wt 158 lb (71.7 kg)   SpO2 96%   BMI 26.29 kg/m²     Gen: NAD  HEENT:  PERRL, throat clear  Neck: no adenopathy, no thyromegaly, no JVD   Heart:  irregular,Nl S1S2,  I/VI murmur, no gallop or rub.   Lungs:  clear  Abdomen:   Soft, non-tender, bowel sounds are active.   Extremities:  No edema  Pulse: symmetric  Neuro: A&O times 3, No focal neuro deficits    Cardiographics    ECG: afib HR 84    Labs:   Lab Results   Component Value Date/Time    Sodium 143 07/08/2019 12:39 PM    Sodium 141 06/03/2019 10:58 AM    Sodium 142 12/12/2018 09:28 AM    Sodium 141 07/26/2018 08:15 AM    Sodium 139 07/25/2018 05:30 AM    Potassium 4.5 07/08/2019 12:39 PM    Potassium 4.6 06/03/2019 10:58 AM    Potassium 4.9 12/12/2018 09:28 AM    Potassium 3.2 (L) 07/26/2018 08:15 AM    Potassium 3.8 07/25/2018 05:30 AM    Chloride 106 07/08/2019 12:39 PM    Chloride 102 06/03/2019 10:58 AM    Chloride 105 12/12/2018 09:28 AM    Chloride 105 07/26/2018 08:15 AM    Chloride 105 07/25/2018 05:30 AM    CO2 30 07/08/2019 12:39 PM    CO2 25 06/03/2019 10:58 AM    CO2 27 12/12/2018 09:28 AM    CO2 27 07/26/2018 08:15 AM    CO2 27 07/25/2018 05:30 AM    Anion gap 7 07/08/2019 12:39 PM    Anion gap 9 07/26/2018 08:15 AM    Anion gap 7 07/25/2018 05:30 AM    Anion gap 10 07/24/2018 05:25 AM    Anion gap 8 07/23/2018 05:35 AM    Glucose 105 (H) 07/08/2019 12:39 PM    Glucose 91 06/03/2019 10:58 AM    Glucose 113 (H) 12/12/2018 09:28 AM    Glucose 92 07/26/2018 08:15 AM    Glucose 94 07/25/2018 05:30 AM    BUN 20 07/08/2019 12:39 PM    BUN 17 06/03/2019 10:58 AM    BUN 14 12/12/2018 09:28 AM    BUN 17 07/26/2018 08:15 AM    BUN 17 07/25/2018 05:30 AM    Creatinine 0.99 07/08/2019 12:39 PM    Creatinine 0.82 06/03/2019 10:58 AM    Creatinine 0.82 12/12/2018 09:28 AM    Creatinine 0.61 07/26/2018 08:15 AM    Creatinine 0.68 07/25/2018 05:30 AM    BUN/Creatinine ratio 20 07/08/2019 12:39 PM    BUN/Creatinine ratio 21 06/03/2019 10:58 AM    BUN/Creatinine ratio 17 12/12/2018 09:28 AM    BUN/Creatinine ratio 28 (H) 07/26/2018 08:15 AM    BUN/Creatinine ratio 25 (H) 07/25/2018 05:30 AM    GFR est AA >60 07/08/2019 12:39 PM    GFR est AA 73 06/03/2019 10:58 AM    GFR est AA 74 12/12/2018 09:28 AM    GFR est AA >60 07/26/2018 08:15 AM    GFR est AA >60 07/25/2018 05:30 AM    GFR est non-AA 53 (L) 07/08/2019 12:39 PM    GFR est non-AA 64 06/03/2019 10:58 AM    GFR est non-AA 64 12/12/2018 09:28 AM    GFR est non-AA >60 07/26/2018 08:15 AM    GFR est non-AA >60 07/25/2018 05:30 AM    Calcium 9.2 07/08/2019 12:39 PM    Calcium 9.3 06/03/2019 10:58 AM    Calcium 9.2 12/12/2018 09:28 AM    Calcium 7.9 (L) 07/26/2018 08:15 AM    Calcium 7.9 (L) 07/25/2018 05:30 AM    Bilirubin, total 0.9 06/03/2019 10:58 AM    Bilirubin, total 0.5 12/12/2018 09:28 AM    Bilirubin, total 0.9 07/22/2018 08:00 PM    Bilirubin, total 0.9 06/13/2018 10:26 AM    Bilirubin, total 0.8 12/19/2017 09:09 AM    AST (SGOT) 27 06/03/2019 10:58 AM    AST (SGOT) 28 12/12/2018 09:28 AM    AST (SGOT) 132 (H) 07/22/2018 08:00 PM    AST (SGOT) 33 06/13/2018 10:26 AM    AST (SGOT) 28 12/19/2017 09:09 AM    Alk. phosphatase 84 06/03/2019 10:58 AM    Alk. phosphatase 84 12/12/2018 09:28 AM    Alk. phosphatase 93 07/22/2018 08:00 PM    Alk. phosphatase 86 06/13/2018 10:26 AM    Alk.  phosphatase 96 12/19/2017 09:09 AM    Protein, total 7.2 06/03/2019 10:58 AM    Protein, total 7.6 12/12/2018 09:28 AM    Protein, total 7.9 07/22/2018 08:00 PM    Protein, total 7.2 06/13/2018 10:26 AM    Protein, total 7.2 12/19/2017 09:09 AM    Albumin 4.4 06/03/2019 10:58 AM    Albumin 4.3 12/12/2018 09:28 AM    Albumin 3.8 07/22/2018 08:00 PM Albumin 4.3 06/13/2018 10:26 AM    Albumin 4.4 12/19/2017 09:09 AM    Globulin 4.1 (H) 07/22/2018 08:00 PM    A-G Ratio 1.6 06/03/2019 10:58 AM    A-G Ratio 1.3 12/12/2018 09:28 AM    A-G Ratio 0.9 (L) 07/22/2018 08:00 PM    A-G Ratio 1.5 06/13/2018 10:26 AM    A-G Ratio 1.6 12/19/2017 09:09 AM    ALT (SGPT) 20 06/03/2019 10:58 AM    ALT (SGPT) 19 12/12/2018 09:28 AM    ALT (SGPT) 86 (H) 07/22/2018 08:00 PM    ALT (SGPT) 25 06/13/2018 10:26 AM    ALT (SGPT) 25 12/19/2017 09:09 AM     No results found for: CPK, CPKX, CPX  Lab Results   Component Value Date/Time    Cholesterol, total 164 12/12/2018 09:28 AM    Cholesterol, total 154 07/23/2018 05:35 AM    Cholesterol, total 159 06/13/2018 10:26 AM    Cholesterol, total 163 12/19/2017 09:09 AM    Cholesterol, total 154 06/20/2017 09:29 AM    HDL Cholesterol 49 12/12/2018 09:28 AM    HDL Cholesterol 55 07/23/2018 05:35 AM    HDL Cholesterol 48 06/13/2018 10:26 AM    HDL Cholesterol 51 12/19/2017 09:09 AM    HDL Cholesterol 47 06/20/2017 09:29 AM    LDL, calculated 95 12/12/2018 09:28 AM    LDL, calculated 89.2 07/23/2018 05:35 AM    LDL, calculated 92 06/13/2018 10:26 AM    LDL, calculated 95 12/19/2017 09:09 AM    LDL, calculated 87 06/20/2017 09:29 AM    Triglyceride 102 12/12/2018 09:28 AM    Triglyceride 49 07/23/2018 05:35 AM    Triglyceride 95 06/13/2018 10:26 AM    Triglyceride 85 12/19/2017 09:09 AM    Triglyceride 98 06/20/2017 09:29 AM    CHOL/HDL Ratio 2.8 07/23/2018 05:35 AM     No results found for this or any previous visit.     Assessment:         Patient Active Problem List    Diagnosis Date Noted    Acute pancreatitis 07/23/2018     Priority: 1 - One    Persistent atrial fibrillation 07/22/2018    Abnormal EKG 10/13/2016    PAC (premature atrial contraction) 10/13/2016    Primary osteoarthritis 10/03/2016    HTN (hypertension)     DJD (degenerative joint disease)     Colitis     Ventricular ectopy     Hyperlipemia       No specific CV sx or complaints. On warfarin due to cost issues with Eliquis/Xarelto--was wondering if ASA alternative (discussed). Plan:     Doing well with no adverse cardiac symptoms. Lipids and labs followed by PCP. Continue current care and f/u in 6 months.     Joaquin Tay MD

## 2020-03-16 DIAGNOSIS — L29.9 PRURITUS: ICD-10-CM

## 2020-03-16 DIAGNOSIS — E78.00 PURE HYPERCHOLESTEROLEMIA: ICD-10-CM

## 2020-03-16 RX ORDER — LOVASTATIN 20 MG/1
TABLET ORAL
Qty: 90 TAB | Refills: 3 | Status: SHIPPED | OUTPATIENT
Start: 2020-03-16 | End: 2020-08-24 | Stop reason: SDUPTHER

## 2020-03-16 RX ORDER — TRIAMCINOLONE ACETONIDE 5 MG/G
CREAM TOPICAL 2 TIMES DAILY
Qty: 45 G | Refills: 3 | Status: SHIPPED | OUTPATIENT
Start: 2020-03-16 | End: 2021-06-03 | Stop reason: SDUPTHER

## 2020-08-26 ENCOUNTER — OFFICE VISIT (OUTPATIENT)
Dept: CARDIOLOGY CLINIC | Age: 85
End: 2020-08-26

## 2020-08-26 VITALS
HEART RATE: 86 BPM | RESPIRATION RATE: 14 BRPM | WEIGHT: 151 LBS | TEMPERATURE: 98.7 F | BODY MASS INDEX: 25.16 KG/M2 | HEIGHT: 65 IN | SYSTOLIC BLOOD PRESSURE: 120 MMHG | OXYGEN SATURATION: 95 % | DIASTOLIC BLOOD PRESSURE: 70 MMHG

## 2020-08-26 DIAGNOSIS — E78.2 MIXED HYPERLIPIDEMIA: ICD-10-CM

## 2020-08-26 DIAGNOSIS — R94.31 ABNORMAL EKG: ICD-10-CM

## 2020-08-26 DIAGNOSIS — M15.9 PRIMARY OSTEOARTHRITIS INVOLVING MULTIPLE JOINTS: ICD-10-CM

## 2020-08-26 DIAGNOSIS — I48.19 PERSISTENT ATRIAL FIBRILLATION (HCC): Primary | ICD-10-CM

## 2020-08-26 DIAGNOSIS — I10 ESSENTIAL HYPERTENSION: ICD-10-CM

## 2020-08-26 DIAGNOSIS — I49.3 VENTRICULAR ECTOPY: ICD-10-CM

## 2020-08-26 NOTE — PROGRESS NOTES
Verified patient with two patient identifiers. Medications reviewed/approved by Dr. Taras Fitzgerald. Chief Complaint   Patient presents with    Irregular Heart Beat     6 month follow up    Hypertension       1. Have you been to the ER, urgent care clinic since your last visit? Hospitalized since your last visit?no    2. Have you seen or consulted any other health care providers outside of the [address] [place] [address] since your last visit? Include any pap smears or colon screening.  no

## 2020-08-26 NOTE — PROGRESS NOTES
Jaleesa Inman is a 80 y.o. female is here for routine f/u. No specific CV sx or complaints. On warfarin due to cost issues with Eliquis/Xarelto. The patient denies chest pain/ shortness of breath, orthopnea, PND, LE edema, palpitations, syncope, presyncope or fatigue.        Patient Active Problem List    Diagnosis Date Noted    Acute pancreatitis 07/23/2018     Priority: 1 - One    Persistent atrial fibrillation ([address] 07/22/2018    Abnormal EKG 10/13/2016    PAC (premature atrial contraction) 10/13/2016    Primary osteoarthritis 10/03/2016    HTN (hypertension)     DJD (degenerative joint disease)     Colitis     Ventricular ectopy     Hyperlipemia       Murl HANNAH Bustos  Past Medical History:   Diagnosis Date    Colitis 2013    C diff; no recent flare, normal colonoscopy 2014    DJD (degenerative joint disease)     Fracture, femoral ([address] 09/2005    HTN (hypertension)     Hyperlipemia     Persistent atrial fibrillation ([address] 2018    Ventricular ectopy     no symptoms      Past Surgical History:   Procedure Laterality Date    HX CATARACT REMOVAL      bilat    HX COLONOSCOPY  2014    WNL    HX ORTHOPAEDIC  2005    right femur fx    HX ORTHOPAEDIC  2012    Lt TKA    HX REFRACTIVE SURGERY  06/15/2017    rt     Allergies   Allergen Reactions    Ambien [Zolpidem] Other (comments)     Legs got heavy    Aminobenzoic Acid Unknown (comments)    Bactrim [Sulfamethoprim Ds] Unknown (comments)     Patient cant remember her reaction    Hydroxyzine Other (comments)     Legs got heavy    Sulfamethoxazole-Trimethoprim Unknown (comments)     Other reaction(s): vomiting and abd pain      Family History   Problem Relation Age of Onset    Cancer Mother         Leukemia    Heart Disease Mother     Cancer Sister         Ovarian    No Known Problems Brother     Heart Failure Brother     Cancer Brother         Brain tumor      Social History     Socioeconomic History    Marital status:      Spouse name: Not on file    Number of children: Not on file    Years of education: Not on file    Highest education level: Not on file   Occupational History    Not on file   Social Needs    Financial resource strain: Not on file    Food insecurity     Worry: Not on file     Inability: Not on file    Transportation needs     Medical: Not on file     Non-medical: Not on file   Tobacco Use    Smoking status: Never Smoker    Smokeless tobacco: Never Used   Substance and Sexual Activity    Alcohol use: No     Alcohol/week: 0.0 standard drinks    Drug use: Not on file    Sexual activity: Not on file   Lifestyle    Physical activity     Days per week: Not on file     Minutes per session: Not on file    Stress: Not on file   Relationships    Social connections     Talks on phone: Not on file     Gets together: Not on file     Attends [employer] service: Not on file     Active member of club or organization: Not on file     Attends meetings of clubs or organizations: Not on file     Relationship status: Not on file    Intimate partner violence     Fear of current or ex partner: Not on file     Emotionally abused: Not on file     Physically abused: Not on file     Forced sexual activity: Not on file   Other Topics Concern     Service No    Blood Transfusions Yes    Caffeine Concern No    Occupational Exposure No    Hobby Hazards No    Sleep Concern Yes     Comment: insomnia    Stress Concern No    Weight Concern Yes     Comment: over weight    Special Diet No    Back Care No    Exercise Yes    Bike Helmet No    Seat Belt Yes    Self-Exams Yes   Social History Narrative    Not on file      Current Outpatient Medications   Medication Sig    warfarin (COUMADIN) 4 mg tablet Take 1 Tab by mouth daily. Indications: treatment to prevent a blood clot in the lung (Patient taking differently: Take 4 mg by mouth daily. Alternates between 4 mg and 5 mg.   Indications: treatment to prevent a blood clot in the lung)    lovastatin (MEVACOR) 20 mg tablet TAKE 1 TABLET BY MOUTH  NIGHTLY    triamcinolone (ARISTOCORT) 0.5 % topical cream Apply  to affected area two (2) times a day. As needed for itching    atenolol (TENORMIN) 50 mg tablet Take 2 Tabs by mouth daily.  C/sourcherry/celery/grape seed (TART CHERRY PO) Take  by mouth.  OTHER high active tart cherry    acetaminophen (TYLENOL EXTRA STRENGTH) 500 mg tablet Take  by mouth every six (6) hours as needed for Pain.  LACTOBACILLUS ACIDOPHILUS (PROBIOTIC PO) Take  by mouth as needed. Indications: not a current medication    ACETAMINOPHEN/DIPHENHYDRAMINE (TYLENOL PM PO) Take  by mouth as needed.  OTHER daily. Folate po     MELATONIN PO Take  by mouth as needed.  cholecalciferol, vitamin D3, (VITAMIN D3) 2,000 unit tab Take  by mouth daily.  cyanocobalamin (VITAMIN B-12) 1,000 mcg tablet Take 1,000 mcg by mouth daily. Indications: not a current medication    BOSWELLIA AVA EXTRACT (BOSWELLIA AVA XT, BULK,) Take  by mouth daily.  OTHER VEGGIES FOR LIFE AND FRUIT FOR LIFE , TAKES ONE A DAY EACH    magnesium 250 mg tab Take  by mouth daily. Pt thinks she takes 200 mg     CALCIUM CITRATE/VITAMIN D3 (CALCITRATE-VITAMIN D PO) Take  by mouth daily.  ASCORBIC ACID (VITAMIN C WITH YOUSIF HIPS PO) Take  by mouth daily.  biotin 2,500 mcg tab Take  by mouth Three (3) times a week. No current facility-administered medications for this visit. Review of Symptoms:    CONST  No weight change. No fever, chills, sweats    ENT No visual changes, URI sx, sore throat    CV  See HPI   RESP  No cough, or sputum, wheezing. Also see HPI   GI  No abdominal pain or change in bowel habits. No heartburn or dysphagia. No melena or rectal bleeding.   No dysuria, urgency, frequency, hematuria   MSKEL  No joint pain, swelling. No muscle pain. SKIN  No rash or lesions. NEURO  No headache, syncope, or seizure.    No weakness, loss of sensation, or paresthesias. PSYCH  No low mood or depression  No anxiety. HE/LYMPH  No easy bruising, abnormal bleeding, or enlarged glands. Physical ExamPhysical Exam:    Visit Vitals  /70 (BP 1 Location: Left arm, BP Patient Position: Sitting)   Pulse 86   Temp 98.7 °F (37.1 °C) (Temporal)   Resp 14   Ht 5' 5\" (1.651 m)   Wt 151 lb (68.5 kg)   SpO2 95% Comment: ra   BMI 25.13 kg/m²     Gen: NAD  HEENT:  PERRL, throat clear  Neck: no adenopathy, no thyromegaly, no JVD   Heart:  irregular,Nl S1S2,  no murmur, gallop or rub. Lungs:  clear  Abdomen:   Soft, non-tender, bowel sounds are active.    Extremities:  No edema  Pulse: symmetric  Neuro: A&O times 3, No focal neuro deficits    Cardiographics    ECG: afib, no acute changes      Labs:   Lab Results   Component Value Date/Time    Sodium 143 07/08/2019 12:39 PM    Sodium 141 06/03/2019 10:58 AM    Sodium 142 12/12/2018 09:28 AM    Sodium 141 07/26/2018 08:15 AM    Sodium 139 07/25/2018 05:30 AM    Potassium 4.5 07/08/2019 12:39 PM    Potassium 4.6 06/03/2019 10:58 AM    Potassium 4.9 12/12/2018 09:28 AM    Potassium 3.2 (L) 07/26/2018 08:15 AM    Potassium 3.8 07/25/2018 05:30 AM    Chloride 106 07/08/2019 12:39 PM    Chloride 102 06/03/2019 10:58 AM    Chloride 105 12/12/2018 09:28 AM    Chloride 105 07/26/2018 08:15 AM    Chloride 105 07/25/2018 05:30 AM    CO2 30 07/08/2019 12:39 PM    CO2 25 06/03/2019 10:58 AM    CO2 27 12/12/2018 09:28 AM    CO2 27 07/26/2018 08:15 AM    CO2 27 07/25/2018 05:30 AM    Anion gap 7 07/08/2019 12:39 PM    Anion gap 9 07/26/2018 08:15 AM    Anion gap 7 07/25/2018 05:30 AM    Anion gap 10 07/24/2018 05:25 AM    Anion gap 8 07/23/2018 05:35 AM    Glucose 105 (H) 07/08/2019 12:39 PM    Glucose 91 06/03/2019 10:58 AM    Glucose 113 (H) 12/12/2018 09:28 AM    Glucose 92 07/26/2018 08:15 AM    Glucose 94 07/25/2018 05:30 AM    BUN 20 07/08/2019 12:39 PM    BUN 17 06/03/2019 10:58 AM    BUN 14 12/12/2018 09:28 AM    BUN 17 07/26/2018 08:15 AM    BUN 17 07/25/2018 05:30 AM    Creatinine 0.99 07/08/2019 12:39 PM    Creatinine 0.82 06/03/2019 10:58 AM    Creatinine 0.82 12/12/2018 09:28 AM    Creatinine 0.61 07/26/2018 08:15 AM    Creatinine 0.68 07/25/2018 05:30 AM    BUN/Creatinine ratio 20 07/08/2019 12:39 PM    BUN/Creatinine ratio 21 06/03/2019 10:58 AM    BUN/Creatinine ratio 17 12/12/2018 09:28 AM    BUN/Creatinine ratio 28 (H) 07/26/2018 08:15 AM    BUN/Creatinine ratio 25 (H) 07/25/2018 05:30 AM    GFR est AA >60 07/08/2019 12:39 PM    GFR est AA 73 06/03/2019 10:58 AM    GFR est AA 74 12/12/2018 09:28 AM    GFR est AA >60 07/26/2018 08:15 AM    GFR est AA >60 07/25/2018 05:30 AM    GFR est non-AA 53 (L) 07/08/2019 12:39 PM    GFR est non-AA 64 06/03/2019 10:58 AM    GFR est non-AA 64 12/12/2018 09:28 AM    GFR est non-AA >60 07/26/2018 08:15 AM    GFR est non-AA >60 07/25/2018 05:30 AM    Calcium 9.2 07/08/2019 12:39 PM    Calcium 9.3 06/03/2019 10:58 AM    Calcium 9.2 12/12/2018 09:28 AM    Calcium 7.9 (L) 07/26/2018 08:15 AM    Calcium 7.9 (L) 07/25/2018 05:30 AM    Bilirubin, total 0.9 06/03/2019 10:58 AM    Bilirubin, total 0.5 12/12/2018 09:28 AM    Bilirubin, total 0.9 07/22/2018 08:00 PM    Bilirubin, total 0.9 06/13/2018 10:26 AM    Bilirubin, total 0.8 12/19/2017 09:09 AM    Alk. phosphatase 84 06/03/2019 10:58 AM    Alk. phosphatase 84 12/12/2018 09:28 AM    Alk. phosphatase 93 07/22/2018 08:00 PM    Alk. phosphatase 86 06/13/2018 10:26 AM    Alk.  phosphatase 96 12/19/2017 09:09 AM    Protein, total 7.2 06/03/2019 10:58 AM    Protein, total 7.6 12/12/2018 09:28 AM    Protein, total 7.9 07/22/2018 08:00 PM    Protein, total 7.2 06/13/2018 10:26 AM    Protein, total 7.2 12/19/2017 09:09 AM    Albumin 4.4 06/03/2019 10:58 AM    Albumin 4.3 12/12/2018 09:28 AM    Albumin 3.8 07/22/2018 08:00 PM    Albumin 4.3 06/13/2018 10:26 AM    Albumin 4.4 12/19/2017 09:09 AM    Globulin 4.1 (H) 07/22/2018 08:00 PM    A-G Ratio 1.6 06/03/2019 10:58 AM    A-G Ratio 1.3 12/12/2018 09:28 AM    A-G Ratio 0.9 (L) 07/22/2018 08:00 PM    A-G Ratio 1.5 06/13/2018 10:26 AM    A-G Ratio 1.6 12/19/2017 09:09 AM    ALT (SGPT) 20 06/03/2019 10:58 AM    ALT (SGPT) 19 12/12/2018 09:28 AM    ALT (SGPT) 86 (H) 07/22/2018 08:00 PM    ALT (SGPT) 25 06/13/2018 10:26 AM    ALT (SGPT) 25 12/19/2017 09:09 AM     No results found for: CPK, CPKX, CPX  Lab Results   Component Value Date/Time    Cholesterol, total 164 12/12/2018 09:28 AM    Cholesterol, total 154 07/23/2018 05:35 AM    Cholesterol, total 159 06/13/2018 10:26 AM    Cholesterol, total 163 12/19/2017 09:09 AM    Cholesterol, total 154 06/20/2017 09:29 AM    HDL Cholesterol 49 12/12/2018 09:28 AM    HDL Cholesterol 55 07/23/2018 05:35 AM    HDL Cholesterol 48 06/13/2018 10:26 AM    HDL Cholesterol 51 12/19/2017 09:09 AM    HDL Cholesterol 47 06/20/2017 09:29 AM    LDL, calculated 95 12/12/2018 09:28 AM    LDL, calculated 89.2 07/23/2018 05:35 AM    LDL, calculated 92 06/13/2018 10:26 AM    LDL, calculated 95 12/19/2017 09:09 AM    LDL, calculated 87 06/20/2017 09:29 AM    Triglyceride 102 12/12/2018 09:28 AM    Triglyceride 49 07/23/2018 05:35 AM    Triglyceride 95 06/13/2018 10:26 AM    Triglyceride 85 12/19/2017 09:09 AM    Triglyceride 98 06/20/2017 09:29 AM    CHOL/HDL Ratio 2.8 07/23/2018 05:35 AM     No results found for this or any previous visit. Assessment:         Patient Active Problem List    Diagnosis Date Noted    Acute pancreatitis 07/23/2018     Priority: 1 - One    Persistent atrial fibrillation ([address] 07/22/2018    Abnormal EKG 10/13/2016    PAC (premature atrial contraction) 10/13/2016    Primary osteoarthritis 10/03/2016    HTN (hypertension)     DJD (degenerative joint disease)     Colitis     Ventricular ectopy     Hyperlipemia      No specific CV sx or complaints.   On warfarin due to cost issues with Eliquis/Xarelto     Plan:     Doing well with no adverse cardiac symptoms. Lipids and labs followed by PCP. Continue current care and f/u in 6 months.     Lesly Pulido MD

## 2020-10-06 PROBLEM — I44.7 NEW ONSET LEFT BUNDLE BRANCH BLOCK (LBBB): Status: ACTIVE | Noted: 2020-10-06

## 2020-10-06 PROBLEM — T45.511A COUMADIN TOXICITY: Status: ACTIVE | Noted: 2020-10-06

## 2020-10-06 PROBLEM — I50.1 ACUTE LEFT-SIDED CHF (CONGESTIVE HEART FAILURE) (HCC): Status: ACTIVE | Noted: 2020-10-06

## 2020-10-11 PROBLEM — I50.21 SYSTOLIC CHF, ACUTE (HCC): Status: ACTIVE | Noted: 2020-10-11

## 2020-10-14 ENCOUNTER — HOME HEALTH ADMISSION (OUTPATIENT)
Dept: HOME HEALTH SERVICES | Facility: HOME HEALTH | Age: 85
End: 2020-10-14
Payer: MEDICARE

## 2020-10-15 ENCOUNTER — PATIENT OUTREACH (OUTPATIENT)
Dept: CASE MANAGEMENT | Age: 85
End: 2020-10-15

## 2020-10-15 ENCOUNTER — HOME CARE VISIT (OUTPATIENT)
Dept: SCHEDULING | Facility: HOME HEALTH | Age: 85
End: 2020-10-15
Payer: MEDICARE

## 2020-10-15 VITALS
OXYGEN SATURATION: 96 % | SYSTOLIC BLOOD PRESSURE: 90 MMHG | DIASTOLIC BLOOD PRESSURE: 50 MMHG | HEART RATE: 98 BPM | TEMPERATURE: 97.8 F | RESPIRATION RATE: 16 BRPM

## 2020-10-15 PROCEDURE — 3331090001 HH PPS REVENUE CREDIT

## 2020-10-15 PROCEDURE — G0299 HHS/HOSPICE OF RN EA 15 MIN: HCPCS

## 2020-10-15 PROCEDURE — 400013 HH SOC

## 2020-10-15 PROCEDURE — 3331090002 HH PPS REVENUE DEBIT

## 2020-10-15 NOTE — PROGRESS NOTES
Patient contacted regarding recent discharge and COVID-19 risk. Discussed COVID-19 related testing which was not done at this time. Test results were not done. Patient informed of results, if available?n/a  Care Transition Nurse/ Ambulatory Care Manager/ LPN Care Coordinator contacted the family by telephone to perform post discharge assessment. Verified name and  with family as identifiers. Patient has following risk factors of: heart failure. CTN/ACM/LPN reviewed discharge instructions, medical action plan and red flags related to discharge diagnosis. Reviewed and educated them on any new and changed medications related to discharge diagnosis. Advised obtaining a 90-day supply of all daily and as-needed medications. Advance Care Planning:   Does patient have an Advance Directive: yes; reviewed and current     Education provided regarding infection prevention, and signs and symptoms of COVID-19 and when to seek medical attention with family who verbalized understanding. Discussed exposure protocols and quarantine from [address] you at higher risk for severe illness  and given an opportunity for questions and concerns. The family agrees to contact the COVID-19 hotline [phone] or PCP office for questions related to their healthcare. CTN/ACM/LPN provided contact information for future reference. From CDC: Are you at higher risk for severe illness?  Wash your hands often.  Avoid close contact (6 feet, which is about two arm lengths) with people who are sick.  Put distance between yourself and other people if COVID-19 is spreading in your community.  Clean and disinfect frequently touched surfaces.  Avoid all cruise travel and non-essential air travel.  Call your healthcare professional if you have concerns about COVID-19 and your underlying condition or if you are sick.     For more information on steps you can take to protect yourself, see CDC's How to Protect Yourself Patient/family/caregiver given information for [employer] and agrees to enroll no      Plan for follow-up call in 7-14 days based on severity of symptoms and risk factors. Confirmed below meds. Patient has PCP appt on 10/21.      START taking:  apixaban (ELIQUIS)  furosemide (LASIX)  lisinopriL (PRINIVIL, ZESTRIL)  metoprolol tartrate (LOPRESSOR)  potassium chloride SR (KLOR-CON 10)  sertraline (ZOLOFT)  STOP taking:  atenoloL 50 mg tablet (TENORMIN)  CALCITRATE-VITAMIN D PO  TYLENOL PM PO  warfarin 4 mg tablet (COUMADIN)

## 2020-10-16 PROCEDURE — 3331090002 HH PPS REVENUE DEBIT

## 2020-10-16 PROCEDURE — 3331090001 HH PPS REVENUE CREDIT

## 2020-10-17 ENCOUNTER — HOME CARE VISIT (OUTPATIENT)
Dept: SCHEDULING | Facility: HOME HEALTH | Age: 85
End: 2020-10-17
Payer: MEDICARE

## 2020-10-17 VITALS
DIASTOLIC BLOOD PRESSURE: 52 MMHG | HEART RATE: 90 BPM | TEMPERATURE: 97.5 F | WEIGHT: 132.6 LBS | OXYGEN SATURATION: 96 % | SYSTOLIC BLOOD PRESSURE: 100 MMHG | RESPIRATION RATE: 18 BRPM | BODY MASS INDEX: 22.07 KG/M2

## 2020-10-17 PROCEDURE — 3331090002 HH PPS REVENUE DEBIT

## 2020-10-17 PROCEDURE — G0299 HHS/HOSPICE OF RN EA 15 MIN: HCPCS

## 2020-10-17 PROCEDURE — 3331090001 HH PPS REVENUE CREDIT

## 2020-10-18 PROCEDURE — 3331090002 HH PPS REVENUE DEBIT

## 2020-10-18 PROCEDURE — 3331090001 HH PPS REVENUE CREDIT

## 2020-10-19 ENCOUNTER — HOME CARE VISIT (OUTPATIENT)
Dept: SCHEDULING | Facility: HOME HEALTH | Age: 85
End: 2020-10-19
Payer: MEDICARE

## 2020-10-19 PROCEDURE — 3331090002 HH PPS REVENUE DEBIT

## 2020-10-19 PROCEDURE — 3331090001 HH PPS REVENUE CREDIT

## 2020-10-19 PROCEDURE — G0151 HHCP-SERV OF PT,EA 15 MIN: HCPCS

## 2020-10-20 ENCOUNTER — HOME CARE VISIT (OUTPATIENT)
Dept: SCHEDULING | Facility: HOME HEALTH | Age: 85
End: 2020-10-20
Payer: MEDICARE

## 2020-10-20 VITALS
TEMPERATURE: 97.8 F | DIASTOLIC BLOOD PRESSURE: 60 MMHG | RESPIRATION RATE: 18 BRPM | SYSTOLIC BLOOD PRESSURE: 91 MMHG | HEART RATE: 84 BPM

## 2020-10-20 PROCEDURE — 3331090002 HH PPS REVENUE DEBIT

## 2020-10-20 PROCEDURE — G0299 HHS/HOSPICE OF RN EA 15 MIN: HCPCS

## 2020-10-20 PROCEDURE — G0152 HHCP-SERV OF OT,EA 15 MIN: HCPCS

## 2020-10-20 PROCEDURE — 3331090001 HH PPS REVENUE CREDIT

## 2020-10-20 NOTE — PROGRESS NOTES
JAYCEE Adorno from [facility] hold lisinopril and if BP back to normal parameters will d/c lisinopril

## 2020-10-20 NOTE — PROGRESS NOTES
HYPOTENSION CONTINUES-  BP 91/60, HR 84, RESP 18,  T 97.8    PATIENT IS ASYMPTOMATIC    PATIEN ENCOURAGED TO HYDRATE  SKIN APPEARS DEHYDRATED    BP AT EVAL END:  102/58, HR 88

## 2020-10-21 PROCEDURE — 3331090002 HH PPS REVENUE DEBIT

## 2020-10-21 PROCEDURE — 3331090001 HH PPS REVENUE CREDIT

## 2020-10-22 VITALS
BODY MASS INDEX: 22.47 KG/M2 | WEIGHT: 135 LBS | OXYGEN SATURATION: 96 % | DIASTOLIC BLOOD PRESSURE: 66 MMHG | TEMPERATURE: 98.3 F | SYSTOLIC BLOOD PRESSURE: 92 MMHG | HEART RATE: 78 BPM | RESPIRATION RATE: 20 BRPM

## 2020-10-22 PROCEDURE — 3331090001 HH PPS REVENUE CREDIT

## 2020-10-22 PROCEDURE — 3331090002 HH PPS REVENUE DEBIT

## 2020-10-23 ENCOUNTER — HOME CARE VISIT (OUTPATIENT)
Dept: SCHEDULING | Facility: HOME HEALTH | Age: 85
End: 2020-10-23
Payer: MEDICARE

## 2020-10-23 ENCOUNTER — VIRTUAL VISIT (OUTPATIENT)
Dept: CARDIOLOGY CLINIC | Age: 85
End: 2020-10-23
Payer: MEDICARE

## 2020-10-23 ENCOUNTER — HOME CARE VISIT (OUTPATIENT)
Dept: HOME HEALTH SERVICES | Facility: HOME HEALTH | Age: 85
End: 2020-10-23
Payer: MEDICARE

## 2020-10-23 DIAGNOSIS — I49.3 VENTRICULAR ECTOPY: ICD-10-CM

## 2020-10-23 DIAGNOSIS — I48.19 PERSISTENT ATRIAL FIBRILLATION (HCC): ICD-10-CM

## 2020-10-23 DIAGNOSIS — M15.9 PRIMARY OSTEOARTHRITIS INVOLVING MULTIPLE JOINTS: ICD-10-CM

## 2020-10-23 DIAGNOSIS — E78.2 MIXED HYPERLIPIDEMIA: ICD-10-CM

## 2020-10-23 DIAGNOSIS — R19.7 DIARRHEA, UNSPECIFIED TYPE: ICD-10-CM

## 2020-10-23 DIAGNOSIS — I25.10 ASCVD (ARTERIOSCLEROTIC CARDIOVASCULAR DISEASE): ICD-10-CM

## 2020-10-23 DIAGNOSIS — I10 ESSENTIAL HYPERTENSION: ICD-10-CM

## 2020-10-23 DIAGNOSIS — I50.43 ACUTE ON CHRONIC COMBINED SYSTOLIC AND DIASTOLIC CONGESTIVE HEART FAILURE (HCC): Primary | ICD-10-CM

## 2020-10-23 DIAGNOSIS — I44.7 LBBB (LEFT BUNDLE BRANCH BLOCK): ICD-10-CM

## 2020-10-23 PROCEDURE — G0157 HHC PT ASSISTANT EA 15: HCPCS

## 2020-10-23 PROCEDURE — 3331090001 HH PPS REVENUE CREDIT

## 2020-10-23 PROCEDURE — 99443 PR PHYS/QHP TELEPHONE EVALUATION 21-30 MIN: CPT | Performed by: INTERNAL MEDICINE

## 2020-10-23 PROCEDURE — 3331090002 HH PPS REVENUE DEBIT

## 2020-10-23 NOTE — PROGRESS NOTES
PATIENT ID VERIFIED WITH TWO PATIENT IDENTIFIERS. PATIENT MEDICATIONS REVIEWED AND APPROVED BY DR. Tanmay Davies.

## 2020-10-23 NOTE — PROGRESS NOTES
The patient was seen by me today by telephone visit to substitute for in-person visit (COVID 19)           Haley Inman is a [address] y.o. female is here for hospital f/u--at [facility] 10/6-10/14/20.  admitted for Acute left-sided CHF (congestive heart failure) ([facility] [address] [I50.1]. Hx persistent/chronic afib--rate controlled on anticoag, hypertension/hypertensive CVD, IVCD, DJD, colitis, pancreatitis, prior femur fx, ventricular ectopy, without known hx CAD or CHF. Seen in office by me in August, doing well at that time. Cost issues with Eliquis/Xarelto and now on warfarin (which she does not like).  To ER with complaint of shortness of breath, diarrhea, anorexia, pedal edema, GREENBERG, and extreme weakness which have all progressed over the past several weeks. Bridgette Gilman has had difficulty ambulating in her home over the past 24 hours.  She reports being up to the bathroom with diarrhea x5 last night.  She notes the preference to lie on her R side to sleep.  She questions whether the recent adjustment in her Coumadin might have precipitated symptoms.  The chest x-ray obtained in the ED was noted for a moderately large right pleural effusion and increased interstitial edema. These findings were not observed on previous CXR July 2019.  She was initially administered 1 L of normal saline for the sepsis protocol.  No other evidenced for acute infection was detected.  Oximetry was low 86% on room air; correcting with nasal oxygen 2 L/min NC to 95%.   She denied complaints of chest pain, angina, pleurisy.  She has no history of coronary disease.  She has not been treated with diuretic therapy in past.  Trop neg. EKG with afib, rate controlled with LBBB (previously IVCD NOT significantly changed). Given IV lasix, now on po. On low dose ACEI, metoprolol, anticoag. . Echo with LVEF 40-45%. Chest CT with pleural effusion, coronary calcification, possible gallstone. Abd U/S and HIDA scan neg. Some diarrhea. BP low at home (home health). Seen by PCP,     Patient Active Problem List    Diagnosis Date Noted    Acute pancreatitis 07/23/2018     Priority: 1 - One    Systolic CHF, acute ([facility] [address] 10/11/2020    Acute left-sided CHF (congestive heart failure) ([facility] [address] 10/06/2020    New onset left bundle branch block (LBBB) 10/06/2020    Coumadin toxicity 10/06/2020    Persistent atrial fibrillation ([facility] [address] 07/22/2018    Abnormal EKG 10/13/2016    PAC (premature atrial contraction) 10/13/2016    Primary osteoarthritis 10/03/2016    HTN (hypertension)     DJD (degenerative joint disease)     Colitis     Ventricular ectopy     Hyperlipemia       Ania Simons NP  Past Medical History:   Diagnosis Date    Colitis 2013    C diff; no recent flare, normal colonoscopy 2014    DJD (degenerative joint disease)     Fracture, femoral ([facility] [address] 09/2005    HTN (hypertension)     Hyperlipemia     Persistent atrial fibrillation ([facility] [address] 2018    Ventricular ectopy     no symptoms      Past Surgical History:   Procedure Laterality Date    HX CATARACT REMOVAL      bilat    HX COLONOSCOPY  2014    WNL    HX ORTHOPAEDIC  2005    right femur fx    HX ORTHOPAEDIC  2012    Lt TKA    HX REFRACTIVE SURGERY  06/15/2017    rt     Allergies   Allergen Reactions    Ambien [Zolpidem] Other (comments)     Legs got heavy    Aminobenzoic Acid Unknown (comments)    Bactrim [Sulfamethoprim Ds] Unknown (comments)     Patient cant remember her reaction    Hydroxyzine Other (comments)     Legs got heavy    Sulfamethoxazole-Trimethoprim Unknown (comments)     Other reaction(s): vomiting and abd pain      Family History   Problem Relation Age of Onset    Cancer Mother         Leukemia    Heart Disease Mother     Cancer Sister         Ovarian    No Known Problems Brother     Heart Failure Brother     Cancer Brother         Brain tumor      Social History     Socioeconomic History    Marital status:      Spouse name: Not on file    Number of children: Not on file    Years of education: Not on file    Highest education level: Not on file   Occupational History    Not on file   Social Needs    Financial resource strain: Not on file    Food insecurity     Worry: Not on file     Inability: Not on file    Transportation needs     Medical: Not on file     Non-medical: Not on file   Tobacco Use    Smoking status: Never Smoker    Smokeless tobacco: Never Used   Substance and Sexual Activity    Alcohol use: No     Alcohol/week: 0.0 standard drinks    Drug use: Never    Sexual activity: Not on file   Lifestyle    Physical activity     Days per week: Not on file     Minutes per session: Not on file    Stress: Not on file   Relationships    Social connections     Talks on phone: Not on file     Gets together: Not on file     Attends Orthodox service: Not on file     Active member of club or organization: Not on file     Attends meetings of clubs or organizations: Not on file     Relationship status: Not on file    Intimate partner violence     Fear of current or ex partner: Not on file     Emotionally abused: Not on file     Physically abused: Not on file     Forced sexual activity: Not on file   Other Topics Concern     Service No    Blood Transfusions Yes    Caffeine Concern No    Occupational Exposure No    Hobby Hazards No    Sleep Concern Yes     Comment: insomnia    Stress Concern No    Weight Concern Yes     Comment: over weight    Special Diet No    Back Care No    Exercise Yes    Bike Helmet No    Seat Belt Yes    Self-Exams Yes   Social History Narrative    Not on file      Current Outpatient Medications   Medication Sig    cholecalciferol, vitamin D3, (VITAMIN D3 PO) Take  by mouth. 1000 IU    vitamin e (E GEMS) 1,000 unit capsule Take 1,000 Units by mouth daily.  C/sourcherry/celery/grape seed (TART CHERRY PO) Take  by mouth. 465 mg    pyridoxine, vitamin B6, (Vitamin B-6) 100 mg tablet Take 100 mg by mouth daily.     magnesium 200 mg tab Take  by mouth.  isosorbide mononitrate ER (IMDUR) 30 mg tablet Take 1 Tab by mouth every morning.  furosemide (LASIX) 40 mg tablet Take 1 Tab by mouth two (2) times a day. take 40mg in the morning and 60mg in the evening.  apixaban (ELIQUIS) 2.5 mg tablet Take 1 Tab by mouth two (2) times a day.  sertraline (ZOLOFT) 25 mg tablet Take 1 Tab by mouth daily.  metoprolol tartrate (LOPRESSOR) 100 mg IR tablet Take 1 Tab by mouth every twelve (12) hours.  potassium chloride SR (KLOR-CON 10) 10 mEq tablet Take 1 Tab by mouth daily.  lovastatin (MEVACOR) 20 mg tablet TAKE 1 TABLET BY MOUTH  NIGHTLY    melatonin 10 mg tab Take 10 mg by mouth nightly.  acetaminophen (Tylenol Extra Strength) 500 mg tablet Take 500 mg by mouth every six (6) hours as needed for Fever or Pain.  triamcinolone (ARISTOCORT) 0.5 % topical cream Apply  to affected area two (2) times a day. As needed for itching     No current facility-administered medications for this visit. Review of Symptoms:    CONST  No weight change. No fever, chills, sweats    ENT No visual changes, URI sx, sore throat    CV  See HPI   RESP  No cough, or sputum, wheezing. Also see HPI   GI  No abdominal pain or change in bowel habits. +nausea, vomtting, diarrhea, poor appetite  No melena or rectal bleeding.   No dysuria, urgency, frequency, hematuria   MSKEL  No joint pain, swelling. No muscle pain. SKIN  No rash or lesions. NEURO  No headache, syncope, or seizure. No weakness, loss of sensation, or paresthesias. PSYCH  No low mood or depression  No anxiety. HE/LYMPH  No easy bruising, abnormal bleeding, or enlarged glands.       Home VS today 123/65, 90, 97.7 weight 134    Labs:   Lab Results   Component Value Date/Time    Sodium 136 10/14/2020 06:19 AM    Sodium 139 10/13/2020 05:56 AM    Sodium 137 10/12/2020 05:47 AM    Sodium 138 10/11/2020 05:41 AM    Sodium 138 10/10/2020 07:23 AM    Potassium 3.7 10/14/2020 06:19 AM    Potassium 3.8 10/13/2020 05:56 AM    Potassium 3.9 10/12/2020 05:47 AM    Potassium 4.2 10/11/2020 05:41 AM    Potassium 4.7 10/10/2020 07:23 AM    Chloride 97 10/14/2020 06:19 AM    Chloride 99 10/13/2020 05:56 AM    Chloride 99 10/12/2020 05:47 AM    Chloride 99 10/11/2020 05:41 AM    Chloride 99 10/10/2020 07:23 AM    CO2 31 10/14/2020 06:19 AM    CO2 31 10/13/2020 05:56 AM    CO2 31 10/12/2020 05:47 AM    CO2 32 10/11/2020 05:41 AM    CO2 34 (H) 10/10/2020 07:23 AM    Anion gap 8 10/14/2020 06:19 AM    Anion gap 9 10/13/2020 05:56 AM    Anion gap 7 10/12/2020 05:47 AM    Anion gap 7 10/11/2020 05:41 AM    Anion gap 5 10/10/2020 07:23 AM    Glucose 98 10/14/2020 06:19 AM    Glucose 108 (H) 10/13/2020 05:56 AM    Glucose 90 10/12/2020 05:47 AM    Glucose 97 10/11/2020 05:41 AM    Glucose 111 (H) 10/10/2020 07:23 AM    BUN 23 (H) 10/14/2020 06:19 AM    BUN 22 (H) 10/13/2020 05:56 AM    BUN 25 (H) 10/12/2020 05:47 AM    BUN 28 (H) 10/11/2020 05:41 AM    BUN 31 (H) 10/10/2020 07:23 AM    Creatinine 0.88 10/14/2020 06:19 AM    Creatinine 0.76 10/13/2020 05:56 AM    Creatinine 0.95 10/12/2020 05:47 AM    Creatinine 0.92 10/11/2020 05:41 AM    Creatinine 1.08 (H) 10/10/2020 07:23 AM    BUN/Creatinine ratio 26 (H) 10/14/2020 06:19 AM    BUN/Creatinine ratio 29 (H) 10/13/2020 05:56 AM    BUN/Creatinine ratio 26 (H) 10/12/2020 05:47 AM    BUN/Creatinine ratio 30 (H) 10/11/2020 05:41 AM    BUN/Creatinine ratio 29 (H) 10/10/2020 07:23 AM    GFR est AA >60 10/14/2020 06:19 AM    GFR est AA >60 10/13/2020 05:56 AM    GFR est AA >60 10/12/2020 05:47 AM    GFR est AA >60 10/11/2020 05:41 AM    GFR est AA 58 (L) 10/10/2020 07:23 AM    GFR est non-AA >60 10/14/2020 06:19 AM    GFR est non-AA >60 10/13/2020 05:56 AM    GFR est non-AA 55 (L) 10/12/2020 05:47 AM    GFR est non-AA 57 (L) 10/11/2020 05:41 AM    GFR est non-AA 48 (L) 10/10/2020 07:23 AM    Calcium 8.7 10/14/2020 06:19 AM    Calcium 8.8 10/13/2020 05:56 AM Calcium 8.6 10/12/2020 05:47 AM    Calcium 8.5 10/11/2020 05:41 AM    Calcium 9.0 10/10/2020 07:23 AM    Bilirubin, total 1.2 (H) 10/14/2020 06:19 AM    Bilirubin, total 1.4 (H) 10/13/2020 05:56 AM    Bilirubin, total 1.2 (H) 10/06/2020 12:54 PM    Bilirubin, total 0.9 06/03/2019 10:58 AM    Bilirubin, total 0.5 12/12/2018 09:28 AM    Alk. phosphatase 75 10/14/2020 06:19 AM    Alk. phosphatase 78 10/13/2020 05:56 AM    Alk. phosphatase 80 10/06/2020 12:54 PM    Alk. phosphatase 84 06/03/2019 10:58 AM    Alk.  phosphatase 84 12/12/2018 09:28 AM    Protein, total 6.3 (L) 10/14/2020 06:19 AM    Protein, total 6.7 10/13/2020 05:56 AM    Protein, total 7.2 10/06/2020 12:54 PM    Protein, total 7.2 06/03/2019 10:58 AM    Protein, total 7.6 12/12/2018 09:28 AM    Albumin 2.3 (L) 10/14/2020 06:19 AM    Albumin 2.4 (L) 10/13/2020 05:56 AM    Albumin 2.8 (L) 10/06/2020 12:54 PM    Albumin 4.4 06/03/2019 10:58 AM    Albumin 4.3 12/12/2018 09:28 AM    Globulin 4.0 10/14/2020 06:19 AM    Globulin 4.3 (H) 10/13/2020 05:56 AM    Globulin 4.4 (H) 10/06/2020 12:54 PM    Globulin 4.1 (H) 07/22/2018 08:00 PM    A-G Ratio 0.6 (L) 10/14/2020 06:19 AM    A-G Ratio 0.6 (L) 10/13/2020 05:56 AM    A-G Ratio 0.6 (L) 10/06/2020 12:54 PM    A-G Ratio 1.6 06/03/2019 10:58 AM    A-G Ratio 1.3 12/12/2018 09:28 AM    ALT (SGPT) 25 10/14/2020 06:19 AM    ALT (SGPT) 31 10/13/2020 05:56 AM    ALT (SGPT) 36 10/06/2020 12:54 PM    ALT (SGPT) 20 06/03/2019 10:58 AM    ALT (SGPT) 19 12/12/2018 09:28 AM     Lab Results   Component Value Date/Time    CK 45 10/07/2020 05:41 AM     Lab Results   Component Value Date/Time    Cholesterol, total 164 12/12/2018 09:28 AM    Cholesterol, total 154 07/23/2018 05:35 AM    Cholesterol, total 159 06/13/2018 10:26 AM    Cholesterol, total 163 12/19/2017 09:09 AM    Cholesterol, total 154 06/20/2017 09:29 AM    HDL Cholesterol 49 12/12/2018 09:28 AM    HDL Cholesterol 55 07/23/2018 05:35 AM    HDL Cholesterol 48 06/13/2018 10:26 AM    HDL Cholesterol 51 12/19/2017 09:09 AM    HDL Cholesterol 47 06/20/2017 09:29 AM    LDL, calculated 95 12/12/2018 09:28 AM    LDL, calculated 89.2 07/23/2018 05:35 AM    LDL, calculated 92 06/13/2018 10:26 AM    LDL, calculated 95 12/19/2017 09:09 AM    LDL, calculated 87 06/20/2017 09:29 AM    Triglyceride 102 12/12/2018 09:28 AM    Triglyceride 49 07/23/2018 05:35 AM    Triglyceride 95 06/13/2018 10:26 AM    Triglyceride 85 12/19/2017 09:09 AM    Triglyceride 98 06/20/2017 09:29 AM    CHOL/HDL Ratio 2.8 07/23/2018 05:35 AM     No results found for this or any previous visit. Assessment:         Patient Active Problem List    Diagnosis Date Noted    Acute pancreatitis 07/23/2018     Priority: 1 - One    Systolic CHF, acute ([facility] [address] 10/11/2020    Acute left-sided CHF (congestive heart failure) ([facility] [address] 10/06/2020    New onset left bundle branch block (LBBB) 10/06/2020    Coumadin toxicity 10/06/2020    Persistent atrial fibrillation ([facility] [address] 07/22/2018    Abnormal EKG 10/13/2016    PAC (premature atrial contraction) 10/13/2016    Primary osteoarthritis 10/03/2016    HTN (hypertension)     DJD (degenerative joint disease)     Colitis     Ventricular ectopy     Hyperlipemia      here for hospital f/u--at [facility] 10/6-10/14/20.  admitted for Acute left-sided CHF (congestive heart failure) ([facility] [address] [I50.1]. Hx persistent/chronic afib--rate controlled on anticoag, hypertension/hypertensive CVD, IVCD, DJD, colitis, pancreatitis, prior femur fx, ventricular ectopy, without known hx CAD or CHF. Seen in office by me in August, doing well at that time.   Cost issues with Eliquis/Xarelto and now on warfarin (which she does not like).  To ER with complaint of shortness of breath, diarrhea, anorexia, pedal edema, GREENBERG, and extreme weakness which have all progressed over the past several weeks. Kristin Perea has had difficulty ambulating in her home over the past 24 hours.  She reports being up to the bathroom with diarrhea x5 last night.  She notes the preference to lie on her R side to sleep.  She questions whether the recent adjustment in her Coumadin might have precipitated symptoms.  The chest x-ray obtained in the ED was noted for a moderately large right pleural effusion and increased interstitial edema. These findings were not observed on previous CXR July 2019.  She was initially administered 1 L of normal saline for the sepsis protocol.  No other evidenced for acute infection was detected.  Oximetry was low 86% on room air; correcting with nasal oxygen 2 L/min NC to 95%.   She denied complaints of chest pain, angina, pleurisy.  She has no history of coronary disease.  She has not been treated with diuretic therapy in past.  Trop neg. EKG with afib, rate controlled with LBBB (previously IVCD NOT significantly changed). Given IV lasix, now on po. On low dose ACEI, metoprolol, anticoag. . Echo with LVEF 40-45%. Chest CT with pleural effusion, coronary calcification, possible gallstone. Abd U/S and HIDA scan neg. Some diarrhea. BP low at home (home health). Plan:     Stay off lisinopril  Continue metoprolol  Continue eliquis  Continue lasix at current dosing--may need to reduce  Daily weights, BP checks  Fu with PCP as planned  RTC 3 mos, sooner floresita Green MD    Pursuant to the emergency declaration under the [facility] waiver authority and the [employer] Act, this Virtual Visit was conducted, with patient's consent, to reduce the patient's risk of exposure to COVID-19 and provide continuity of care for an established patient. Services were provided through a telephone to substitute for in-person visit.     TIME SPENT 22 mins

## 2020-10-24 PROCEDURE — 3331090002 HH PPS REVENUE DEBIT

## 2020-10-24 PROCEDURE — 3331090001 HH PPS REVENUE CREDIT

## 2020-10-25 VITALS
SYSTOLIC BLOOD PRESSURE: 120 MMHG | DIASTOLIC BLOOD PRESSURE: 68 MMHG | OXYGEN SATURATION: 98 % | HEART RATE: 99 BPM | TEMPERATURE: 97.8 F

## 2020-10-25 PROCEDURE — 3331090001 HH PPS REVENUE CREDIT

## 2020-10-25 PROCEDURE — 3331090002 HH PPS REVENUE DEBIT

## 2020-10-26 ENCOUNTER — HOME CARE VISIT (OUTPATIENT)
Dept: SCHEDULING | Facility: HOME HEALTH | Age: 85
End: 2020-10-26
Payer: MEDICARE

## 2020-10-26 VITALS
TEMPERATURE: 97.6 F | HEART RATE: 99 BPM | SYSTOLIC BLOOD PRESSURE: 100 MMHG | DIASTOLIC BLOOD PRESSURE: 68 MMHG | OXYGEN SATURATION: 93 %

## 2020-10-26 PROCEDURE — 3331090002 HH PPS REVENUE DEBIT

## 2020-10-26 PROCEDURE — G0157 HHC PT ASSISTANT EA 15: HCPCS

## 2020-10-26 PROCEDURE — 3331090001 HH PPS REVENUE CREDIT

## 2020-10-27 ENCOUNTER — HOME CARE VISIT (OUTPATIENT)
Dept: SCHEDULING | Facility: HOME HEALTH | Age: 85
End: 2020-10-27
Payer: MEDICARE

## 2020-10-27 VITALS
BODY MASS INDEX: 22.3 KG/M2 | OXYGEN SATURATION: 95 % | RESPIRATION RATE: 18 BRPM | TEMPERATURE: 98.6 F | HEART RATE: 70 BPM | WEIGHT: 134 LBS | SYSTOLIC BLOOD PRESSURE: 100 MMHG | DIASTOLIC BLOOD PRESSURE: 80 MMHG

## 2020-10-27 PROCEDURE — 3331090001 HH PPS REVENUE CREDIT

## 2020-10-27 PROCEDURE — G0152 HHCP-SERV OF OT,EA 15 MIN: HCPCS

## 2020-10-27 PROCEDURE — 3331090002 HH PPS REVENUE DEBIT

## 2020-10-27 PROCEDURE — G0299 HHS/HOSPICE OF RN EA 15 MIN: HCPCS

## 2020-10-28 PROCEDURE — 3331090001 HH PPS REVENUE CREDIT

## 2020-10-28 PROCEDURE — 3331090002 HH PPS REVENUE DEBIT

## 2020-10-29 ENCOUNTER — PATIENT OUTREACH (OUTPATIENT)
Dept: CASE MANAGEMENT | Age: 85
End: 2020-10-29

## 2020-10-29 PROCEDURE — 3331090001 HH PPS REVENUE CREDIT

## 2020-10-29 PROCEDURE — 3331090002 HH PPS REVENUE DEBIT

## 2020-10-29 NOTE — PROGRESS NOTES
Patient resolved from Transition of Care episode on 10/29/2020  Discussed COVID-19 related testing which was not done at this time. Patient/family has been provided the following resources and education related to COVID-19:                         Signs, symptoms and red flags related to COVID-19            CDC exposure and quarantine guidelines            [employer] exposure contact - [phone]            Contact for their local Department of Health                 Patient currently reports that the following symptoms have improved:  no new symptoms. No further outreach scheduled with this CTN/ACM/LPN/HC/ MA. Episode of Care resolved. Patient has this CTN/ACM/LPN/HC/MA contact information if future needs arise.

## 2020-10-30 ENCOUNTER — HOME CARE VISIT (OUTPATIENT)
Dept: SCHEDULING | Facility: HOME HEALTH | Age: 85
End: 2020-10-30
Payer: MEDICARE

## 2020-10-30 VITALS — SYSTOLIC BLOOD PRESSURE: 94 MMHG | DIASTOLIC BLOOD PRESSURE: 65 MMHG | HEART RATE: 94 BPM

## 2020-10-30 PROCEDURE — G0157 HHC PT ASSISTANT EA 15: HCPCS

## 2020-10-30 PROCEDURE — G0299 HHS/HOSPICE OF RN EA 15 MIN: HCPCS

## 2020-10-30 PROCEDURE — 3331090002 HH PPS REVENUE DEBIT

## 2020-10-30 PROCEDURE — 3331090001 HH PPS REVENUE CREDIT

## 2020-10-30 PROCEDURE — G0152 HHCP-SERV OF OT,EA 15 MIN: HCPCS

## 2020-10-31 PROCEDURE — 3331090002 HH PPS REVENUE DEBIT

## 2020-10-31 PROCEDURE — 3331090001 HH PPS REVENUE CREDIT

## 2020-11-01 VITALS
RESPIRATION RATE: 20 BRPM | TEMPERATURE: 98.4 F | DIASTOLIC BLOOD PRESSURE: 64 MMHG | WEIGHT: 133 LBS | SYSTOLIC BLOOD PRESSURE: 94 MMHG | OXYGEN SATURATION: 97 % | HEART RATE: 94 BPM | BODY MASS INDEX: 22.13 KG/M2

## 2020-11-01 PROCEDURE — 3331090002 HH PPS REVENUE DEBIT

## 2020-11-01 PROCEDURE — 3331090001 HH PPS REVENUE CREDIT

## 2020-11-02 ENCOUNTER — HOME CARE VISIT (OUTPATIENT)
Dept: SCHEDULING | Facility: HOME HEALTH | Age: 85
End: 2020-11-02
Payer: MEDICARE

## 2020-11-02 PROCEDURE — 3331090002 HH PPS REVENUE DEBIT

## 2020-11-02 PROCEDURE — G0152 HHCP-SERV OF OT,EA 15 MIN: HCPCS

## 2020-11-02 PROCEDURE — 3331090001 HH PPS REVENUE CREDIT

## 2020-11-03 ENCOUNTER — HOME CARE VISIT (OUTPATIENT)
Dept: SCHEDULING | Facility: HOME HEALTH | Age: 85
End: 2020-11-03
Payer: MEDICARE

## 2020-11-03 VITALS
DIASTOLIC BLOOD PRESSURE: 70 MMHG | OXYGEN SATURATION: 96 % | SYSTOLIC BLOOD PRESSURE: 109 MMHG | TEMPERATURE: 97.6 F | HEART RATE: 95 BPM

## 2020-11-03 PROCEDURE — G0157 HHC PT ASSISTANT EA 15: HCPCS

## 2020-11-03 PROCEDURE — 3331090002 HH PPS REVENUE DEBIT

## 2020-11-03 PROCEDURE — G0299 HHS/HOSPICE OF RN EA 15 MIN: HCPCS

## 2020-11-03 PROCEDURE — 3331090001 HH PPS REVENUE CREDIT

## 2020-11-04 PROCEDURE — 3331090002 HH PPS REVENUE DEBIT

## 2020-11-04 PROCEDURE — 3331090001 HH PPS REVENUE CREDIT

## 2020-11-05 VITALS
HEART RATE: 94 BPM | SYSTOLIC BLOOD PRESSURE: 109 MMHG | WEIGHT: 136 LBS | DIASTOLIC BLOOD PRESSURE: 70 MMHG | OXYGEN SATURATION: 96 % | TEMPERATURE: 97.6 F | RESPIRATION RATE: 20 BRPM | BODY MASS INDEX: 22.63 KG/M2

## 2020-11-05 PROCEDURE — 3331090001 HH PPS REVENUE CREDIT

## 2020-11-05 PROCEDURE — 3331090002 HH PPS REVENUE DEBIT

## 2020-11-06 ENCOUNTER — HOME CARE VISIT (OUTPATIENT)
Dept: SCHEDULING | Facility: HOME HEALTH | Age: 85
End: 2020-11-06
Payer: MEDICARE

## 2020-11-06 PROCEDURE — G0157 HHC PT ASSISTANT EA 15: HCPCS

## 2020-11-06 PROCEDURE — 3331090001 HH PPS REVENUE CREDIT

## 2020-11-06 PROCEDURE — G0299 HHS/HOSPICE OF RN EA 15 MIN: HCPCS

## 2020-11-06 PROCEDURE — 3331090002 HH PPS REVENUE DEBIT

## 2020-11-06 PROCEDURE — G0152 HHCP-SERV OF OT,EA 15 MIN: HCPCS

## 2020-11-07 PROCEDURE — 3331090002 HH PPS REVENUE DEBIT

## 2020-11-07 PROCEDURE — 3331090001 HH PPS REVENUE CREDIT

## 2020-11-08 VITALS
HEART RATE: 92 BPM | SYSTOLIC BLOOD PRESSURE: 115 MMHG | OXYGEN SATURATION: 95 % | TEMPERATURE: 97.8 F | DIASTOLIC BLOOD PRESSURE: 66 MMHG

## 2020-11-08 PROCEDURE — 3331090001 HH PPS REVENUE CREDIT

## 2020-11-08 PROCEDURE — 3331090002 HH PPS REVENUE DEBIT

## 2020-11-09 VITALS
OXYGEN SATURATION: 94 % | HEART RATE: 92 BPM | TEMPERATURE: 97.8 F | BODY MASS INDEX: 22.63 KG/M2 | RESPIRATION RATE: 20 BRPM | SYSTOLIC BLOOD PRESSURE: 115 MMHG | WEIGHT: 136 LBS | DIASTOLIC BLOOD PRESSURE: 66 MMHG

## 2020-11-09 PROCEDURE — 3331090001 HH PPS REVENUE CREDIT

## 2020-11-09 PROCEDURE — 3331090002 HH PPS REVENUE DEBIT

## 2020-11-10 ENCOUNTER — HOME CARE VISIT (OUTPATIENT)
Dept: SCHEDULING | Facility: HOME HEALTH | Age: 85
End: 2020-11-10
Payer: MEDICARE

## 2020-11-10 VITALS
DIASTOLIC BLOOD PRESSURE: 72 MMHG | TEMPERATURE: 97.5 F | HEART RATE: 86 BPM | OXYGEN SATURATION: 94 % | SYSTOLIC BLOOD PRESSURE: 122 MMHG

## 2020-11-10 PROCEDURE — 3331090002 HH PPS REVENUE DEBIT

## 2020-11-10 PROCEDURE — G0157 HHC PT ASSISTANT EA 15: HCPCS

## 2020-11-10 PROCEDURE — G0152 HHCP-SERV OF OT,EA 15 MIN: HCPCS

## 2020-11-10 PROCEDURE — 3331090001 HH PPS REVENUE CREDIT

## 2020-11-10 PROCEDURE — G0299 HHS/HOSPICE OF RN EA 15 MIN: HCPCS

## 2020-11-10 NOTE — Clinical Note
Patient had fall this date reporting LOB when ambulating from livingroom to kitchen using small base quad cane without assistance while caregiver and  out of the home. Patient reporting scrape on right hand only injury sustain during fall.

## 2020-11-11 PROCEDURE — 3331090001 HH PPS REVENUE CREDIT

## 2020-11-11 PROCEDURE — 3331090002 HH PPS REVENUE DEBIT

## 2020-11-12 PROCEDURE — 3331090002 HH PPS REVENUE DEBIT

## 2020-11-12 PROCEDURE — 3331090001 HH PPS REVENUE CREDIT

## 2020-11-13 ENCOUNTER — HOME CARE VISIT (OUTPATIENT)
Dept: SCHEDULING | Facility: HOME HEALTH | Age: 85
End: 2020-11-13
Payer: MEDICARE

## 2020-11-13 VITALS
WEIGHT: 132 LBS | TEMPERATURE: 98.2 F | HEART RATE: 78 BPM | OXYGEN SATURATION: 98 % | SYSTOLIC BLOOD PRESSURE: 130 MMHG | DIASTOLIC BLOOD PRESSURE: 70 MMHG | RESPIRATION RATE: 20 BRPM | BODY MASS INDEX: 21.97 KG/M2

## 2020-11-13 PROCEDURE — G0299 HHS/HOSPICE OF RN EA 15 MIN: HCPCS

## 2020-11-13 PROCEDURE — G0157 HHC PT ASSISTANT EA 15: HCPCS

## 2020-11-13 PROCEDURE — 3331090002 HH PPS REVENUE DEBIT

## 2020-11-13 PROCEDURE — 3331090001 HH PPS REVENUE CREDIT

## 2020-11-13 PROCEDURE — 3331090003 HH PPS REVENUE ADJ

## 2020-11-13 PROCEDURE — G0152 HHCP-SERV OF OT,EA 15 MIN: HCPCS

## 2020-11-14 PROCEDURE — 3331090001 HH PPS REVENUE CREDIT

## 2020-11-14 PROCEDURE — 3331090002 HH PPS REVENUE DEBIT

## 2020-11-15 PROCEDURE — 3331090002 HH PPS REVENUE DEBIT

## 2020-11-15 PROCEDURE — 3331090001 HH PPS REVENUE CREDIT

## 2020-11-16 VITALS
BODY MASS INDEX: 21.97 KG/M2 | RESPIRATION RATE: 20 BRPM | DIASTOLIC BLOOD PRESSURE: 70 MMHG | HEART RATE: 82 BPM | TEMPERATURE: 98.8 F | SYSTOLIC BLOOD PRESSURE: 108 MMHG | WEIGHT: 132 LBS | OXYGEN SATURATION: 96 %

## 2020-11-16 PROCEDURE — 3331090001 HH PPS REVENUE CREDIT

## 2020-11-16 PROCEDURE — 3331090002 HH PPS REVENUE DEBIT

## 2020-11-17 ENCOUNTER — HOME CARE VISIT (OUTPATIENT)
Dept: SCHEDULING | Facility: HOME HEALTH | Age: 85
End: 2020-11-17
Payer: MEDICARE

## 2020-11-17 PROCEDURE — 3331090001 HH PPS REVENUE CREDIT

## 2020-11-17 PROCEDURE — 400013 HH SOC

## 2020-11-17 PROCEDURE — 3331090002 HH PPS REVENUE DEBIT

## 2020-11-17 PROCEDURE — G0152 HHCP-SERV OF OT,EA 15 MIN: HCPCS

## 2020-11-18 ENCOUNTER — HOME CARE VISIT (OUTPATIENT)
Dept: SCHEDULING | Facility: HOME HEALTH | Age: 85
End: 2020-11-18
Payer: MEDICARE

## 2020-11-18 ENCOUNTER — HOME CARE VISIT (OUTPATIENT)
Dept: HOME HEALTH SERVICES | Facility: HOME HEALTH | Age: 85
End: 2020-11-18
Payer: MEDICARE

## 2020-11-18 VITALS
SYSTOLIC BLOOD PRESSURE: 118 MMHG | TEMPERATURE: 97.5 F | RESPIRATION RATE: 18 BRPM | DIASTOLIC BLOOD PRESSURE: 70 MMHG | HEART RATE: 79 BPM | OXYGEN SATURATION: 92 %

## 2020-11-18 PROCEDURE — 3331090001 HH PPS REVENUE CREDIT

## 2020-11-18 PROCEDURE — G0151 HHCP-SERV OF PT,EA 15 MIN: HCPCS

## 2020-11-18 PROCEDURE — 3331090002 HH PPS REVENUE DEBIT

## 2020-11-19 ENCOUNTER — HOME CARE VISIT (OUTPATIENT)
Dept: SCHEDULING | Facility: HOME HEALTH | Age: 85
End: 2020-11-19
Payer: MEDICARE

## 2020-11-19 VITALS
SYSTOLIC BLOOD PRESSURE: 123 MMHG | DIASTOLIC BLOOD PRESSURE: 66 MMHG | OXYGEN SATURATION: 100 % | HEART RATE: 78 BPM | TEMPERATURE: 98.2 F

## 2020-11-19 PROCEDURE — 3331090001 HH PPS REVENUE CREDIT

## 2020-11-19 PROCEDURE — 3331090002 HH PPS REVENUE DEBIT

## 2020-11-19 PROCEDURE — G0157 HHC PT ASSISTANT EA 15: HCPCS

## 2020-11-20 ENCOUNTER — HOME CARE VISIT (OUTPATIENT)
Dept: SCHEDULING | Facility: HOME HEALTH | Age: 85
End: 2020-11-20
Payer: MEDICARE

## 2020-11-20 PROCEDURE — 3331090001 HH PPS REVENUE CREDIT

## 2020-11-20 PROCEDURE — G0152 HHCP-SERV OF OT,EA 15 MIN: HCPCS

## 2020-11-20 PROCEDURE — 3331090002 HH PPS REVENUE DEBIT

## 2020-11-21 PROCEDURE — 3331090002 HH PPS REVENUE DEBIT

## 2020-11-21 PROCEDURE — 3331090001 HH PPS REVENUE CREDIT

## 2020-11-22 PROCEDURE — 3331090002 HH PPS REVENUE DEBIT

## 2020-11-22 PROCEDURE — 3331090001 HH PPS REVENUE CREDIT

## 2020-11-23 ENCOUNTER — HOME CARE VISIT (OUTPATIENT)
Dept: SCHEDULING | Facility: HOME HEALTH | Age: 85
End: 2020-11-23
Payer: MEDICARE

## 2020-11-23 PROCEDURE — 3331090002 HH PPS REVENUE DEBIT

## 2020-11-23 PROCEDURE — G0157 HHC PT ASSISTANT EA 15: HCPCS

## 2020-11-23 PROCEDURE — G0152 HHCP-SERV OF OT,EA 15 MIN: HCPCS

## 2020-11-23 PROCEDURE — 3331090001 HH PPS REVENUE CREDIT

## 2020-11-24 ENCOUNTER — HOME CARE VISIT (OUTPATIENT)
Dept: SCHEDULING | Facility: HOME HEALTH | Age: 85
End: 2020-11-24
Payer: MEDICARE

## 2020-11-24 PROCEDURE — 3331090002 HH PPS REVENUE DEBIT

## 2020-11-24 PROCEDURE — G0152 HHCP-SERV OF OT,EA 15 MIN: HCPCS

## 2020-11-24 PROCEDURE — 3331090001 HH PPS REVENUE CREDIT

## 2020-11-25 ENCOUNTER — HOME CARE VISIT (OUTPATIENT)
Dept: SCHEDULING | Facility: HOME HEALTH | Age: 85
End: 2020-11-25
Payer: MEDICARE

## 2020-11-25 PROCEDURE — G0157 HHC PT ASSISTANT EA 15: HCPCS

## 2020-11-25 PROCEDURE — 3331090002 HH PPS REVENUE DEBIT

## 2020-11-25 PROCEDURE — 3331090001 HH PPS REVENUE CREDIT

## 2020-11-26 PROCEDURE — 3331090001 HH PPS REVENUE CREDIT

## 2020-11-26 PROCEDURE — 3331090002 HH PPS REVENUE DEBIT

## 2020-11-27 PROCEDURE — 3331090001 HH PPS REVENUE CREDIT

## 2020-11-27 PROCEDURE — 3331090002 HH PPS REVENUE DEBIT

## 2020-11-28 PROCEDURE — 3331090002 HH PPS REVENUE DEBIT

## 2020-11-28 PROCEDURE — 3331090001 HH PPS REVENUE CREDIT

## 2020-11-29 PROCEDURE — 3331090001 HH PPS REVENUE CREDIT

## 2020-11-29 PROCEDURE — 3331090002 HH PPS REVENUE DEBIT

## 2020-11-30 VITALS
HEART RATE: 95 BPM | TEMPERATURE: 97.4 F | SYSTOLIC BLOOD PRESSURE: 112 MMHG | OXYGEN SATURATION: 97 % | DIASTOLIC BLOOD PRESSURE: 62 MMHG

## 2020-11-30 PROCEDURE — 3331090001 HH PPS REVENUE CREDIT

## 2020-11-30 PROCEDURE — 3331090002 HH PPS REVENUE DEBIT

## 2020-12-01 ENCOUNTER — HOME CARE VISIT (OUTPATIENT)
Dept: SCHEDULING | Facility: HOME HEALTH | Age: 85
End: 2020-12-01
Payer: MEDICARE

## 2020-12-01 VITALS
SYSTOLIC BLOOD PRESSURE: 122 MMHG | DIASTOLIC BLOOD PRESSURE: 77 MMHG | OXYGEN SATURATION: 96 % | TEMPERATURE: 97.9 F | HEART RATE: 84 BPM

## 2020-12-01 PROCEDURE — 3331090002 HH PPS REVENUE DEBIT

## 2020-12-01 PROCEDURE — G0152 HHCP-SERV OF OT,EA 15 MIN: HCPCS

## 2020-12-01 PROCEDURE — G0157 HHC PT ASSISTANT EA 15: HCPCS

## 2020-12-01 PROCEDURE — 3331090001 HH PPS REVENUE CREDIT

## 2020-12-02 PROCEDURE — 3331090002 HH PPS REVENUE DEBIT

## 2020-12-02 PROCEDURE — 3331090001 HH PPS REVENUE CREDIT

## 2020-12-03 ENCOUNTER — HOME CARE VISIT (OUTPATIENT)
Dept: SCHEDULING | Facility: HOME HEALTH | Age: 85
End: 2020-12-03
Payer: MEDICARE

## 2020-12-03 PROCEDURE — 3331090001 HH PPS REVENUE CREDIT

## 2020-12-03 PROCEDURE — 3331090002 HH PPS REVENUE DEBIT

## 2020-12-03 PROCEDURE — G0157 HHC PT ASSISTANT EA 15: HCPCS

## 2020-12-03 PROCEDURE — G0152 HHCP-SERV OF OT,EA 15 MIN: HCPCS

## 2020-12-04 VITALS
DIASTOLIC BLOOD PRESSURE: 83 MMHG | TEMPERATURE: 97.7 F | HEART RATE: 87 BPM | SYSTOLIC BLOOD PRESSURE: 138 MMHG | OXYGEN SATURATION: 96 %

## 2020-12-04 PROCEDURE — 3331090001 HH PPS REVENUE CREDIT

## 2020-12-04 PROCEDURE — 3331090002 HH PPS REVENUE DEBIT

## 2020-12-05 PROCEDURE — 3331090001 HH PPS REVENUE CREDIT

## 2020-12-05 PROCEDURE — 3331090002 HH PPS REVENUE DEBIT

## 2020-12-06 PROCEDURE — 3331090002 HH PPS REVENUE DEBIT

## 2020-12-06 PROCEDURE — 3331090001 HH PPS REVENUE CREDIT

## 2020-12-07 ENCOUNTER — HOME CARE VISIT (OUTPATIENT)
Dept: SCHEDULING | Facility: HOME HEALTH | Age: 85
End: 2020-12-07
Payer: MEDICARE

## 2020-12-07 VITALS
TEMPERATURE: 98.7 F | SYSTOLIC BLOOD PRESSURE: 111 MMHG | OXYGEN SATURATION: 99 % | HEART RATE: 67 BPM | DIASTOLIC BLOOD PRESSURE: 73 MMHG

## 2020-12-07 PROCEDURE — 3331090001 HH PPS REVENUE CREDIT

## 2020-12-07 PROCEDURE — 3331090002 HH PPS REVENUE DEBIT

## 2020-12-07 PROCEDURE — G0157 HHC PT ASSISTANT EA 15: HCPCS

## 2020-12-07 NOTE — Clinical Note
Please see below. Lisa Hooper PTA  ----- Message -----  From: Eva Edwards NP  Sent: 12/7/2020   4:30 PM EST  To: Lisa Hooper PTA      [facility] to help with the skin tear. Vida LAMAR   ----- Message -----  From: Sugey November, PTA  Sent: 12/7/2020   4:04 PM EST  To: Eleanor Asencio NP    Patient obtained skin tear while plugging in exercise equipment in home. PTA kept patient from falling and helped her onto sofa. Patient obtained skin tear on sofa arm rest.  Skin tear is approximately 3\" x 3\". Skin tear is on right forearm. Patient requesting nursing assistance due to large size of skin tear. If you are in agreement please advise so we can have nursing come out to further assess skin tear. Thank you.   Lisa Hooper PTA

## 2020-12-07 NOTE — PROGRESS NOTES
Patient obtained skin tear while plugging in exercise equipment in home. PTA kept patient from falling and helped her onto sofa. Patient obtained skin tear on sofa arm rest.  Skin tear is approximately 3\" x 3\". Skin tear is on right forearm. Patient requesting nursing assistance due to large size of skin tear. If you are in agreement please advise so we can have nursing come out to further assess skin tear. Thank you.   Lola locke PTA

## 2020-12-07 NOTE — Clinical Note
See below. Nam Chun  ----- Message -----  From: Venkat Krishnamurthy NP  Sent: 12/7/2020   4:37 PM EST  To: Nam Chun PTA          ----- Message -----  From: Shikha Fernando PTA  Sent: 12/7/2020   4:04 PM EST  To: Shi Shahid NP    Patient obtained skin tear while plugging in exercise equipment in home. PTA kept patient from falling and helped her onto sofa. Patient obtained skin tear on sofa arm rest.  Skin tear is approximately 3\" x 3\". Skin tear is on right forearm. Patient requesting nursing assistance due to large size of skin tear. If you are in agreement please advise so we can have nursing come out to further assess skin tear. Thank you.   Nam Chun PTA

## 2020-12-08 ENCOUNTER — HOME CARE VISIT (OUTPATIENT)
Dept: SCHEDULING | Facility: HOME HEALTH | Age: 85
End: 2020-12-08
Payer: MEDICARE

## 2020-12-08 PROCEDURE — G0152 HHCP-SERV OF OT,EA 15 MIN: HCPCS

## 2020-12-08 PROCEDURE — 3331090001 HH PPS REVENUE CREDIT

## 2020-12-08 PROCEDURE — 3331090002 HH PPS REVENUE DEBIT

## 2020-12-09 ENCOUNTER — HOME CARE VISIT (OUTPATIENT)
Dept: SCHEDULING | Facility: HOME HEALTH | Age: 85
End: 2020-12-09
Payer: MEDICARE

## 2020-12-09 PROCEDURE — 3331090002 HH PPS REVENUE DEBIT

## 2020-12-09 PROCEDURE — G0299 HHS/HOSPICE OF RN EA 15 MIN: HCPCS

## 2020-12-09 PROCEDURE — G0151 HHCP-SERV OF PT,EA 15 MIN: HCPCS

## 2020-12-09 PROCEDURE — 3331090001 HH PPS REVENUE CREDIT

## 2020-12-10 PROCEDURE — 3331090002 HH PPS REVENUE DEBIT

## 2020-12-10 PROCEDURE — 3331090001 HH PPS REVENUE CREDIT

## 2020-12-11 VITALS
DIASTOLIC BLOOD PRESSURE: 77 MMHG | SYSTOLIC BLOOD PRESSURE: 118 MMHG | OXYGEN SATURATION: 95 % | RESPIRATION RATE: 18 BRPM | HEART RATE: 87 BPM

## 2020-12-11 PROCEDURE — 3331090002 HH PPS REVENUE DEBIT

## 2020-12-11 PROCEDURE — 3331090001 HH PPS REVENUE CREDIT

## 2020-12-12 PROCEDURE — 3331090002 HH PPS REVENUE DEBIT

## 2020-12-12 PROCEDURE — 3331090001 HH PPS REVENUE CREDIT

## 2020-12-13 PROCEDURE — 3331090001 HH PPS REVENUE CREDIT

## 2020-12-13 PROCEDURE — 3331090002 HH PPS REVENUE DEBIT

## 2020-12-13 PROCEDURE — 3331090003 HH PPS REVENUE ADJ

## 2020-12-14 ENCOUNTER — HOME CARE VISIT (OUTPATIENT)
Dept: SCHEDULING | Facility: HOME HEALTH | Age: 85
End: 2020-12-14
Payer: MEDICARE

## 2020-12-14 PROCEDURE — G0157 HHC PT ASSISTANT EA 15: HCPCS

## 2020-12-14 PROCEDURE — 3331090002 HH PPS REVENUE DEBIT

## 2020-12-14 PROCEDURE — 3331090001 HH PPS REVENUE CREDIT

## 2020-12-14 PROCEDURE — 400014 HH F/U

## 2020-12-15 VITALS
HEART RATE: 84 BPM | TEMPERATURE: 98.1 F | DIASTOLIC BLOOD PRESSURE: 78 MMHG | SYSTOLIC BLOOD PRESSURE: 124 MMHG | RESPIRATION RATE: 17 BRPM | OXYGEN SATURATION: 96 %

## 2020-12-15 PROCEDURE — 3331090001 HH PPS REVENUE CREDIT

## 2020-12-15 PROCEDURE — 3331090002 HH PPS REVENUE DEBIT

## 2020-12-16 VITALS
RESPIRATION RATE: 20 BRPM | WEIGHT: 131 LBS | OXYGEN SATURATION: 97 % | SYSTOLIC BLOOD PRESSURE: 118 MMHG | BODY MASS INDEX: 21.8 KG/M2 | HEART RATE: 86 BPM | DIASTOLIC BLOOD PRESSURE: 72 MMHG | TEMPERATURE: 98.2 F

## 2020-12-16 PROCEDURE — 3331090002 HH PPS REVENUE DEBIT

## 2020-12-16 PROCEDURE — 3331090001 HH PPS REVENUE CREDIT

## 2020-12-17 ENCOUNTER — HOME CARE VISIT (OUTPATIENT)
Dept: SCHEDULING | Facility: HOME HEALTH | Age: 85
End: 2020-12-17
Payer: MEDICARE

## 2020-12-17 VITALS
SYSTOLIC BLOOD PRESSURE: 110 MMHG | RESPIRATION RATE: 16 BRPM | DIASTOLIC BLOOD PRESSURE: 50 MMHG | OXYGEN SATURATION: 94 % | HEART RATE: 81 BPM | TEMPERATURE: 98.5 F

## 2020-12-17 PROCEDURE — 3331090001 HH PPS REVENUE CREDIT

## 2020-12-17 PROCEDURE — 3331090002 HH PPS REVENUE DEBIT

## 2020-12-17 PROCEDURE — G0299 HHS/HOSPICE OF RN EA 15 MIN: HCPCS

## 2020-12-17 PROCEDURE — G0157 HHC PT ASSISTANT EA 15: HCPCS

## 2020-12-18 PROCEDURE — 3331090002 HH PPS REVENUE DEBIT

## 2020-12-18 PROCEDURE — 3331090001 HH PPS REVENUE CREDIT

## 2020-12-19 ENCOUNTER — HOME CARE VISIT (OUTPATIENT)
Dept: SCHEDULING | Facility: HOME HEALTH | Age: 85
End: 2020-12-19
Payer: MEDICARE

## 2020-12-19 VITALS
BODY MASS INDEX: 22.33 KG/M2 | HEART RATE: 84 BPM | OXYGEN SATURATION: 95 % | WEIGHT: 134.2 LBS | DIASTOLIC BLOOD PRESSURE: 70 MMHG | RESPIRATION RATE: 18 BRPM | SYSTOLIC BLOOD PRESSURE: 130 MMHG | TEMPERATURE: 97.4 F

## 2020-12-19 PROCEDURE — 3331090002 HH PPS REVENUE DEBIT

## 2020-12-19 PROCEDURE — 3331090001 HH PPS REVENUE CREDIT

## 2020-12-19 PROCEDURE — G0299 HHS/HOSPICE OF RN EA 15 MIN: HCPCS

## 2020-12-20 PROCEDURE — 3331090002 HH PPS REVENUE DEBIT

## 2020-12-20 PROCEDURE — 3331090001 HH PPS REVENUE CREDIT

## 2020-12-21 ENCOUNTER — HOME CARE VISIT (OUTPATIENT)
Dept: SCHEDULING | Facility: HOME HEALTH | Age: 85
End: 2020-12-21
Payer: MEDICARE

## 2020-12-21 PROCEDURE — 3331090001 HH PPS REVENUE CREDIT

## 2020-12-21 PROCEDURE — 3331090002 HH PPS REVENUE DEBIT

## 2020-12-21 PROCEDURE — G0299 HHS/HOSPICE OF RN EA 15 MIN: HCPCS

## 2020-12-22 ENCOUNTER — HOME CARE VISIT (OUTPATIENT)
Dept: SCHEDULING | Facility: HOME HEALTH | Age: 85
End: 2020-12-22
Payer: MEDICARE

## 2020-12-22 VITALS
TEMPERATURE: 98.1 F | RESPIRATION RATE: 18 BRPM | DIASTOLIC BLOOD PRESSURE: 62 MMHG | SYSTOLIC BLOOD PRESSURE: 100 MMHG | OXYGEN SATURATION: 98 % | HEART RATE: 93 BPM

## 2020-12-22 PROCEDURE — 3331090002 HH PPS REVENUE DEBIT

## 2020-12-22 PROCEDURE — 3331090001 HH PPS REVENUE CREDIT

## 2020-12-22 PROCEDURE — G0157 HHC PT ASSISTANT EA 15: HCPCS

## 2020-12-23 ENCOUNTER — HOME CARE VISIT (OUTPATIENT)
Dept: HOME HEALTH SERVICES | Facility: HOME HEALTH | Age: 85
End: 2020-12-23
Payer: MEDICARE

## 2020-12-23 ENCOUNTER — HOME CARE VISIT (OUTPATIENT)
Dept: SCHEDULING | Facility: HOME HEALTH | Age: 85
End: 2020-12-23
Payer: MEDICARE

## 2020-12-23 PROCEDURE — G0157 HHC PT ASSISTANT EA 15: HCPCS

## 2020-12-23 PROCEDURE — 3331090002 HH PPS REVENUE DEBIT

## 2020-12-23 PROCEDURE — 3331090001 HH PPS REVENUE CREDIT

## 2020-12-24 ENCOUNTER — HOME CARE VISIT (OUTPATIENT)
Dept: HOME HEALTH SERVICES | Facility: HOME HEALTH | Age: 85
End: 2020-12-24
Payer: MEDICARE

## 2020-12-24 VITALS
HEART RATE: 82 BPM | TEMPERATURE: 98 F | DIASTOLIC BLOOD PRESSURE: 80 MMHG | OXYGEN SATURATION: 98 % | SYSTOLIC BLOOD PRESSURE: 124 MMHG

## 2020-12-24 PROCEDURE — 3331090002 HH PPS REVENUE DEBIT

## 2020-12-24 PROCEDURE — 3331090001 HH PPS REVENUE CREDIT

## 2020-12-25 PROCEDURE — 3331090001 HH PPS REVENUE CREDIT

## 2020-12-25 PROCEDURE — 3331090002 HH PPS REVENUE DEBIT

## 2020-12-26 PROCEDURE — 3331090001 HH PPS REVENUE CREDIT

## 2020-12-26 PROCEDURE — 3331090002 HH PPS REVENUE DEBIT

## 2020-12-27 PROCEDURE — 3331090001 HH PPS REVENUE CREDIT

## 2020-12-27 PROCEDURE — 3331090002 HH PPS REVENUE DEBIT

## 2020-12-28 ENCOUNTER — HOME CARE VISIT (OUTPATIENT)
Dept: SCHEDULING | Facility: HOME HEALTH | Age: 85
End: 2020-12-28
Payer: MEDICARE

## 2020-12-28 PROCEDURE — 3331090001 HH PPS REVENUE CREDIT

## 2020-12-28 PROCEDURE — 3331090002 HH PPS REVENUE DEBIT

## 2020-12-28 PROCEDURE — G0157 HHC PT ASSISTANT EA 15: HCPCS

## 2020-12-29 ENCOUNTER — HOME CARE VISIT (OUTPATIENT)
Dept: SCHEDULING | Facility: HOME HEALTH | Age: 85
End: 2020-12-29
Payer: MEDICARE

## 2020-12-29 VITALS
TEMPERATURE: 97.9 F | RESPIRATION RATE: 20 BRPM | BODY MASS INDEX: 22.5 KG/M2 | HEART RATE: 83 BPM | DIASTOLIC BLOOD PRESSURE: 70 MMHG | SYSTOLIC BLOOD PRESSURE: 122 MMHG | OXYGEN SATURATION: 96 % | WEIGHT: 135.2 LBS

## 2020-12-29 VITALS
DIASTOLIC BLOOD PRESSURE: 72 MMHG | OXYGEN SATURATION: 96 % | RESPIRATION RATE: 18 BRPM | HEART RATE: 90 BPM | SYSTOLIC BLOOD PRESSURE: 120 MMHG | TEMPERATURE: 97.3 F

## 2020-12-29 PROCEDURE — G0299 HHS/HOSPICE OF RN EA 15 MIN: HCPCS

## 2020-12-29 PROCEDURE — 3331090002 HH PPS REVENUE DEBIT

## 2020-12-29 PROCEDURE — 3331090001 HH PPS REVENUE CREDIT

## 2020-12-30 PROCEDURE — 3331090001 HH PPS REVENUE CREDIT

## 2020-12-30 PROCEDURE — 3331090002 HH PPS REVENUE DEBIT

## 2020-12-31 ENCOUNTER — HOME CARE VISIT (OUTPATIENT)
Dept: SCHEDULING | Facility: HOME HEALTH | Age: 85
End: 2020-12-31
Payer: MEDICARE

## 2020-12-31 ENCOUNTER — HOME CARE VISIT (OUTPATIENT)
Dept: HOME HEALTH SERVICES | Facility: HOME HEALTH | Age: 85
End: 2020-12-31
Payer: MEDICARE

## 2020-12-31 PROCEDURE — 3331090002 HH PPS REVENUE DEBIT

## 2020-12-31 PROCEDURE — G0157 HHC PT ASSISTANT EA 15: HCPCS

## 2020-12-31 PROCEDURE — 3331090001 HH PPS REVENUE CREDIT

## 2021-01-01 ENCOUNTER — HOME CARE VISIT (OUTPATIENT)
Dept: HOME HEALTH SERVICES | Facility: HOME HEALTH | Age: 86
End: 2021-01-01
Payer: MEDICARE

## 2021-01-01 PROCEDURE — 3331090001 HH PPS REVENUE CREDIT

## 2021-01-01 PROCEDURE — 3331090002 HH PPS REVENUE DEBIT

## 2021-01-02 PROCEDURE — 3331090002 HH PPS REVENUE DEBIT

## 2021-01-02 PROCEDURE — 3331090001 HH PPS REVENUE CREDIT

## 2021-01-03 PROCEDURE — 3331090002 HH PPS REVENUE DEBIT

## 2021-01-03 PROCEDURE — 3331090001 HH PPS REVENUE CREDIT

## 2021-01-04 ENCOUNTER — HOME CARE VISIT (OUTPATIENT)
Dept: SCHEDULING | Facility: HOME HEALTH | Age: 86
End: 2021-01-04
Payer: MEDICARE

## 2021-01-04 PROCEDURE — G0299 HHS/HOSPICE OF RN EA 15 MIN: HCPCS

## 2021-01-04 PROCEDURE — 3331090002 HH PPS REVENUE DEBIT

## 2021-01-04 PROCEDURE — 3331090001 HH PPS REVENUE CREDIT

## 2021-01-05 ENCOUNTER — HOME CARE VISIT (OUTPATIENT)
Dept: SCHEDULING | Facility: HOME HEALTH | Age: 86
End: 2021-01-05
Payer: MEDICARE

## 2021-01-05 VITALS
TEMPERATURE: 98 F | HEART RATE: 81 BPM | OXYGEN SATURATION: 94 % | DIASTOLIC BLOOD PRESSURE: 70 MMHG | RESPIRATION RATE: 20 BRPM | SYSTOLIC BLOOD PRESSURE: 120 MMHG

## 2021-01-05 PROCEDURE — 3331090002 HH PPS REVENUE DEBIT

## 2021-01-05 PROCEDURE — G0151 HHCP-SERV OF PT,EA 15 MIN: HCPCS

## 2021-01-05 PROCEDURE — 3331090001 HH PPS REVENUE CREDIT

## 2021-01-05 PROCEDURE — 3331090003 HH PPS REVENUE ADJ

## 2021-01-06 PROCEDURE — 3331090002 HH PPS REVENUE DEBIT

## 2021-01-06 PROCEDURE — 3331090001 HH PPS REVENUE CREDIT

## 2021-01-07 VITALS
HEART RATE: 83 BPM | DIASTOLIC BLOOD PRESSURE: 73 MMHG | TEMPERATURE: 97.8 F | OXYGEN SATURATION: 95 % | SYSTOLIC BLOOD PRESSURE: 123 MMHG | RESPIRATION RATE: 18 BRPM

## 2021-01-07 VITALS
TEMPERATURE: 98.2 F | BODY MASS INDEX: 22.13 KG/M2 | OXYGEN SATURATION: 97 % | WEIGHT: 133 LBS | RESPIRATION RATE: 20 BRPM | SYSTOLIC BLOOD PRESSURE: 110 MMHG | DIASTOLIC BLOOD PRESSURE: 68 MMHG | HEART RATE: 88 BPM

## 2021-01-27 PROBLEM — I77.9 CAROTID ARTERY DISEASE, UNSPECIFIED LATERALITY (HCC): Status: ACTIVE | Noted: 2021-01-27

## 2021-03-04 ENCOUNTER — OFFICE VISIT (OUTPATIENT)
Dept: CARDIOLOGY CLINIC | Age: 86
End: 2021-03-04
Payer: MEDICARE

## 2021-03-04 VITALS
WEIGHT: 135 LBS | HEIGHT: 65 IN | SYSTOLIC BLOOD PRESSURE: 110 MMHG | OXYGEN SATURATION: 96 % | TEMPERATURE: 98.2 F | RESPIRATION RATE: 18 BRPM | BODY MASS INDEX: 22.49 KG/M2 | HEART RATE: 70 BPM | DIASTOLIC BLOOD PRESSURE: 70 MMHG

## 2021-03-04 DIAGNOSIS — I49.3 VENTRICULAR ECTOPY: ICD-10-CM

## 2021-03-04 DIAGNOSIS — I50.43 ACUTE ON CHRONIC COMBINED SYSTOLIC AND DIASTOLIC CONGESTIVE HEART FAILURE (HCC): ICD-10-CM

## 2021-03-04 DIAGNOSIS — M15.9 PRIMARY OSTEOARTHRITIS INVOLVING MULTIPLE JOINTS: ICD-10-CM

## 2021-03-04 DIAGNOSIS — I48.19 PERSISTENT ATRIAL FIBRILLATION (HCC): Primary | ICD-10-CM

## 2021-03-04 DIAGNOSIS — I44.7 LBBB (LEFT BUNDLE BRANCH BLOCK): ICD-10-CM

## 2021-03-04 DIAGNOSIS — I10 ESSENTIAL HYPERTENSION: ICD-10-CM

## 2021-03-04 DIAGNOSIS — E78.2 MIXED HYPERLIPIDEMIA: ICD-10-CM

## 2021-03-04 PROCEDURE — 1090F PRES/ABSN URINE INCON ASSESS: CPT | Performed by: INTERNAL MEDICINE

## 2021-03-04 PROCEDURE — G8427 DOCREV CUR MEDS BY ELIG CLIN: HCPCS | Performed by: INTERNAL MEDICINE

## 2021-03-04 PROCEDURE — G8536 NO DOC ELDER MAL SCRN: HCPCS | Performed by: INTERNAL MEDICINE

## 2021-03-04 PROCEDURE — G8431 POS CLIN DEPRES SCRN F/U DOC: HCPCS | Performed by: INTERNAL MEDICINE

## 2021-03-04 PROCEDURE — G8420 CALC BMI NORM PARAMETERS: HCPCS | Performed by: INTERNAL MEDICINE

## 2021-03-04 PROCEDURE — 99214 OFFICE O/P EST MOD 30 MIN: CPT | Performed by: INTERNAL MEDICINE

## 2021-03-04 PROCEDURE — 1101F PT FALLS ASSESS-DOCD LE1/YR: CPT | Performed by: INTERNAL MEDICINE

## 2021-03-04 NOTE — PROGRESS NOTES
Kamilla Inman is a 80 y.o. female is here for routine f/u. Hosp at 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 Persistent/chronic afib on OAC, rate controlled. Hx hypertension, LBBB, ASCVD, colitis. Last seen by me 10/20. Lost  in January. Mild stable GREENBERG, no other CV sx or complaints. The patient denies chest pain, orthopnea, PND, LE edema, palpitations, syncope, presyncope or fatigue.        Patient Active Problem List    Diagnosis Date Noted    Acute pancreatitis 07/23/2018     Priority: 1 - One    Carotid artery disease, unspecified laterality ([address] 48/56/1918    Systolic CHF, acute ([address] 10/11/2020    Acute left-sided CHF (congestive heart failure) ([address] 10/06/2020    New onset left bundle branch block (LBBB) 10/06/2020    Coumadin toxicity 10/06/2020    Persistent atrial fibrillation ([address] 07/22/2018    Abnormal EKG 10/13/2016    PAC (premature atrial contraction) 10/13/2016    Primary osteoarthritis 10/03/2016    HTN (hypertension)     DJD (degenerative joint disease)     Colitis     Ventricular ectopy     Hyperlipemia       Perla Pop NP  Past Medical History:   Diagnosis Date    Colitis 2013    C diff; no recent flare, normal colonoscopy 2014    DJD (degenerative joint disease)     Fracture, femoral ([address] 09/2005    HTN (hypertension)     Hyperlipemia     Persistent atrial fibrillation ([address] 2018    Ventricular ectopy     no symptoms      Past Surgical History:   Procedure Laterality Date    HX CATARACT REMOVAL      bilat    HX COLONOSCOPY  2014    WNL    HX ORTHOPAEDIC  2005    right femur fx    HX ORTHOPAEDIC  2012    Lt TKA    HX REFRACTIVE SURGERY  06/15/2017    rt     Allergies   Allergen Reactions    Ambien [Zolpidem] Other (comments)     Legs got heavy    Aminobenzoic Acid Unknown (comments)    Bactrim [Sulfamethoprim Ds] Unknown (comments)     Patient cant remember her reaction    Hydroxyzine Other (comments)     Legs got heavy    Sulfamethoxazole-Trimethoprim Unknown (comments)     Other reaction(s): vomiting and abd pain      Family History   Problem Relation Age of Onset    Cancer Mother         Leukemia    Heart Disease Mother     Cancer Sister         Ovarian    No Known Problems Brother     Heart Failure Brother     Cancer Brother         Brain tumor      Social History     Socioeconomic History    Marital status:      Spouse name: Not on file    Number of children: Not on file    Years of education: Not on file    Highest education level: Not on file   Occupational History    Not on file   Social Needs    Financial resource strain: Not on file    Food insecurity     Worry: Not on file     Inability: Not on file    Transportation needs     Medical: Not on file     Non-medical: Not on file   Tobacco Use    Smoking status: Never Smoker    Smokeless tobacco: Never Used   Substance and Sexual Activity    Alcohol use: No     Alcohol/week: 0.0 standard drinks    Drug use: Never    Sexual activity: Not on file   Lifestyle    Physical activity     Days per week: Not on file     Minutes per session: Not on file    Stress: Not on file   Relationships    Social connections     Talks on phone: Not on file     Gets together: Not on file     Attends Congregation service: Not on file     Active member of club or organization: Not on file     Attends meetings of clubs or organizations: Not on file     Relationship status: Not on file    Intimate partner violence     Fear of current or ex partner: Not on file     Emotionally abused: Not on file     Physically abused: Not on file     Forced sexual activity: Not on file   Other Topics Concern     Service No    Blood Transfusions Yes    Caffeine Concern No    Occupational Exposure No    Hobby Hazards No    Sleep Concern Yes     Comment: insomnia    Stress Concern No    Weight Concern Yes     Comment: over weight    Special Diet No    Back Care No    Exercise Yes    Bike Helmet No    Seat Belt Yes    Self-Exams Yes   Social History Narrative    Not on file      Current Outpatient Medications   Medication Sig    isosorbide mononitrate ER (IMDUR) 30 mg tablet TAKE 1 TABLET BY MOUTH EVERY DAY IN THE MORNING    potassium chloride SR (KLOR-CON 10) 10 mEq tablet Take 1 Tab by mouth daily. Indications: prevention of low potassium in the blood    metoprolol tartrate (LOPRESSOR) 100 mg IR tablet TAKE 1 TABLET BY MOUTH EVERY 12 HOURS    lovastatin (MEVACOR) 20 mg tablet TAKE 1 TABLET BY MOUTH  NIGHTLY    furosemide (LASIX) 40 mg tablet Take 1 Tab by mouth two (2) times a day. take 40mg in the morning and 60mg in the evening. (Patient taking differently: Take 40 mg by mouth two (2) times a day. take 60 mg in the morning and 40 mg in the evening.)    sertraline (ZOLOFT) 50 mg tablet Take 1 Tab by mouth daily.  apixaban (ELIQUIS) 2.5 mg tablet Take 1 Tab by mouth two (2) times a day.  melatonin 10 mg tab Take 10 mg by mouth nightly.  ascorbic acid (VITAMIN C PO) Take  by mouth daily.  cholecalciferol, vitamin D3, (VITAMIN D3 PO) Take  by mouth. 1000 IU    vitamin e (E GEMS) 1,000 unit capsule Take 1,000 Units by mouth daily.  C/sourcherry/celery/grape seed (TART CHERRY PO) Take  by mouth. 465 mg    pyridoxine, vitamin B6, (Vitamin B-6) 100 mg tablet Take 100 mg by mouth daily.  magnesium 200 mg tab Take  by mouth.  acetaminophen (Tylenol Extra Strength) 500 mg tablet Take 500 mg by mouth every six (6) hours as needed for Fever or Pain.  triamcinolone (ARISTOCORT) 0.5 % topical cream Apply  to affected area two (2) times a day. As needed for itching (Patient taking differently: Apply  to affected area two (2) times a day. As needed for itching-  [address] AND DRY YOUR HANDS. BEFORE APPLYING THE MEDICATION, CLEAN AFFECTED AREA.  APPLY A THIN FILM OF THE MEDICATION TO THE AFFECTED AREA AND GENTLY RUB IN. AVOID GETTING MEDICATION IN THE EYES, . ALSO AVOID GETTING THIS MEDICATION IN THE NOSE OR MOUTH. IF YOU GET THIS MEDICATION IN YOUR EYS, NOSE, OR MOUTH, RINSE WITH PLENTY OF WATER.)     No current facility-administered medications for this visit. Review of Symptoms:    CONST  No weight change. No fever, chills, sweats    ENT No visual changes, URI sx, sore throat    CV  See HPI   RESP  No cough, or sputum, wheezing. Also see HPI   GI  No abdominal pain or change in bowel habits. No heartburn or dysphagia. No melena or rectal bleeding.   No dysuria, urgency, frequency, hematuria   MSKEL  No joint pain, swelling. No muscle pain. SKIN  No rash or lesions. NEURO  No headache, syncope, or seizure. No weakness, loss of sensation, or paresthesias. PSYCH  No low mood or depression  No anxiety. HE/LYMPH  No easy bruising, abnormal bleeding, or enlarged glands. Physical ExamPhysical Exam:    Visit Vitals  /70 (BP 1 Location: Left upper arm, BP Patient Position: Sitting, BP Cuff Size: Adult)   Pulse 70   Temp 98.2 °F (36.8 °C) (Temporal)   Resp 18   Ht 5' 5\" (1.651 m)   Wt 135 lb (61.2 kg) Comment: pt took am lasix   SpO2 96% Comment: ra   BMI 22.47 kg/m²     Gen: NAD  HEENT:  PERRL, throat clear  Neck: no adenopathy, no thyromegaly, no JVD   Heart:  irregular,Nl S1S2,  II/VI murmur, no gallop or rub. Lungs:  clear  Abdomen:   Soft, non-tender, bowel sounds are active.    Extremities:  No edema  Pulse: symmetric  Neuro: A&O times 3, No focal neuro deficits      Labs:   Lab Results   Component Value Date/Time    Sodium 136 10/14/2020 06:19 AM    Sodium 139 10/13/2020 05:56 AM    Sodium 137 10/12/2020 05:47 AM    Sodium 138 10/11/2020 05:41 AM    Sodium 138 10/10/2020 07:23 AM    Potassium 3.7 10/14/2020 06:19 AM    Potassium 3.8 10/13/2020 05:56 AM    Potassium 3.9 10/12/2020 05:47 AM    Potassium 4.2 10/11/2020 05:41 AM    Potassium 4.7 10/10/2020 07:23 AM    Chloride 97 10/14/2020 06:19 AM    Chloride 99 10/13/2020 05:56 AM Chloride 99 10/12/2020 05:47 AM    Chloride 99 10/11/2020 05:41 AM    Chloride 99 10/10/2020 07:23 AM    CO2 31 10/14/2020 06:19 AM    CO2 31 10/13/2020 05:56 AM    CO2 31 10/12/2020 05:47 AM    CO2 32 10/11/2020 05:41 AM    CO2 34 (H) 10/10/2020 07:23 AM    Anion gap 8 10/14/2020 06:19 AM    Anion gap 9 10/13/2020 05:56 AM    Anion gap 7 10/12/2020 05:47 AM    Anion gap 7 10/11/2020 05:41 AM    Anion gap 5 10/10/2020 07:23 AM    Glucose 98 10/14/2020 06:19 AM    Glucose 108 (H) 10/13/2020 05:56 AM    Glucose 90 10/12/2020 05:47 AM    Glucose 97 10/11/2020 05:41 AM    Glucose 111 (H) 10/10/2020 07:23 AM    BUN 23 (H) 10/14/2020 06:19 AM    BUN 22 (H) 10/13/2020 05:56 AM    BUN 25 (H) 10/12/2020 05:47 AM    BUN 28 (H) 10/11/2020 05:41 AM    BUN 31 (H) 10/10/2020 07:23 AM    Creatinine 0.88 10/14/2020 06:19 AM    Creatinine 0.76 10/13/2020 05:56 AM    Creatinine 0.95 10/12/2020 05:47 AM    Creatinine 0.92 10/11/2020 05:41 AM    Creatinine 1.08 (H) 10/10/2020 07:23 AM    BUN/Creatinine ratio 26 (H) 10/14/2020 06:19 AM    BUN/Creatinine ratio 29 (H) 10/13/2020 05:56 AM    BUN/Creatinine ratio 26 (H) 10/12/2020 05:47 AM    BUN/Creatinine ratio 30 (H) 10/11/2020 05:41 AM    BUN/Creatinine ratio 29 (H) 10/10/2020 07:23 AM    GFR est AA >60 10/14/2020 06:19 AM    GFR est AA >60 10/13/2020 05:56 AM    GFR est AA >60 10/12/2020 05:47 AM    GFR est AA >60 10/11/2020 05:41 AM    GFR est AA 58 (L) 10/10/2020 07:23 AM    GFR est non-AA >60 10/14/2020 06:19 AM    GFR est non-AA >60 10/13/2020 05:56 AM    GFR est non-AA 55 (L) 10/12/2020 05:47 AM    GFR est non-AA 57 (L) 10/11/2020 05:41 AM    GFR est non-AA 48 (L) 10/10/2020 07:23 AM    Calcium 8.7 10/14/2020 06:19 AM    Calcium 8.8 10/13/2020 05:56 AM    Calcium 8.6 10/12/2020 05:47 AM    Calcium 8.5 10/11/2020 05:41 AM    Calcium 9.0 10/10/2020 07:23 AM    Bilirubin, total 1.2 (H) 10/14/2020 06:19 AM    Bilirubin, total 1.4 (H) 10/13/2020 05:56 AM    Bilirubin, total 1.2 (H) 10/06/2020 12:54 PM    Bilirubin, total 0.9 06/03/2019 10:58 AM    Bilirubin, total 0.5 12/12/2018 09:28 AM    Alk. phosphatase 75 10/14/2020 06:19 AM    Alk. phosphatase 78 10/13/2020 05:56 AM    Alk. phosphatase 80 10/06/2020 12:54 PM    Alk. phosphatase 84 06/03/2019 10:58 AM    Alk.  phosphatase 84 12/12/2018 09:28 AM    Protein, total 6.3 (L) 10/14/2020 06:19 AM    Protein, total 6.7 10/13/2020 05:56 AM    Protein, total 7.2 10/06/2020 12:54 PM    Protein, total 7.2 06/03/2019 10:58 AM    Protein, total 7.6 12/12/2018 09:28 AM    Albumin 2.3 (L) 10/14/2020 06:19 AM    Albumin 2.4 (L) 10/13/2020 05:56 AM    Albumin 2.8 (L) 10/06/2020 12:54 PM    Albumin 4.4 06/03/2019 10:58 AM    Albumin 4.3 12/12/2018 09:28 AM    Globulin 4.0 10/14/2020 06:19 AM    Globulin 4.3 (H) 10/13/2020 05:56 AM    Globulin 4.4 (H) 10/06/2020 12:54 PM    Globulin 4.1 (H) 07/22/2018 08:00 PM    A-G Ratio 0.6 (L) 10/14/2020 06:19 AM    A-G Ratio 0.6 (L) 10/13/2020 05:56 AM    A-G Ratio 0.6 (L) 10/06/2020 12:54 PM    A-G Ratio 1.6 06/03/2019 10:58 AM    A-G Ratio 1.3 12/12/2018 09:28 AM    ALT (SGPT) 25 10/14/2020 06:19 AM    ALT (SGPT) 31 10/13/2020 05:56 AM    ALT (SGPT) 36 10/06/2020 12:54 PM    ALT (SGPT) 20 06/03/2019 10:58 AM    ALT (SGPT) 19 12/12/2018 09:28 AM     Lab Results   Component Value Date/Time    CK 45 10/07/2020 05:41 AM     Lab Results   Component Value Date/Time    Cholesterol, total 164 12/12/2018 09:28 AM    Cholesterol, total 154 07/23/2018 05:35 AM    Cholesterol, total 159 06/13/2018 10:26 AM    Cholesterol, total 163 12/19/2017 09:09 AM    Cholesterol, total 154 06/20/2017 09:29 AM    HDL Cholesterol 49 12/12/2018 09:28 AM    HDL Cholesterol 55 07/23/2018 05:35 AM    HDL Cholesterol 48 06/13/2018 10:26 AM    HDL Cholesterol 51 12/19/2017 09:09 AM    HDL Cholesterol 47 06/20/2017 09:29 AM    LDL, calculated 95 12/12/2018 09:28 AM    LDL, calculated 89.2 07/23/2018 05:35 AM    LDL, calculated 92 06/13/2018 10:26 AM LDL, calculated 95 12/19/2017 09:09 AM    LDL, calculated 87 06/20/2017 09:29 AM    Triglyceride 102 12/12/2018 09:28 AM    Triglyceride 49 07/23/2018 05:35 AM    Triglyceride 95 06/13/2018 10:26 AM    Triglyceride 85 12/19/2017 09:09 AM    Triglyceride 98 06/20/2017 09:29 AM    CHOL/HDL Ratio 2.8 07/23/2018 05:35 AM     No results found for this or any previous visit. Assessment:         Patient Active Problem List    Diagnosis Date Noted    Acute pancreatitis 07/23/2018     Priority: 1 - One    Carotid artery disease, unspecified laterality ([facility] 80/60/4500    Systolic CHF, acute ([facility] 10/11/2020    Acute left-sided CHF (congestive heart failure) ([facility] 10/06/2020    New onset left bundle branch block (LBBB) 10/06/2020    Coumadin toxicity 10/06/2020    Persistent atrial fibrillation ([facility] 07/22/2018    Abnormal EKG 10/13/2016    PAC (premature atrial contraction) 10/13/2016    Primary osteoarthritis 10/03/2016    HTN (hypertension)     DJD (degenerative joint disease)     Colitis     Ventricular ectopy     Hyperlipemia      Hosp at [facility] 10/6-10/14/20--acute on chronic systolic/diastolic CHF. Persistent/chronic afib on OAC, rate controlled. Hx hypertension, LBBB, ASCVD, colitis. Last seen by me 10/20. Lost  in January. Mild stable GREENBERG, no other CV sx or complaints. Plan:     Doing well with no adverse cardiac symptoms. Lipids and labs followed by PCP. Continue current care and f/u in 6 months.     Bia Peña MD

## 2021-03-04 NOTE — PROGRESS NOTES
Verified patient with two patient identifiers. Medications reviewed/approved by Dr. Blossom Gonzalez. Chief Complaint   Patient presents with    Irregular Heart Beat     6 month follow up    CHF    Hypertension     1. Have you been to the ER, urgent care clinic since your last visit? Hospitalized since your last visit?no    2. Have you seen or consulted any other health care providers outside of the [address] [place] [address] since your last visit? Include any pap smears or colon screening.  no

## 2021-03-08 ENCOUNTER — TELEPHONE (OUTPATIENT)
Dept: FAMILY MEDICINE CLINIC | Age: 86
End: 2021-03-08

## 2021-03-08 NOTE — TELEPHONE ENCOUNTER
S/w [facility] pharmacist  Latosha, she changed the amount to 225 of her Lasix to make it a 90 day supply and directions 40 mg ( 1.5)  tab for 60 mg in AM and  40 mg ( 1 ) tab in evening, FYI

## 2021-03-10 ENCOUNTER — TELEPHONE (OUTPATIENT)
Dept: FAMILY MEDICINE CLINIC | Age: 86
End: 2021-03-10

## 2021-03-10 RX ORDER — METOPROLOL TARTRATE 100 MG/1
TABLET ORAL
Qty: 90 TAB | Refills: 1 | Status: SHIPPED | OUTPATIENT
Start: 2021-03-10 | End: 2021-05-10

## 2021-03-10 NOTE — TELEPHONE ENCOUNTER
----- Message from Aliza Price sent at 3/10/2021  4:31 PM EST -----  Regarding: HANNAH MILNER / REFPATRICK  General Message/Vendor Calls    Pt reports that she has not received her medication via mail order, requesting to speak with Dixon Loving    To be called into the pharmacy listed in chart      Callback required   Best contact number(s):[phone]          Aliza Price

## 2021-03-24 ENCOUNTER — TELEPHONE (OUTPATIENT)
Dept: FAMILY MEDICINE CLINIC | Age: 86
End: 2021-03-24

## 2021-03-24 NOTE — TELEPHONE ENCOUNTER
Pt called and is concerned about receiving her zoloft medication for 50mg instead of 25mg. Pt is scared to take it.  Pt would like a call back from [place] or Sol Russell

## 2021-06-03 ENCOUNTER — OFFICE VISIT (OUTPATIENT)
Dept: FAMILY MEDICINE CLINIC | Age: 86
End: 2021-06-03
Payer: MEDICARE

## 2021-06-03 VITALS
SYSTOLIC BLOOD PRESSURE: 118 MMHG | OXYGEN SATURATION: 94 % | RESPIRATION RATE: 18 BRPM | HEART RATE: 89 BPM | TEMPERATURE: 96.9 F | WEIGHT: 137 LBS | HEIGHT: 65 IN | DIASTOLIC BLOOD PRESSURE: 70 MMHG | BODY MASS INDEX: 22.82 KG/M2

## 2021-06-03 DIAGNOSIS — F32.9 REACTIVE DEPRESSION: ICD-10-CM

## 2021-06-03 DIAGNOSIS — I50.9 CONGESTIVE HEART FAILURE, UNSPECIFIED HF CHRONICITY, UNSPECIFIED HEART FAILURE TYPE (HCC): ICD-10-CM

## 2021-06-03 DIAGNOSIS — Z00.00 MEDICARE ANNUAL WELLNESS VISIT, SUBSEQUENT: ICD-10-CM

## 2021-06-03 DIAGNOSIS — I48.91 ATRIAL FIBRILLATION, UNSPECIFIED TYPE (HCC): ICD-10-CM

## 2021-06-03 DIAGNOSIS — L29.9 PRURITUS: ICD-10-CM

## 2021-06-03 DIAGNOSIS — I10 ESSENTIAL HYPERTENSION: Primary | ICD-10-CM

## 2021-06-03 PROCEDURE — G8536 NO DOC ELDER MAL SCRN: HCPCS | Performed by: NURSE PRACTITIONER

## 2021-06-03 PROCEDURE — G8420 CALC BMI NORM PARAMETERS: HCPCS | Performed by: NURSE PRACTITIONER

## 2021-06-03 PROCEDURE — G8427 DOCREV CUR MEDS BY ELIG CLIN: HCPCS | Performed by: NURSE PRACTITIONER

## 2021-06-03 PROCEDURE — G8432 DEP SCR NOT DOC, RNG: HCPCS | Performed by: NURSE PRACTITIONER

## 2021-06-03 PROCEDURE — 99213 OFFICE O/P EST LOW 20 MIN: CPT | Performed by: NURSE PRACTITIONER

## 2021-06-03 PROCEDURE — G0439 PPPS, SUBSEQ VISIT: HCPCS | Performed by: NURSE PRACTITIONER

## 2021-06-03 PROCEDURE — 1090F PRES/ABSN URINE INCON ASSESS: CPT | Performed by: NURSE PRACTITIONER

## 2021-06-03 PROCEDURE — 1101F PT FALLS ASSESS-DOCD LE1/YR: CPT | Performed by: NURSE PRACTITIONER

## 2021-06-03 RX ORDER — SERTRALINE HYDROCHLORIDE 50 MG/1
50 TABLET, FILM COATED ORAL DAILY
Qty: 90 TABLET | Refills: 1 | Status: SHIPPED | OUTPATIENT
Start: 2021-06-03 | End: 2021-08-05 | Stop reason: SDUPTHER

## 2021-06-03 RX ORDER — METOPROLOL TARTRATE 100 MG/1
TABLET ORAL
Qty: 90 TABLET | Refills: 1 | Status: SHIPPED | OUTPATIENT
Start: 2021-06-03 | End: 2021-08-05 | Stop reason: SDUPTHER

## 2021-06-03 RX ORDER — TRIAMCINOLONE ACETONIDE 5 MG/G
CREAM TOPICAL 2 TIMES DAILY
Qty: 45 G | Refills: 3 | Status: ON HOLD | OUTPATIENT
Start: 2021-06-03 | End: 2021-11-19

## 2021-06-03 RX ORDER — FUROSEMIDE 40 MG/1
TABLET ORAL
Qty: 180 TABLET | Refills: 1 | Status: SHIPPED | OUTPATIENT
Start: 2021-06-03 | End: 2021-08-05 | Stop reason: SDUPTHER

## 2021-06-03 RX ORDER — ISOSORBIDE MONONITRATE 30 MG/1
30 TABLET, EXTENDED RELEASE ORAL DAILY
Qty: 90 TABLET | Refills: 1 | Status: SHIPPED | OUTPATIENT
Start: 2021-06-03 | End: 2021-08-05 | Stop reason: SDUPTHER

## 2021-06-03 RX ORDER — POTASSIUM CHLORIDE 750 MG/1
10 TABLET, FILM COATED, EXTENDED RELEASE ORAL DAILY
Qty: 90 TABLET | Refills: 1 | Status: SHIPPED | OUTPATIENT
Start: 2021-06-03 | End: 2021-08-05 | Stop reason: SDUPTHER

## 2021-06-03 NOTE — PROGRESS NOTES
Subjective:     Elina Inman is a 80 y.o. female who presents today with [place] her daughter with  the following:  Chief Complaint   Patient presents with   [facility] Annual Wellness Visit       Patient Active Problem List   Diagnosis Code    HTN (hypertension) I10    DJD (degenerative joint disease) M19.90    Colitis K52.9    Ventricular ectopy I49.3    Hyperlipemia E78.5    Primary osteoarthritis M19.91    Abnormal EKG R94.31    PAC (premature atrial contraction) I49.1    Persistent atrial fibrillation (HCC) I48.19    Acute pancreatitis K85.90    Acute left-sided CHF (congestive heart failure) (HCC) I50.1    New onset left bundle branch block (LBBB) I44.7    Coumadin toxicity V80.606T    Systolic CHF, acute (HCC) I50.21    Carotid artery disease, unspecified laterality (HCC) I77.9         COMPLIANT WITH MEDICATION:   HTN; Denies chest pain, dyspnea, palpitations, headache and blurred vision. Blood pressure normotensive. Reviewed medications for HTN. Requesting 6 month refill. CHF; Denies chest pain, dyspnea, palpitations, headache and blurred vision. Blood pressure normotensive. Reviewed medications for HTN. Requesting 6 month refill. A fib; Denies chest pain, dyspnea, palpitations, headache and blurred vision. Blood pressure normotensive. Reviewed medications for HTN. Requesting 6 month refill. Puritis; chronic itching  Request refill for tricinolone. Depression; Zoloft is effective. Ms Inman notes that she lives alone and does most of her housework and has help 1 day a week . Medicare Annual Wellness ; note attached     depression screening addressed not at risk    abuse screening addressed denies    learning assessment addressed reviewed nurses notes    fall risk addressed not at risk    HM: addressed declines immunization (shingles vaccine).      ROS:  Gen: denies fever, chills, fatigue, weight loss, weight gain  HEENT:denies blurry vision, nasal congestion, sore throat  Resp: denies dypsnea, cough, wheezing  CV: denies chest pain radiating to the jaws or arms, palpitations, lower extremity edema  Abd: denies nausea, vomiting, diarrhea, constipation  Neuro: denies numbness/tingling  Endo: denies polyuria, polydipsia, heat/cold intolerance  Heme: no lymphadenopathy    Allergies   Allergen Reactions    Ambien [Zolpidem] Other (comments)     Legs got heavy    Aminobenzoic Acid Unknown (comments)    Bactrim [Sulfamethoprim Ds] Unknown (comments)     Patient cant remember her reaction    Hydroxyzine Other (comments)     Legs got heavy    Sulfamethoxazole-Trimethoprim Unknown (comments)     Other reaction(s): vomiting and abd pain         Current Outpatient Medications:     metoprolol tartrate (LOPRESSOR) 100 mg IR tablet, TAKE 1 TABLET EVERY 12     HOURS, Disp: 90 Tablet, Rfl: 1    isosorbide mononitrate ER (IMDUR) 30 mg tablet, Take 1 Tablet by mouth daily. , Disp: 90 Tablet, Rfl: 1    potassium chloride SR (KLOR-CON 10) 10 mEq tablet, Take 1 Tablet by mouth daily. Indications: prevention of low potassium in the blood, Disp: 90 Tablet, Rfl: 1    furosemide (LASIX) 40 mg tablet, take 60 mg in the morning and 40 mg in the evening., Disp: 180 Tablet, Rfl: 1    sertraline (ZOLOFT) 50 mg tablet, Take 1 Tablet by mouth daily. , Disp: 90 Tablet, Rfl: 1    apixaban (ELIQUIS) 2.5 mg tablet, Take 1 Tablet by mouth two (2) times a day., Disp: 180 Tablet, Rfl: 1    triamcinolone (ARISTOCORT) 0.5 % topical cream, Apply  to affected area two (2) times a day. As needed for itching, Disp: 45 g, Rfl: 3    lovastatin (MEVACOR) 20 mg tablet, TAKE 1 TABLET BY MOUTH  NIGHTLY, Disp: 90 Tab, Rfl: 3    melatonin 10 mg tab, Take 10 mg by mouth nightly., Disp: 90 Tab, Rfl: 1    ascorbic acid (VITAMIN C PO), Take  by mouth daily. , Disp: , Rfl:     cholecalciferol, vitamin D3, (VITAMIN D3 PO), Take  by mouth.  1000 IU, Disp: , Rfl:     vitamin e (E GEMS) 1,000 unit capsule, Take 1,000 Units by mouth daily., Disp: , Rfl:     C/sourcherry/celery/grape seed (TART CHERRY PO), Take  by mouth. 465 mg, Disp: , Rfl:     pyridoxine, vitamin B6, (Vitamin B-6) 100 mg tablet, Take 100 mg by mouth daily. , Disp: , Rfl:     magnesium 200 mg tab, Take  by mouth., Disp: , Rfl:     acetaminophen (Tylenol Extra Strength) 500 mg tablet, Take 500 mg by mouth every six (6) hours as needed for Fever or Pain., Disp: , Rfl:     Past Medical History:   Diagnosis Date    Colitis 2013    C diff; no recent flare, normal colonoscopy 2014    DJD (degenerative joint disease)     Fracture, femoral ([address] 09/2005    HTN (hypertension)     Hyperlipemia     Persistent atrial fibrillation ([address] 2018    Ventricular ectopy     no symptoms       Past Surgical History:   Procedure Laterality Date    HX CATARACT REMOVAL      bilat    HX COLONOSCOPY  2014    WNL    HX ORTHOPAEDIC  2005    right femur fx    HX ORTHOPAEDIC  2012    Lt TKA    HX REFRACTIVE SURGERY  06/15/2017    rt       Social History     Tobacco Use   Smoking Status Never Smoker   Smokeless Tobacco Never Used       Social History     Socioeconomic History    Marital status:      Spouse name: Not on file    Number of children: Not on file    Years of education: Not on file    Highest education level: Not on file   Tobacco Use    Smoking status: Never Smoker    Smokeless tobacco: Never Used   Vaping Use    Vaping Use: Never used   Substance and Sexual Activity    Alcohol use: No     Alcohol/week: 0.0 standard drinks    Drug use: Never   Other Topics Concern     Service No    Blood Transfusions Yes    Caffeine Concern No    Occupational Exposure No    Hobby Hazards No    Sleep Concern Yes     Comment: insomnia    Stress Concern No    Weight Concern Yes     Comment: over weight    Special Diet No    Back Care No    Exercise Yes    Bike Helmet No    Seat Belt Yes    Self-Exams Yes     Social Determinants of Health     Financial Resource Strain:     Difficulty of Paying Living Expenses:    Food Insecurity:     Worried About Running Out of Food in the Last Year:     [address] in the Last Year:    Transportation Needs:     Lack of Transportation (Medical):  Lack of Transportation (Non-Medical):    Physical Activity:     Days of Exercise per Week:     Minutes of Exercise per Session:    Stress:     Feeling of Stress :    Social Connections:     Frequency of Communication with Friends and Family:     Frequency of Social Gatherings with Friends and Family:     Attends Gnosticist Services:     Active Member of Clubs or Organizations:     Attends Club or Organization Meetings:     Marital Status:        Family History   Problem Relation Age of Onset    Cancer Mother         Leukemia    Heart Disease Mother     Cancer Sister         Ovarian    No Known Problems Brother     Heart Failure Brother     Cancer Brother         Brain tumor         Objective:     Visit Vitals  /70 (BP 1 Location: Right upper arm, BP Patient Position: Sitting, BP Cuff Size: Adult)   Pulse 89   Temp 96.9 °F (36.1 °C) (Temporal)   Resp 18   Ht 5' 5\" (1.651 m)   Wt 137 lb (62.1 kg)   SpO2 94%   BMI 22.80 kg/m²     Body mass index is 22.8 kg/m². General: Alert and oriented. No acute distress. Well nourished  HEENT :  Ears:TMs are normal. Canals are clear. Eyes: pupils equal, round, react to light and accommodation. Nose: patent. Mouth and throat is clear. Neck:supple full range of motion no thyromegaly. Trachea midline, No carotid bruits. No significant lymphadenopathy  Lungs[de-identified] clear to auscultation without wheezes, rales, or rhonchi. Heart :regular irregular,  S1 & S2 are normal intensity. No murmur; no gallop  Abdomen: bowel sounds active. No tenderness, guarding, rebound, masses, hepatic or spleen enlargement  Back: no CVA tenderness.   Extremities: without clubbing, cyanosis, or edema  Pulses: radial and femoral pulses are normal  Neuro: HMF intact. Cranial nerves II through XII grossly normal.  Motor: is 5 over 5 and symmetrical.   Deep tendon reflexes: +2 equal  Psych:appropriate behavior, mood, affect and judgement. Results for orders placed or performed during the hospital encounter of 99/52/81   METABOLIC PANEL, COMPREHENSIVE   Result Value Ref Range    Sodium 138 136 - 145 mmol/L    Potassium 3.6 3.5 - 5.1 mmol/L    Chloride 99 97 - 108 mmol/L    CO2 31 21 - 32 mmol/L    Anion gap 8 5 - 15 mmol/L    Glucose 96 65 - 100 mg/dL    BUN 20 6 - 20 MG/DL    Creatinine 1.14 (H) 0.55 - 1.02 MG/DL    BUN/Creatinine ratio 18 12 - 20      GFR est AA 54 (L) >60 ml/min/1.73m2    GFR est non-AA 45 (L) >60 ml/min/1.73m2    Calcium 8.9 8.5 - 10.1 MG/DL    Bilirubin, total 1.2 (H) 0.2 - 1.0 MG/DL    ALT (SGPT) 36 12 - 78 U/L    AST (SGOT) 38 (H) 15 - 37 U/L    Alk. phosphatase 80 45 - 117 U/L    Protein, total 7.2 6.4 - 8.2 g/dL    Albumin 2.8 (L) 3.5 - 5.0 g/dL    Globulin 4.4 (H) 2.0 - 4.0 g/dL    A-G Ratio 0.6 (L) 1.1 - 2.2     CBC WITH AUTOMATED DIFF   Result Value Ref Range    WBC 9.7 3.6 - 11.0 K/uL    RBC 4.54 3.80 - 5.20 M/uL    HGB 13.4 11.5 - 16.0 g/dL    HCT 42.4 35.0 - 47.0 %    MCV 93.4 80.0 - 99.0 FL    MCH 29.5 26.0 - 34.0 PG    MCHC 31.6 30.0 - 36.5 g/dL    RDW 15.2 (H) 11.5 - 14.5 %    PLATELET 330 958 - 557 K/uL    MPV 9.6 8.9 - 12.9 FL    NRBC 0.0 0  WBC    ABSOLUTE NRBC 0.00 0.00 - 0.01 K/uL    NEUTROPHILS 81 (H) 32 - 75 %    LYMPHOCYTES 10 (L) 12 - 49 %    MONOCYTES 7 5 - 13 %    EOSINOPHILS 1 0 - 7 %    BASOPHILS 0 0 - 1 %    IMMATURE GRANULOCYTES 1 (H) 0.0 - 0.5 %    ABS. NEUTROPHILS 7.9 1.8 - 8.0 K/UL    ABS. LYMPHOCYTES 0.9 0.8 - 3.5 K/UL    ABS. MONOCYTES 0.7 0.0 - 1.0 K/UL    ABS. EOSINOPHILS 0.1 0.0 - 0.4 K/UL    ABS. BASOPHILS 0.0 0.0 - 0.1 K/UL    ABS. IMM.  GRANS. 0.1 (H) 0.00 - 0.04 K/UL    DF AUTOMATED     LIPASE   Result Value Ref Range    Lipase 75 73 - 393 U/L   LACTIC ACID   Result Value Ref Range    Lactic acid 1.9 0.4 - 2.0 MMOL/L   URINALYSIS W/MICROSCOPIC   Result Value Ref Range    Color YELLOW/STRAW      Appearance CLEAR CLEAR      Specific gravity 1.025 1.003 - 1.030      pH (UA) 7.0 5.0 - 8.0      Protein Negative NEG mg/dL    Glucose Negative NEG mg/dL    Ketone Negative NEG mg/dL    Bilirubin Negative NEG      Blood SMALL (A) NEG      Urobilinogen 0.2 0.2 - 1.0 EU/dL    Nitrites Positive (A) NEG      Leukocyte Esterase MODERATE (A) NEG      WBC 20-50 0 - 4 /hpf    RBC 5-10 0 - 5 /hpf    Epithelial cells FEW FEW /lpf    Bacteria 2+ (A) NEG /hpf   TROPONIN I   Result Value Ref Range    Troponin-I, Qt. <0.05 <0.05 ng/mL   NT-PRO BNP   Result Value Ref Range    NT pro-BNP 10,444 (H) 0 - 450 PG/ML   PROTHROMBIN TIME + INR   Result Value Ref Range    INR 7.1 (HH) 0.9 - 1.1      Prothrombin time 64.2 (H) 9.0 - 11.1 sec   PROCALCITONIN   Result Value Ref Range    Procalcitonin <0.05 ng/mL   FIBRINOGEN   Result Value Ref Range    Fibrinogen 426 200 - 475 mg/dL   C REACTIVE PROTEIN, QT   Result Value Ref Range    C-Reactive protein 3.42 (H) 0.00 - 0.60 mg/dL   LD   Result Value Ref Range     (H) 81 - 246 U/L   FERRITIN   Result Value Ref Range    Ferritin 167 8 - 875 NG/ML   METABOLIC PANEL, BASIC   Result Value Ref Range    Sodium 140 136 - 145 mmol/L    Potassium 3.2 (L) 3.5 - 5.1 mmol/L    Chloride 100 97 - 108 mmol/L    CO2 31 21 - 32 mmol/L    Anion gap 9 5 - 15 mmol/L    Glucose 108 (H) 65 - 100 mg/dL    BUN 19 6 - 20 MG/DL    Creatinine 1.05 (H) 0.55 - 1.02 MG/DL    BUN/Creatinine ratio 18 12 - 20      GFR est AA 60 (L) >60 ml/min/1.73m2    GFR est non-AA 49 (L) >60 ml/min/1.73m2    Calcium 8.7 8.5 - 10.1 MG/DL   CBC W/O DIFF   Result Value Ref Range    WBC 10.9 3.6 - 11.0 K/uL    RBC 4.60 3.80 - 5.20 M/uL    HGB 13.8 11.5 - 16.0 g/dL    HCT 42.7 35.0 - 47.0 %    MCV 92.8 80.0 - 99.0 FL    MCH 30.0 26.0 - 34.0 PG    MCHC 32.3 30.0 - 36.5 g/dL    RDW 15.5 (H) 11.5 - 14.5 %    PLATELET 425 064 - 055 K/uL    MPV 10.0 8.9 - 12.9 FL    NRBC 0.0 0  WBC    ABSOLUTE NRBC 0.00 0.00 - 0.01 K/uL   MAGNESIUM   Result Value Ref Range    Magnesium 2.1 1.6 - 2.4 mg/dL   PROTHROMBIN TIME + INR   Result Value Ref Range    INR 6.5 (HH) 0.9 - 1.1      Prothrombin time 58.9 (H) 9.0 - 11.1 sec   TROPONIN I   Result Value Ref Range    Troponin-I, Qt. <0.05 <0.05 ng/mL   CK   Result Value Ref Range    CK 45 26 - 434 U/L   METABOLIC PANEL, BASIC   Result Value Ref Range    Sodium 139 136 - 145 mmol/L    Potassium 3.2 (L) 3.5 - 5.1 mmol/L    Chloride 95 (L) 97 - 108 mmol/L    CO2 36 (H) 21 - 32 mmol/L    Anion gap 8 5 - 15 mmol/L    Glucose 101 (H) 65 - 100 mg/dL    BUN 17 6 - 20 MG/DL    Creatinine 1.04 (H) 0.55 - 1.02 MG/DL    BUN/Creatinine ratio 16 12 - 20      GFR est AA >60 >60 ml/min/1.73m2    GFR est non-AA 50 (L) >60 ml/min/1.73m2    Calcium 8.9 8.5 - 10.1 MG/DL   TROPONIN I   Result Value Ref Range    Troponin-I, Qt. <0.05 <0.05 ng/mL   TSH 3RD GENERATION   Result Value Ref Range    TSH 1.28 0.36 - 7.71 uIU/mL   METABOLIC PANEL, BASIC   Result Value Ref Range    Sodium 140 136 - 145 mmol/L    Potassium 4.0 3.5 - 5.1 mmol/L    Chloride 98 97 - 108 mmol/L    CO2 37 (H) 21 - 32 mmol/L    Anion gap 5 5 - 15 mmol/L    Glucose 101 (H) 65 - 100 mg/dL    BUN 21 (H) 6 - 20 MG/DL    Creatinine 0.96 0.55 - 1.02 MG/DL    BUN/Creatinine ratio 22 (H) 12 - 20      GFR est AA >60 >60 ml/min/1.73m2    GFR est non-AA 55 (L) >60 ml/min/1.73m2    Calcium 8.6 8.5 - 10.1 MG/DL   CBC WITH AUTOMATED DIFF   Result Value Ref Range    WBC 10.1 3.6 - 11.0 K/uL    RBC 4.52 3.80 - 5.20 M/uL    HGB 13.4 11.5 - 16.0 g/dL    HCT 41.4 35.0 - 47.0 %    MCV 91.6 80.0 - 99.0 FL    MCH 29.6 26.0 - 34.0 PG    MCHC 32.4 30.0 - 36.5 g/dL    RDW 15.1 (H) 11.5 - 14.5 %    PLATELET 270 929 - 957 K/uL    MPV 9.8 8.9 - 12.9 FL    NRBC 0.0 0  WBC    ABSOLUTE NRBC 0.00 0.00 - 0.01 K/uL    NEUTROPHILS 72 32 - 75 %    LYMPHOCYTES 13 12 - 49 %    MONOCYTES 9 5 - 13 % EOSINOPHILS 4 0 - 7 %    BASOPHILS 1 0 - 1 %    IMMATURE GRANULOCYTES 1 (H) 0.0 - 0.5 %    ABS. NEUTROPHILS 7.4 1.8 - 8.0 K/UL    ABS. LYMPHOCYTES 1.3 0.8 - 3.5 K/UL    ABS. MONOCYTES 0.9 0.0 - 1.0 K/UL    ABS. EOSINOPHILS 0.4 0.0 - 0.4 K/UL    ABS. BASOPHILS 0.1 0.0 - 0.1 K/UL    ABS. IMM.  GRANS. 0.1 (H) 0.00 - 0.04 K/UL    DF AUTOMATED     MAGNESIUM   Result Value Ref Range    Magnesium 1.9 1.6 - 2.4 mg/dL   PROTHROMBIN TIME + INR   Result Value Ref Range    INR 2.0 (H) 0.9 - 1.1      Prothrombin time 19.2 (H) 9.0 - 82.0 sec   METABOLIC PANEL, BASIC   Result Value Ref Range    Sodium 138 136 - 145 mmol/L    Potassium 4.7 3.5 - 5.1 mmol/L    Chloride 99 97 - 108 mmol/L    CO2 34 (H) 21 - 32 mmol/L    Anion gap 5 5 - 15 mmol/L    Glucose 111 (H) 65 - 100 mg/dL    BUN 31 (H) 6 - 20 MG/DL    Creatinine 1.08 (H) 0.55 - 1.02 MG/DL    BUN/Creatinine ratio 29 (H) 12 - 20      GFR est AA 58 (L) >60 ml/min/1.73m2    GFR est non-AA 48 (L) >60 ml/min/1.73m2    Calcium 9.0 8.5 - 04.3 MG/DL   METABOLIC PANEL, BASIC   Result Value Ref Range    Sodium 138 136 - 145 mmol/L    Potassium 4.2 3.5 - 5.1 mmol/L    Chloride 99 97 - 108 mmol/L    CO2 32 21 - 32 mmol/L    Anion gap 7 5 - 15 mmol/L    Glucose 97 65 - 100 mg/dL    BUN 28 (H) 6 - 20 MG/DL    Creatinine 0.92 0.55 - 1.02 MG/DL    BUN/Creatinine ratio 30 (H) 12 - 20      GFR est AA >60 >60 ml/min/1.73m2    GFR est non-AA 57 (L) >60 ml/min/1.73m2    Calcium 8.5 8.5 - 15.3 MG/DL   METABOLIC PANEL, BASIC   Result Value Ref Range    Sodium 137 136 - 145 mmol/L    Potassium 3.9 3.5 - 5.1 mmol/L    Chloride 99 97 - 108 mmol/L    CO2 31 21 - 32 mmol/L    Anion gap 7 5 - 15 mmol/L    Glucose 90 65 - 100 mg/dL    BUN 25 (H) 6 - 20 MG/DL    Creatinine 0.95 0.55 - 1.02 MG/DL    BUN/Creatinine ratio 26 (H) 12 - 20      GFR est AA >60 >60 ml/min/1.73m2    GFR est non-AA 55 (L) >60 ml/min/1.73m2    Calcium 8.6 8.5 - 10.1 MG/DL   MAGNESIUM   Result Value Ref Range    Magnesium 2.2 1.6 - 2.4 mg/dL   NT-PRO BNP   Result Value Ref Range    NT pro-BNP 3,018 (H) 0 - 450 PG/ML   SAMPLES BEING HELD   Result Value Ref Range    SAMPLES BEING HELD 1LAV     COMMENT        Add-on orders for these samples will be processed based on acceptable specimen integrity and analyte stability, which may vary by analyte. METABOLIC PANEL, COMPREHENSIVE   Result Value Ref Range    Sodium 139 136 - 145 mmol/L    Potassium 3.8 3.5 - 5.1 mmol/L    Chloride 99 97 - 108 mmol/L    CO2 31 21 - 32 mmol/L    Anion gap 9 5 - 15 mmol/L    Glucose 108 (H) 65 - 100 mg/dL    BUN 22 (H) 6 - 20 MG/DL    Creatinine 0.76 0.55 - 1.02 MG/DL    BUN/Creatinine ratio 29 (H) 12 - 20      GFR est AA >60 >60 ml/min/1.73m2    GFR est non-AA >60 >60 ml/min/1.73m2    Calcium 8.8 8.5 - 10.1 MG/DL    Bilirubin, total 1.4 (H) 0.2 - 1.0 MG/DL    ALT (SGPT) 31 12 - 78 U/L    AST (SGOT) 32 15 - 37 U/L    Alk. phosphatase 78 45 - 117 U/L    Protein, total 6.7 6.4 - 8.2 g/dL    Albumin 2.4 (L) 3.5 - 5.0 g/dL    Globulin 4.3 (H) 2.0 - 4.0 g/dL    A-G Ratio 0.6 (L) 1.1 - 2.2     CBC WITH AUTOMATED DIFF   Result Value Ref Range    WBC 12.8 (H) 3.6 - 11.0 K/uL    RBC 4.25 3.80 - 5.20 M/uL    HGB 12.6 11.5 - 16.0 g/dL    HCT 38.8 35.0 - 47.0 %    MCV 91.3 80.0 - 99.0 FL    MCH 29.6 26.0 - 34.0 PG    MCHC 32.5 30.0 - 36.5 g/dL    RDW 15.5 (H) 11.5 - 14.5 %    PLATELET 315 324 - 140 K/uL    MPV 10.3 8.9 - 12.9 FL    NRBC 0.0 0  WBC    ABSOLUTE NRBC 0.00 0.00 - 0.01 K/uL    NEUTROPHILS 82 (H) 32 - 75 %    LYMPHOCYTES 8 (L) 12 - 49 %    MONOCYTES 8 5 - 13 %    EOSINOPHILS 1 0 - 7 %    BASOPHILS 0 0 - 1 %    IMMATURE GRANULOCYTES 1 (H) 0.0 - 0.5 %    ABS. NEUTROPHILS 10.6 (H) 1.8 - 8.0 K/UL    ABS. LYMPHOCYTES 1.0 0.8 - 3.5 K/UL    ABS. MONOCYTES 1.0 0.0 - 1.0 K/UL    ABS. EOSINOPHILS 0.1 0.0 - 0.4 K/UL    ABS. BASOPHILS 0.0 0.0 - 0.1 K/UL    ABS. IMM.  GRANS. 0.1 (H) 0.00 - 0.04 K/UL    DF AUTOMATED     LIPASE   Result Value Ref Range    Lipase 66 (L) 73 - 393 U/L CBC WITH MANUAL DIFF   Result Value Ref Range    WBC 9.2 3.6 - 11.0 K/uL    RBC 4.09 3.80 - 5.20 M/uL    HGB 12.1 11.5 - 16.0 g/dL    HCT 36.6 35.0 - 47.0 %    MCV 89.5 80.0 - 99.0 FL    MCH 29.6 26.0 - 34.0 PG    MCHC 33.1 30.0 - 36.5 g/dL    RDW 15.3 (H) 11.5 - 14.5 %    PLATELET 522 517 - 889 K/uL    MPV 10.2 8.9 - 12.9 FL    NRBC 0.0 0  WBC    ABSOLUTE NRBC 0.00 0.00 - 0.01 K/uL    NEUTROPHILS 75 32 - 75 %    BAND NEUTROPHILS 0 0 - 6 %    LYMPHOCYTES 13 12 - 49 %    MONOCYTES 9 5 - 13 %    EOSINOPHILS 3 0 - 7 %    BASOPHILS 0 0 - 1 %    METAMYELOCYTES 0 0 %    MYELOCYTES 0 0 %    PROMYELOCYTES 0 0 %    BLASTS 0 0 %    OTHER CELL 0 0      IMMATURE GRANULOCYTES 0 %    ABS. NEUTROPHILS 6.9 1.8 - 8.0 K/UL    ABS. LYMPHOCYTES 1.2 0.8 - 3.5 K/UL    ABS. MONOCYTES 0.8 0.0 - 1.0 K/UL    ABS. EOSINOPHILS 0.3 0.0 - 0.4 K/UL    ABS. BASOPHILS 0.0 0.0 - 0.1 K/UL    ABS. IMM. GRANS. 0.0 K/UL    DF MANUAL      RBC COMMENTS NORMOCYTIC, NORMOCHROMIC     METABOLIC PANEL, BASIC   Result Value Ref Range    Sodium 136 136 - 145 mmol/L    Potassium 3.7 3.5 - 5.1 mmol/L    Chloride 97 97 - 108 mmol/L    CO2 31 21 - 32 mmol/L    Anion gap 8 5 - 15 mmol/L    Glucose 98 65 - 100 mg/dL    BUN 23 (H) 6 - 20 MG/DL    Creatinine 0.88 0.55 - 1.02 MG/DL    BUN/Creatinine ratio 26 (H) 12 - 20      GFR est AA >60 >60 ml/min/1.73m2    GFR est non-AA >60 >60 ml/min/1.73m2    Calcium 8.7 8.5 - 10.1 MG/DL   MAGNESIUM   Result Value Ref Range    Magnesium 2.1 1.6 - 2.4 mg/dL   HEPATIC FUNCTION PANEL   Result Value Ref Range    Protein, total 6.3 (L) 6.4 - 8.2 g/dL    Albumin 2.3 (L) 3.5 - 5.0 g/dL    Globulin 4.0 2.0 - 4.0 g/dL    A-G Ratio 0.6 (L) 1.1 - 2.2      Bilirubin, total 1.2 (H) 0.2 - 1.0 MG/DL    Bilirubin, direct 0.4 (H) 0.0 - 0.2 MG/DL    Alk.  phosphatase 75 45 - 117 U/L    AST (SGOT) 24 15 - 37 U/L    ALT (SGPT) 25 12 - 78 U/L   EKG, 12 LEAD, INITIAL   Result Value Ref Range    Ventricular Rate 101 BPM    Atrial Rate 107 BPM QRS Duration 136 ms    Q-T Interval 394 ms    QTC Calculation (Bezet) 510 ms    Calculated R Axis -66 degrees    Calculated T Axis 100 degrees    Diagnosis       Atrial fibrillation with rapid ventricular response  Left axis deviation  Left bundle branch block  Abnormal ECG  When compared with ECG of 23-JUL-2018 01:18,  Left bundle branch block is now present  Confirmed by Lizeth Lozano ([id number]) on 10/6/2020 6:32:50 PM     EKG, 12 LEAD, SUBSEQUENT   Result Value Ref Range    Ventricular Rate 109 BPM    Atrial Rate 117 BPM    QRS Duration 132 ms    Q-T Interval 368 ms    QTC Calculation (Bezet) 495 ms    Calculated R Axis -116 degrees    Calculated T Axis 81 degrees    Diagnosis       ** Suspect arm lead reversal, interpretation assumes no reversal  Atrial fibrillation with rapid ventricular response  Nonspecific intraventricular block  Lateral infarct , age undetermined  Inferior infarct , age undetermined  Abnormal ECG  When compared with ECG of 06-OCT-2020 14:32,  No significant change was found  Confirmed by Dwight Mitchell MD, --- ([id number]) on 10/8/2020 9:57:49 AM     EKG, 12 LEAD, SUBSEQUENT   Result Value Ref Range    Ventricular Rate 86 BPM    Atrial Rate 97 BPM    QRS Duration 132 ms    Q-T Interval 416 ms    QTC Calculation (Bezet) 497 ms    Calculated R Axis -66 degrees    Calculated T Axis 104 degrees    Diagnosis       Atrial fibrillation  Left axis deviation  Left bundle branch block  Abnormal ECG  When compared with ECG of 07-OCT-2020 21:43,  No significant change was found  Confirmed by Dwight Mitchell MD, --- ([id number]) on 10/9/2020 8:25:26 AM         No results found for this visit on 06/03/21. Assessment/ Plan:     1. Medicare annual wellness visit, subsequent  Note attached    2. Pruritus    - triamcinolone (ARISTOCORT) 0.5 % topical cream; Apply  to affected area two (2) times a day. As needed for itching  Dispense: 45 g; Refill: 3    3.  Atrial fibrillation, unspecified type ([address]  Continue current medication  Refills below no change in medical management     4. Essential hypertension  Continue current medication  Refills below no change in medical management     5. left-sided CHF (congestive heart failure) (HCC)  Continue current medication  Refills below no change in medical management     6. Reactive depression  Continue ADL and Zoloft 50 mg po       Orders Placed This Encounter    metoprolol tartrate (LOPRESSOR) 100 mg IR tablet     Sig: TAKE 1 TABLET EVERY 12     HOURS     Dispense:  90 Tablet     Refill:  1    isosorbide mononitrate ER (IMDUR) 30 mg tablet     Sig: Take 1 Tablet by mouth daily. Dispense:  90 Tablet     Refill:  1    potassium chloride SR (KLOR-CON 10) 10 mEq tablet     Sig: Take 1 Tablet by mouth daily. Indications: prevention of low potassium in the blood     Dispense:  90 Tablet     Refill:  1    furosemide (LASIX) 40 mg tablet     Sig: take 60 mg in the morning and 40 mg in the evening. Dispense:  180 Tablet     Refill:  1    sertraline (ZOLOFT) 50 mg tablet     Sig: Take 1 Tablet by mouth daily. Dispense:  90 Tablet     Refill:  1    apixaban (ELIQUIS) 2.5 mg tablet     Sig: Take 1 Tablet by mouth two (2) times a day. Dispense:  180 Tablet     Refill:  1    triamcinolone (ARISTOCORT) 0.5 % topical cream     Sig: Apply  to affected area two (2) times a day. As needed for itching     Dispense:  45 g     Refill:  3         Verbal and written instructions (see AVS) provided. Patient expresses understanding of diagnosis and treatment plan. Health Maintenance Due   Topic Date Due    Shingrix Vaccine Age 50> (1 of 2) Never done         Follow up in 6 months or sooner.        DIGNA Lund

## 2021-06-03 NOTE — ACP (ADVANCE CARE PLANNING)
Has advanced medical directive scanned into chart. Reviewed at this visit. No changes.  Shellie LAMAR

## 2021-06-03 NOTE — PROGRESS NOTES
This is the Subsequent Medicare Annual Wellness Exam, performed 12 months or more after the Initial AWV or the last Subsequent AWV    I have reviewed the patient's medical history in detail and updated the computerized patient record. Assessment/Plan   Education and counseling provided:  Are appropriate based on today's review and evaluation    1. Medicare annual wellness visit, subsequent  2. Pruritus  -     triamcinolone (ARISTOCORT) 0.5 % topical cream; Apply  to affected area two (2) times a day. As needed for itching, Normal, Disp-45 g, R-3  3. Atrial fibrillation, unspecified type ([facility] [address]  4. Essential hypertension  5.  Acute left-sided CHF (congestive heart failure) (HCC)  6. Reactive depression       Depression Risk Factor Screening     3 most recent PHQ Screens 3/4/2021   PHQ Not Done -   Little interest or pleasure in doing things Several days   Feeling down, depressed, irritable, or hopeless Nearly every day   Total Score PHQ 2 4   Trouble falling or staying asleep, or sleeping too much Nearly every day   Feeling tired or having little energy Nearly every day   Poor appetite, weight loss, or overeating Nearly every day   Feeling bad about yourself - or that you are a failure or have let yourself or your family down Not at all   Trouble concentrating on things such as school, work, reading, or watching TV Nearly every day   Moving or speaking so slowly that other people could have noticed; or the opposite being so fidgety that others notice Several days   Thoughts of being better off dead, or hurting yourself in some way Not at all   PHQ 9 Score 17   How difficult have these problems made it for you to do your work, take care of your home and get along with others Somewhat difficult       Alcohol Risk Screen    Do you average more than 1 drink per night or more than 7 drinks a week:  No    On any one occasion in the past three months have you have had more than 3 drinks containing alcohol: No        Functional Ability and Level of Safety    Hearing: Whisper Test done with abnormal results. Hard of hearing. Activities of Daily Living: The home contains: handrails  Patient needs help with:  transportation, shopping, preparing meals, laundry, housework, managing medications, dressing, bathing and hygiene      Ambulation: with difficulty, uses a cane and sometimes a walker. Fall Risk:  Fall Risk Assessment, last 12 mths 3/4/2021   Able to walk? Yes   Fall in past 12 months? 1   Do you feel unsteady? 1   Are you worried about falling 1   Is TUG test greater than 12 seconds? 1   Is the gait abnormal? 1   Number of falls in past 12 months 2   Fall with injury?  1      Abuse Screen:  Patient is not abused       Cognitive Screening    Has your family/caregiver stated any concerns about your memory: yes     Cognitive Screening: Normal - MMSE (Mini Mental Status Exam)    Health Maintenance Due     Health Maintenance Due   Topic Date Due    COVID-19 Vaccine (1) Never done    Shingrix Vaccine Age 50> (1 of 2) Never done       Patient Care Team   Patient Care Team:  Milly Sorensen NP as PCP - General (Nurse Practitioner)  Milly Sorensen NP as PCP - [address] [place] [address] Dr Mortensenled Provider  Ladi Villalba MD (Cardiology)  Aissatou Roberts MD (Orthopedic Surgery)    History     Patient Active Problem List   Diagnosis Code    HTN (hypertension) I10    DJD (degenerative joint disease) M19.90    Colitis K52.9    Ventricular ectopy I49.3    Hyperlipemia E78.5    Primary osteoarthritis M19.91    Abnormal EKG R94.31    PAC (premature atrial contraction) I49.1    Persistent atrial fibrillation (HCC) I48.19    Acute pancreatitis K85.90    Acute left-sided CHF (congestive heart failure) (HCC) I50.1    New onset left bundle branch block (LBBB) I44.7    Coumadin toxicity J21.425R    Systolic CHF, acute ([address] I50.21    Carotid artery disease, unspecified laterality ([address] I77.9     Past Medical History:   Diagnosis Date    Colitis 2013    C diff; no recent flare, normal colonoscopy 2014    DJD (degenerative joint disease)     Fracture, femoral ([facility] [address] 09/2005    HTN (hypertension)     Hyperlipemia     Persistent atrial fibrillation ([facility] [address] 2018    Ventricular ectopy     no symptoms      Past Surgical History:   Procedure Laterality Date    HX CATARACT REMOVAL      bilat    HX COLONOSCOPY  2014    WNL    HX ORTHOPAEDIC  2005    right femur fx    HX ORTHOPAEDIC  2012    Lt TKA    HX REFRACTIVE SURGERY  06/15/2017    rt     Current Outpatient Medications   Medication Sig Dispense Refill    metoprolol tartrate (LOPRESSOR) 100 mg IR tablet TAKE 1 TABLET EVERY 12     HOURS 90 Tab 1    isosorbide mononitrate ER (IMDUR) 30 mg tablet TAKE 1 TABLET BY MOUTH EVERY DAY IN THE MORNING 30 Tab 1    potassium chloride SR (KLOR-CON 10) 10 mEq tablet TAKE 1 TAB BY MOUTH DAILY. INDICATIONS: PREVENTION OF LOW POTASSIUM IN THE BLOOD 30 Tab 1    furosemide (LASIX) 40 mg tablet take 60 mg in the morning and 40 mg in the evening. 180 Tab 1    lovastatin (MEVACOR) 20 mg tablet TAKE 1 TABLET BY MOUTH  NIGHTLY 90 Tab 3    sertraline (ZOLOFT) 50 mg tablet Take 1 Tab by mouth daily. 90 Tab 1    apixaban (ELIQUIS) 2.5 mg tablet Take 1 Tab by mouth two (2) times a day. 180 Tab 1    melatonin 10 mg tab Take 10 mg by mouth nightly. 90 Tab 1    ascorbic acid (VITAMIN C PO) Take  by mouth daily.  cholecalciferol, vitamin D3, (VITAMIN D3 PO) Take  by mouth. 1000 IU      vitamin e (E GEMS) 1,000 unit capsule Take 1,000 Units by mouth daily.  C/sourcherry/celery/grape seed (TART CHERRY PO) Take  by mouth. 465 mg      pyridoxine, vitamin B6, (Vitamin B-6) 100 mg tablet Take 100 mg by mouth daily.  magnesium 200 mg tab Take  by mouth.  acetaminophen (Tylenol Extra Strength) 500 mg tablet Take 500 mg by mouth every six (6) hours as needed for Fever or Pain.       triamcinolone (ARISTOCORT) 0.5 % topical cream Apply  to affected area two (2) times a day. As needed for itching (Patient taking differently: Apply  to affected area two (2) times a day. As needed for itching-  [address] AND DRY YOUR HANDS. BEFORE APPLYING THE MEDICATION, CLEAN AFFECTED AREA. APPLY A THIN FILM OF THE MEDICATION TO THE AFFECTED AREA AND GENTLY RUB IN. AVOID GETTING MEDICATION IN THE EYES, . ALSO AVOID GETTING THIS MEDICATION IN THE NOSE OR MOUTH.  IF YOU GET THIS MEDICATION IN YOUR EYS, NOSE, OR MOUTH, RINSE WITH PLENTY OF WATER.) 45 g 3     Allergies   Allergen Reactions    Ambien [Zolpidem] Other (comments)     Legs got heavy    Aminobenzoic Acid Unknown (comments)    Bactrim [Sulfamethoprim Ds] Unknown (comments)     Patient cant remember her reaction    Hydroxyzine Other (comments)     Legs got heavy    Sulfamethoxazole-Trimethoprim Unknown (comments)     Other reaction(s): vomiting and abd pain       Family History   Problem Relation Age of Onset    Cancer Mother         Leukemia    Heart Disease Mother     Cancer Sister         Ovarian    No Known Problems Brother     Heart Failure Brother     Cancer Brother         Brain tumor     Social History     Tobacco Use    Smoking status: Never Smoker    Smokeless tobacco: Never Used   Substance Use Topics    Alcohol use: No     Alcohol/week: 0.0 standard drinks         Zay Morel NP-C

## 2021-06-03 NOTE — PATIENT INSTRUCTIONS
Medicare Wellness Visit, Female The best way to live healthy is to have a lifestyle where you eat a well-balanced diet, exercise regularly, limit alcohol use, and quit all forms of tobacco/nicotine, if applicable. Regular preventive services are another way to keep healthy. Preventive services (vaccines, screening tests, monitoring & exams) can help personalize your care plan, which helps you manage your own care. Screening tests can find health problems at the earliest stages, when they are easiest to treat. [facility] follows the current, evidence-based guidelines published by the [facility] ([facility]) when recommending preventive services for our patients. Because we follow these guidelines, sometimes recommendations change over time as research supports it. (For example, mammograms used to be recommended annually. Even though Medicare will still pay for an annual mammogram, the newer guidelines recommend a mammogram every two years for women of average risk). Of course, you and your doctor may decide to screen more often for some diseases, based on your risk and your co-morbidities (chronic disease you are already diagnosed with). Preventive services for you include: - Medicare offers their members a free annual wellness visit, which is time for you and your primary care provider to discuss and plan for your preventive service needs. Take advantage of this benefit every year! 
-All adults over the age of 72 should receive the recommended pneumonia vaccines. Current USPSTF guidelines recommend a series of two vaccines for the best pneumonia protection.  
-All adults should have a flu vaccine yearly and a tetanus vaccine every 10 years.  
-All adults age 48 and older should receive the shingles vaccines (series of two vaccines).      
-All adults age 38-68 who are overweight should have a diabetes screening test once every three years.  
-All adults born between [birth year] and 1965 should be screened once for Hepatitis C. 
-Other screening tests and preventive services for persons with diabetes include: an eye exam to screen for diabetic retinopathy, a kidney function test, a foot exam, and stricter control over your cholesterol.  
-Cardiovascular screening for adults with routine risk involves an electrocardiogram (ECG) at intervals determined by your doctor.  
-Colorectal cancer screenings should be done for adults age 54-65 with no increased risk factors for colorectal cancer. There are a number of acceptable methods of screening for this type of cancer. Each test has its own benefits and drawbacks. Discuss with your doctor what is most appropriate for you during your annual wellness visit. The different tests include: colonoscopy (considered the best screening method), a fecal occult blood test, a fecal DNA test, and sigmoidoscopy. 
 
-A bone mass density test is recommended when a woman turns 65 to screen for osteoporosis. This test is only recommended one time, as a screening. Some providers will use this same test as a disease monitoring tool if you already have osteoporosis. -Breast cancer screenings are recommended every other year for women of normal risk, age 54-69. 
-Cervical cancer screenings for women over age 72 are only recommended with certain risk factors. Here is a list of your current Health Maintenance items (your personalized list of preventive services) with a due date: 
Health Maintenance Due Topic Date Due  
 COVID-19 Vaccine (1) Never done  Shingles Vaccine (1 of 2) Never done

## 2021-08-05 ENCOUNTER — TELEPHONE (OUTPATIENT)
Dept: FAMILY MEDICINE CLINIC | Age: 86
End: 2021-08-05

## 2021-08-05 RX ORDER — SERTRALINE HYDROCHLORIDE 50 MG/1
50 TABLET, FILM COATED ORAL DAILY
Qty: 90 TABLET | Refills: 1 | Status: ON HOLD | OUTPATIENT
Start: 2021-08-05 | End: 2022-04-07

## 2021-08-05 RX ORDER — POTASSIUM CHLORIDE 750 MG/1
10 TABLET, FILM COATED, EXTENDED RELEASE ORAL DAILY
Qty: 90 TABLET | Refills: 1 | Status: SHIPPED | OUTPATIENT
Start: 2021-08-05 | End: 2022-02-07 | Stop reason: SDUPTHER

## 2021-08-05 RX ORDER — ISOSORBIDE MONONITRATE 30 MG/1
30 TABLET, EXTENDED RELEASE ORAL DAILY
Qty: 90 TABLET | Refills: 1 | Status: SHIPPED | OUTPATIENT
Start: 2021-08-05 | End: 2022-02-07 | Stop reason: SDUPTHER

## 2021-08-05 RX ORDER — METOPROLOL TARTRATE 100 MG/1
TABLET ORAL
Qty: 90 TABLET | Refills: 1 | Status: SHIPPED | OUTPATIENT
Start: 2021-08-05 | End: 2021-10-19

## 2021-08-05 RX ORDER — FUROSEMIDE 40 MG/1
TABLET ORAL
Qty: 180 TABLET | Refills: 1 | Status: SHIPPED | OUTPATIENT
Start: 2021-08-05 | End: 2021-11-24

## 2021-08-05 NOTE — TELEPHONE ENCOUNTER
Ashley Smith called and stated that this patient is in need of medication refills for 6 medications. 1. Isosorbide Mononitrate 30mg  2. Furosemide 40mg  3. Metoprolol Tartrate 100mg  4. Apixaban 2.5mg  5. Sertraline 50mg  6.  Potassium Chloride 10mEq    They would like these sent to her Mail order please

## 2021-09-16 ENCOUNTER — OFFICE VISIT (OUTPATIENT)
Dept: CARDIOLOGY CLINIC | Age: 86
End: 2021-09-16
Payer: MEDICARE

## 2021-09-16 VITALS
TEMPERATURE: 98.4 F | WEIGHT: 134 LBS | SYSTOLIC BLOOD PRESSURE: 110 MMHG | BODY MASS INDEX: 22.33 KG/M2 | DIASTOLIC BLOOD PRESSURE: 80 MMHG | RESPIRATION RATE: 16 BRPM | HEIGHT: 65 IN | OXYGEN SATURATION: 96 % | HEART RATE: 60 BPM

## 2021-09-16 DIAGNOSIS — I50.43 ACUTE ON CHRONIC COMBINED SYSTOLIC AND DIASTOLIC CONGESTIVE HEART FAILURE (HCC): ICD-10-CM

## 2021-09-16 DIAGNOSIS — I10 ESSENTIAL HYPERTENSION: ICD-10-CM

## 2021-09-16 DIAGNOSIS — I44.7 LBBB (LEFT BUNDLE BRANCH BLOCK): ICD-10-CM

## 2021-09-16 DIAGNOSIS — M15.9 PRIMARY OSTEOARTHRITIS INVOLVING MULTIPLE JOINTS: ICD-10-CM

## 2021-09-16 DIAGNOSIS — I48.19 PERSISTENT ATRIAL FIBRILLATION (HCC): Primary | ICD-10-CM

## 2021-09-16 DIAGNOSIS — I25.10 ASCVD (ARTERIOSCLEROTIC CARDIOVASCULAR DISEASE): ICD-10-CM

## 2021-09-16 DIAGNOSIS — E78.2 MIXED HYPERLIPIDEMIA: ICD-10-CM

## 2021-09-16 PROCEDURE — 93000 ELECTROCARDIOGRAM COMPLETE: CPT | Performed by: INTERNAL MEDICINE

## 2021-09-16 PROCEDURE — 1101F PT FALLS ASSESS-DOCD LE1/YR: CPT | Performed by: INTERNAL MEDICINE

## 2021-09-16 PROCEDURE — G8510 SCR DEP NEG, NO PLAN REQD: HCPCS | Performed by: INTERNAL MEDICINE

## 2021-09-16 PROCEDURE — G8427 DOCREV CUR MEDS BY ELIG CLIN: HCPCS | Performed by: INTERNAL MEDICINE

## 2021-09-16 PROCEDURE — 99214 OFFICE O/P EST MOD 30 MIN: CPT | Performed by: INTERNAL MEDICINE

## 2021-09-16 PROCEDURE — G8536 NO DOC ELDER MAL SCRN: HCPCS | Performed by: INTERNAL MEDICINE

## 2021-09-16 PROCEDURE — G8420 CALC BMI NORM PARAMETERS: HCPCS | Performed by: INTERNAL MEDICINE

## 2021-09-16 PROCEDURE — 1090F PRES/ABSN URINE INCON ASSESS: CPT | Performed by: INTERNAL MEDICINE

## 2021-09-16 NOTE — PROGRESS NOTES
Identified pt with two pt identifiers(name and ). Reviewed record in preparation for visit and have obtained necessary documentation. Chief Complaint   Patient presents with    Irregular Heart Beat     6 month follow up    CHF    Hypertension    Cholesterol Problem      Vitals:    21 1050   BP: 110/80   Pulse: 60   Resp: 16   Temp: 98.4 °F (36.9 °C)   TempSrc: Temporal   SpO2: 96%   Weight: 134 lb (60.8 kg)   Height: 5' 5\" (1.651 m)   PainSc:   0 - No pain       Medications reviewed/approved by Dr. Eric Patrick. Health Maintenance Review: Patient reminded of \"due or due soon\" health maintenance. I have asked the patient to contact his/her primary care provider (PCP) for follow-up on his/her health maintenance. Coordination of Care Questionnaire:  :   1) Have you been to an emergency room, urgent care, or hospitalized since your last visit? If yes, where when, and reason for visit? no       2. Have seen or consulted any other health care provider since your last visit? If yes, where when, and reason for visit? NO      Patient is accompanied by daughter I have received verbal consent from Abhinav Inman to discuss any/all medical information while they are present in the room.

## 2021-09-16 NOTE — PROGRESS NOTES
Sonia Inman is a 80 y.o. female is here for routine f/u. 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 Persistent/chronic afib on OAC, rate controlled. Hx hypertension, LBBB, ASCVD, colitis. Last seen by me 3/21. Lost  in January. Mild stable GREENBERG, no other CV sx or complaints The patient denies chest pain, orthopnea, PND, LE edema, palpitations, syncope, presyncope or fatigue.        Patient Active Problem List    Diagnosis Date Noted    Acute pancreatitis 07/23/2018    Carotid artery disease, unspecified laterality ([address] 55/29/8076    Systolic CHF, acute ([address] 10/11/2020    Acute left-sided CHF (congestive heart failure) ([facility] 10/06/2020    New onset left bundle branch block (LBBB) 10/06/2020    Coumadin toxicity 10/06/2020    Persistent atrial fibrillation ([address] 07/22/2018    Abnormal EKG 10/13/2016    PAC (premature atrial contraction) 10/13/2016    Primary osteoarthritis 10/03/2016    HTN (hypertension)     DJD (degenerative joint disease)     Colitis     Ventricular ectopy     Hyperlipemia       Tobin Ackerman NP  Past Medical History:   Diagnosis Date    Colitis 2013    C diff; no recent flare, normal colonoscopy 2014    DJD (degenerative joint disease)     Fracture, femoral ([address] 09/2005    HTN (hypertension)     Hyperlipemia     Persistent atrial fibrillation ([address] 2018    Ventricular ectopy     no symptoms      Past Surgical History:   Procedure Laterality Date    HX CATARACT REMOVAL      bilat    HX COLONOSCOPY  2014    WNL    HX ORTHOPAEDIC  2005    right femur fx    HX ORTHOPAEDIC  2012    Lt TKA    HX REFRACTIVE SURGERY  06/15/2017    rt     Allergies   Allergen Reactions    Ambien [Zolpidem] Other (comments)     Legs got heavy    Aminobenzoic Acid Unknown (comments)    Bactrim [Sulfamethoprim Ds] Unknown (comments)     Patient cant remember her reaction    Hydroxyzine Other (comments)     Legs got heavy    Sulfamethoxazole-Trimethoprim Unknown (comments)     Other reaction(s): vomiting and abd pain      Family History   Problem Relation Age of Onset    Cancer Mother         Leukemia    Heart Disease Mother     Cancer Sister         Ovarian    No Known Problems Brother     Heart Failure Brother     Cancer Brother         Brain tumor      Social History     Socioeconomic History    Marital status:      Spouse name: Not on file    Number of children: Not on file    Years of education: Not on file    Highest education level: Not on file   Occupational History    Not on file   Tobacco Use    Smoking status: Never Smoker    Smokeless tobacco: Never Used   Vaping Use    Vaping Use: Never used   Substance and Sexual Activity    Alcohol use: No     Alcohol/week: 0.0 standard drinks    Drug use: Never    Sexual activity: Not on file   Other Topics Concern     Service No    Blood Transfusions Yes    Caffeine Concern No    Occupational Exposure No    Hobby Hazards No    Sleep Concern Yes     Comment: insomnia    Stress Concern No    Weight Concern Yes     Comment: over weight    Special Diet No    Back Care No    Exercise Yes    Bike Helmet No    Seat Belt Yes    Self-Exams Yes   Social History Narrative    Not on file     Social Determinants of Health     Financial Resource Strain:     Difficulty of Paying Living Expenses:    Food Insecurity:     Worried About Running Out of Food in the Last Year:     Ran Out of Food in the Last Year:    Transportation Needs:     Lack of Transportation (Medical):      Lack of Transportation (Non-Medical):    Physical Activity:     Days of Exercise per Week:     Minutes of Exercise per Session:    Stress:     Feeling of Stress :    Social Connections:     Frequency of Communication with Friends and Family:     Frequency of Social Gatherings with Friends and Family:     Attends [employer] Services:     Active Member of Clubs or Organizations:     Attends Club or Organization Meetings:     Marital Status:    Intimate Partner Violence:     Fear of Current or Ex-Partner:     Emotionally Abused:     Physically Abused:     Sexually Abused:       Current Outpatient Medications   Medication Sig    isosorbide mononitrate ER (IMDUR) 30 mg tablet Take 1 Tablet by mouth daily.  furosemide (LASIX) 40 mg tablet take 60 mg in the morning and 40 mg in the evening.  metoprolol tartrate (LOPRESSOR) 100 mg IR tablet TAKE 1 TABLET EVERY 12     HOURS    apixaban (ELIQUIS) 2.5 mg tablet Take 1 Tablet by mouth two (2) times a day.  sertraline (ZOLOFT) 50 mg tablet Take 1 Tablet by mouth daily.  potassium chloride SR (KLOR-CON 10) 10 mEq tablet Take 1 Tablet by mouth daily. Indications: prevention of low potassium in the blood    triamcinolone (ARISTOCORT) 0.5 % topical cream Apply  to affected area two (2) times a day. As needed for itching    lovastatin (MEVACOR) 20 mg tablet TAKE 1 TABLET BY MOUTH  NIGHTLY    melatonin 10 mg tab Take 10 mg by mouth nightly.  ascorbic acid (VITAMIN C PO) Take  by mouth daily.  cholecalciferol, vitamin D3, (VITAMIN D3 PO) Take  by mouth. 1000 IU    vitamin e (E GEMS) 1,000 unit capsule Take 1,000 Units by mouth daily.  C/sourcherry/celery/grape seed (TART CHERRY PO) Take  by mouth. 465 mg    pyridoxine, vitamin B6, (Vitamin B-6) 100 mg tablet Take 100 mg by mouth daily.  magnesium 200 mg tab Take  by mouth.  acetaminophen (Tylenol Extra Strength) 500 mg tablet Take 500 mg by mouth every six (6) hours as needed for Fever or Pain. No current facility-administered medications for this visit. Review of Symptoms:    CONST  No weight change. No fever, chills, sweats    ENT No visual changes, URI sx, sore throat    CV  See HPI   RESP  No cough, or sputum, wheezing. Also see HPI   GI  No abdominal pain or change in bowel habits. No heartburn or dysphagia.    No melena or rectal bleeding.   No dysuria, urgency, frequency, hematuria   MSKEL  No joint pain, swelling. No muscle pain. SKIN  No rash or lesions. NEURO  No headache, syncope, or seizure. No weakness, loss of sensation, or paresthesias. PSYCH  No low mood or depression  No anxiety. HE/LYMPH  No easy bruising, abnormal bleeding, or enlarged glands. Physical ExamPhysical Exam:    Visit Vitals  /80 (BP 1 Location: Left upper arm, BP Patient Position: Sitting, BP Cuff Size: Adult)   Pulse 60   Temp 98.4 °F (36.9 °C) (Temporal)   Resp 16   Ht 5' 5\" (1.651 m)   Wt 134 lb (60.8 kg)   SpO2 96% Comment: ra   BMI 22.30 kg/m²     Gen: NAD  HEENT:  PERRL, throat clear  Neck: no adenopathy, no thyromegaly, no JVD   Heart:  irregular,Nl S1S2,  I/VI murmur, no gallop or rub. Lungs:  clear  Abdomen:   Soft, non-tender, bowel sounds are active.    Extremities:  No edema  Pulse: symmetric  Neuro: A&O times 3, No focal neuro deficits    Cardiographics    ECG: afib, LBBB, no acute changes    Labs:   Lab Results   Component Value Date/Time    Sodium 136 10/14/2020 06:19 AM    Sodium 139 10/13/2020 05:56 AM    Sodium 137 10/12/2020 05:47 AM    Sodium 138 10/11/2020 05:41 AM    Sodium 138 10/10/2020 07:23 AM    Potassium 3.7 10/14/2020 06:19 AM    Potassium 3.8 10/13/2020 05:56 AM    Potassium 3.9 10/12/2020 05:47 AM    Potassium 4.2 10/11/2020 05:41 AM    Potassium 4.7 10/10/2020 07:23 AM    Chloride 97 10/14/2020 06:19 AM    Chloride 99 10/13/2020 05:56 AM    Chloride 99 10/12/2020 05:47 AM    Chloride 99 10/11/2020 05:41 AM    Chloride 99 10/10/2020 07:23 AM    CO2 31 10/14/2020 06:19 AM    CO2 31 10/13/2020 05:56 AM    CO2 31 10/12/2020 05:47 AM    CO2 32 10/11/2020 05:41 AM    CO2 34 (H) 10/10/2020 07:23 AM    Anion gap 8 10/14/2020 06:19 AM    Anion gap 9 10/13/2020 05:56 AM    Anion gap 7 10/12/2020 05:47 AM    Anion gap 7 10/11/2020 05:41 AM    Anion gap 5 10/10/2020 07:23 AM    Glucose 98 10/14/2020 06:19 AM Glucose 108 (H) 10/13/2020 05:56 AM    Glucose 90 10/12/2020 05:47 AM    Glucose 97 10/11/2020 05:41 AM    Glucose 111 (H) 10/10/2020 07:23 AM    BUN 23 (H) 10/14/2020 06:19 AM    BUN 22 (H) 10/13/2020 05:56 AM    BUN 25 (H) 10/12/2020 05:47 AM    BUN 28 (H) 10/11/2020 05:41 AM    BUN 31 (H) 10/10/2020 07:23 AM    Creatinine 0.88 10/14/2020 06:19 AM    Creatinine 0.76 10/13/2020 05:56 AM    Creatinine 0.95 10/12/2020 05:47 AM    Creatinine 0.92 10/11/2020 05:41 AM    Creatinine 1.08 (H) 10/10/2020 07:23 AM    BUN/Creatinine ratio 26 (H) 10/14/2020 06:19 AM    BUN/Creatinine ratio 29 (H) 10/13/2020 05:56 AM    BUN/Creatinine ratio 26 (H) 10/12/2020 05:47 AM    BUN/Creatinine ratio 30 (H) 10/11/2020 05:41 AM    BUN/Creatinine ratio 29 (H) 10/10/2020 07:23 AM    GFR est AA >60 10/14/2020 06:19 AM    GFR est AA >60 10/13/2020 05:56 AM    GFR est AA >60 10/12/2020 05:47 AM    GFR est AA >60 10/11/2020 05:41 AM    GFR est AA 58 (L) 10/10/2020 07:23 AM    GFR est non-AA >60 10/14/2020 06:19 AM    GFR est non-AA >60 10/13/2020 05:56 AM    GFR est non-AA 55 (L) 10/12/2020 05:47 AM    GFR est non-AA 57 (L) 10/11/2020 05:41 AM    GFR est non-AA 48 (L) 10/10/2020 07:23 AM    Calcium 8.7 10/14/2020 06:19 AM    Calcium 8.8 10/13/2020 05:56 AM    Calcium 8.6 10/12/2020 05:47 AM    Calcium 8.5 10/11/2020 05:41 AM    Calcium 9.0 10/10/2020 07:23 AM    Bilirubin, total 1.2 (H) 10/14/2020 06:19 AM    Bilirubin, total 1.4 (H) 10/13/2020 05:56 AM    Bilirubin, total 1.2 (H) 10/06/2020 12:54 PM    Bilirubin, total 0.9 06/03/2019 10:58 AM    Bilirubin, total 0.5 12/12/2018 09:28 AM    Alk. phosphatase 75 10/14/2020 06:19 AM    Alk. phosphatase 78 10/13/2020 05:56 AM    Alk. phosphatase 80 10/06/2020 12:54 PM    Alk. phosphatase 84 06/03/2019 10:58 AM    Alk.  phosphatase 84 12/12/2018 09:28 AM    Protein, total 6.3 (L) 10/14/2020 06:19 AM    Protein, total 6.7 10/13/2020 05:56 AM    Protein, total 7.2 10/06/2020 12:54 PM    Protein, total 7.2 06/03/2019 10:58 AM    Protein, total 7.6 12/12/2018 09:28 AM    Albumin 2.3 (L) 10/14/2020 06:19 AM    Albumin 2.4 (L) 10/13/2020 05:56 AM    Albumin 2.8 (L) 10/06/2020 12:54 PM    Albumin 4.4 06/03/2019 10:58 AM    Albumin 4.3 12/12/2018 09:28 AM    Globulin 4.0 10/14/2020 06:19 AM    Globulin 4.3 (H) 10/13/2020 05:56 AM    Globulin 4.4 (H) 10/06/2020 12:54 PM    Globulin 4.1 (H) 07/22/2018 08:00 PM    A-G Ratio 0.6 (L) 10/14/2020 06:19 AM    A-G Ratio 0.6 (L) 10/13/2020 05:56 AM    A-G Ratio 0.6 (L) 10/06/2020 12:54 PM    A-G Ratio 1.6 06/03/2019 10:58 AM    A-G Ratio 1.3 12/12/2018 09:28 AM    ALT (SGPT) 25 10/14/2020 06:19 AM    ALT (SGPT) 31 10/13/2020 05:56 AM    ALT (SGPT) 36 10/06/2020 12:54 PM    ALT (SGPT) 20 06/03/2019 10:58 AM    ALT (SGPT) 19 12/12/2018 09:28 AM     Lab Results   Component Value Date/Time    CK 45 10/07/2020 05:41 AM     Lab Results   Component Value Date/Time    Cholesterol, total 164 12/12/2018 09:28 AM    Cholesterol, total 154 07/23/2018 05:35 AM    Cholesterol, total 159 06/13/2018 10:26 AM    Cholesterol, total 163 12/19/2017 09:09 AM    Cholesterol, total 154 06/20/2017 09:29 AM    HDL Cholesterol 49 12/12/2018 09:28 AM    HDL Cholesterol 55 07/23/2018 05:35 AM    HDL Cholesterol 48 06/13/2018 10:26 AM    HDL Cholesterol 51 12/19/2017 09:09 AM    HDL Cholesterol 47 06/20/2017 09:29 AM    LDL, calculated 95 12/12/2018 09:28 AM    LDL, calculated 89.2 07/23/2018 05:35 AM    LDL, calculated 92 06/13/2018 10:26 AM    LDL, calculated 95 12/19/2017 09:09 AM    LDL, calculated 87 06/20/2017 09:29 AM    Triglyceride 102 12/12/2018 09:28 AM    Triglyceride 49 07/23/2018 05:35 AM    Triglyceride 95 06/13/2018 10:26 AM    Triglyceride 85 12/19/2017 09:09 AM    Triglyceride 98 06/20/2017 09:29 AM    CHOL/HDL Ratio 2.8 07/23/2018 05:35 AM     No results found for this or any previous visit.     Assessment:         Patient Active Problem List    Diagnosis Date Noted    Acute pancreatitis 07/23/2018    Carotid artery disease, unspecified laterality ([facility] [address] 78/14/6928    Systolic CHF, acute ([facility] 10/11/2020    Acute left-sided [facility] (congestive [facility] failure) ([facility] 10/06/2020    New onset left bundle branch block (LBBB) 10/06/2020    Coumadin toxicity 10/06/2020    Persistent atrial fibrillation ([facility] 07/22/2018    Abnormal EKG 10/13/2016    PAC (premature atrial contraction) 10/13/2016    Primary osteoarthritis 10/03/2016    HTN (hypertension)     DJD (degenerative joint disease)     Colitis     Ventricular ectopy     Hyperlipemia       Hosp at [facility] 10/6-10/14/20--acute on chronic systolic/diastolic CHF. Persistent/chronic afib on OAC, rate controlled. Hx hypertension, LBBB, ASCVD, colitis. Last seen by me 3/21. Lost  in January. Mild stable GREENBERG, no other CV sx or complaints      Plan:     Doing well with no adverse cardiac symptoms. Lipids and labs followed by PCP. Continue current care and f/u in 6 months.     Lucia Bosworth, MD

## 2021-10-19 RX ORDER — METOPROLOL TARTRATE 100 MG/1
TABLET ORAL
Qty: 90 TABLET | Refills: 1 | Status: ON HOLD | OUTPATIENT
Start: 2021-10-19 | End: 2022-04-07

## 2021-11-19 ENCOUNTER — APPOINTMENT (OUTPATIENT)
Dept: CT IMAGING | Age: 86
DRG: 536 | End: 2021-11-19
Attending: EMERGENCY MEDICINE
Payer: MEDICARE

## 2021-11-19 ENCOUNTER — HOSPITAL ENCOUNTER (INPATIENT)
Age: 86
LOS: 5 days | Discharge: NURSING, REHAB OR LONG-TERM CARE | DRG: 536 | End: 2021-11-24
Attending: EMERGENCY MEDICINE | Admitting: INTERNAL MEDICINE
Payer: MEDICARE

## 2021-11-19 ENCOUNTER — APPOINTMENT (OUTPATIENT)
Dept: GENERAL RADIOLOGY | Age: 86
DRG: 536 | End: 2021-11-19
Attending: EMERGENCY MEDICINE
Payer: MEDICARE

## 2021-11-19 DIAGNOSIS — S32.591A CLOSED FRACTURE OF MULTIPLE PUBIC RAMI, RIGHT, INITIAL ENCOUNTER (HCC): Primary | ICD-10-CM

## 2021-11-19 DIAGNOSIS — S32.591A FRACTURE OF MULTIPLE PUBIC RAMI, RIGHT, CLOSED, INITIAL ENCOUNTER (HCC): ICD-10-CM

## 2021-11-19 PROBLEM — I50.22 CHRONIC SYSTOLIC HEART FAILURE (HCC): Chronic | Status: ACTIVE | Noted: 2021-11-19
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
## 2021-11-19 LAB
ALBUMIN SERPL-MCNC: 3.4 G/DL (ref 3.5–5)
ALBUMIN/GLOB SERPL: 0.7 {RATIO} (ref 1.1–2.2)
ALP SERPL-CCNC: 122 U/L (ref 45–117)
ALT SERPL-CCNC: 30 U/L (ref 12–78)
ANION GAP SERPL CALC-SCNC: 7 MMOL/L (ref 5–15)
AST SERPL-CCNC: 29 U/L (ref 15–37)
BILIRUB SERPL-MCNC: 0.9 MG/DL (ref 0.2–1)
BUN SERPL-MCNC: 23 MG/DL (ref 6–20)
BUN/CREAT SERPL: 20 (ref 12–20)
CALCIUM SERPL-MCNC: 9.1 MG/DL (ref 8.5–10.1)
CHLORIDE SERPL-SCNC: 101 MMOL/L (ref 97–108)
CO2 SERPL-SCNC: 32 MMOL/L (ref 21–32)
COMMENT, HOLDF: NORMAL
CREAT SERPL-MCNC: 1.14 MG/DL (ref 0.55–1.02)
GLOBULIN SER CALC-MCNC: 4.6 G/DL (ref 2–4)
GLUCOSE SERPL-MCNC: 147 MG/DL (ref 65–100)
POTASSIUM SERPL-SCNC: 3.5 MMOL/L (ref 3.5–5.1)
PROT SERPL-MCNC: 8 G/DL (ref 6.4–8.2)
SAMPLES BEING HELD,HOLD: NORMAL
SODIUM SERPL-SCNC: 140 MMOL/L (ref 136–145)

## 2021-11-19 PROCEDURE — 73502 X-RAY EXAM HIP UNI 2-3 VIEWS: CPT

## 2021-11-19 PROCEDURE — 65270000029 HC RM PRIVATE

## 2021-11-19 PROCEDURE — 97530 THERAPEUTIC ACTIVITIES: CPT

## 2021-11-19 PROCEDURE — 85025 COMPLETE CBC W/AUTO DIFF WBC: CPT

## 2021-11-19 PROCEDURE — 74011250636 HC RX REV CODE- 250/636: Performed by: EMERGENCY MEDICINE

## 2021-11-19 PROCEDURE — 74011250637 HC RX REV CODE- 250/637: Performed by: INTERNAL MEDICINE

## 2021-11-19 PROCEDURE — 97535 SELF CARE MNGMENT TRAINING: CPT

## 2021-11-19 PROCEDURE — 94760 N-INVAS EAR/PLS OXIMETRY 1: CPT

## 2021-11-19 PROCEDURE — 97161 PT EVAL LOW COMPLEX 20 MIN: CPT

## 2021-11-19 PROCEDURE — 72125 CT NECK SPINE W/O DYE: CPT

## 2021-11-19 PROCEDURE — 96375 TX/PRO/DX INJ NEW DRUG ADDON: CPT

## 2021-11-19 PROCEDURE — 36415 COLL VENOUS BLD VENIPUNCTURE: CPT

## 2021-11-19 PROCEDURE — 99284 EMERGENCY DEPT VISIT MOD MDM: CPT

## 2021-11-19 PROCEDURE — 80053 COMPREHEN METABOLIC PANEL: CPT

## 2021-11-19 PROCEDURE — 77010033678 HC OXYGEN DAILY

## 2021-11-19 PROCEDURE — 94761 N-INVAS EAR/PLS OXIMETRY MLT: CPT

## 2021-11-19 PROCEDURE — 96374 THER/PROPH/DIAG INJ IV PUSH: CPT

## 2021-11-19 PROCEDURE — 70450 CT HEAD/BRAIN W/O DYE: CPT

## 2021-11-19 PROCEDURE — 97165 OT EVAL LOW COMPLEX 30 MIN: CPT

## 2021-11-19 RX ORDER — FAMOTIDINE 20 MG/1
20 TABLET, FILM COATED ORAL EVERY EVENING
Status: DISCONTINUED | OUTPATIENT
Start: 2021-11-19 | End: 2021-11-24 | Stop reason: HOSPADM

## 2021-11-19 RX ORDER — ACETAMINOPHEN 500 MG
500 TABLET ORAL 4 TIMES DAILY
Status: DISCONTINUED | OUTPATIENT
Start: 2021-11-19 | End: 2021-11-24 | Stop reason: HOSPADM

## 2021-11-19 RX ORDER — MELATONIN
1000 DAILY
Status: DISCONTINUED | OUTPATIENT
Start: 2021-11-19 | End: 2021-11-24 | Stop reason: HOSPADM

## 2021-11-19 RX ORDER — MORPHINE SULFATE 4 MG/ML
4 INJECTION INTRAVENOUS
Status: COMPLETED | OUTPATIENT
Start: 2021-11-19 | End: 2021-11-19

## 2021-11-19 RX ORDER — ONDANSETRON 2 MG/ML
4 INJECTION INTRAMUSCULAR; INTRAVENOUS
Status: DISCONTINUED | OUTPATIENT
Start: 2021-11-19 | End: 2021-11-19

## 2021-11-19 RX ORDER — MORPHINE SULFATE 4 MG/ML
4 INJECTION INTRAVENOUS
Status: DISCONTINUED | OUTPATIENT
Start: 2021-11-19 | End: 2021-11-19

## 2021-11-19 RX ORDER — FACIAL-BODY WIPES
10 EACH TOPICAL DAILY PRN
Status: DISCONTINUED | OUTPATIENT
Start: 2021-11-19 | End: 2021-11-24 | Stop reason: HOSPADM

## 2021-11-19 RX ORDER — SODIUM CHLORIDE 0.9 % (FLUSH) 0.9 %
5-40 SYRINGE (ML) INJECTION AS NEEDED
Status: DISCONTINUED | OUTPATIENT
Start: 2021-11-19 | End: 2021-11-24 | Stop reason: HOSPADM

## 2021-11-19 RX ORDER — SODIUM CHLORIDE 0.9 % (FLUSH) 0.9 %
5-40 SYRINGE (ML) INJECTION EVERY 8 HOURS
Status: DISCONTINUED | OUTPATIENT
Start: 2021-11-19 | End: 2021-11-24 | Stop reason: HOSPADM

## 2021-11-19 RX ORDER — METOPROLOL TARTRATE 50 MG/1
100 TABLET ORAL EVERY 12 HOURS
Status: DISCONTINUED | OUTPATIENT
Start: 2021-11-19 | End: 2021-11-24 | Stop reason: HOSPADM

## 2021-11-19 RX ORDER — CHOLECALCIFEROL (VITAMIN D3) 125 MCG
10 CAPSULE ORAL
Status: DISCONTINUED | OUTPATIENT
Start: 2021-11-19 | End: 2021-11-24 | Stop reason: HOSPADM

## 2021-11-19 RX ORDER — FUROSEMIDE 40 MG/1
40 TABLET ORAL
Status: DISCONTINUED | OUTPATIENT
Start: 2021-11-19 | End: 2021-11-20

## 2021-11-19 RX ORDER — SERTRALINE HYDROCHLORIDE 25 MG/1
25 TABLET, FILM COATED ORAL DAILY
Status: DISCONTINUED | OUTPATIENT
Start: 2021-11-19 | End: 2021-11-24 | Stop reason: HOSPADM

## 2021-11-19 RX ORDER — ATORVASTATIN CALCIUM 10 MG/1
10 TABLET, FILM COATED ORAL
Status: DISCONTINUED | OUTPATIENT
Start: 2021-11-19 | End: 2021-11-24 | Stop reason: HOSPADM

## 2021-11-19 RX ORDER — POTASSIUM CHLORIDE 750 MG/1
10 TABLET, FILM COATED, EXTENDED RELEASE ORAL DAILY
Status: DISCONTINUED | OUTPATIENT
Start: 2021-11-19 | End: 2021-11-24 | Stop reason: HOSPADM

## 2021-11-19 RX ORDER — MORPHINE SULFATE 2 MG/ML
2 INJECTION, SOLUTION INTRAMUSCULAR; INTRAVENOUS
Status: DISCONTINUED | OUTPATIENT
Start: 2021-11-19 | End: 2021-11-21

## 2021-11-19 RX ORDER — HYDROCODONE BITARTRATE AND ACETAMINOPHEN 5; 325 MG/1; MG/1
1 TABLET ORAL
Status: DISCONTINUED | OUTPATIENT
Start: 2021-11-19 | End: 2021-11-24 | Stop reason: HOSPADM

## 2021-11-19 RX ORDER — ASCORBIC ACID 500 MG
500 TABLET ORAL DAILY
Status: DISCONTINUED | OUTPATIENT
Start: 2021-11-19 | End: 2021-11-24 | Stop reason: HOSPADM

## 2021-11-19 RX ORDER — ISOSORBIDE MONONITRATE 30 MG/1
30 TABLET, EXTENDED RELEASE ORAL DAILY
Status: DISCONTINUED | OUTPATIENT
Start: 2021-11-19 | End: 2021-11-24 | Stop reason: HOSPADM

## 2021-11-19 RX ORDER — ONDANSETRON 4 MG/1
4 TABLET, ORALLY DISINTEGRATING ORAL
Status: DISCONTINUED | OUTPATIENT
Start: 2021-11-19 | End: 2021-11-24 | Stop reason: HOSPADM

## 2021-11-19 RX ORDER — ONDANSETRON 2 MG/ML
4 INJECTION INTRAMUSCULAR; INTRAVENOUS
Status: COMPLETED | OUTPATIENT
Start: 2021-11-19 | End: 2021-11-19

## 2021-11-19 RX ADMIN — Medication 1000 UNITS: at 10:47

## 2021-11-19 RX ADMIN — ONDANSETRON 4 MG: 4 TABLET, ORALLY DISINTEGRATING ORAL at 18:13

## 2021-11-19 RX ADMIN — METOPROLOL TARTRATE 100 MG: 50 TABLET, FILM COATED ORAL at 10:46

## 2021-11-19 RX ADMIN — FUROSEMIDE 40 MG: 40 TABLET ORAL at 13:53

## 2021-11-19 RX ADMIN — APIXABAN 2.5 MG: 2.5 TABLET, FILM COATED ORAL at 20:09

## 2021-11-19 RX ADMIN — ACETAMINOPHEN 500 MG: 500 TABLET ORAL at 22:38

## 2021-11-19 RX ADMIN — POTASSIUM CHLORIDE 10 MEQ: 750 TABLET, FILM COATED, EXTENDED RELEASE ORAL at 10:46

## 2021-11-19 RX ADMIN — ONDANSETRON 4 MG: 2 INJECTION INTRAMUSCULAR; INTRAVENOUS at 08:12

## 2021-11-19 RX ADMIN — Medication 10 ML: at 20:15

## 2021-11-19 RX ADMIN — ACETAMINOPHEN 500 MG: 500 TABLET ORAL at 17:23

## 2021-11-19 RX ADMIN — APIXABAN 2.5 MG: 2.5 TABLET, FILM COATED ORAL at 10:47

## 2021-11-19 RX ADMIN — ATORVASTATIN CALCIUM 10 MG: 10 TABLET, FILM COATED ORAL at 22:38

## 2021-11-19 RX ADMIN — SERTRALINE HYDROCHLORIDE 25 MG: 25 TABLET ORAL at 10:46

## 2021-11-19 RX ADMIN — HYDROCODONE BITARTRATE AND ACETAMINOPHEN 1 TABLET: 5; 325 TABLET ORAL at 10:46

## 2021-11-19 RX ADMIN — ISOSORBIDE MONONITRATE 30 MG: 30 TABLET, EXTENDED RELEASE ORAL at 10:47

## 2021-11-19 RX ADMIN — ACETAMINOPHEN 500 MG: 500 TABLET ORAL at 13:53

## 2021-11-19 RX ADMIN — FAMOTIDINE 20 MG: 20 TABLET, FILM COATED ORAL at 17:23

## 2021-11-19 RX ADMIN — HYDROCODONE BITARTRATE AND ACETAMINOPHEN 1 TABLET: 5; 325 TABLET ORAL at 20:09

## 2021-11-19 RX ADMIN — Medication 10 ML: at 17:23

## 2021-11-19 RX ADMIN — Medication 10 MG: at 22:40

## 2021-11-19 RX ADMIN — MORPHINE SULFATE 4 MG: 4 INJECTION INTRAVENOUS at 08:13

## 2021-11-19 RX ADMIN — METOPROLOL TARTRATE 100 MG: 50 TABLET, FILM COATED ORAL at 20:09

## 2021-11-19 RX ADMIN — OXYCODONE HYDROCHLORIDE AND ACETAMINOPHEN 500 MG: 500 TABLET ORAL at 10:46

## 2021-11-19 NOTE — ED PROVIDER NOTES
[facility]  EMERGENCY DEPARTMENT HISTORY AND PHYSICAL EXAM         Date of Service: 11/19/2021   Patient Name: Matt Inman   YOB: 1930  Medical Record Number: [id number]    History of Presenting Illness     Chief Complaint   Patient presents with    Fall        History Provided By:  patient and EMS    Additional History:   Matt Inman is a 80 y.o. female who presents via EMS to the ED with cc of fall this morning with head and right hip injury. Pt slipped and fell in the bathroom this morning, striking the back of her head on the floor. Denies LOC. She has some minor neck pain as well. She also C/O right hip and buttock pain, though no deformity. She takes Eliquis for atrial fib. No other acute injury. There are no other complaints, changes or physical findings at this time.     Primary Care Provider: Michael Cobb NP   Specialist:    Past History     Past Medical History:   Past Medical History:   Diagnosis Date    Colitis 2013    C diff; no recent flare, normal colonoscopy 2014    DJD (degenerative joint disease)     Fracture, femoral ([address] 09/2005    HTN (hypertension)     Hyperlipemia     Persistent atrial fibrillation ([address] 2018    Ventricular ectopy     no symptoms        Past Surgical History:   Past Surgical History:   Procedure Laterality Date    HX CATARACT REMOVAL      bilat    HX COLONOSCOPY  2014    WNL    HX ORTHOPAEDIC  2005    right femur fx    HX ORTHOPAEDIC  2012    Lt TKA    HX REFRACTIVE SURGERY  06/15/2017    rt        Family History:   Family History   Problem Relation Age of Onset    Cancer Mother         Leukemia    Heart Disease Mother     Cancer Sister         Ovarian    No Known Problems Brother     Heart Failure Brother     Cancer Brother         Brain tumor        Social History:   Social History     Tobacco Use    Smoking status: Never Smoker    Smokeless tobacco: Never Used   Vaping Use    Vaping Use: Never used   Substance Use Topics    Alcohol use: No     Alcohol/week: 0.0 standard drinks    Drug use: Never        Allergies: Allergies   Allergen Reactions    Ambien [Zolpidem] Other (comments)     Legs got heavy    Aminobenzoic Acid Unknown (comments)    Bactrim [Sulfamethoprim Ds] Unknown (comments)     Patient cant remember her reaction    Hydroxyzine Other (comments)     Legs got heavy    Sulfamethoxazole-Trimethoprim Unknown (comments)     Other reaction(s): vomiting and abd pain        Review of Systems   Review of Systems   Constitutional: Negative for appetite change, chills and fever. HENT: Negative for congestion. Eyes: Negative for visual disturbance. Respiratory: Negative for cough, shortness of breath and wheezing. Cardiovascular: Negative for chest pain, palpitations and leg swelling. Gastrointestinal: Negative for abdominal pain. Genitourinary: Negative for dysuria, frequency and urgency. Musculoskeletal: Positive for arthralgias, myalgias and neck pain. Negative for back pain, joint swelling and neck stiffness. Skin: Negative for rash. Neurological: Negative for dizziness, syncope, weakness and headaches. Hematological: Negative for adenopathy. Psychiatric/Behavioral: Negative for behavioral problems and dysphoric mood. Physical Exam  Physical Exam  Vitals and nursing note reviewed. Constitutional:       General: She is not in acute distress. Appearance: She is well-developed. She is not ill-appearing. HENT:      Head: Normocephalic. Comments: Hematoma posterior scalp, no skin injury  Eyes:      General: No scleral icterus. Conjunctiva/sclera: Conjunctivae normal.      Pupils: Pupils are equal, round, and reactive to light. Cardiovascular:      Rate and Rhythm: Normal rate. Rhythm irregular. Heart sounds: No murmur heard. No gallop.        Comments: Irregularly irregular  Pulmonary:      Effort: Pulmonary effort is normal. No respiratory distress. Breath sounds: No stridor. No wheezing or rales. Abdominal:      General: Bowel sounds are normal. There is no distension. Palpations: Abdomen is soft. There is no mass. Tenderness: There is no abdominal tenderness. There is no guarding or rebound. Musculoskeletal:         General: Tenderness present. No deformity. Cervical back: Normal range of motion and neck supple. Comments: TTP right greater trochanter and posterior buttock and bony pelvis, with decreased ROM to hip flexion   Lymphadenopathy:      Cervical: No cervical adenopathy. Skin:     General: Skin is warm and dry. Findings: No erythema or rash. Neurological:      Mental Status: She is alert and oriented to person, place, and time. Cranial Nerves: No cranial nerve deficit. Coordination: Coordination normal.         Medical Decision Making   I am the first provider for this patient. I reviewed the vital signs, available nursing notes, past medical history, past surgical history, family history and social history. Old Medical Records: PCP notes     Provider Notes:   DDX: Contusion, hematoma, ICH, fracture hip and/or pelvis     ED Course:  7:21 AM   Initial assessment performed. The patients presenting problems have been discussed, and they are in agreement with the care plan formulated and outlined with them. I have encouraged them to ask questions as they arise throughout their visit. Progress Notes:  8:18 AM  Superior and inferior right pubic rami fxs. Possible sacral fx. CT head and C-spine negative. Await labs. Will admit for pain control and PT evaluation. Jacquelyn Serrano MD      8:19 AM  Jacquelyn Serrano MD spoke with Dr. Sandee Buchanan, Consult for Hospitalist. Discussed available diagnostic tests and clinical findings. He is in agreement with care plans as outlined. He/she will admit.       Procedures:   Procedures    Diagnostic Study Results   Labs -      Recent Results (from the past 12 hour(s))   CBC WITH AUTOMATED DIFF    Collection Time: 11/19/21  8:09 AM   Result Value Ref Range    WBC 17.0 (H) 3.6 - 11.0 K/uL    RBC 4.39 3.80 - 5.20 M/uL    HGB 13.7 11.5 - 16.0 g/dL    HCT 41.8 35.0 - 47.0 %    MCV 95.2 80.0 - 99.0 FL    MCH 31.2 26.0 - 34.0 PG    MCHC 32.8 30.0 - 36.5 g/dL    RDW 14.4 11.5 - 14.5 %    PLATELET 114 917 - 061 K/uL    MPV 10.4 8.9 - 12.9 FL    NRBC 0.0 0  WBC    ABSOLUTE NRBC 0.00 0.00 - 0.01 K/uL    NEUTROPHILS PENDING %    LYMPHOCYTES PENDING %    MONOCYTES PENDING %    EOSINOPHILS PENDING %    BASOPHILS PENDING %    IMMATURE GRANULOCYTES PENDING %    ABS. NEUTROPHILS PENDING K/UL    ABS. LYMPHOCYTES PENDING K/UL    ABS. MONOCYTES PENDING K/UL    ABS. EOSINOPHILS PENDING K/UL    ABS. BASOPHILS PENDING K/UL    ABS. IMM. GRANS. PENDING K/UL    DF PENDING    METABOLIC PANEL, COMPREHENSIVE    Collection Time: 11/19/21  8:09 AM   Result Value Ref Range    Sodium 140 136 - 145 mmol/L    Potassium 3.5 3.5 - 5.1 mmol/L    Chloride 101 97 - 108 mmol/L    CO2 32 21 - 32 mmol/L    Anion gap 7 5 - 15 mmol/L    Glucose 147 (H) 65 - 100 mg/dL    BUN 23 (H) 6 - 20 MG/DL    Creatinine 1.14 (H) 0.55 - 1.02 MG/DL    BUN/Creatinine ratio 20 12 - 20      GFR est AA 54 (L) >60 ml/min/1.73m2    GFR est non-AA 45 (L) >60 ml/min/1.73m2    Calcium 9.1 8.5 - 10.1 MG/DL    Bilirubin, total 0.9 0.2 - 1.0 MG/DL    ALT (SGPT) 30 12 - 78 U/L    AST (SGOT) 29 15 - 37 U/L    Alk. phosphatase 122 (H) 45 - 117 U/L    Protein, total 8.0 6.4 - 8.2 g/dL    Albumin 3.4 (L) 3.5 - 5.0 g/dL    Globulin 4.6 (H) 2.0 - 4.0 g/dL    A-G Ratio 0.7 (L) 1.1 - 2.2     SAMPLES BEING HELD    Collection Time: 11/19/21  8:09 AM   Result Value Ref Range    SAMPLES BEING HELD 1 blue, 1 sst     COMMENT        Add-on orders for these samples will be processed based on acceptable specimen integrity and analyte stability, which may vary by analyte.        Radiologic Studies -  The following have been ordered and reviewed:  CT HEAD WO CONT   Final Result   No acute intracranial abnormality. Small right parietal scalp   contusion. CT SPINE CERV WO CONT   Final Result   No fracture. Degenerative stenoses as described above. XR HIP RT W OR WO PELV 2-3 VWS   Final Result   1. Acute right superior and inferior pubic rami fractures. Possible right sacral   alar fracture   2. Severe osteopenia           CT Results  (Last 48 hours)               11/19/21 0757  CT HEAD WO CONT Final result    Impression:  No acute intracranial abnormality. Small right parietal scalp   contusion. Narrative:  HEAD CT WITHOUT CONTRAST: 11/19/2021 7:57 AM       INDICATION: head trauma       COMPARISON: None. PROCEDURE: Axial images of the head were obtained without contrast. Coronal and   sagittal reformats were performed. CT dose reduction was achieved through use of   a standardized protocol tailored for this examination and automatic exposure   control for dose modulation. FINDINGS: There is a small right parietal scalp contusion. The ventricles and   sulci are appropriate in size and configuration for age. No loss of gray-white   differentiation to suggest late acute or early subacute infarction. No mass   effect or intracranial hemorrhage. 11/19/21 0757  CT SPINE CERV WO CONT Final result    Impression:  No fracture. Degenerative stenoses as described above. Narrative:  EXAM:  CT CERVICAL SPINE WITHOUT CONTRAST       INDICATION:   fall        COMPARISON: None. TECHNIQUE: CT of the cervical spine was performed without intravenous contrast   administration. Sagittal and coronal reconstructions were generated. CT dose   reduction was achieved through use of a standardized protocol tailored for this   examination and automatic exposure control for dose modulation. FINDINGS:   No fracture or dislocation.  Cervical alignment is normal. The paravertebral soft   tissues are normal. Degenerative disease causes the following stenoses:   C2-C3:  Mild canal stenosis. C3-C4:  Left and severe right neural foraminal stenosis. Moderate canal   stenosis. C4-C5:  Severe bilateral neural foraminal stenosis. Mild canal stenosis. C5-C6:  Left and severe right neural foraminal stenosis. C6-C7:  Severe right neural foraminal stenosis. C7-T1:  No stenosis. CXR Results  (Last 48 hours)    None            Vital Signs-Reviewed the patient's vital signs. Patient Vitals for the past 12 hrs:   Temp Pulse Resp BP SpO2   11/19/21 0817  82 18 (!) 145/80 96 %   11/19/21 0720 97.7 °F (36.5 °C) 85 20 (!) 149/82 91 %       Medications Given in the ED:  Medications   morphine injection 4 mg (has no administration in time range)   ondansetron (ZOFRAN) injection 4 mg (has no administration in time range)   morphine injection 4 mg (4 mg IntraVENous Given 11/19/21 0813)   ondansetron (ZOFRAN) injection 4 mg (4 mg IntraVENous Given 11/19/21 0812)       Diagnosis:  Clinical Impression:   1. Fracture of multiple pubic rami, right, closed, initial encounter ([facility] [address]         Disposition:  Admit  _______________________________   Attestations: This note was performed by Nohelia Del Valle MD in its entirety.   _______________________________

## 2021-11-19 NOTE — PROGRESS NOTES
Problem: Self Care Deficits Care Plan (Adult)  Goal: *Acute Goals and Plan of Care (Insert Text)  11/19/2021 1458 by Mat Smalls OT  Outcome: Progressing Towards Goal  Note:   FUNCTIONAL STATUS PRIOR TO ADMISSION: Patient was modified independent using a walker and single point cane for functional mobility. HOME SUPPORT: The patient lived alone with son and paid  to provide assistance. Occupational Therapy Goals  Initiated 11/19/2021  1. Patient will perform bathing with minimal assistance/contact guard assist within 7 day(s). 2.  Patient will perform toilet transfer with moderate assistance  within 7 day(s). 3.  Patient will perform lower body dressing with minimal assistance/contact guard assist within 7 day(s). 4.  Patient will perform all aspects of toileting with moderate assistance  within 7 day(s). 5.  Patient will utilize energy conservation techniques during functional activities with verbal cues within 7 day(s). OCCUPATIONAL THERAPY EVALUATION  Patient: Bradley Inman [de-identified]80 y.o. female)  Date: 11/19/2021  Primary Diagnosis: Closed fracture of multiple pubic rami, right, initial encounter ([facility] [address] [S32.591A]        Precautions: fall       ASSESSMENT  Based on the objective data described below, the patient presents with s/p closed fx of multiple pubic rami Right. She fell at home and also hit her head without LOC. She lives alone with son coming by daily and paid help every other week for housekeeping. Pt used mostly cane in the home, has a walker. Has a chair and grab bars in shower. Was independent in self care, cooked, light cleaning. Friend helped with grocery shopping. Today demonstrates functional AROM to reach behind back and head. Uses dressing aids at home for LE bathing/dressing so for now needs assist for those tasks. She was set-up seated in chair for grooming. She is independent in eating and combing hair. Per PT she was max assist x 2 for transfer.  She will need to use BSC at this time for safety and will be max assist x 2 for transfer and rosa-area care as both hands needed on walker. Max assist x 2 for getting clothing LE on/off. Current Level of Function Impacting Discharge (ADLs/self-care): balance, pain will impact self care tasks requiring standing, transfers for bathing/dressing and toileting. Functional Outcome Measure: The patient scored Total: 40/100 on the Barthel Index outcome measure which is indicative of being moderaly dependent in basic self-care. Other factors to consider for discharge: lives alone     Patient will benefit from skilled therapy intervention to address the above noted impairments. PLAN :  Recommendations and Planned Interventions: self care training, functional mobility training, therapeutic activities, endurance activities, and patient education    Frequency/Duration: Patient will be followed by occupational therapy 3-5 times a week to address goals. Recommendation for discharge: (in order for the patient to meet his/her long term goals)  Therapy up to 5 days/week in SNF setting    This discharge recommendation:  Has not yet been discussed the attending provider and/or case management    IF patient discharges home will need the following DME: none       SUBJECTIVE:   Patient stated I did it all at home.     OBJECTIVE DATA SUMMARY:   HISTORY:   Past Medical History:   Diagnosis Date    Colitis 2013    C diff; no recent flare, normal colonoscopy 2014    DJD (degenerative joint disease)     Fracture, femoral ([address] 09/2005    HTN (hypertension)     Hyperlipemia     Persistent atrial fibrillation ([address] 2018    Ventricular ectopy     no symptoms     Past Surgical History:   Procedure Laterality Date    HX CATARACT REMOVAL      bilat    HX COLONOSCOPY  2014    WNL    HX ORTHOPAEDIC  2005    right femur fx    HX ORTHOPAEDIC  2012    Lt TKA    HX REFRACTIVE SURGERY  06/15/2017    rt       Expanded or extensive additional review of patient history:     Home Situation  Home Environment: Private residence  # Steps to Enter: 4  One/Two Story Residence: One story  Living Alone: Yes  Support Systems: Child(wen)  Patient Expects to be Discharged to[de-identified] [employer]  Current DME Used/Available at Home: [employer], Shower chair (dressing aids)  Tub or Shower Type: Shower    Hand dominance: Right    EXAMINATION OF PERFORMANCE DEFICITS:  Cognitive/Behavioral Status:  Neurologic State: Alert                   Skin: appears intact    Edema: none visualized    Hearing: Auditory  Auditory Impairment: Hard of hearing, bilateral    Vision/Perceptual:    Appears intact    Range of Motion:  WFL BUE      Strength:  4+/5 BUE shoulder flexion, ER/IR  Elbow flex/ext    Coordination:     Fine Motor Skills-Upper: Left Intact; Right Intact    Gross Motor Skills-Upper: Left Intact; Right Intact    Tone & Sensation:  normal    Balance:  Sitting: Intact    Functional Mobility and Transfers for ADLs:  Bed Mobility:       Transfers:  Sit to Stand: Maximum assistance; Assist x2; Adaptive equipment  Stand to Sit: Moderate assistance; Assist x2; Additional time; Adaptive equipment  Bed to Chair: Maximum assistance; Assist x2; Adaptive equipment; Additional time    ADL Assessment:     Oral Facial Hygiene/Grooming: Setup      Upper Body Dressing: Setup    Lower Body Dressing: Moderate assistance    Toileting: Maximum assistance    ADL Intervention and task modifications:       Grooming  Position Performed: Seated in chair  Washing Face: Set-up  Washing Hands: Set-up  Brushing Teeth: Set-up  Brushing/Combing Hair: Independent      Lower Body Dressing Assistance  Socks: Total assistance (dependent) (uses sock aid at home)    Functional Measure:    Barthel Index:  Bathin  Bladder: 5  Bowels: 10  Groomin  Dressin  Feeding: 10  Mobility: 0  Stairs: 0  Toilet Use: 0  Transfer (Bed to Chair and Back): 5  Total: 40/100      The Barthel ADL Index: Guidelines  1.  The index should be used as a record of what a patient does, not as a record of what a patient could do. 2. The main aim is to establish degree of independence from any help, physical or verbal, however minor and for whatever reason. 3. The need for supervision renders the patient not independent. 4. A patient's performance should be established using the best available evidence. Asking the patient, friends/relatives and nurses are the usual sources, but direct observation and common sense are also important. However direct testing is not needed. 5. Usually the patient's performance over the preceding 24-48 hours is important, but occasionally longer periods will be relevant. 6. Middle categories imply that the patient supplies over 50 per cent of the effort. 7. Use of aids to be independent is allowed. Score Interpretation (from [facility])    Independent   60-79 Minimally independent   40-59 Partially dependent   20-39 Very dependent   <20 Totally dependent     -Kevyn Calderón., BarthelMARCINW. (1965). Functional evaluation: the Barthel Index. [facility] ([address] (1997). The Barthel activities of daily living index: self-reporting versus actual performance in the old (> or = 75 years). Journal of [address] [place] [address] 45(7), [facility], [facility] CenterMylaMyla, Reji Martinez., [facility]. (1999). Measuring the change in disability after inpatient rehabilitation; comparison of the responsiveness of the Barthel Index and Functional [place] Measure. Journal of Neurology, Neurosurgery, and Psychiatry, 66(4), 587-845. vAa Anderson, N.[place].A, BRANNON Maddox, & Donovan Perez M.A. (2004) Assessment of post-stroke quality of life in cost-effectiveness studies: The usefulness of the Barthel Index and the EuroQoL-5D.  Quality of Life Research, 15, 311-54     Occupational Therapy Evaluation Charge Determination   History Examination Decision-Making   LOW Complexity : Brief history review  LOW Complexity : 1-3 performance deficits relating to physical, cognitive , or psychosocial skils that result in activity limitations and / or participation restrictions  LOW Complexity : No comorbidities that affect functional and no verbal or physical assistance needed to complete eval tasks       Based on the above components, the patient evaluation is determined to be of the following complexity level: LOW   Pain Rating:  While seated pain is controlled    Activity Tolerance:   Good, stayed seated    After treatment patient left in no apparent distress:    Sitting in chair, Call bell within reach, Bed / chair alarm activated, and Caregiver / family present    COMMUNICATION/EDUCATION:   The patients plan of care was discussed with: Rehabilitation technician. Patient/family have participated as able in goal setting and plan of care. This patients plan of care is appropriate for delegation to [facility].     Thank you for this referral.  Erickson Wilson OT  Time Calculation: 24 mins

## 2021-11-19 NOTE — H&P
[facility]   Admission History & Physical        11/19/2021 10:03 AM  Patient: Paula Inman 3/7/1930  PCP: Demetrius Baig NP    HISTORY  Chief Complaint:   Chief Complaint   Patient presents with   Pleasant Reyes       HPI: 80 y.o. female presenting for admission to [facility] for further evaluation and treatment for Closed fracture of multiple pubic rami, right, initial encounter ([facility] [address] She  has a past medical history of Colitis (2013), DJD (degenerative joint disease), Fracture, femoral ([facility] [address] (09/2005), HTN (hypertension), Hyperlipemia, Persistent atrial fibrillation ([facility] [address] (2018), and Ventricular ectopy. She lives alone but near her son. She fell this morning after getting up with the intent to get to the bathroom. She cannot state what happened but she found herself on the floor with complaints of significant pain, and was able to contact her caregivers with her med-alert necklace. She was found on the floor with suspicion that she had fallen while ambulating with her cane. Not able to get up. Her pain was markedly aggravated by any movement. Assessment in the ED was noted for a pubic bone fracture. She is admitted inpatient for pain management, therapy and placement to SNF Rehab when able      Past Medical History:  Past Medical History:   Diagnosis Date    Colitis 2013    C diff; no recent flare, normal colonoscopy 2014    DJD (degenerative joint disease)     Fracture, femoral ([facility] [address] 09/2005    HTN (hypertension)     Hyperlipemia     Persistent atrial fibrillation ([facility] [address] 2018    Ventricular ectopy     no symptoms       Past Surgical History:  Past Surgical History:   Procedure Laterality Date    HX CATARACT REMOVAL      bilat    HX COLONOSCOPY  2014    WNL    HX ORTHOPAEDIC  2005    right femur fx    HX ORTHOPAEDIC  2012    Lt TKA    HX REFRACTIVE SURGERY  06/15/2017    rt       Medication:  Prior to Admission medications    Medication Sig Start Date End Date Taking? Authorizing Provider   metoprolol tartrate (LOPRESSOR) 100 mg IR tablet TAKE 1 TABLET EVERY 12     HOURS 10/19/21  Yes Jalene Barthel, NP   isosorbide mononitrate ER (IMDUR) 30 mg tablet Take 1 Tablet by mouth daily. 8/5/21  Yes Jalene Barthel, NP   furosemide (LASIX) 40 mg tablet take 60 mg in the morning and 40 mg in the evening. 8/5/21  Yes Jalene Barthel, NP   apixaban (ELIQUIS) 2.5 mg tablet Take 1 Tablet by mouth two (2) times a day. 8/5/21  Yes Jalene Barthel, NP   sertraline (ZOLOFT) 50 mg tablet Take 1 Tablet by mouth daily. Patient taking differently: Take 25 mg by mouth daily. 8/5/21  Yes Jalene Barthel, NP   potassium chloride SR (KLOR-CON 10) 10 mEq tablet Take 1 Tablet by mouth daily. Indications: prevention of low potassium in the blood 8/5/21  Yes Jalene Barthel, NP   lovastatin (MEVACOR) 20 mg tablet TAKE 1 TABLET BY MOUTH  NIGHTLY 3/3/21  Yes Jalene Barthel, NP   melatonin 10 mg tab Take 10 mg by mouth nightly. 2/8/21  Yes Jalene Barthel, NP   triamcinolone (ARISTOCORT) 0.5 % topical cream Apply  to affected area two (2) times a day. As needed for itching 6/3/21   Jalene Barthel, NP   ascorbic acid (VITAMIN C PO) Take  by mouth daily. Provider, Historical   cholecalciferol, vitamin D3, (VITAMIN D3 PO) Take  by mouth. 1000 IU    Provider, Historical   vitamin e (E GEMS) 1,000 unit capsule Take 1,000 Units by mouth daily. Provider, Historical   C/sourcherry/celery/grape seed (TART CHERRY PO) Take  by mouth. 465 mg    Provider, Historical   pyridoxine, vitamin B6, (Vitamin B-6) 100 mg tablet Take 100 mg by mouth daily. Provider, Historical   magnesium 200 mg tab Take  by mouth. Provider, Historical   acetaminophen (Tylenol Extra Strength) 500 mg tablet Take 500 mg by mouth every six (6) hours as needed for Fever or Pain. 10/15/20   Jalene Barthel, NP       Allergies:   Allergies   Allergen Reactions    Ambien [Zolpidem] Other (comments)     Legs got heavy    Aminobenzoic Acid Unknown (comments)    Bactrim [Sulfamethoprim Ds] Unknown (comments)     Patient cant remember her reaction    Hydroxyzine Other (comments)     Legs got heavy    Sulfamethoxazole-Trimethoprim Unknown (comments)     Other reaction(s): vomiting and abd pain       Social History:  Social History     Tobacco Use    Smoking status: Never Smoker    Smokeless tobacco: Never Used   Vaping Use    Vaping Use: Never used   Substance Use Topics    Alcohol use: No     Alcohol/week: 0.0 standard drinks    Drug use: Never       Family History:  Family History   Problem Relation Age of Onset    Cancer Mother         Leukemia    Heart Disease Mother     Cancer Sister         Ovarian    No Known Problems Brother     Heart Failure Brother     Cancer Brother         Brain tumor       ROS:  Total of 12 systems reviewed as follows:  POSITIVE= bolded text  Negative = text not bolded       General:  fever, chills, sweats, generalized weakness, weight loss/gain, loss of appetite   Eyes:    blurred vision, eye pain, loss of vision, double vision  ENT:    rhinorrhea, pharyngitis   Respiratory:  cough, sputum production, SOB, GREEN[place]G, wheezing, pleuritic pain   Cardiology:   chest pain, palpitations, orthopnea, PND, edema, syncope   Gastrointestinal:  abdominal pain , N/V, diarrhea, dysphagia, constipation, bleeding   Genitourinary:  frequency, urgency, dysuria, hematuria, incontinence, prostatism   Muskuloskeletal: arthralgia, myalgia, back pain, neck pain  Hematology:   easy bruising, nose or gum bleeding, lymphadenopathy   Dermatological: rash, ulceration, pruritis, color change / jaundice  Endocrine:   hot flashes or polydipsia   Neurological:  headache, dizziness, confusion, focal weakness, paresthesia, speech difficulties, memory loss, gait difficulty  Psychological: feelings of anxiety, depression, agitation      PHYSICAL EXAM:  Patient Vitals for the past 24 hrs:   Temp Pulse Resp BP SpO2   11/19/21 0929 97.5 °F (36.4 °C) 96 18 (!) 155/71 97 %   11/19/21 0916  78 17  95 %   11/19/21 0817  82 18 (!) 145/80 96 %   11/19/21 0720 97.7 °F (36.5 °C) 85 20 (!) 149/82 91 %       General:    Alert, cooperative, no distress, appears stated age. HEENT: Atraumatic, anicteric sclerae, pink conjunctivae     No oral ulcers, mucosa moist, throat clear, dentition fair  Neck:  Supple, symmetrical;   thyroid non tender  Lungs:   Clear to auscultation bilaterally. No wheezing or rhonchi. No rales. Chest wall:  No tenderness. No accessory muscle use. Heart:   Regular rhythm. No  murmur. No edema  Abdomen:   Soft, non-tender. Not distended. Bowel sounds normal  Extremities: No cyanosis. No clubbing      Capillary refill normal,  Radial pulse 2+,  DP 1+    Tender R hip  / pelvis to palpation / movement  Skin:     Not pale. Not jaundiced. No rashes   Psych:  Good insight. Not depressed. Not anxious or agitated. Neurologic: EOMs intact. No facial asymmetry. No aphasia or slurred speech. Symmetrical strength, Sensation grossly intact. Alert and oriented X 4. Lab Data Reviewed:    Recent Results (from the past 24 hour(s))   CBC WITH AUTOMATED DIFF    Collection Time: 11/19/21  8:09 AM   Result Value Ref Range    WBC 17.0 (H) 3.6 - 11.0 K/uL    RBC 4.39 3.80 - 5.20 M/uL    HGB 13.7 11.5 - 16.0 g/dL    HCT 41.8 35.0 - 47.0 %    MCV 95.2 80.0 - 99.0 FL    MCH 31.2 26.0 - 34.0 PG    MCHC 32.8 30.0 - 36.5 g/dL    RDW 14.4 11.5 - 14.5 %    PLATELET 877 109 - 804 K/uL    MPV 10.4 8.9 - 12.9 FL    NRBC 0.0 0  WBC    ABSOLUTE NRBC 0.00 0.00 - 0.01 K/uL    NEUTROPHILS 86 (H) 32 - 75 %    LYMPHOCYTES 7 (L) 12 - 49 %    MONOCYTES 5 5 - 13 %    EOSINOPHILS 0 0 - 7 %    BASOPHILS 0 0 - 1 %    IMMATURE GRANULOCYTES 2 (H) 0.0 - 0.5 %    ABS. NEUTROPHILS 14.6 (H) 1.8 - 8.0 K/UL    ABS. LYMPHOCYTES 1.2 0.8 - 3.5 K/UL    ABS. MONOCYTES 0.9 0.0 - 1.0 K/UL    ABS. EOSINOPHILS 0.0 0.0 - 0.4 K/UL    ABS. BASOPHILS 0.0 0.0 - 0.1 K/UL    ABS. IMM.  GRANS. 0.3 (H) 0.00 - 0.04 K/UL    DF MANUAL      RBC COMMENTS ANISOCYTOSIS  1+       METABOLIC PANEL, COMPREHENSIVE    Collection Time: 11/19/21  8:09 AM   Result Value Ref Range    Sodium 140 136 - 145 mmol/L    Potassium 3.5 3.5 - 5.1 mmol/L    Chloride 101 97 - 108 mmol/L    CO2 32 21 - 32 mmol/L    Anion gap 7 5 - 15 mmol/L    Glucose 147 (H) 65 - 100 mg/dL    BUN 23 (H) 6 - 20 MG/DL    Creatinine 1.14 (H) 0.55 - 1.02 MG/DL    BUN/Creatinine ratio 20 12 - 20      GFR est AA 54 (L) >60 ml/min/1.73m2    GFR est non-AA 45 (L) >60 ml/min/1.73m2    Calcium 9.1 8.5 - 10.1 MG/DL    Bilirubin, total 0.9 0.2 - 1.0 MG/DL    ALT (SGPT) 30 12 - 78 U/L    AST (SGOT) 29 15 - 37 U/L    Alk. phosphatase 122 (H) 45 - 117 U/L    Protein, total 8.0 6.4 - 8.2 g/dL    Albumin 3.4 (L) 3.5 - 5.0 g/dL    Globulin 4.6 (H) 2.0 - 4.0 g/dL    A-G Ratio 0.7 (L) 1.1 - 2.2     SAMPLES BEING HELD    Collection Time: 11/19/21  8:09 AM   Result Value Ref Range    SAMPLES BEING HELD 1 blue, 1 sst     COMMENT        Add-on orders for these samples will be processed based on acceptable specimen integrity and analyte stability, which may vary by analyte. EKG: pending    Radiology:  CT HEAD WO CONT   Final Result   No acute intracranial abnormality. Small right parietal scalp   contusion. CT SPINE CERV WO CONT   Final Result   No fracture. Degenerative stenoses as described above. XR HIP RT W OR WO PELV 2-3 VWS   Final Result   1. Acute right superior and inferior pubic rami fractures. Possible right sacral   alar fracture   2.  Severe osteopenia             Care Plan discussed with:   Patient x    Family     RN x         Consultant      Expected  Disposition:   Home with Family x   HH/PT/OT/RN    SNF/LTC    KASSI      TOTAL TIME:  60 Minutes      Comments    x Reviewed previous records   >50% of visit spent in counseling and coordination of care x Discussion with patient and/or family and questions answered _______________________________________________________  Given the patient's current clinical presentation, I have a high level of concern for decompensation if discharged from the emergency department. Complex decision making was performed, which includes reviewing the patient's available past medical records, laboratory results, and x-ray films. My assessment of this patient's clinical condition and my plan of care is as follows.     ASSESSMENT / PLAN    Principal Problem:    Closed fracture of multiple pubic rami, right, initial encounter ([facility] [address] (11/19/2021)  Active Problems:    Fall from ground level (11/19/2021)  Non surgical mngt  Pain Mngt  PT/OT to tolerance  Placement SNF Rehab, with goal to return home with caregivers      Persistent atrial fibrillation ([facility] [address] (7/22/2018)    [facility] (11/19/2021)  Cont current home tx Eliquis, Lasix, Imdur, Metoprolol, KCl      HTN (hypertension) ()  Cont home tx Lasix, Metoprolol      Hyperlipemia ()  Cont home tx Lipitor      DJD (degenerative joint disease) ()  Plan PT/OT  Symptom tx       SAFETY:   Code Status:DNR  DVT prophylaxis:Eliquis  Stress Ulcer prophylaxis: Pepcid  Bladder catheter:no  Family Contact Info:  Primary Emergency Contact: milligansharona, Ivan Phone: [phone]  Bedded: [facility] Room 206/01  Disposition: TBD, likely home when stable  Admission status:  Inpatient    -Tentative plan of care discussed with patient / family, who demonstrated understanding and is in agreement to the above  -Case was reviewed with the ED Provider, MD Marifer Hdez MD  [facility] Hospitalist  [phone]

## 2021-11-19 NOTE — ED TRIAGE NOTES
Pt arrived by EMS after a fall. Per EMS pt fell in the bathroom this morning hitting the back of her head, hematoma reported on back of head and pt complained of right hip pain. No shortening or rotation noted. Pt denies LOC and is awake alert and oriented x 4.  Pt educated on ER flow

## 2021-11-19 NOTE — PROGRESS NOTES
Care Management Interventions  PCP Verified by CM: Yes  Last Visit to PCP: 06/03/21  Palliative Care Criteria Met (RRAT>21 & CHF Dx)?: No (No MD order)  Mode of Transport at Discharge: Other (see comment) (Family POV )  Transition of Care Consult (CM Consult): Discharge Planning  Discharge Durable Medical Equipment: No  Physical Therapy Consult: Yes  Occupational Therapy Consult: Yes  Speech Therapy Consult: No  Support Systems: Child(wen)  Confirm Follow Up Transport: Family  The Plan for Transition of Care is Related to the Following Treatment Goals : PT/OT after hip fx  Discharge Location  Discharge Placement: Skilled nursing facility    Patient lives at home alone. She has a family friend named Ruby Bianchi who is very supportive. She comes in everyday to check in on her. Her son also visits frequently. His name is Pito Inman. She is normally independent. Cooks, cleans, dresses herself, etc. Patient has ACP document but it isn't on file. Ms. Gabe Martin said she will bring it in tomorrow. Patient uses a quad cane at home. Patient in inpatient status. 1st IMM already obtained. Care management given to patient and Ms. Espinosa. Told them to contact us if they have any questions or concerns.        Reason for Admission:  Hip fx                      RUR Score:          12% LOW            Plan for utilizing home health:     TBD     PCP: First and Last name:  Anuj Steward NP     Name of Practice: [facility]    Are you a current patient: Yes/No: yes   Approximate date of last visit: 6/3/21   Can you participate in a virtual visit with your PCP: Yes                     Current Advanced Directive/Advance Care Plan: DNR      Healthcare Decision Maker:   Click here to complete [address] including selection of the Healthcare Decision Maker Relationship (ie \"Primary\")             Primary Decision MakerSlater Leonor - Daughter - [phone]    Primary Decision Maker: Luke Linda - Son - [phone]                  Transition of Care Plan:                      Advance Care Planning     General Advance Care Planning (ACP) Conversation      Date of Conversation: 11/19/2021  Conducted with: Patient with Decision Making Capacity    Healthcare Decision Maker:     Primary Decision Maker: milligan,claudia - Daughter - [phone]    Primary Decision Maker: Jami [facility] - [phone]  Click here to complete [address] including selection of the Healthcare Decision Maker Relationship (ie \"Primary\")      Today we documented Decision Maker(s) consistent with ACP documents on file. Content/Action Overview:    Has ACP document(s) NOT on file - requested patient to provide  Reviewed DNR/DNI and patient elects Full Code (Attempt Resuscitation)  Topics discussed: treatment goals  Additional Comments: n/a      Length of Voluntary ACP Conversation in minutes:  16 minutes    Kenyatta

## 2021-11-19 NOTE — PROGRESS NOTES
Problem: Mobility Impaired (Adult and Pediatric)  Goal: *Acute Goals and Plan of Care (Insert Text)  Description: FUNCTIONAL STATUS PRIOR TO ADMISSION: Patient was modified independent using a single point cane for functional mobility. HOME SUPPORT PRIOR TO ADMISSION: The patient lived alone with family to provide assistance. Physical Therapy Goals  Initiated 11/19/2021  1. Patient will move from supine to sit and sit to supine  in bed with minimal assistance/contact guard assist within 7 day(s). 2.  Patient will transfer from bed to chair and chair to bed with minimal assistance/contact guard assist using the least restrictive device within 7 day(s). 3.  Patient will perform sit to stand with minimal assistance/contact guard assist within 7 day(s). 4.  Patient will ambulate with minimal assistance/contact guard assist for 150 feet with the least restrictive device within 7 day(s). 5.  Patient will ascend/descend 4 stairs with bilateral handrail(s) with minimal assistance/contact guard assist within 7 day(s). Outcome: Progressing Towards Goal   PHYSICAL THERAPY EVALUATION  Patient: Josh Inman (80 y.o. female)  Date: 11/19/2021  Primary Diagnosis: Closed fracture of multiple pubic rami, right, initial encounter ([facility] [address] [S32.591A]        Precautions: Fall       ASSESSMENT  Pt. Received upright and agreeable to PT. Pt. In 8/10 reported pain in the pubic area s/p fall and fracture this morning. Pt. Required max assistance x 2 with use of bed features to reach the edge of the bed. Pt. Max A x 2 for sit <>stand with use of adaptive equipment to reach th chair. Pt. Left with all needs met and reviewed seated exercises with family at bedside.     Current Level of Function Impacting Discharge (mobility/balance): functional transfers/advancement in ambulation     Other factors to consider for discharge: home set up     Patient will benefit from skilled therapy intervention to address the above noted impairments. PLAN :  Recommendations and Planned Interventions: bed mobility training, transfer training, gait training, therapeutic exercises, neuromuscular re-education, patient and family training/education, and therapeutic activities      Frequency/Duration: Patient will be followed by physical therapy:  4-6 times a week to address goals. Recommendation for discharge: (in order for the patient to meet his/her long term goals)  Therapy up to 5 days/week in SNF setting    This discharge recommendation:  Has not yet been discussed the attending provider and/or case management    IF patient discharges home will need the following DME: patient owns DME required for discharge         SUBJECTIVE:   Patient stated im hurting by my hip.     OBJECTIVE DATA SUMMARY:   HISTORY:    Past Medical History:   Diagnosis Date    Colitis 2013    C diff; no recent flare, normal colonoscopy 2014    DJD (degenerative joint disease)     Fracture, femoral ([facility] [address] 09/2005    HTN (hypertension)     Hyperlipemia     Persistent atrial fibrillation ([facility] [address] 2018    Ventricular ectopy     no symptoms     Past Surgical History:   Procedure Laterality Date    HX CATARACT REMOVAL      bilat    HX COLONOSCOPY  2014    WNL    HX ORTHOPAEDIC  2005    right femur fx    HX ORTHOPAEDIC  2012    Lt TKA    HX REFRACTIVE SURGERY  06/15/2017    rt       Home Situation  Home Environment: Private residence  # Steps to Enter: 4  One/Two Story Residence: One story  Living Alone: Yes  Support Systems: Child(wen), Other (Comment) (caregiver)  Patient Expects to be Discharged to[de-identified] House  Current DME Used/Available at Home: [address] [facility] [address], [facility]    EXAMINATION/PRESENTATION/DECISION MAKING:     Hearing: Auditory  Auditory Impairment: None    Functional Mobility:    Transfers:  Sit to Stand: Maximum assistance; Assist x2; Adaptive equipment  Stand to Sit: Moderate assistance; Assist x2; Additional time;  Adaptive equipment             Physical Therapy Evaluation Charge Determination   History Examination Presentation Decision-Making   LOW Complexity : Zero comorbidities / personal factors that will impact the outcome / POC LOW Complexity : 1-2 Standardized tests and measures addressing body structure, function, activity limitation and / or participation in recreation  LOW Complexity : Stable, uncomplicated  LOW Complexity : FOTO score of       Based on the above components, the patient evaluation is determined to be of the following complexity level: LOW     Pain Ratin/10    Activity Tolerance:   Fair    After treatment patient left in no apparent distress:   Sitting in chair    COMMUNICATION/EDUCATION:   The patients plan of care was discussed with: Physical therapist and Rehabilitation technician. Patient/family have participated as able in goal setting and plan of care.     Thank you for this referral.  Theta Do, PT, DPT, EP-C   Time Calculation: 25 mins

## 2021-11-19 NOTE — ED NOTES
TRANSFER - OUT REPORT:    Verbal report given to Veena Restrepo on [address]  being transferred to [facility] Med-surg for routine progression of care       Report consisted of patients Situation, Background, Assessment and   Recommendations(SBAR). Information from the following report(s) SBAR, Kardex, ED Summary, Recent Results and Med Rec Status was reviewed with the receiving nurse. Lines:   Peripheral IV 11/19/21 Anterior; Proximal; Right Antecubital (Active)        Opportunity for questions and clarification was provided. Patient transported with:   Registered Nurse      Pt incontinent of urine and stool upon arrival, urine sample unable to be obtained before admission placed. RN aware during report.

## 2021-11-20 LAB
ANION GAP SERPL CALC-SCNC: 9 MMOL/L (ref 5–15)
APPEARANCE UR: CLEAR
BACTERIA URNS QL MICRO: ABNORMAL /HPF
BASOPHILS # BLD: 0 K/UL (ref 0–0.1)
BASOPHILS NFR BLD: 0 % (ref 0–1)
BILIRUB UR QL: NEGATIVE
BUN SERPL-MCNC: 33 MG/DL (ref 6–20)
BUN/CREAT SERPL: 22 (ref 12–20)
CALCIUM SERPL-MCNC: 8.8 MG/DL (ref 8.5–10.1)
CHLORIDE SERPL-SCNC: 98 MMOL/L (ref 97–108)
CO2 SERPL-SCNC: 30 MMOL/L (ref 21–32)
COLOR UR: ABNORMAL
CREAT SERPL-MCNC: 1.47 MG/DL (ref 0.55–1.02)
DIFFERENTIAL METHOD BLD: ABNORMAL
EOSINOPHIL # BLD: 0.3 K/UL (ref 0–0.4)
EOSINOPHIL NFR BLD: 3 % (ref 0–7)
EPITH CASTS URNS QL MICRO: ABNORMAL /LPF
ERYTHROCYTE [DISTWIDTH] IN BLOOD BY AUTOMATED COUNT: 14.6 % (ref 11.5–14.5)
GLUCOSE SERPL-MCNC: 122 MG/DL (ref 65–100)
GLUCOSE UR STRIP.AUTO-MCNC: NEGATIVE MG/DL
HCT VFR BLD AUTO: 36.1 % (ref 35–47)
HGB BLD-MCNC: 11.8 G/DL (ref 11.5–16)
HGB UR QL STRIP: ABNORMAL
IMM GRANULOCYTES # BLD AUTO: 0.1 K/UL (ref 0–0.04)
IMM GRANULOCYTES NFR BLD AUTO: 1 % (ref 0–0.5)
KETONES UR QL STRIP.AUTO: NEGATIVE MG/DL
LEUKOCYTE ESTERASE UR QL STRIP.AUTO: ABNORMAL
LYMPHOCYTES # BLD: 1.4 K/UL (ref 0.8–3.5)
LYMPHOCYTES NFR BLD: 13 % (ref 12–49)
MAGNESIUM SERPL-MCNC: 2.5 MG/DL (ref 1.6–2.4)
MCH RBC QN AUTO: 31.6 PG (ref 26–34)
MCHC RBC AUTO-ENTMCNC: 32.7 G/DL (ref 30–36.5)
MCV RBC AUTO: 96.5 FL (ref 80–99)
MONOCYTES # BLD: 0.7 K/UL (ref 0–1)
MONOCYTES NFR BLD: 7 % (ref 5–13)
MUCOUS THREADS URNS QL MICRO: ABNORMAL /LPF
NEUTS SEG # BLD: 8.2 K/UL (ref 1.8–8)
NEUTS SEG NFR BLD: 76 % (ref 32–75)
NITRITE UR QL STRIP.AUTO: NEGATIVE
NRBC # BLD: 0 K/UL (ref 0–0.01)
NRBC BLD-RTO: 0 PER 100 WBC
PH UR STRIP: 5.5 [PH] (ref 5–8)
PLATELET # BLD AUTO: 185 K/UL (ref 150–400)
PMV BLD AUTO: 11 FL (ref 8.9–12.9)
POTASSIUM SERPL-SCNC: 4 MMOL/L (ref 3.5–5.1)
PROT UR STRIP-MCNC: NEGATIVE MG/DL
RBC # BLD AUTO: 3.74 M/UL (ref 3.8–5.2)
RBC #/AREA URNS HPF: ABNORMAL /HPF (ref 0–5)
SODIUM SERPL-SCNC: 137 MMOL/L (ref 136–145)
SP GR UR REFRACTOMETRY: 1.02 (ref 1–1.03)
UA: UC IF INDICATED,UAUC: ABNORMAL
UROBILINOGEN UR QL STRIP.AUTO: 0.2 EU/DL (ref 0.2–1)
WBC # BLD AUTO: 10.8 K/UL (ref 3.6–11)
WBC URNS QL MICRO: ABNORMAL /HPF (ref 0–4)

## 2021-11-20 PROCEDURE — 81001 URINALYSIS AUTO W/SCOPE: CPT

## 2021-11-20 PROCEDURE — 87086 URINE CULTURE/COLONY COUNT: CPT

## 2021-11-20 PROCEDURE — 83735 ASSAY OF MAGNESIUM: CPT

## 2021-11-20 PROCEDURE — 87186 SC STD MICRODIL/AGAR DIL: CPT

## 2021-11-20 PROCEDURE — 80048 BASIC METABOLIC PNL TOTAL CA: CPT

## 2021-11-20 PROCEDURE — 85025 COMPLETE CBC W/AUTO DIFF WBC: CPT

## 2021-11-20 PROCEDURE — 74011250637 HC RX REV CODE- 250/637: Performed by: INTERNAL MEDICINE

## 2021-11-20 PROCEDURE — 87077 CULTURE AEROBIC IDENTIFY: CPT

## 2021-11-20 PROCEDURE — 94760 N-INVAS EAR/PLS OXIMETRY 1: CPT

## 2021-11-20 PROCEDURE — 65270000029 HC RM PRIVATE

## 2021-11-20 PROCEDURE — 77010033678 HC OXYGEN DAILY

## 2021-11-20 PROCEDURE — 97530 THERAPEUTIC ACTIVITIES: CPT

## 2021-11-20 PROCEDURE — 36415 COLL VENOUS BLD VENIPUNCTURE: CPT

## 2021-11-20 PROCEDURE — 97110 THERAPEUTIC EXERCISES: CPT

## 2021-11-20 PROCEDURE — 93005 ELECTROCARDIOGRAM TRACING: CPT

## 2021-11-20 RX ORDER — TRAMADOL HYDROCHLORIDE 50 MG/1
50 TABLET ORAL 2 TIMES DAILY WITH MEALS
Status: DISCONTINUED | OUTPATIENT
Start: 2021-11-21 | End: 2021-11-21 | Stop reason: DRUGHIGH

## 2021-11-20 RX ORDER — FUROSEMIDE 20 MG/1
20 TABLET ORAL DAILY
Status: DISCONTINUED | OUTPATIENT
Start: 2021-11-21 | End: 2021-11-24 | Stop reason: HOSPADM

## 2021-11-20 RX ADMIN — FUROSEMIDE 40 MG: 40 TABLET ORAL at 07:00

## 2021-11-20 RX ADMIN — ATORVASTATIN CALCIUM 10 MG: 10 TABLET, FILM COATED ORAL at 21:24

## 2021-11-20 RX ADMIN — SERTRALINE HYDROCHLORIDE 25 MG: 25 TABLET ORAL at 09:07

## 2021-11-20 RX ADMIN — POTASSIUM CHLORIDE 10 MEQ: 750 TABLET, FILM COATED, EXTENDED RELEASE ORAL at 09:07

## 2021-11-20 RX ADMIN — Medication 10 MG: at 21:23

## 2021-11-20 RX ADMIN — ACETAMINOPHEN 500 MG: 500 TABLET ORAL at 21:23

## 2021-11-20 RX ADMIN — METOPROLOL TARTRATE 100 MG: 50 TABLET, FILM COATED ORAL at 21:24

## 2021-11-20 RX ADMIN — Medication 5 ML: at 16:53

## 2021-11-20 RX ADMIN — ACETAMINOPHEN 500 MG: 500 TABLET ORAL at 09:07

## 2021-11-20 RX ADMIN — ACETAMINOPHEN 500 MG: 500 TABLET ORAL at 17:43

## 2021-11-20 RX ADMIN — METOPROLOL TARTRATE 100 MG: 50 TABLET, FILM COATED ORAL at 09:08

## 2021-11-20 RX ADMIN — ACETAMINOPHEN 500 MG: 500 TABLET ORAL at 13:41

## 2021-11-20 RX ADMIN — FAMOTIDINE 20 MG: 20 TABLET, FILM COATED ORAL at 17:44

## 2021-11-20 RX ADMIN — APIXABAN 2.5 MG: 2.5 TABLET, FILM COATED ORAL at 21:24

## 2021-11-20 RX ADMIN — Medication 1000 UNITS: at 09:07

## 2021-11-20 RX ADMIN — ISOSORBIDE MONONITRATE 30 MG: 30 TABLET, EXTENDED RELEASE ORAL at 09:08

## 2021-11-20 RX ADMIN — APIXABAN 2.5 MG: 2.5 TABLET, FILM COATED ORAL at 09:07

## 2021-11-20 RX ADMIN — OXYCODONE HYDROCHLORIDE AND ACETAMINOPHEN 500 MG: 500 TABLET ORAL at 09:07

## 2021-11-20 NOTE — PROGRESS NOTES
Problem: Mobility Impaired (Adult and Pediatric)  Goal: *Acute Goals and Plan of Care (Insert Text)  Description: FUNCTIONAL STATUS PRIOR TO ADMISSION: Patient was modified independent using a single point cane for functional mobility. HOME SUPPORT PRIOR TO ADMISSION: The patient lived alone with family to provide assistance. Physical Therapy Goals  Initiated 11/19/2021  1. Patient will move from supine to sit and sit to supine  in bed with minimal assistance/contact guard assist within 7 day(s). 2.  Patient will transfer from bed to chair and chair to bed with minimal assistance/contact guard assist using the least restrictive device within 7 day(s). 3.  Patient will perform sit to stand with minimal assistance/contact guard assist within 7 day(s). 4.  Patient will ambulate with minimal assistance/contact guard assist for 150 feet with the least restrictive device within 7 day(s). 5.  Patient will ascend/descend 4 stairs with bilateral handrail(s) with minimal assistance/contact guard assist within 7 day(s). Outcome: Progressing Towards Goal   PHYSICAL THERAPY TREATMENT  Patient: Zen Inman [de-identified]80 y.o. female)  Date: 11/20/2021  Diagnosis: Closed fracture of multiple pubic rami, right, initial encounter ([facility] [address] [S32.591A]   Closed fracture of multiple pubic rami, right, initial encounter ([facility] [address]       Precautions:    Chart, physical therapy assessment, plan of care and goals were reviewed. ASSESSMENT  Patient continues with skilled PT services and is progressing towards goals. Pt. Received supine in bed with family at bedside. Pt. Continues to report of pubic pain s/p fracture 1 day ago. Pt. Moderate assistance for bed mobility to reach the edge of the bed with use of bed features as well. Pt. Demonstrated increased static sitting balance performance. Pt. Sit <>stand with max A x 1 with a rolling walker with several attempts needed to clear buttocks.  Pt. With bilaterally LE buckling during stand <>pivot transfer requiring total A x1 for transfer into chair and unable to tend to verbal cues during transfer as well. Pt. Left upright and left with all needs met and nursing hand off made. Current Level of Function Impacting Discharge (mobility/balance): functional transfer advancement, ambulation when pain is decreased    Other factors to consider for discharge: home set up         PLAN :  Patient continues to benefit from skilled intervention to address the above impairments. Continue treatment per established plan of care. to address goals. Recommendation for discharge: (in order for the patient to meet his/her long term goals)  Therapy up to 5 days/week in SNF setting    This discharge recommendation:  Has not yet been discussed the attending provider and/or case management    IF patient discharges home will need the following DME: patient owns DME required for discharge       SUBJECTIVE:   Patient stated my legs are killing me.     OBJECTIVE DATA SUMMARY:   Critical Behavior:  Neurologic State: Alert  Orientation Level: Oriented to person, Oriented to place, Oriented to situation, Oriented to time  Cognition: Appropriate decision making     Functional Mobility Training:  Bed Mobility:     Supine to Sit: Moderate assistance; Additional time; Adaptive equipment     Scooting: Moderate assistance; Assist x1; Adaptive equipment;  Additional time    Transfers:  Sit to Stand: Maximum assistance; Assist x1  Stand to Sit: Maximum assistance; Assist x1        Bed to Chair: Maximum assistance; Assist x1    Pain Rating:  Did not rate but reported increased pain in bilateral LE with transfers and bed mobility throughout    Activity Tolerance:   Fair    After treatment patient left in no apparent distress:   Sitting in chair    COMMUNICATION/COLLABORATION:   The patients plan of care was discussed with: Physical therapist.     Emelia Sanon, PT, DPT, EP-C   Time Calculation: 24 mins

## 2021-11-20 NOTE — PROGRESS NOTES
Problem: Pressure Injury - Risk of  Goal: *Prevention of pressure injury  Description: Document Mike Scale and appropriate interventions in the flowsheet.   Outcome: Progressing Towards Goal  Note: Pressure Injury Interventions:  Sensory Interventions: Assess changes in LOC    Moisture Interventions: Absorbent underpads    Activity Interventions: Increase time out of bed    Mobility Interventions: HOB 30 degrees or less    Nutrition Interventions: Document food/fluid/supplement intake    Friction and Shear Interventions: HOB 30 degrees or less                Problem: Patient Education: Go to Patient Education Activity  Goal: Patient/Family Education  Outcome: Progressing Towards Goal     Problem: Pain  Goal: *Control of Pain  Outcome: Progressing Towards Goal     Problem: General Medical Care Plan  Goal: *Vital signs within specified parameters  Outcome: Progressing Towards Goal  Goal: *Labs within defined limits  Outcome: Progressing Towards Goal  Goal: *Absence of infection signs and symptoms  Outcome: Progressing Towards Goal  Goal: *Optimal pain control at patient's stated goal  Outcome: Progressing Towards Goal  Goal: *Skin integrity maintained  Outcome: Progressing Towards Goal  Goal: *Fluid volume balance  Outcome: Progressing Towards Goal  Goal: *Optimize nutritional status  Outcome: Progressing Towards Goal Where Do You Want The Question To Include Opioid Counseling Located?: Case Summary Tab

## 2021-11-20 NOTE — PROGRESS NOTES
[facility]  Hospitalist Progress Note    NAME: Richard Inman   :  3/7/1930   MRN:  [id number]     Total duration of encounter: 1 day      Interim Hospital Summary: 80 y.o. female who presented on 2021 with Closed fracture of multiple pubic rami, right, initial encounter ([facility] [address] She has a past medical history of Colitis (), DJD (degenerative joint disease), Fracture, femoral ([facility] [address] (2005), HTN (hypertension), Hyperlipemia, Persistent atrial fibrillation ([facility] [address] (2018), and Ventricular ectopy. .    Pt admitted following unwitnessed fall in home , dx with R Inf/Sup pubic rami fractures. Tx with analgesics and PT/OT. Likely will need placement in LTC. Plan for Ultram daily with breakfast and lunch. Subjective:     Chief Complaint / Reason for Physician Visit  \"better\". Discussed with RN   No pain when still  Getting up to bedside chair with PT  Has pain in R groin, but has not taken meds as Rx.       Review of Systems:  Symptom Y/N Comments  Symptom Y/N Comments   Fever/Chills n   Chest Pain n    Poor Appetite n   Edema n    Cough n   Abdominal Pain n    Sputum n   Joint Pain n    SOB/GREENBERG n   Pruritis/Rash n    Nausea/vomit n   Tolerating PT/OT y    Diarrhea n   Tolerating Diet y    Constipation n   Other         Current Facility-Administered Medications:     [START ON 2021] traMADoL (ULTRAM) tablet 50 mg, 50 mg, Oral, BID WITH MEALS, Ino Villalpando MD    [START ON 2021] furosemide (LASIX) tablet 20 mg, 20 mg, Oral, DAILY, Ino Villalpando MD    influenza vaccine - (6 mos+)(PF) (FLUARIX/FLULAVAL/FLUZONE QUAD) injection 0.5 mL, 1 Each, IntraMUSCular, PRIOR TO DISCHARGE, Ino Villalpando MD    acetaminophen (TYLENOL) tablet 500 mg, 500 mg, Oral, QID, Ino Villalpando MD, 500 mg at 21 1341    apixaban (ELIQUIS) tablet 2.5 mg, 2.5 mg, Oral, Q12H, Ino Villalpando MD, 2.5 mg at 21 0963    ascorbic acid (vitamin C) (VITAMIN C) tablet 500 mg, 500 mg, Oral, DAILY, Husam Hernandez MD, 500 mg at 11/20/21 2251    cholecalciferol (VITAMIN D3) (1000 Units /25 mcg) tablet 1,000 Units, 1,000 Units, Oral, DAILY, Husam Hernandez MD, 1,000 Units at 11/20/21 8399    isosorbide mononitrate ER (IMDUR) tablet 30 mg, 30 mg, Oral, DAILY, Husam Hernandez MD, 30 mg at 11/20/21 0908    atorvastatin (LIPITOR) tablet 10 mg, 10 mg, Oral, QHS, Husam Hernandez MD, 10 mg at 11/19/21 2238    melatonin tablet 10 mg, 10 mg, Oral, QHS, Husam Hernandez MD, 10 mg at 11/19/21 2240    metoprolol tartrate (LOPRESSOR) tablet 100 mg, 100 mg, Oral, Q12H, Husam Hernandez MD, 100 mg at 11/20/21 0908    potassium chloride SR (KLOR-CON 10) tablet 10 mEq, 10 mEq, Oral, DAILY, Husam Hernandez MD, 10 mEq at 11/20/21 7021    sertraline (ZOLOFT) tablet 25 mg, 25 mg, Oral, DAILY, Husam Hernandez MD, 25 mg at 11/20/21 0907    sodium chloride (NS) flush 5-40 mL, 5-40 mL, IntraVENous, Q8H, Husam Hernandez MD, 10 mL at 11/19/21 1723    sodium chloride (NS) flush 5-40 mL, 5-40 mL, IntraVENous, PRN, Husam Hernandez MD, 10 mL at 11/19/21 2015    bisacodyL (DULCOLAX) suppository 10 mg, 10 mg, Rectal, DAILY PRN, Husam Hernandez MD    famotidine (PEPCID) tablet 20 mg, 20 mg, Oral, QPM, Husam Hernandez MD, 20 mg at 11/19/21 1723    HYDROcodone-acetaminophen (NORCO) 5-325 mg per tablet 1 Tablet, 1 Tablet, Oral, Q4H PRN, Husam Hernandez MD, 1 Tablet at 11/19/21 2009    morphine injection 2 mg, 2 mg, IntraVENous, Q3H PRN, Husam Hernandez MD    ondansetron (ZOFRAN ODT) tablet 4 mg, 4 mg, Oral, Q8H PRN, Husam Hernandez MD, 4 mg at 11/19/21 1813    Objective:     VITALS:   Patient Vitals for the past 12 hrs:   Temp Pulse Resp BP SpO2   11/20/21 1334  100  (!) 94/52    11/20/21 1238  91  (!) 105/56    11/20/21 0908  92  (!) 115/55    11/20/21 0743 98.2 °F (36.8 °C) 86 20 110/60 94 %   11/20/21 0659  78  121/69        Intake/Output Summary (Last 24 hours) at 11/20/2021 1515  Last data filed at 11/20/2021 1400  Gross per 24 hour   Intake 900 ml   Output 950 ml   Net -50 ml        PHYSICAL EXAM:  General: WD, WN. Alert, cooperative, no acute distress    EENT:  EOMI. Anicteric sclerae. MMM  Resp:  CTA bilaterally, no wheezing or rales. No accessory muscle use  CV:  Irreg Irreg rhythm,  Tr edema  GI:  Soft, Non distended, Non tender. +Bowel sounds  Neurologic:  Alert and oriented X 3, normal speech, nonfocal  Psych:   Not anxious nor agitated  Skin:  No rashes. No jaundice    LABS:  I reviewed today's most current labs and imaging studies. Pertinent labs include:  Recent Labs     11/20/21 0621 11/19/21  0809   WBC 10.8 17.0*   HGB 11.8 13.7   HCT 36.1 41.8    217     Recent Labs     11/20/21 0621 11/19/21  0809    140   K 4.0 3.5   CL 98 101   CO2 30 32   * 147*   BUN 33* 23*   CREA 1.47* 1.14*   CA 8.8 9.1   MG 2.5*  --    ALB  --  3.4*   TBILI  --  0.9   ALT  --  30       RADIOLOGY:  CT HEAD 11/19: There is a small right parietal scalp contusion. The ventricles and  sulci are appropriate in size and configuration for age. No loss of gray-white  differentiation to suggest late acute or early subacute infarction. No mass  effect or intracranial hemorrhage.   IMPRESSION:  No acute intracranial abnormality. Small right parietal scalp contusion. CT C-SPINE 11/19:  No fracture or dislocation. Cervical alignment is normal. The paravertebral soft tissues are normal.    Degenerative disease causes the following stenoses:  C2-C3:  Mild canal stenosis. C3-C4:  Left and severe right neural foraminal stenosis. Moderate canal stenosis. C4-C5:  Severe bilateral neural foraminal stenosis. Mild canal stenosis. C5-C6:  Left and severe right neural foraminal stenosis. C6-C7:  Severe right neural foraminal stenosis. C7-T1:  No stenosis.    IMPRESSION:  No fracture. Degenerative stenoses as described above. R HIP XRAY 11/19:  Patient is post ORIF of right hip. Left hip is been replaced.  No dislocation   There are acute fractures of the right superior and inferior pubic rami. Overall  bone mineral density is decreased. Question right sacral alar fracture   IMPRESSION  1. Acute right superior and inferior pubic rami fractures. Possible right sacral alar fracture  2. Severe osteopenia    EKG 11/20: Afib 86 bpm, LAD, LBBB, NSC      Procedures: see electronic medical records for all procedures/Xrays and details which were not copied into this note but were reviewed prior to creation of Plan.         Assessment / Plan:  Principal Problem:    Closed fracture of multiple pubic rami, right, initial encounter ([facility] [address] (11/19/2021)  Active Problems:    Fall from ground level (11/19/2021)  Non surgical mngt  Pain Mngt  PT/OT to tolerance  Placement SNF Rehab, with goal to return home with caregivers  Tylenol qid / Ultram bid 0800/1200 and prn Norco / Morphine       Persistent atrial fibrillation ([facility] [address] (7/22/2018)    Chronic systolic heart failure ([facility] [address] (11/19/2021)  Cont current home tx Eliquis, Lasix, Imdur, Metoprolol, KCl  Over diuresis with Lasix 40mg bid, noted jump in BUN / Cr from prior  Reduce Lasix 20mg daily, Encourage fluids       HTN (hypertension) ()  Cont home tx Lasix, Metoprolol       Hyperlipemia ()  Cont home tx Lipitor       DJD (degenerative joint disease) ()  Plan PT/OT  Symptom tx         SAFETY:   Code Status:DNR  DVT prophylaxis:Eliquis  Stress Ulcer prophylaxis: Pepcid  Bladder catheter:no  Family Contact Info:  Primary Emergency Contact: milligan,claudia, Home Phone: [phone]  Bedded: [facility] Room 206/01  Disposition: TBD, likely home when stable  Admission status:  Inpatient      Reviewed most current lab test results and cultures  YES  Reviewed most current radiology test results   YES  Review and summation of old records today    NO  Reviewed patient's current orders and MAR    YES  PMH/SH reviewed - no change compared to H&P    Care Plan discussed with: Comments  Patient x     Family  x \"daughter\" in room   RN x     Care Manager       Consultant                           Multidiciplinary team rounds were held today with , nursing, pharmacist and clinical coordinator. Patient's plan of care was discussed; medications were reviewed and discharge planning was addressed.         ____________________________________________    Total NON Critical Care TIME:  35   Minutes        Comments   >50% of visit spent in counseling and coordination of care   x      Signed: Cindy Mota MD  [facility] Hospitalist  [phone]

## 2021-11-20 NOTE — PROGRESS NOTES
Bedside shift change report given to JOSE Gardiner RN (oncoming nurse) by Stephane Cota RN (offgoing nurse). Report included the following information Kardex.

## 2021-11-21 PROCEDURE — 97530 THERAPEUTIC ACTIVITIES: CPT

## 2021-11-21 PROCEDURE — 94760 N-INVAS EAR/PLS OXIMETRY 1: CPT

## 2021-11-21 PROCEDURE — 65270000029 HC RM PRIVATE

## 2021-11-21 PROCEDURE — 77010033678 HC OXYGEN DAILY

## 2021-11-21 PROCEDURE — 74011250637 HC RX REV CODE- 250/637: Performed by: INTERNAL MEDICINE

## 2021-11-21 RX ORDER — TRAMADOL HYDROCHLORIDE 50 MG/1
50 TABLET ORAL EVERY 12 HOURS
Status: DISCONTINUED | OUTPATIENT
Start: 2021-11-21 | End: 2021-11-21 | Stop reason: DRUGHIGH

## 2021-11-21 RX ORDER — CIPROFLOXACIN 250 MG/1
250 TABLET, FILM COATED ORAL EVERY 12 HOURS
Status: DISCONTINUED | OUTPATIENT
Start: 2021-11-21 | End: 2021-11-24 | Stop reason: HOSPADM

## 2021-11-21 RX ORDER — TRAMADOL HYDROCHLORIDE 50 MG/1
50 TABLET ORAL
Status: DISCONTINUED | OUTPATIENT
Start: 2021-11-22 | End: 2021-11-24 | Stop reason: HOSPADM

## 2021-11-21 RX ADMIN — METOPROLOL TARTRATE 100 MG: 50 TABLET, FILM COATED ORAL at 21:08

## 2021-11-21 RX ADMIN — Medication 5 ML: at 13:41

## 2021-11-21 RX ADMIN — FAMOTIDINE 20 MG: 20 TABLET, FILM COATED ORAL at 17:45

## 2021-11-21 RX ADMIN — APIXABAN 2.5 MG: 2.5 TABLET, FILM COATED ORAL at 21:08

## 2021-11-21 RX ADMIN — Medication 1000 UNITS: at 08:16

## 2021-11-21 RX ADMIN — CIPROFLOXACIN 250 MG: 250 TABLET ORAL at 21:08

## 2021-11-21 RX ADMIN — ACETAMINOPHEN 500 MG: 500 TABLET ORAL at 13:17

## 2021-11-21 RX ADMIN — Medication 10 MG: at 21:08

## 2021-11-21 RX ADMIN — FUROSEMIDE 20 MG: 20 TABLET ORAL at 08:16

## 2021-11-21 RX ADMIN — ISOSORBIDE MONONITRATE 30 MG: 30 TABLET, EXTENDED RELEASE ORAL at 08:17

## 2021-11-21 RX ADMIN — SERTRALINE HYDROCHLORIDE 25 MG: 25 TABLET ORAL at 08:16

## 2021-11-21 RX ADMIN — APIXABAN 2.5 MG: 2.5 TABLET, FILM COATED ORAL at 08:16

## 2021-11-21 RX ADMIN — METOPROLOL TARTRATE 100 MG: 50 TABLET, FILM COATED ORAL at 08:17

## 2021-11-21 RX ADMIN — TRAMADOL HYDROCHLORIDE 50 MG: 50 TABLET, FILM COATED ORAL at 08:16

## 2021-11-21 RX ADMIN — Medication 10 ML: at 21:09

## 2021-11-21 RX ADMIN — HYDROCODONE BITARTRATE AND ACETAMINOPHEN 1 TABLET: 5; 325 TABLET ORAL at 21:08

## 2021-11-21 RX ADMIN — ACETAMINOPHEN 500 MG: 500 TABLET ORAL at 08:17

## 2021-11-21 RX ADMIN — OXYCODONE HYDROCHLORIDE AND ACETAMINOPHEN 500 MG: 500 TABLET ORAL at 08:16

## 2021-11-21 RX ADMIN — ATORVASTATIN CALCIUM 10 MG: 10 TABLET, FILM COATED ORAL at 21:09

## 2021-11-21 RX ADMIN — ACETAMINOPHEN 500 MG: 500 TABLET ORAL at 17:46

## 2021-11-21 RX ADMIN — ACETAMINOPHEN 500 MG: 500 TABLET ORAL at 21:08

## 2021-11-21 RX ADMIN — CIPROFLOXACIN 250 MG: 250 TABLET ORAL at 13:41

## 2021-11-21 RX ADMIN — POTASSIUM CHLORIDE 10 MEQ: 750 TABLET, FILM COATED, EXTENDED RELEASE ORAL at 08:17

## 2021-11-21 NOTE — PROGRESS NOTES
Problem: Pressure Injury - Risk of  Goal: *Prevention of pressure injury  Description: Document Mike Scale and appropriate interventions in the flowsheet.   Outcome: Progressing Towards Goal  Note: Pressure Injury Interventions:  Sensory Interventions: Assess changes in LOC    Moisture Interventions: Absorbent underpads    Activity Interventions: Increase time out of bed    Mobility Interventions: HOB 30 degrees or less    Nutrition Interventions: Document food/fluid/supplement intake    Friction and Shear Interventions: HOB 30 degrees or less                Problem: Patient Education: Go to Patient Education Activity  Goal: Patient/Family Education  Outcome: Progressing Towards Goal     Problem: Pain  Goal: *Control of Pain  Outcome: Progressing Towards Goal     Problem: General Medical Care Plan  Goal: *Vital signs within specified parameters  Outcome: Progressing Towards Goal  Goal: *Labs within defined limits  Outcome: Progressing Towards Goal  Goal: *Absence of infection signs and symptoms  Outcome: Progressing Towards Goal  Goal: *Fluid volume balance  Outcome: Progressing Towards Goal  Goal: *Optimize nutritional status  Outcome: Progressing Towards Goal  Goal: *Progressive mobility and function (eg: ADL's)  Outcome: Progressing Towards Goal     Problem: Patient Education: Go to Patient Education Activity  Goal: Patient/Family Education  Outcome: Progressing Towards Goal

## 2021-11-21 NOTE — PROGRESS NOTES
[facility]  Hospitalist Progress Note    NAME: Richard Inman   :  3/7/1930   MRN:  [id number]     Total duration of encounter: 2 days      Interim Hospital Summary: 80 y.o. female who presented on 2021 with Closed fracture of multiple pubic rami, right, initial encounter ([facility] [address] She has a past medical history of Colitis (), DJD (degenerative joint disease), Fracture, femoral ([facility] [address] (2005), HTN (hypertension), Hyperlipemia, Persistent atrial fibrillation ([facility] [address] (2018), and Ventricular ectopy. .    Pt admitted following unwitnessed fall in home , dx with R Inf/Sup pubic rami fractures. Tx with analgesics and PT/OT. Likely will need placement in LTC, at least temporarily. Plan for Ultram daily with breakfast and lunch for pain mngt during PT/OT. Subjective:     Chief Complaint / Reason for Physician Visit  \"no c/o\".   Discussed with RN   Son feels she is near baseline this PM  Pt has R hip c/o with activity  No pain when sedentary  Getting up to bedside chair with PT - up again today    Review of Systems:  Symptom Y/N Comments  Symptom Y/N Comments   Fever/Chills n   Chest Pain n    Poor Appetite n   Edema n    Cough n   Abdominal Pain n    Sputum n   Joint Pain n    SOB/GREENBERG n   Pruritis/Rash n    Nausea/vomit n   Tolerating PT/OT y    Diarrhea n   Tolerating Diet y    Constipation n   Other       No  c/o,   Urine culture c/w cystitis however  Staff noted increased confusion during the night      Current Facility-Administered Medications:     ciprofloxacin HCl (CIPRO) tablet 250 mg, 250 mg, Oral, Q12H, Grace MON MD, 250 mg at 21 1341    [START ON 2021] traMADoL (ULTRAM) tablet 50 mg, 50 mg, Oral, DAILY WITH BREAKFAST, Ino Villalpando MD    furosemide (LASIX) tablet 20 mg, 20 mg, Oral, DAILY, Ino Villalpando MD, 20 mg at 21 0816    influenza vaccine - (6 mos+)(PF) (FLUARIX/FLULAVAL/FLUZONE QUAD) injection 0.5 mL, 1 Each, IntraMUSCular, PRIOR TO DISCHARGE, Dat Baptiste MD    acetaminophen (TYLENOL) tablet 500 mg, 500 mg, Oral, QID, Dat Baptiste MD, 500 mg at 11/21/21 1317    apixaban (ELIQUIS) tablet 2.5 mg, 2.5 mg, Oral, Q12H, Dat Baptiste MD, 2.5 mg at 11/21/21 0816    ascorbic acid (vitamin C) (VITAMIN C) tablet 500 mg, 500 mg, Oral, DAILY, Dat Baptiste MD, 500 mg at 11/21/21 4823    cholecalciferol (VITAMIN D3) (1000 Units /25 mcg) tablet 1,000 Units, 1,000 Units, Oral, DAILY, Dat Baptiste MD, 1,000 Units at 11/21/21 3168    isosorbide mononitrate ER (IMDUR) tablet 30 mg, 30 mg, Oral, DAILY, Dat Baptiste MD, 30 mg at 11/21/21 0817    atorvastatin (LIPITOR) tablet 10 mg, 10 mg, Oral, QHS, Dat Baptiste MD, 10 mg at 11/20/21 2124    melatonin tablet 10 mg, 10 mg, Oral, QHS, Dat Baptiste MD, 10 mg at 11/20/21 2123    metoprolol tartrate (LOPRESSOR) tablet 100 mg, 100 mg, Oral, Q12H, Dat Baptiste MD, 100 mg at 11/21/21 0817    potassium chloride SR (KLOR-CON 10) tablet 10 mEq, 10 mEq, Oral, DAILY, Dat Baptiste MD, 10 mEq at 11/21/21 0817    sertraline (ZOLOFT) tablet 25 mg, 25 mg, Oral, DAILY, Dat Baptiste MD, 25 mg at 11/21/21 0816    sodium chloride (NS) flush 5-40 mL, 5-40 mL, IntraVENous, Q8H, Dat Baptiste MD, 5 mL at 11/21/21 1341    sodium chloride (NS) flush 5-40 mL, 5-40 mL, IntraVENous, PRN, Dat Baptiste MD, 10 mL at 11/19/21 2015    bisacodyL (DULCOLAX) suppository 10 mg, 10 mg, Rectal, DAILY PRN, Dat Baptiste MD    famotidine (PEPCID) tablet 20 mg, 20 mg, Oral, QPM, Dat Baptiste MD, 20 mg at 11/20/21 1744    HYDROcodone-acetaminophen (NORCO) 5-325 mg per tablet 1 Tablet, 1 Tablet, Oral, Q4H PRN, Dat Baptiste MD, 1 Tablet at 11/19/21 2009    morphine injection 2 mg, 2 mg, IntraVENous, Q3H PRN, Dat Baptiste MD    ondansetron (ZOFRAN ODT) tablet 4 mg, 4 mg, Oral, Q8H PRN, Dat Baptiste MD, 4 mg at 11/19/21 1813    Objective:     VITALS:   Patient Vitals for the past 12 hrs:   Temp Pulse Resp BP SpO2   11/21/21 0754 98.2 °F (36.8 °C) 80 20 132/76 94 %       Intake/Output Summary (Last 24 hours) at 11/21/2021 1541  Last data filed at 11/21/2021 1200  Gross per 24 hour   Intake 1200 ml   Output 950 ml   Net 250 ml        PHYSICAL EXAM:  General: WD, WN. Alert, cooperative, no acute distress    EENT:  EOMI. Anicteric sclerae. MMM  Resp:  CTA bilaterally, no wheezing or rales. No accessory muscle use  CV:  Irreg Irreg rhythm,  Tr edema  GI:  Soft, Non distended, Non tender. +Bowel sounds  Neurologic:  Alert and oriented X 3, normal speech, nonfocal  Psych:   Depressed affect. Not anxious nor agitated  Skin:  No rashes. No jaundice    LABS:  I reviewed today's most current labs and imaging studies. Pertinent labs include:  Recent Labs     11/20/21  0621 11/19/21  0809   WBC 10.8 17.0*   HGB 11.8 13.7   HCT 36.1 41.8    217     Recent Labs     11/20/21  0621 11/19/21  0809    140   K 4.0 3.5   CL 98 101   CO2 30 32   * 147*   BUN 33* 23*   CREA 1.47* 1.14*   CA 8.8 9.1   MG 2.5*  --    ALB  --  3.4*   TBILI  --  0.9   ALT  --  30     CULTURE  Urine 11/19 >100,000 GNR (ID to follow)    RADIOLOGY:  CT HEAD 11/19: There is a small right parietal scalp contusion. The ventricles and  sulci are appropriate in size and configuration for age. No loss of gray-white  differentiation to suggest late acute or early subacute infarction. No mass  effect or intracranial hemorrhage.   IMPRESSION:  No acute intracranial abnormality. Small right parietal scalp contusion. CT C-SPINE 11/19:  No fracture or dislocation. Cervical alignment is normal. The paravertebral soft tissues are normal.    Degenerative disease causes the following stenoses:  C2-C3:  Mild canal stenosis. C3-C4:  Left and severe right neural foraminal stenosis. Moderate canal stenosis. C4-C5:  Severe bilateral neural foraminal stenosis. Mild canal stenosis. C5-C6:  Left and severe right neural foraminal stenosis.    C6-C7:  Severe right neural foraminal stenosis. C7-T1:  No stenosis.    IMPRESSION:  No fracture. Degenerative stenoses as described above. R HIP XRAY 11/19:  Patient is post ORIF of right hip. Left hip is been replaced. No dislocation   There are acute fractures of the right superior and inferior pubic rami. Overall  bone mineral density is decreased. Question right sacral alar fracture   IMPRESSION  1. Acute right superior and inferior pubic rami fractures. Possible right sacral alar fracture  2. Severe osteopenia    EKG 11/20: Afib 86 bpm, LAD, LBBB, NSC      Procedures: see electronic medical records for all procedures/Xrays and details which were not copied into this note but were reviewed prior to creation of Plan.         Assessment / Plan:  Principal Problem:    Closed fracture of multiple pubic rami, right, initial encounter ([facility] [address] (11/19/2021)  Active Problems:    Fall from ground level (11/19/2021)  Non surgical mngt  Pain Mngt - to begin one Ultram in AM (bid on hold until renal function improves  PT/OT to tolerance, resume in AM  Placement SNF Rehab, with goal to return home with caregivers (CM plan in progress)  Tylenol qid / Ultram bid 0800/1200 and prn Norco / Morphine       Persistent atrial fibrillation ([facility] [address] (7/22/2018)    Chronic systolic heart failure ([facility] [address] (11/19/2021)  Cont current home tx Eliquis, Lasix, Imdur, Metoprolol, KCl  Over diuresis with Lasix 40mg bid, noted jump in BUN / Cr from prior  Reduce Lasix 20mg daily, Encourage fluids  F/u BMP in AM, may need IVF if MOISE still present       HTN (hypertension) ()  Cont home tx Lasix, Metoprolol  Lasix tapered from 40/40 to 20 daily  F/u MOISE with BMP in AM       Hyperlipemia ()  Cont home tx Lipitor       DJD (degenerative joint disease) ()  Plan PT/OT in AM  Symptom tx Ultram with breakfast        SAFETY:   Code Status:DNR  DVT prophylaxis:Eliquis  Stress Ulcer prophylaxis: Pepcid  Bladder catheter:no  Family Contact Info:  Primary Emergency Contact: milligan,claudia, Home Phone: [phone]  Bedded: [facility] Room 206/01  Disposition: TBD, planning inpt rehab on d/c  Admission status:  Inpatient      Reviewed most current lab test results and cultures  YES  Reviewed most current radiology test results   YES  Review and summation of old records today    NO  Reviewed patient's current orders and MAR    YES  PMH/SH reviewed - no change compared to H&P    Care Plan discussed with:                                   Comments  Patient x     Family  x son in room   RN x     Care Manager       Consultant                           Multidiciplinary team rounds were held today with , nursing, pharmacist and clinical coordinator. Patient's plan of care was discussed; medications were reviewed and discharge planning was addressed.         ____________________________________________    Total NON Critical Care TIME:  35   Minutes        Comments   >50% of visit spent in counseling and coordination of care   x      Signed: Leo Wilson MD  [facility] Hospitalist  [phone]

## 2021-11-21 NOTE — PROGRESS NOTES
Problem: Mobility Impaired (Adult and Pediatric)  Goal: *Acute Goals and Plan of Care (Insert Text)  Description: FUNCTIONAL STATUS PRIOR TO ADMISSION: Patient was modified independent using a single point cane for functional mobility. HOME SUPPORT PRIOR TO ADMISSION: The patient lived alone with family to provide assistance. Physical Therapy Goals  Initiated 11/19/2021  1. Patient will move from supine to sit and sit to supine  in bed with minimal assistance/contact guard assist within 7 day(s). 2.  Patient will transfer from bed to chair and chair to bed with minimal assistance/contact guard assist using the least restrictive device within 7 day(s). 3.  Patient will perform sit to stand with minimal assistance/contact guard assist within 7 day(s). 4.  Patient will ambulate with minimal assistance/contact guard assist for 150 feet with the least restrictive device within 7 day(s). 5.  Patient will ascend/descend 4 stairs with bilateral handrail(s) with minimal assistance/contact guard assist within 7 day(s). Outcome: Progressing Towards Goal     PHYSICAL THERAPY TREATMENT  Patient: Ramond Galeazzi Spindle [de-identified]80 y.o. female)  Date: 11/21/2021  Diagnosis: Closed fracture of multiple pubic rami, right, initial encounter ([facility] [address] [S32.591A]   Closed fracture of multiple pubic rami, right, initial encounter ([facility] [address]       Precautions:    Chart, physical therapy assessment, plan of care and goals were reviewed. ASSESSMENT  Patient continues with skilled PT services and is progressing towards goals. Pt received in bed agreeable to therapy. Pt states pain is a 8/10 pain with  movement at times. Pt able to negotiate L LE towards EOB, however required assistance with RLE due to increase in pain with movement. Pt required mod A with transfer to EOB with assist x 2. Pt has improved sit to stand with use of lift assistance device.  She was able to pull herself up with min A to stand and was able to lower herself down with CGA-min A to the darcie chair. Pt left with all needs met and visitor in room. Current Level of Function Impacting Discharge (mobility/balance): ambulation not yet assessed, min A transfer to stand to transfer device. Other factors to consider for discharge: ambulation assistance         PLAN :  Patient continues to benefit from skilled intervention to address the above impairments. Continue treatment per established plan of care. to address goals. Recommendation for discharge: (in order for the patient to meet his/her long term goals)  Therapy up to 5 days/week in SNF setting    This discharge recommendation:  A follow-up discussion with the attending provider and/or case management is planned    IF patient discharges home will need the following DME: rolling walker       SUBJECTIVE:   Patient stated it hurts when I move it, but I have had a lot of therapy so I am no stranger to it.     OBJECTIVE DATA SUMMARY:   Critical Behavior:  Neurologic State: Alert  Orientation Level: Oriented to person, Oriented to situation, Oriented to time, Disoriented to place  Cognition: Follows commands     Functional Mobility Training:  Bed Mobility:     Supine to Sit: Moderate assistance; Assist x2; Additional time; Bed Modified     Scooting: Contact guard assistance;  Additional time        Transfers:  Sit to Stand: Minimum assistance  Stand to Sit: Minimum assistance        Bed to Chair: Adaptive equipment                    Balance:  Sitting: Intact  Ambulation/Gait Training:                                                    Pain Ratin/10    Activity Tolerance:   Fair and desaturates with exertion and requires oxygen    After treatment patient left in no apparent distress:   Sitting in chair, Heels elevated for pressure relief, Call bell within reach, Bed / chair alarm activated, and Caregiver / family present    COMMUNICATION/COLLABORATION:   The patients plan of care was discussed with: Physical therapist and Registered nurse.      Millie Dandy, PTA   Time Calculation: 18 mins

## 2021-11-21 NOTE — PROGRESS NOTES
Bedside shift change report given to RUPA Valverde RN (oncoming nurse) by Pearl Cota RN (offgoing nurse). Report included the following information Kardex.

## 2021-11-21 NOTE — PROGRESS NOTES
Bedside and Verbal shift change report given to MIKI Cota RN (oncoming nurse) by RUPA Lara RN  (offgoing nurse). Report included the following information SBAR, Kardex, Intake/Output, MAR and Recent Results.

## 2021-11-21 NOTE — PROGRESS NOTES
Pt assisted with turning and repositioning this shift. Pt needs much encouragement to move and change positions. Pt has increased confusion at night (thinks she is at home and occasionally yells out names of family members). Gayatri Brandi in place with clear yellow urine noted in collection container. Pt has to frequently be reminded to keep 02 on. Fall precautions remain in place.

## 2021-11-22 LAB
ANION GAP SERPL CALC-SCNC: 6 MMOL/L (ref 5–15)
ATRIAL RATE: 258 BPM
BACTERIA SPEC CULT: ABNORMAL
BASOPHILS # BLD: 0.1 K/UL (ref 0–0.1)
BASOPHILS NFR BLD: 1 % (ref 0–1)
BUN SERPL-MCNC: 29 MG/DL (ref 6–20)
BUN/CREAT SERPL: 30 (ref 12–20)
CALCIUM SERPL-MCNC: 8.7 MG/DL (ref 8.5–10.1)
CALCULATED R AXIS, ECG10: -75 DEGREES
CALCULATED T AXIS, ECG11: 96 DEGREES
CC UR VC: ABNORMAL
CHLORIDE SERPL-SCNC: 101 MMOL/L (ref 97–108)
CO2 SERPL-SCNC: 32 MMOL/L (ref 21–32)
CREAT SERPL-MCNC: 0.96 MG/DL (ref 0.55–1.02)
DIAGNOSIS, 93000: NORMAL
DIFFERENTIAL METHOD BLD: ABNORMAL
EOSINOPHIL # BLD: 0.3 K/UL (ref 0–0.4)
EOSINOPHIL NFR BLD: 4 % (ref 0–7)
ERYTHROCYTE [DISTWIDTH] IN BLOOD BY AUTOMATED COUNT: 14.3 % (ref 11.5–14.5)
GLUCOSE SERPL-MCNC: 110 MG/DL (ref 65–100)
HCT VFR BLD AUTO: 29.4 % (ref 35–47)
HGB BLD-MCNC: 9.8 G/DL (ref 11.5–16)
IMM GRANULOCYTES # BLD AUTO: 0.1 K/UL (ref 0–0.04)
IMM GRANULOCYTES NFR BLD AUTO: 1 % (ref 0–0.5)
LYMPHOCYTES # BLD: 1.1 K/UL (ref 0.8–3.5)
LYMPHOCYTES NFR BLD: 14 % (ref 12–49)
MCH RBC QN AUTO: 31.7 PG (ref 26–34)
MCHC RBC AUTO-ENTMCNC: 33.3 G/DL (ref 30–36.5)
MCV RBC AUTO: 95.1 FL (ref 80–99)
MONOCYTES # BLD: 0.8 K/UL (ref 0–1)
MONOCYTES NFR BLD: 10 % (ref 5–13)
NEUTS SEG # BLD: 5.5 K/UL (ref 1.8–8)
NEUTS SEG NFR BLD: 70 % (ref 32–75)
NRBC # BLD: 0 K/UL (ref 0–0.01)
NRBC BLD-RTO: 0 PER 100 WBC
PLATELET # BLD AUTO: 149 K/UL (ref 150–400)
PMV BLD AUTO: 10.9 FL (ref 8.9–12.9)
POTASSIUM SERPL-SCNC: 4.1 MMOL/L (ref 3.5–5.1)
Q-T INTERVAL, ECG07: 410 MS
QRS DURATION, ECG06: 136 MS
QTC CALCULATION (BEZET), ECG08: 509 MS
RBC # BLD AUTO: 3.09 M/UL (ref 3.8–5.2)
SERVICE CMNT-IMP: ABNORMAL
SODIUM SERPL-SCNC: 139 MMOL/L (ref 136–145)
VENTRICULAR RATE, ECG03: 93 BPM
WBC # BLD AUTO: 7.7 K/UL (ref 3.6–11)

## 2021-11-22 PROCEDURE — 74011250637 HC RX REV CODE- 250/637: Performed by: INTERNAL MEDICINE

## 2021-11-22 PROCEDURE — 97530 THERAPEUTIC ACTIVITIES: CPT

## 2021-11-22 PROCEDURE — 65270000029 HC RM PRIVATE

## 2021-11-22 PROCEDURE — 85025 COMPLETE CBC W/AUTO DIFF WBC: CPT

## 2021-11-22 PROCEDURE — 77010033678 HC OXYGEN DAILY

## 2021-11-22 PROCEDURE — 36415 COLL VENOUS BLD VENIPUNCTURE: CPT

## 2021-11-22 PROCEDURE — 94760 N-INVAS EAR/PLS OXIMETRY 1: CPT

## 2021-11-22 PROCEDURE — 80048 BASIC METABOLIC PNL TOTAL CA: CPT

## 2021-11-22 RX ADMIN — ACETAMINOPHEN 500 MG: 500 TABLET ORAL at 17:22

## 2021-11-22 RX ADMIN — FUROSEMIDE 20 MG: 20 TABLET ORAL at 09:19

## 2021-11-22 RX ADMIN — TRAMADOL HYDROCHLORIDE 50 MG: 50 TABLET, FILM COATED ORAL at 08:54

## 2021-11-22 RX ADMIN — Medication 10 ML: at 22:00

## 2021-11-22 RX ADMIN — Medication 1000 UNITS: at 09:19

## 2021-11-22 RX ADMIN — CIPROFLOXACIN 250 MG: 250 TABLET ORAL at 22:14

## 2021-11-22 RX ADMIN — ATORVASTATIN CALCIUM 10 MG: 10 TABLET, FILM COATED ORAL at 22:14

## 2021-11-22 RX ADMIN — Medication 10 ML: at 14:00

## 2021-11-22 RX ADMIN — APIXABAN 2.5 MG: 2.5 TABLET, FILM COATED ORAL at 09:18

## 2021-11-22 RX ADMIN — APIXABAN 2.5 MG: 2.5 TABLET, FILM COATED ORAL at 22:14

## 2021-11-22 RX ADMIN — OXYCODONE HYDROCHLORIDE AND ACETAMINOPHEN 500 MG: 500 TABLET ORAL at 09:19

## 2021-11-22 RX ADMIN — METOPROLOL TARTRATE 100 MG: 50 TABLET, FILM COATED ORAL at 09:19

## 2021-11-22 RX ADMIN — SERTRALINE HYDROCHLORIDE 25 MG: 25 TABLET ORAL at 09:19

## 2021-11-22 RX ADMIN — METOPROLOL TARTRATE 100 MG: 50 TABLET, FILM COATED ORAL at 22:14

## 2021-11-22 RX ADMIN — CIPROFLOXACIN 250 MG: 250 TABLET ORAL at 09:19

## 2021-11-22 RX ADMIN — ISOSORBIDE MONONITRATE 30 MG: 30 TABLET, EXTENDED RELEASE ORAL at 09:19

## 2021-11-22 RX ADMIN — ACETAMINOPHEN 500 MG: 500 TABLET ORAL at 22:14

## 2021-11-22 RX ADMIN — FAMOTIDINE 20 MG: 20 TABLET, FILM COATED ORAL at 17:22

## 2021-11-22 RX ADMIN — ACETAMINOPHEN 500 MG: 500 TABLET ORAL at 12:59

## 2021-11-22 RX ADMIN — POTASSIUM CHLORIDE 10 MEQ: 750 TABLET, FILM COATED, EXTENDED RELEASE ORAL at 09:18

## 2021-11-22 RX ADMIN — ACETAMINOPHEN 500 MG: 500 TABLET ORAL at 08:54

## 2021-11-22 NOTE — PROGRESS NOTES
Problem: Pressure Injury - Risk of  Goal: *Prevention of pressure injury  Description: Document Mike Scale and appropriate interventions in the flowsheet.   Outcome: Progressing Towards Goal  Note: Pressure Injury Interventions:  Sensory Interventions: Assess changes in LOC    Moisture Interventions: Absorbent underpads    Activity Interventions: Increase time out of bed    Mobility Interventions: HOB 30 degrees or less    Nutrition Interventions: Document food/fluid/supplement intake    Friction and Shear Interventions: HOB 30 degrees or less                Problem: Patient Education: Go to Patient Education Activity  Goal: Patient/Family Education  Outcome: Progressing Towards Goal     Problem: Pain  Goal: *Control of Pain  Outcome: Progressing Towards Goal     Problem: General Medical Care Plan  Goal: *Vital signs within specified parameters  Outcome: Progressing Towards Goal  Goal: *Labs within defined limits  Outcome: Progressing Towards Goal  Goal: *Absence of infection signs and symptoms  Outcome: Progressing Towards Goal  Goal: *Optimal pain control at patient's stated goal  Outcome: Progressing Towards Goal  Goal: *Skin integrity maintained  Outcome: Progressing Towards Goal  Goal: *Fluid volume balance  Outcome: Progressing Towards Goal  Goal: *Optimize nutritional status  Outcome: Progressing Towards Goal  Goal: *Anxiety reduced or absent  Outcome: Progressing Towards Goal  Goal: *Progressive mobility and function (eg: ADL's)  Outcome: Progressing Towards Goal

## 2021-11-22 NOTE — PROGRESS NOTES
IDR Team; MD, Care Manager, nurse,  Physical therapy, OT, ,  Pharmacy and Dietician, met to review patient's plan of care. Discussed goals, interventions, barriers and progress. Team will continue to monitor progress and report any concerns to the physician and care management as indicated. Transition of Care Plan: Per nurse, patient normally independent. Had lortab last night that made her very drowsy. Also on tylenol and tramadol. Might hinder PT/OT progress. PT/OT hasn't seen her yet.

## 2021-11-22 NOTE — PROGRESS NOTES
Problem: Pressure Injury - Risk of  Goal: *Prevention of pressure injury  Description: Document Mike Scale and appropriate interventions in the flowsheet. Outcome: Progressing Towards Goal  Note: Pressure Injury Interventions:  Sensory Interventions: Assess changes in LOC    Moisture Interventions: Absorbent underpads    Activity Interventions: Increase time out of bed    Mobility Interventions: HOB 30 degrees or less    Nutrition Interventions: Document food/fluid/supplement intake    Friction and Shear Interventions: HOB 30 degrees or less                Problem: Patient Education: Go to Patient Education Activity  Goal: Patient/Family Education  Outcome: Progressing Towards Goal     Problem: Pain  Goal: *Control of Pain  Outcome: Progressing Towards Goal     Problem: General Medical Care Plan  Goal: *Vital signs within specified parameters  Outcome: Progressing Towards Goal  Goal: *Labs within defined limits  Outcome: Progressing Towards Goal  Goal: *Absence of infection signs and symptoms  Outcome: Progressing Towards Goal  Goal: *Optimal pain control at patient's stated goal  Outcome: Progressing Towards Goal  Goal: *Skin integrity maintained  Outcome: Progressing Towards Goal  Goal: *Fluid volume balance  Outcome: Progressing Towards Goal  Goal: *Optimize nutritional status  Outcome: Progressing Towards Goal  Goal: *Anxiety reduced or absent  Outcome: Progressing Towards Goal  Goal: *Progressive mobility and function (eg: ADL's)  Outcome: Progressing Towards Goal     Problem: Patient Education: Go to Patient Education Activity  Goal: Patient/Family Education  Outcome: Progressing Towards Goal     Problem: Falls - Risk of  Goal: *Absence of Falls  Description: Document Sheila Fall Risk and appropriate interventions in the flowsheet.   Outcome: Progressing Towards Goal  Note: Fall Risk Interventions:  Mobility Interventions: Bed/chair exit alarm    Mentation Interventions: Bed/chair exit alarm    Medication Interventions: Bed/chair exit alarm    Elimination Interventions: Call light in reach    History of Falls Interventions: Bed/chair exit alarm         Problem: Patient Education: Go to Patient Education Activity  Goal: Patient/Family Education  Outcome: Progressing Towards Goal     Problem: Hypertension  Goal: *Blood pressure within specified parameters  Outcome: Progressing Towards Goal  Goal: *Fluid volume balance  Outcome: Progressing Towards Goal  Goal: *Labs within defined limits  Outcome: Progressing Towards Goal     Problem: Patient Education: Go to Patient Education Activity  Goal: Patient/Family Education  Outcome: Progressing Towards Goal

## 2021-11-22 NOTE — PROGRESS NOTES
Problem: Self Care Deficits Care Plan (Adult)  Goal: *Acute Goals and Plan of Care (Insert Text)  Outcome: Progressing Towards Goal     Problem: Self Care Deficits Care Plan (Adult)  Goal: *Acute Goals and Plan of Care (Insert Text)  11/22/2021 1524 by Conrad Hyatt OT  Note:   Occupational Therapy Goals  Initiated 11/19/2021  1. Patient will perform bathing with minimal assistance/contact guard assist within 7 day(s). 2.  Patient will perform toilet transfer with moderate assistance  within 7 day(s). 3.  Patient will perform lower body dressing with minimal assistance/contact guard assist within 7 day(s). 4.  Patient will perform all aspects of toileting with moderate assistance  within 7 day(s). 5.  Patient will utilize energy conservation techniques during functional activities with verbal cues within 7 day(s). OCCUPATIONAL THERAPY TREATMENT  Patient: Paula Inman [de-identified]80 y.o. female)  Date: 11/22/2021  Diagnosis: Closed fracture of multiple pubic rami, right, [facility] [S32.591A]   Closed fracture of multiple pubic rami, right, initial encounter [facility])       Precautions:    Chart, occupational therapy assessment, plan of care, and goals were reviewed. ASSESSMENT  Patient continues with skilled OT services and is progressing towards goals. She performed 10 knee kicks bilaterally to warm up in sitting. Pt was asked to practice sit to stand with OTR and rehab tech. Needed assist to scoot out toward edge of chair. Is max assist of 2 for first sit to stand and needed to quickly sit back down. On second attempt she is able to get both hands on the walker and stand for approx 10-15 seconds before asking to sit. She has fear she will fall but weight bears static through both legs.       Current Level of Function Impacting Discharge (ADLs): inability to stand/transfer impacts ability to dress and     Other factors to consider for discharge: lives primarily alone         PLAN :  Patient continues to benefit from skilled intervention to address the above impairments. Continue treatment per established plan of care to address goals. Recommend with staff: up in chair as tolerated, BSC and lift device use at this time    Recommend next OT session: self care and transfers    Recommendation for discharge: (in order for the patient to meet his/her long term goals)  SnF     This discharge recommendation:  Has been made in collaboration with the attending provider and/or case management    IF patient discharges home will need the following DME: walker: rolling       SUBJECTIVE:   Patient stated Richad [place] you\". Pt noted slurring words more rush\n last week. Initially son in room and also noted but we are both aware she has pain meds on board. She is aware she is in [place]. Is aware of her pain and need to get up and move. OBJECTIVE DATA SUMMARY:   Cognitive/Behavioral Status:  Neurologic State: Alert; Confused  Orientation Level: Oriented to person; Disoriented to place; Disoriented to situation; Disoriented to time  Cognition: Follows commands; Memory loss; Poor safety awareness        Safety/Judgement: Awareness of environment    Functional Mobility and Transfers for ADLs:  Bed Mobility:  Supine to Sit: Maximum assistance; Assist x2; Bed Modified  Scooting: Contact guard assistance; Minimum assistance    Transfers:  Sit to Stand: Moderate assistance     Bed to Chair: Adaptive equipment; Assist x2    Balance:  Sitting: Intact  Standing: Impaired; With support  Standing - Static: Constant support; Poor  Standing - Dynamic : Not tested    ADL Intervention:                                     Cognitive Retraining  Following Commands:  Follows one step commands/directions  Safety/Judgement: Awareness of environment        Pain:  Sharp pain with weight bearing reported    Activity Tolerance:   Good/Fair    After treatment patient left in no apparent distress:   Sitting in chair, Call bell within reach and Bed / chair alarm activated    COMMUNICATION/COLLABORATION:   The patients plan of care was discussed with: Physical therapy assistant.      Jona Noyola OT  Time Calculation: 11 mins

## 2021-11-22 NOTE — PROGRESS NOTES
[facility]  Hospitalist Progress Note    NAME: Terry Inman   :  3/7/1930   MRN:  [id number]     Total duration of encounter: 3 days      Interim Hospital Summary: 80 y.o. female who presented on 2021 with Closed fracture of multiple pubic rami, right, initial encounter ([facility] [address] She has a past medical history of Colitis (), DJD (degenerative joint disease), Fracture, femoral ([facility] [address] (2005), HTN (hypertension), Hyperlipemia, Persistent atrial fibrillation ([facility] [address] (), and Ventricular ectopy. .    Pt admitted following unwitnessed fall in home , dx with R Inf/Sup pubic rami fractures. Tx with analgesics and PT/OT. Likely will need placement in LTC, at least temporarily. Plan for Ultram daily with breakfast for pain mngt during PT/OT. Subjective:     Chief Complaint / Reason for Physician Visit  \"sedate\".   Discussed with RN   More sedate this AM following 1 dose Norco 5 late yesterday  Son feels she was near baseline on   Pt has R hip area c/o with activity  No pain when sedentary  Getting up to bedside chair with PT    Review of Systems:  Symptom Y/N Comments  Symptom Y/N Comments   Fever/Chills n   Chest Pain n    Poor Appetite n   Edema n    Cough n   Abdominal Pain n    Sputum n   Joint Pain n    SOB/GREENBERG n   Pruritis/Rash n    Nausea/vomit n   Tolerating PT/OT y    Diarrhea n   Tolerating Diet y    Constipation n   Other       No  c/o,   Urine culture c/w cystitis however    Current Facility-Administered Medications:     ciprofloxacin HCl (CIPRO) tablet 250 mg, 250 mg, Oral, Q12H, Garo Ku MD, 250 mg at 21 09    traMADoL (ULTRAM) tablet 50 mg, 50 mg, Oral, DAILY WITH BREAKFAST, Garo Ku MD, 50 mg at 21 0854    furosemide (LASIX) tablet 20 mg, 20 mg, Oral, DAILY, Garo Ku MD, 20 mg at 21 0919    influenza vaccine  (6 mos+)(PF) (FLUARIX/FLULAVAL/FLUZONE QUAD) injection 0.5 mL, 1 Each, IntraMUSCular, PRIOR TO Alicja Perez MD    acetaminophen (TYLENOL) tablet 500 mg, 500 mg, Oral, QID, Karley Cm MD, 500 mg at 11/22/21 4279    apixaban (ELIQUIS) tablet 2.5 mg, 2.5 mg, Oral, Q12H, Karley Cm MD, 2.5 mg at 11/22/21 1678    ascorbic acid (vitamin C) (VITAMIN C) tablet 500 mg, 500 mg, Oral, DAILY, Karley Cm MD, 500 mg at 11/22/21 0919    cholecalciferol (VITAMIN D3) (1000 Units /25 mcg) tablet 1,000 Units, 1,000 Units, Oral, DAILY, Karley Cm MD, 1,000 Units at 11/22/21 0919    isosorbide mononitrate ER (IMDUR) tablet 30 mg, 30 mg, Oral, DAILY, Karley Cm MD, 30 mg at 11/22/21 0919    atorvastatin (LIPITOR) tablet 10 mg, 10 mg, Oral, QHS, Karley Cm MD, 10 mg at 11/21/21 2109    melatonin tablet 10 mg, 10 mg, Oral, QHS, Karley Cm MD, 10 mg at 11/21/21 2108    metoprolol tartrate (LOPRESSOR) tablet 100 mg, 100 mg, Oral, Q12H, Karley Cm MD, 100 mg at 11/22/21 0919    potassium chloride SR (KLOR-CON 10) tablet 10 mEq, 10 mEq, Oral, DAILY, Karley Cm MD, 10 mEq at 11/22/21 7643    sertraline (ZOLOFT) tablet 25 mg, 25 mg, Oral, DAILY, Karley Cm MD, 25 mg at 11/22/21 0919    sodium chloride (NS) flush 5-40 mL, 5-40 mL, IntraVENous, Q8H, Karley Cm MD, 5 mL at 11/21/21 1341    sodium chloride (NS) flush 5-40 mL, 5-40 mL, IntraVENous, PRN, Karley Cm MD, 10 mL at 11/21/21 2109    bisacodyL (DULCOLAX) suppository 10 mg, 10 mg, Rectal, DAILY PRN, Karley Cm MD    famotidine (PEPCID) tablet 20 mg, 20 mg, Oral, QPM, Karley Cm MD, 20 mg at 11/21/21 1745    HYDROcodone-acetaminophen (NORCO) 5-325 mg per tablet 1 Tablet, 1 Tablet, Oral, Q4H PRN, Karley Cm MD, 1 Tablet at 11/21/21 2108    ondansetron (ZOFRAN ODT) tablet 4 mg, 4 mg, Oral, Q8H PRN, Karley Cm MD, 4 mg at 11/19/21 1813    Objective:     VITALS:   Patient Vitals for the past 12 hrs:   Temp Pulse Resp BP SpO2   11/22/21 0758 97.3 °F (36.3 °C) 90 20 139/74 97 %   11/22/21 0020 97.8 °F (36.6 °C) 89 16 121/67 93 %       Intake/Output Summary (Last 24 hours) at 11/22/2021 1124  Last data filed at 11/22/2021 7480  Gross per 24 hour   Intake 720 ml   Output 800 ml   Net -80 ml        PHYSICAL EXAM:  General: WD, WN. Sedate but easilty aroused, cooperative, no acute distress    EENT:  EOMI. Anicteric sclerae. MMM  Resp:  CTA bilaterally, no wheezing or rales. No accessory muscle use  CV:  Irreg Irreg rhythm,  Tr edema  GI:  Soft, Non distended, Non tender. +Bowel sounds  Neurologic:  Alert and oriented X 3, normal speech, nonfocal  Psych:   Depressed affect. Not anxious nor agitated  Skin:  No rashes. No jaundice    LABS:  I reviewed today's most current labs and imaging studies. Pertinent labs include:  Recent Labs     11/22/21  0535 11/20/21  0621   WBC 7.7 10.8   HGB 9.8* 11.8   HCT 29.4* 36.1   * 185     Recent Labs     11/22/21  0535 11/20/21  0621    137   K 4.1 4.0    98   CO2 32 30   * 122*   BUN 29* 33*   CREA 0.96 1.47*   CA 8.7 8.8   MG  --  2.5*     CULTURE  Urine 11/19 >100,000 GNR (ID to follow)    RADIOLOGY:  CT HEAD 11/19: There is a small right parietal scalp contusion. The ventricles and  sulci are appropriate in size and configuration for age. No loss of gray-white  differentiation to suggest late acute or early subacute infarction. No mass  effect or intracranial hemorrhage.   IMPRESSION:  No acute intracranial abnormality. Small right parietal scalp contusion. CT C-SPINE 11/19:  No fracture or dislocation. Cervical alignment is normal. The paravertebral soft tissues are normal.    Degenerative disease causes the following stenoses:  C2-C3:  Mild canal stenosis. C3-C4:  Left and severe right neural foraminal stenosis. Moderate canal stenosis. C4-C5:  Severe bilateral neural foraminal stenosis. Mild canal stenosis. C5-C6:  Left and severe right neural foraminal stenosis. C6-C7:  Severe right neural foraminal stenosis.    C7-T1:  No stenosis.    IMPRESSION:  No fracture. Degenerative stenoses as described above. R HIP XRAY 11/19:  Patient is post ORIF of right hip. Left hip is been replaced. No dislocation   There are acute fractures of the right superior and inferior pubic rami. Overall  bone mineral density is decreased. Question right sacral alar fracture   IMPRESSION  1. Acute right superior and inferior pubic rami fractures. Possible right sacral alar fracture  2. Severe osteopenia    EKG 11/20: Afib 86 bpm, LAD, LBBB, NSC      Procedures: see electronic medical records for all procedures/Xrays and details which were not copied into this note but were reviewed prior to creation of Plan.         Assessment / Plan:  Principal Problem:    Closed fracture of multiple pubic rami, right, initial encounter ([facility] [address] (11/19/2021)  Active Problems:    Fall from ground level (11/19/2021)  Non surgical mngt  Pain Mngt - to begin one Ultram in AM , plan to advance if needed  PT/OT to tolerance  Placement SNF Rehab vs ICF, with goal to return home with caregivers (CM plan in progress)  Tylenol qid / Ultram qd 0800 and prn Norco        Persistent atrial fibrillation ([facility] [address] (7/22/2018)    Chronic systolic heart failure ([facility] [address] (11/19/2021)  Cont current home tx Eliquis, Lasix, Imdur, Metoprolol, KCl  Over diuresis with Lasix 40mg bid, noted jump in BUN / Cr from prior  Reduce Lasix 20mg daily, Encourage fluids  F/u BMP back to baseline, follow on low dose Lasix daily       HTN (hypertension) ()  Cont home tx Lasix, Metoprolol  Lasix tapered from 40/40 to 20 daily  F/u MOISE improved       Hyperlipemia ()  Cont home tx Lipitor       DJD (degenerative joint disease) ()  Cont PT/OT   Symptom tx Ultram with breakfast        SAFETY:   Code Status:DNR  DVT prophylaxis:Eliquis  Stress Ulcer prophylaxis: Pepcid  Bladder catheter:no  Family Contact Info:  Primary Emergency Contact: milligan,claudia, Home Phone: [phone]  Bedded: [facility] Room 206/01  Disposition: TBD, planning inpt rehab on d/c  Admission status:  Inpatient      Reviewed most current lab test results and cultures  YES  Reviewed most current radiology test results   YES  Review and summation of old records today    NO  Reviewed patient's current orders and MAR    YES  PMH/SH reviewed - no change compared to H&P    Care Plan discussed with:                                   Comments  Patient x     Family  x Caregive in room   RN x     Care Manager x      Consultant                           Multidiciplinary team rounds were held today with , nursing, pharmacist and clinical coordinator. Patient's plan of care was discussed; medications were reviewed and discharge planning was addressed.         ____________________________________________    Total NON Critical Care TIME:  35   Minutes        Comments   >50% of visit spent in counseling and coordination of care   x      Signed: Marifer Slater MD  [facility] Hospitalist  [phone]

## 2021-11-22 NOTE — PROGRESS NOTES
Problem: Mobility Impaired (Adult and Pediatric)  Goal: *Acute Goals and Plan of Care (Insert Text)  Description: FUNCTIONAL STATUS PRIOR TO ADMISSION: Patient was modified independent using a single point cane for functional mobility. HOME SUPPORT PRIOR TO ADMISSION: The patient lived alone with family to provide assistance. Physical Therapy Goals  Initiated 11/19/2021  1. Patient will move from supine to sit and sit to supine  in bed with minimal assistance/contact guard assist within 7 day(s). 2.  Patient will transfer from bed to chair and chair to bed with minimal assistance/contact guard assist using the least restrictive device within 7 day(s). 3.  Patient will perform sit to stand with minimal assistance/contact guard assist within 7 day(s). 4.  Patient will ambulate with minimal assistance/contact guard assist for 150 feet with the least restrictive device within 7 day(s). 5.  Patient will ascend/descend 4 stairs with bilateral handrail(s) with minimal assistance/contact guard assist within 7 day(s). Outcome: Not Progressing Towards Goal     PHYSICAL THERAPY TREATMENT  Patient: Pancho Inman [de-identified]80 y.o. female)  Date: 11/22/2021  Diagnosis: Closed fracture of multiple pubic rami, right, initial encounter ([facility] [address] [S32.592U]   Closed fracture of multiple pubic rami, right, initial encounter ([facility] [address]       Precautions:    Chart, physical therapy assessment, plan of care and goals were reviewed. ASSESSMENT  Patient continues with skilled PT services and is not progressing towards goals. Pt received today in bed very sleepy. Caregiver stated pt was tired, but she knew that yesterday she felt a lot better once she was up sitting in the chair. MD states pt may be tired from the medication she was given last night. Pt required additional assistance with LE negotiation towards EOB today compared to yesterday. She had increased complaints of pain with LE movement this session.  Pt still able to sit EOB and scoot with cueing. Pt was mod A to stand with lift assist this morning with increase in pain with standing. Pt able to lower self down into chair with min A x 1. Pt set up in chair with heels floated, alarm intact and all needs met. Current Level of Function Impacting Discharge (mobility/balance): transfer with device, mod A bed mobility to EOB    Other factors to consider for discharge: pt pain tolerance and participation in therapy         PLAN :  Patient continues to benefit from skilled intervention to address the above impairments. Continue treatment per established plan of care. to address goals. Recommendation for discharge: (in order for the patient to meet his/her long term goals)  To be determined: SNF vs HH    This discharge recommendation:  A follow-up discussion with the attending provider and/or case management is planned    IF patient discharges home will need the following DME: rolling walker       SUBJECTIVE:   Patient stated thats too much.     OBJECTIVE DATA SUMMARY:   Critical Behavior:  Neurologic State: Alert, Confused  Orientation Level: Oriented to person, Disoriented to place, Disoriented to situation, Disoriented to time  Cognition: Follows commands, Memory loss, Poor safety awareness     Functional Mobility Training:  Bed Mobility:     Supine to Sit: Maximum assistance; Assist x2; Bed Modified     Scooting: Contact guard assistance; Minimum assistance        Transfers:  Sit to Stand:  Moderate assistance  Stand to Sit: Minimum assistance        Bed to Chair: Adaptive equipment; Assist x2                    Balance:  Sitting: Intact  Ambulation/Gait Training:                                                 Pain Rating:  Pt complained of pain with movement, but did not rate    Activity Tolerance:   Fair, Poor, and desaturates with exertion and requires oxygen    After treatment patient left in no apparent distress:   Sitting in chair, Heels elevated for pressure relief, Call bell within reach, Bed / chair alarm activated, and Caregiver / family present    COMMUNICATION/COLLABORATION:   The patients plan of care was discussed with: Registered nurse, Physician, Case management, Rehabilitation technician, and Certified nursing assistant/patient care technician.      Millie Dandy, PTA   Time Calculation: 15 mins

## 2021-11-22 NOTE — PROGRESS NOTES
Bedside shift change report given to PANKAJ White RN (oncoming nurse) by Akua Cota RN (offgoing nurse). Report included the following information Kardex.

## 2021-11-22 NOTE — PROGRESS NOTES
Bedside and Verbal shift change report given to MIKI Cota RN (oncoming nurse) by RUPA Avila RN (offgoing nurse). Report included the following information SBAR, Kardex, Intake/Output and MAR.

## 2021-11-22 NOTE — PROGRESS NOTES
Follow up visit with patient in  206  Provided empathic listening and spiritual support  Advised of  Availability   [address] Paging [phone]

## 2021-11-23 PROCEDURE — 97530 THERAPEUTIC ACTIVITIES: CPT

## 2021-11-23 PROCEDURE — 77010033678 HC OXYGEN DAILY

## 2021-11-23 PROCEDURE — 94760 N-INVAS EAR/PLS OXIMETRY 1: CPT

## 2021-11-23 PROCEDURE — 65270000029 HC RM PRIVATE

## 2021-11-23 PROCEDURE — 74011250637 HC RX REV CODE- 250/637: Performed by: INTERNAL MEDICINE

## 2021-11-23 PROCEDURE — 94761 N-INVAS EAR/PLS OXIMETRY MLT: CPT

## 2021-11-23 RX ADMIN — SERTRALINE HYDROCHLORIDE 25 MG: 25 TABLET ORAL at 09:13

## 2021-11-23 RX ADMIN — FUROSEMIDE 20 MG: 20 TABLET ORAL at 09:13

## 2021-11-23 RX ADMIN — METOPROLOL TARTRATE 100 MG: 50 TABLET, FILM COATED ORAL at 09:13

## 2021-11-23 RX ADMIN — ATORVASTATIN CALCIUM 10 MG: 10 TABLET, FILM COATED ORAL at 21:44

## 2021-11-23 RX ADMIN — ISOSORBIDE MONONITRATE 30 MG: 30 TABLET, EXTENDED RELEASE ORAL at 09:13

## 2021-11-23 RX ADMIN — METOPROLOL TARTRATE 100 MG: 50 TABLET, FILM COATED ORAL at 21:45

## 2021-11-23 RX ADMIN — APIXABAN 2.5 MG: 2.5 TABLET, FILM COATED ORAL at 09:13

## 2021-11-23 RX ADMIN — Medication 1000 UNITS: at 09:13

## 2021-11-23 RX ADMIN — ACETAMINOPHEN 500 MG: 500 TABLET ORAL at 12:10

## 2021-11-23 RX ADMIN — ACETAMINOPHEN 500 MG: 500 TABLET ORAL at 17:29

## 2021-11-23 RX ADMIN — Medication 10 ML: at 06:22

## 2021-11-23 RX ADMIN — Medication 10 MG: at 21:45

## 2021-11-23 RX ADMIN — Medication 10 ML: at 21:50

## 2021-11-23 RX ADMIN — POTASSIUM CHLORIDE 10 MEQ: 750 TABLET, FILM COATED, EXTENDED RELEASE ORAL at 09:13

## 2021-11-23 RX ADMIN — ACETAMINOPHEN 500 MG: 500 TABLET ORAL at 21:45

## 2021-11-23 RX ADMIN — ACETAMINOPHEN 500 MG: 500 TABLET ORAL at 09:13

## 2021-11-23 RX ADMIN — CIPROFLOXACIN 250 MG: 250 TABLET ORAL at 21:45

## 2021-11-23 RX ADMIN — TRAMADOL HYDROCHLORIDE 50 MG: 50 TABLET, FILM COATED ORAL at 08:27

## 2021-11-23 RX ADMIN — APIXABAN 2.5 MG: 2.5 TABLET, FILM COATED ORAL at 21:45

## 2021-11-23 RX ADMIN — OXYCODONE HYDROCHLORIDE AND ACETAMINOPHEN 500 MG: 500 TABLET ORAL at 09:13

## 2021-11-23 RX ADMIN — FAMOTIDINE 20 MG: 20 TABLET, FILM COATED ORAL at 17:29

## 2021-11-23 RX ADMIN — CIPROFLOXACIN 250 MG: 250 TABLET ORAL at 09:13

## 2021-11-23 NOTE — PROGRESS NOTES
Problem: Mobility Impaired (Adult and Pediatric)  Goal: *Acute Goals and Plan of Care (Insert Text)  Description: FUNCTIONAL STATUS PRIOR TO ADMISSION: Patient was modified independent using a single point cane for functional mobility. HOME SUPPORT PRIOR TO ADMISSION: The patient lived alone with family to provide assistance. Physical Therapy Goals  Initiated 11/19/2021  1. Patient will move from supine to sit and sit to supine  in bed with minimal assistance/contact guard assist within 7 day(s). 2.  Patient will transfer from bed to chair and chair to bed with minimal assistance/contact guard assist using the least restrictive device within 7 day(s). 3.  Patient will perform sit to stand with minimal assistance/contact guard assist within 7 day(s). 4.  Patient will ambulate with minimal assistance/contact guard assist for 150 feet with the least restrictive device within 7 day(s). 5.  Patient will ascend/descend 4 stairs with bilateral handrail(s) with minimal assistance/contact guard assist within 7 day(s). Outcome: Not Progressing Towards Goal   PHYSICAL THERAPY TREATMENT  Patient: Jas Inman [de-identified]80 y.o. female)  Date: 11/23/2021  Diagnosis: Closed fracture of multiple pubic rami, right, initial encounter ([facility] [address] [S32.591A]   Closed fracture of multiple pubic rami, right, initial encounter ([facility] [address]       Precautions:    Chart, physical therapy assessment, plan of care and goals were reviewed. ASSESSMENT  Patient continues with skilled PT services and is not progressing towards goals. Pt. Received upright in bed and agreeable to PT. Pt. Moderate assistance for bed mobility with use of bed features. Pt. Maximal assistance x 1 with RW for sit <>stand and total assistance x 1 for bed <>chair transfer. Pt. Left upright in bed and left with all needs met.     Current Level of Function Impacting Discharge (mobility/balance): functional transfer advancement and ambulation    Other factors to consider for discharge: home set up         PLAN :  Patient continues to benefit from skilled intervention to address the above impairments. Continue treatment per established plan of care. to address goals. Recommendation for discharge: (in order for the patient to meet his/her long term goals)  Therapy up to 5 days/week in SNF setting    This discharge recommendation:  Has been made in collaboration with the attending provider and/or case management    IF patient discharges home will need the following DME: patient owns DME required for discharge       SUBJECTIVE:   Patient stated My legs are killing me.     OBJECTIVE DATA SUMMARY:   Critical Behavior:  Neurologic State: Alert  Orientation Level: Appropriate for age, Oriented to person, Disoriented to place, Disoriented to situation, Disoriented to time  Cognition: Follows commands, Poor safety awareness  Safety/Judgement: Awareness of environment  Functional Mobility Training:  Bed Mobility:     Supine to Sit: Maximum assistance     Scooting: Minimum assistance        Transfers:  Sit to Stand: Moderate assistance; Assist x2  Stand to Sit: Maximum assistance; Assist x1        Bed to Chair: Assist x1; Maximum assistance                    Balance:  Sitting: Intact  Standing: Impaired;  With support  Standing - Static: Constant support; Poor  Standing - Dynamic : Constant support; Poor    Pain Rating:  Did not rate but reported pain in bilateral LEs throughout    Activity Tolerance:   Fair    After treatment patient left in no apparent distress:   Sitting in chair    COMMUNICATION/COLLABORATION:   The patients plan of care was discussed with: Physical therapist and Occupational therapist.     Scot Harada, PT, DPT, EP-C   Time Calculation: 10 mins

## 2021-11-23 NOTE — PROGRESS NOTES
Problem: Pressure Injury - Risk of  Goal: *Prevention of pressure injury  Description: Document Mike Scale and appropriate interventions in the flowsheet. Outcome: Progressing Towards Goal  Note: Pressure Injury Interventions:  Sensory Interventions: Assess changes in LOC, Keep linens dry and wrinkle-free, Minimize linen layers    Moisture Interventions: Minimize layers    Activity Interventions: Increase time out of bed    Mobility Interventions: PT/OT evaluation    Nutrition Interventions: Document food/fluid/supplement intake    Friction and Shear Interventions: Lift sheet, Minimize layers                Problem: Patient Education: Go to Patient Education Activity  Goal: Patient/Family Education  Outcome: Progressing Towards Goal     Problem: Pain  Goal: *Control of Pain  Outcome: Progressing Towards Goal     Problem: General Medical Care Plan  Goal: *Vital signs within specified parameters  Outcome: Progressing Towards Goal  Goal: *Labs within defined limits  Outcome: Progressing Towards Goal  Goal: *Absence of infection signs and symptoms  Outcome: Progressing Towards Goal  Goal: *Optimal pain control at patient's stated goal  Outcome: Progressing Towards Goal  Goal: *Skin integrity maintained  Outcome: Progressing Towards Goal  Goal: *Fluid volume balance  Outcome: Progressing Towards Goal  Goal: *Optimize nutritional status  Outcome: Progressing Towards Goal  Goal: *Anxiety reduced or absent  Outcome: Progressing Towards Goal  Goal: *Progressive mobility and function (eg: ADL's)  Outcome: Progressing Towards Goal     Problem: Patient Education: Go to Patient Education Activity  Goal: Patient/Family Education  Outcome: Progressing Towards Goal     Problem: Falls - Risk of  Goal: *Absence of Falls  Description: Document Sheila Fall Risk and appropriate interventions in the flowsheet.   Outcome: Progressing Towards Goal  Note: Fall Risk Interventions:  Mobility Interventions: Bed/chair exit alarm, Patient to call before getting OOB, Utilize walker, cane, or other assistive device    Mentation Interventions: Adequate sleep, hydration, pain control, Bed/chair exit alarm, Door open when patient unattended, More frequent rounding, Reorient patient, Room close to nurse's station    Medication Interventions: Bed/chair exit alarm, Patient to call before getting OOB, Teach patient to arise slowly    Elimination Interventions: Bed/chair exit alarm, Call light in reach, Patient to call for help with toileting needs, Stay With Me (per policy)    History of Falls Interventions: Bed/chair exit alarm, Room close to nurse's station         Problem: Patient Education: Go to Patient Education Activity  Goal: Patient/Family Education  Outcome: Progressing Towards Goal     Problem: Hypertension  Goal: *Blood pressure within specified parameters  Outcome: Progressing Towards Goal  Goal: *Fluid volume balance  Outcome: Progressing Towards Goal  Goal: *Labs within defined limits  Outcome: Progressing Towards Goal     Problem: Patient Education: Go to Patient Education Activity  Goal: Patient/Family Education  Outcome: Progressing Towards Goal

## 2021-11-23 NOTE — ROUTINE PROCESS
Bedside and Verbal shift change report given to Geoff (oncoming nurse) by Lauryn Tam RN (offgoing nurse). Report included the following information SBAR. Alba score 4  Bed/chair alarm is in use. If in use it is set at the highest volume. Intravenous fluids were administered, SL  Patient Vitals for the past 12 hrs:   Temp Pulse Resp BP SpO2   11/22/21 1508 97.4 °F (36.3 °C) 94 20 (!) 112/58 98 %   11/22/21 0758 97.3 °F (36.3 °C) 90 20 139/74 97 %     No flowsheet data found. All lab results for the last 24 hours reviewed.

## 2021-11-23 NOTE — PROGRESS NOTES
Problem: Pressure Injury - Risk of  Goal: *Prevention of pressure injury  Description: Document Mike Scale and appropriate interventions in the flowsheet. Outcome: Progressing Towards Goal  Note: Pressure Injury Interventions:  Sensory Interventions: Assess changes in LOC, Keep linens dry and wrinkle-free, Minimize linen layers    Moisture Interventions: Minimize layers    Activity Interventions: Increase time out of bed    Mobility Interventions: PT/OT evaluation    Nutrition Interventions: Document food/fluid/supplement intake    Friction and Shear Interventions: Lift sheet, Minimize layers                Problem: Patient Education: Go to Patient Education Activity  Goal: Patient/Family Education  Outcome: Progressing Towards Goal     Problem: Pain  Goal: *Control of Pain  Outcome: Progressing Towards Goal     Problem: General Medical Care Plan  Goal: *Vital signs within specified parameters  Outcome: Progressing Towards Goal  Goal: *Labs within defined limits  Outcome: Progressing Towards Goal  Goal: *Absence of infection signs and symptoms  Outcome: Progressing Towards Goal  Goal: *Optimal pain control at patient's stated goal  Outcome: Progressing Towards Goal  Goal: *Skin integrity maintained  Outcome: Progressing Towards Goal  Goal: *Fluid volume balance  Outcome: Progressing Towards Goal  Goal: *Optimize nutritional status  Outcome: Progressing Towards Goal  Goal: *Anxiety reduced or absent  Outcome: Progressing Towards Goal  Goal: *Progressive mobility and function (eg: ADL's)  Outcome: Progressing Towards Goal     Problem: Patient Education: Go to Patient Education Activity  Goal: Patient/Family Education  Outcome: Progressing Towards Goal     Problem: Falls - Risk of  Goal: *Absence of Falls  Description: Document Sheila Fall Risk and appropriate interventions in the flowsheet.   Outcome: Progressing Towards Goal  Note: Fall Risk Interventions:  Mobility Interventions: Bed/chair exit alarm, Patient to call before getting OOB    Mentation Interventions: Adequate sleep, hydration, pain control, Bed/chair exit alarm, Door open when patient unattended, More frequent rounding, Update white board    Medication Interventions: Bed/chair exit alarm, Patient to call before getting OOB, Teach patient to arise slowly    Elimination Interventions: Bed/chair exit alarm, Call light in reach, Patient to call for help with toileting needs, Stay With Me (per policy)    History of Falls Interventions: Bed/chair exit alarm, Door open when patient unattended, Room close to nurse's station         Problem: Patient Education: Go to Patient Education Activity  Goal: Patient/Family Education  Outcome: Progressing Towards Goal     Problem: Hypertension  Goal: *Blood pressure within specified parameters  Outcome: Progressing Towards Goal  Goal: *Fluid volume balance  Outcome: Progressing Towards Goal  Goal: *Labs within defined limits  Outcome: Progressing Towards Goal     Problem: Patient Education: Go to Patient Education Activity  Goal: Patient/Family Education  Outcome: Progressing Towards Goal

## 2021-11-23 NOTE — PROGRESS NOTES
Discussed with patient and her friend Fani Rendonpapa discharge plans. Told her CM spoke with PT/OT and they state she is NOT a candidate for our swing bed program. Susy Bryant she requires max assistance. Patient/family decided they would like info to be sent to [facility]. Info sent to [employer]. Called Brandan to give her heads up but she did not answer. Awaiting to hear back from Brandan. After skilled care patient hopes to return home but if that's not an option her second choice going to assisted living at [facility]. Ms. Bekah Rocha has been in contact with Carmella Sanon about a year ago. Told her Dimitrios Mayfield retired and now she would contact [employer]. Will give her Nilsa's number. She also wanted me to check with Brandan about visiting hours. Awaiting to hear back from Brandan.

## 2021-11-23 NOTE — PROGRESS NOTES
Problem: Self Care Deficits Care Plan (Adult)  Goal: *Acute Goals and Plan of Care (Insert Text)  Description: Occupational Therapy Goals  Initiated 11/19/2021  1. Patient will perform bathing with minimal assistance/contact guard assist within 7 day(s). 2.  Patient will perform toilet transfer with moderate assistance  within 7 day(s). 3.  Patient will perform lower body dressing with minimal assistance/contact guard assist within 7 day(s). 4.  Patient will perform all aspects of toileting with moderate assistance  within 7 day(s). 5.  Patient will utilize energy conservation techniques during functional activities with verbal cues within 7 day(s). Outcome: Progressing Towards Goal   OCCUPATIONAL THERAPY TREATMENT  Patient: Matt Inman [de-identified]80 y.o. female)  Date: 11/23/2021  Diagnosis: Closed fracture of multiple pubic rami, right, initial encounter ([facility] [S32.591A]   Closed fracture of multiple pubic rami, right, initial encounter [facility])       Precautions:    Chart, occupational therapy assessment, plan of care, and goals were reviewed. ASSESSMENT  Patient continues with skilled OT services and is progressing towards goals. OTR saw pt for 2 sessions. First 15 min session w/PT and interrupted by lunch arriving. She worked on bed mobility and sit to stand transfers, tolerating a minute or more in standing but unable to independently step without max assist by PT. She was later seen just by OTR for another 15 mins for LE bathing. Is able to lean forward and touch to the top of her sock. Dependent for washing and drying feet, getting clothing on and over feet at this time. She demonstrated her HEP with BLE for sitting in chair and performing ankle pumps, knee kicks, ab/adduction L hip flexion but unable to perform R hip flexion due to discomfort. She was shown seated chair situps and while she cannot bend forward enough to touch  elbows to knees she did perform 7 partial before stopping.  This morning discussed at length with pt and her friend what we have to offer here and concern pt would plateau and have to be discharged. As pt is expected to go home and live alone again OTR recommended she transition to a facility which might have longer term options should she plateau quickly due to her pain. CM notified and family agreed as uncertain if she will be able to show weekly progress and may need a longer term care temporarily until return to home. Current Level of Function Impacting Discharge (ADLs): pt's pain level impacts her ability to perform self care for reaching feet. Pain impacts bed mobility with need for max assist of one for some portions and also her inability to perform sit to stand transfers requiring 2 person assist and while she can weight bear through legs she is not yet able to weight shift to move feet to ambulate 2-3 steps to chair without max assist.      Other factors to consider for discharge: will be living alone again if she goes home         PLAN :  Patient continues to benefit from skilled intervention to address the above impairments. Continue treatment per established plan of care to address goals. Recommend with staff: up to [id number] [facility] [address] next OT session: continue working on transers/bed mobility, toileting, self care    Recommendation for discharge: (in order for the patient to meet his/her long term goals)  Therapy up to 5 days/week in SNF setting    This discharge recommendation:  Has been made in collaboration with the attending provider and/or case management    IF patient discharges home will need the following DME: bedside commode and walker: rolling       SUBJECTIVE:   Patient stated oh that hurts my hip.  re: trying to move R leg in bed    OBJECTIVE DATA SUMMARY:   Cognitive/Behavioral Status:  Neurologic State: Alert  Orientation Level: Appropriate for age; Oriented to person; Disoriented to place;  Disoriented to situation; Disoriented to time  Cognition: Follows commands; Poor safety awareness    Functional Mobility and Transfers for ADLs:  Bed Mobility:  Supine to Sit: Maximum assistance  Scooting: Minimum assistance    Transfers:  Sit to Stand: Moderate assistance; Assist x2     Bed to Chair: Assist x1; Maximum assistance    Balance:  Sitting: Intact  Standing: Impaired; With support  Standing - Static: Constant support; Poor  Standing - Dynamic : Constant support; Poor    ADL Intervention:     Lower Body Bathing  Lower Body : Moderate assistance         Lower Body Dressing Assistance  Socks: Total assistance (dependent)       Therapeutic Exercises:   7 reps chair sit ups    Pain:  Winces in pain    Activity Tolerance:   Fair    After treatment patient left in no apparent distress:   Sitting in chair, Call bell within reach, Bed / chair alarm activated, and Caregiver / family present    COMMUNICATION/COLLABORATION:   The patients plan of care was discussed with: Physical therapist and Case management.      Kyara Vasques OT  Time Calculation: 15 mins plus 15 misn

## 2021-11-24 VITALS
SYSTOLIC BLOOD PRESSURE: 129 MMHG | HEIGHT: 65 IN | DIASTOLIC BLOOD PRESSURE: 71 MMHG | WEIGHT: 142.8 LBS | RESPIRATION RATE: 20 BRPM | BODY MASS INDEX: 23.79 KG/M2 | HEART RATE: 90 BPM | OXYGEN SATURATION: 93 % | TEMPERATURE: 97.4 F

## 2021-11-24 PROBLEM — N30.90 CYSTITIS: Status: ACTIVE | Noted: 2021-11-24

## 2021-11-24 PROCEDURE — 90686 IIV4 VACC NO PRSV 0.5 ML IM: CPT | Performed by: INTERNAL MEDICINE

## 2021-11-24 PROCEDURE — 90471 IMMUNIZATION ADMIN: CPT

## 2021-11-24 PROCEDURE — 94760 N-INVAS EAR/PLS OXIMETRY 1: CPT

## 2021-11-24 PROCEDURE — 74011250636 HC RX REV CODE- 250/636: Performed by: INTERNAL MEDICINE

## 2021-11-24 PROCEDURE — 74011250637 HC RX REV CODE- 250/637: Performed by: INTERNAL MEDICINE

## 2021-11-24 PROCEDURE — 77010033678 HC OXYGEN DAILY

## 2021-11-24 PROCEDURE — 97530 THERAPEUTIC ACTIVITIES: CPT

## 2021-11-24 RX ORDER — ACETAMINOPHEN 500 MG
500 TABLET ORAL
Status: SHIPPED | COMMUNITY
Start: 2021-11-24

## 2021-11-24 RX ORDER — FUROSEMIDE 20 MG/1
20 TABLET ORAL DAILY
Qty: 30 TABLET | Refills: 0 | Status: SHIPPED
Start: 2021-11-24 | End: 2022-03-16

## 2021-11-24 RX ORDER — FAMOTIDINE 20 MG/1
20 TABLET, FILM COATED ORAL EVERY EVENING
Status: ON HOLD | COMMUNITY
Start: 2021-11-24 | End: 2022-04-07

## 2021-11-24 RX ORDER — SERTRALINE HYDROCHLORIDE 25 MG/1
25 TABLET, FILM COATED ORAL DAILY
Qty: 30 TABLET | Refills: 0 | Status: SHIPPED
Start: 2021-11-25

## 2021-11-24 RX ORDER — FACIAL-BODY WIPES
10 EACH TOPICAL
Status: SHIPPED | COMMUNITY
Start: 2021-11-24 | End: 2022-04-27

## 2021-11-24 RX ORDER — TRAMADOL HYDROCHLORIDE 50 MG/1
50 TABLET ORAL
Qty: 14 TABLET | Refills: 0 | Status: SHIPPED | OUTPATIENT
Start: 2021-11-25 | End: 2021-12-09

## 2021-11-24 RX ADMIN — Medication 1000 UNITS: at 09:17

## 2021-11-24 RX ADMIN — FUROSEMIDE 20 MG: 20 TABLET ORAL at 09:17

## 2021-11-24 RX ADMIN — SERTRALINE HYDROCHLORIDE 25 MG: 25 TABLET ORAL at 09:17

## 2021-11-24 RX ADMIN — POTASSIUM CHLORIDE 10 MEQ: 750 TABLET, FILM COATED, EXTENDED RELEASE ORAL at 09:17

## 2021-11-24 RX ADMIN — OXYCODONE HYDROCHLORIDE AND ACETAMINOPHEN 500 MG: 500 TABLET ORAL at 09:17

## 2021-11-24 RX ADMIN — Medication 10 ML: at 06:00

## 2021-11-24 RX ADMIN — ISOSORBIDE MONONITRATE 30 MG: 30 TABLET, EXTENDED RELEASE ORAL at 09:17

## 2021-11-24 RX ADMIN — METOPROLOL TARTRATE 100 MG: 50 TABLET, FILM COATED ORAL at 09:17

## 2021-11-24 RX ADMIN — APIXABAN 2.5 MG: 2.5 TABLET, FILM COATED ORAL at 09:17

## 2021-11-24 RX ADMIN — CIPROFLOXACIN 250 MG: 250 TABLET ORAL at 09:17

## 2021-11-24 RX ADMIN — ACETAMINOPHEN 500 MG: 500 TABLET ORAL at 12:29

## 2021-11-24 RX ADMIN — ACETAMINOPHEN 500 MG: 500 TABLET ORAL at 09:17

## 2021-11-24 RX ADMIN — INFLUENZA VIRUS VACCINE 0.5 ML: 15; 15; 15; 15 SUSPENSION INTRAMUSCULAR at 10:39

## 2021-11-24 RX ADMIN — TRAMADOL HYDROCHLORIDE 50 MG: 50 TABLET, FILM COATED ORAL at 08:09

## 2021-11-24 NOTE — PROGRESS NOTES
Care Management Interventions  PCP Verified by CM: Yes  Last Visit to PCP: 06/03/21  Palliative Care Criteria Met (RRAT>21 & CHF Dx)?: No (No MD order)  Mode of Transport at Discharge: Other (see comment) (Family POV )  Transition of Care Consult (CM Consult): Discharge Planning  Discharge Durable Medical Equipment: No  Physical Therapy Consult: Yes  Occupational Therapy Consult: Yes  Speech Therapy Consult: No  Support Systems: Child(wen)  Confirm Follow Up Transport: Family  The Plan for Transition of Care is Related to the Following Treatment Goals : PT/OT after hip fx  Discharge Location  Discharge Placement: Skilled nursing facility [facility])    Patient is being discharged to [facility] for skilled care. She will receive PT/OT and skilled nursing. Goal is to gain strength, mobility and endurance to hopefully return to baseline. Plan is to return home. Second option if to go to assisted living at [facility]. Medicare pt has received, reviewed, and signed 2nd IM letter informing them of their right to appeal the discharge. Signed copy has been placed on pt bedside chart. Patient/family agree on discharge plans. Patient told to contact care management for any further questions or conerns. RUR: 12% LOW     Transition of Care (PRASANNA) Plan:  SNF at [facility] Transportation:       How is patient being transported at discharge? Squad      When? Today      Is transport scheduled? Yes, ETA 1500     Follow-up appointment and transportation: Will f/u with physician at facility     Communication plan (with patient/family): Patient aware and agree with discharge plan for today        Who is being called? Patient or Next of Kin? Responsible party? Patient       What number(s) is to be used? [phone]      What service provider is calling for [employer] [employer]? [address]       When are they calling?  Unknown       Click here to complete [place] including selection of the Healthcare Decision Maker Relationship (ie \"Primary\")  [email or web address]    Transition of Care Plan to SNF/Rehab    SNF/Rehab Transition:  Patient has been accepted to  [facility] and meets criteria for admission. Patient will transported by squad and expected to leave at 1500. Communication to Patient/Family:  Met with patient and friend Brad Ennis  (identified care giver) and they are agreeable to the transition plan. Communication to SNF/Rehab:  Bedside RN, Patricio Cole , has been notified to update the transition plan to the facility and call report (phone [phone] )  Discharge information has been updated on the AVS.     Discharge instructions to be fax'd to facility at [facility] # [phone]). Nursing Please include all hard scripts for controlled substances, med rec and dc summary, and AVS in packet. Reviewed and confirmed with facility, [facility], can manage the patient care needs for the following:     [place] with (X) only those applicable:    Medication:  [x]  Medications will be available at the facility  []  IV Antibiotics   []  Controlled Substance - hard copy to be sent with patient   []  Weekly Labs   Documents:  [x] Hard RX  [x] MAR  [] Kardex  [] AVS  [x]Transfer Summary  [x]Discharge   Equipment:  []  CPAP/BiPAP  []  Wound Vacuum  []  Amaral or Urinary Device  []  PICC/Central Line  []  Nebulizer  []  Ventilator   Treatment:  []Isolation (for MRSA, VRE, etc.)  []Surgical Drain Management  []Tracheostomy Care  []Dressing Changes  []Dialysis with transportation and chair time   []PEG Care  []Oxygen  []Daily Weights for Heart Failure   Dietary:  []Any diet limitations  []Tube Feedings   []Total Parenteral Management (TPN)   Eligible for Medicaid Long Term Services and Supports  Yes:  [] Eligible for medical assistance or will become eligible within 180 days and UAI completed. [] Provider/Patient and/or support system has requested screening.   [] UAI copy provided to patient or responsible party,   [] UAI unavailable at discharge will send once processed to SNF provider. [] UAI unavailable at discharged mailed to patient  No:   [x] Private pay and is not financially eligible for Medicaid within the next 180 days. [] Reside out-of-state.   [] A residents of a state owned/operated facility that is licensed  by [address] and [facility] or [facility]  [] Enrollment in [facility] hospice services  [] [address]  [] Patient /Family declines to have screening completed or provide financial information for screening     Financial Resources:  Medicaid    [] Initiated and application pending   [] Full coverage     Advanced Care Plan:  []Surrogate Decision Maker of Care  []POA  []Communicated Code Status DNR    Other

## 2021-11-24 NOTE — PROGRESS NOTES
Discharge arrangements made via [address] [place] [address] ([facility]) for transportation to [address] per [place]. ETA 15:00. Med/surg unit aware.

## 2021-11-24 NOTE — PROGRESS NOTES
Problem: Mobility Impaired (Adult and Pediatric)  Goal: *Acute Goals and Plan of Care (Insert Text)  Description: FUNCTIONAL STATUS PRIOR TO ADMISSION: Patient was modified independent using a single point cane for functional mobility. HOME SUPPORT PRIOR TO ADMISSION: The patient lived alone with family to provide assistance. Physical Therapy Goals  Initiated 11/19/2021  1. Patient will move from supine to sit and sit to supine  in bed with minimal assistance/contact guard assist within 7 day(s). 2.  Patient will transfer from bed to chair and chair to bed with minimal assistance/contact guard assist using the least restrictive device within 7 day(s). 3.  Patient will perform sit to stand with minimal assistance/contact guard assist within 7 day(s). 4.  Patient will ambulate with minimal assistance/contact guard assist for 150 feet with the least restrictive device within 7 day(s). 5.  Patient will ascend/descend 4 stairs with bilateral handrail(s) with minimal assistance/contact guard assist within 7 day(s). Outcome: Progressing Towards Goal     PHYSICAL THERAPY TREATMENT  Patient: Virginia Inman [de-identified]80 y.o. female)  Date: 11/24/2021  Diagnosis: Closed fracture of multiple pubic rami, right, initial encounter ([facility] [S32.591A]   Closed fracture of multiple pubic rami, right, initial encounter ([facility]       Precautions:    Chart, physical therapy assessment, plan of care and goals were reviewed. ASSESSMENT  Patient continues with skilled PT services and is progressing towards goals. Pt received in bed to transfer to chair. Pt [place] which hinders pt ability to follow cueing. Pt able to initiate movement of BLE toward EOB, however was nonproductive with movement, requiring mod A x 2 transfer to EOB. Pt able to utilize UE to help maintain upright trunk during transfer and should eventually be able to use bed rails to increase indpendence of bed mobility.  Pt with improved sit to stand transfer to lift device with min A and cueing for proper hand placement. Pt able to maintain standing with UE support only in lift device. Pt transferred to chair with lift device and able to lower self down with min A. Pt set up and left with all needs met. Current Level of Function Impacting Discharge (mobility/balance): mod A bed mobility, use of lift device for transfers bed to chair    Other factors to consider for discharge: [place], pain tolerance for upward movements         PLAN :  Patient continues to benefit from skilled intervention to address the above impairments. Continue treatment per established plan of care. to address goals. Recommendation for discharge: (in order for the patient to meet his/her long term goals)  Therapy up to 5 days/week in SNF setting    This discharge recommendation:  Has been made in collaboration with the attending provider and/or case management    IF patient discharges home will need the following DME: to be determined (TBD)       SUBJECTIVE:   Patient stated I think that I should have gotten more medication at the beginning.     OBJECTIVE DATA SUMMARY:   Critical Behavior:  Neurologic State: Alert, Appropriate for age  Orientation Level: Appropriate for age, Oriented to person, Disoriented to place, Disoriented to situation, Disoriented to time  Cognition: Follows commands  Safety/Judgement: Awareness of environment  Functional Mobility Training:  Bed Mobility:     Supine to Sit: Moderate assistance; Bed Modified     Scooting: Minimum assistance        Transfers:  Sit to Stand: Minimum assistance  Stand to Sit: Minimum assistance        Bed to Chair: Adaptive equipment                    Balance:  Sitting: Intact  Standing: Impaired;  With support  Standing - Static: Constant support; Poor  Standing - Dynamic : Constant support; Poor  Ambulation/Gait Training:                                                    Pain Rating:  Pt had no complaints of pain this morning    Activity Tolerance:   Fair and Poor    After treatment patient left in no apparent distress:   Sitting in chair, Heels elevated for pressure relief, Call bell within reach, Bed / chair alarm activated, and Caregiver / family present    COMMUNICATION/COLLABORATION:   The patients plan of care was discussed with: Physical therapist, Occupational therapist, Registered nurse, Case management, and Rehabilitation technician.      Conor Moran PTA   Time Calculation: 13 mins

## 2021-11-24 NOTE — ROUTINE PROCESS
Report called to the [facility] - spoke with Bakari Hayes.

## 2021-11-24 NOTE — ROUTINE PROCESS
Report given to HUSSEIN. Transported to the \[facility]\"" [facility] via stretcher with [facility].

## 2021-11-24 NOTE — PROGRESS NOTES
Problem: Pressure Injury - Risk of  Goal: *Prevention of pressure injury  Description: Document Mike Scale and appropriate interventions in the flowsheet. Outcome: Progressing Towards Goal  Note: Pressure Injury Interventions:  Sensory Interventions: Assess changes in LOC    Moisture Interventions: Limit adult briefs, Minimize layers, Moisture barrier, Internal/External urinary devices    Activity Interventions: PT/OT evaluation    Mobility Interventions: PT/OT evaluation, HOB 30 degrees or less    Nutrition Interventions: Document food/fluid/supplement intake    Friction and Shear Interventions: Apply protective barrier, creams and emollients, Lift sheet, Minimize layers                Problem: Patient Education: Go to Patient Education Activity  Goal: Patient/Family Education  Outcome: Progressing Towards Goal     Problem: Pain  Goal: *Control of Pain  Outcome: Progressing Towards Goal     Problem: General Medical Care Plan  Goal: *Optimal pain control at patient's stated goal  Outcome: Progressing Towards Goal  Goal: *Skin integrity maintained  Outcome: Progressing Towards Goal  Goal: *Progressive mobility and function (eg: ADL's)  Outcome: Progressing Towards Goal     Problem: Falls - Risk of  Goal: *Absence of Falls  Description: Document Sheila Fall Risk and appropriate interventions in the flowsheet.   Outcome: Progressing Towards Goal  Note: Fall Risk Interventions:  Mobility Interventions: Bed/chair exit alarm, OT consult for ADLs, Patient to call before getting OOB, PT Consult for mobility concerns, PT Consult for assist device competence, Strengthening exercises (ROM-active/passive), Utilize walker, cane, or other assistive device    Mentation Interventions: Bed/chair exit alarm, More frequent rounding, Reorient patient, Toileting rounds, Update white board    Medication Interventions: Bed/chair exit alarm, Patient to call before getting OOB, Teach patient to arise slowly    Elimination Interventions: Bed/chair exit alarm, Call light in reach, Toileting schedule/hourly rounds    History of Falls Interventions: Bed/chair exit alarm, Door open when patient unattended, Room close to nurse's station         Problem: Patient Education: Go to Patient Education Activity  Goal: Patient/Family Education  Outcome: Progressing Towards Goal     Problem: Hypertension  Goal: *Blood pressure within specified parameters  Outcome: Progressing Towards Goal  Goal: *Fluid volume balance  Outcome: Progressing Towards Goal  Goal: *Labs within defined limits  Outcome: Progressing Towards Goal     Problem: Patient Education: Go to Patient Education Activity  Goal: Patient/Family Education  Outcome: Progressing Towards Goal

## 2021-11-24 NOTE — PROGRESS NOTES
Comprehensive Nutrition Assessment    Type and Reason for Visit: Initial    Nutrition Recommendations/Plan: continue regular diet low fat/low chol/high fiber/ARMAAN, encourage po intake, may bring in boost as she prefers that     Nutrition Assessment:    Present on Admission:   Closed fracture of multiple pubic rami, right, initial encounter ([facility]   Fall from ground level   Persistent atrial fibrillation ([facility]   DJD (degenerative joint disease)   HTN (hypertension)   Hyperlipemia   Chronic systolic heart failure ([facility]   Cystitis    Pt admitted with above. Pt likes boost and tried ensure while she was here but she does not like it. Pt's friend stated she can bring some boost in. Her weight is stable. Her po intake is poor to fair. She is on diuretic. Labs: BUN-29 high, H/H low 9.8/29.4 and glucose 110-122. Encourage po intake and encourage supplement use if pt will take it. Malnutrition Assessment:  Malnutrition Status:      Moderate malnutrition     Estimated Daily Nutrient Needs:  Energy (kcal): 1400; Weight Used for Energy Requirements: Current  Protein (g): 65; Weight Used for Protein Requirements: Current  Fluid (ml/day): 1400; Method Used for Fluid Requirements: 1 ml/kcal      Nutrition Related Findings:       Patient Vitals for the past 168 hrs:   % Diet Eaten   11/22/21 0830 26 - 50%   11/21/21 1700 0%   11/21/21 1200 1 - 25%   11/21/21 0900 1 - 25%   11/20/21 1800 1 - 25%   11/20/21 1200 1 - 25%   11/20/21 0913 51 - 75%   11/19/21 1830 26 - 50%       Wounds:    None       Current Nutrition Therapies:  ADULT DIET Regular; Low Fat/Low Chol/High Fiber/ARMAAN    Anthropometric Measures:  · Height:  5' 5\" (165.1 cm)  · Current Body Wt:  64.8 kg (142 lb 13.7 oz)   · Admission Body Wt:       · Usual Body Wt:        · Ideal Body Wt:  125 lbs:  114.3 %   · Adjusted Body Weight:   ; Weight Adjustment for:     · Adjusted BMI:       · BMI Category:  Normal weight (BMI 22.0-24.9) age over 72       Weight Loss Metrics 11/21/2021 9/16/2021 6/3/2021 3/4/2021 3/3/2021 1/25/2021 1/4/2021   Today's Wt 142 lb 12.8 oz 134 lb 137 lb 135 lb 136 lb 139 lb 8 oz 133 lb   BMI 23.76 kg/m2 22.3 kg/m2 22.8 kg/m2 22.47 kg/m2 22.63 kg/m2 23.21 kg/m2 22.13 kg/m2       Nutrition Diagnosis:   · Inadequate oral intake related to early satiety as evidenced by intake 26-50%      Nutrition Interventions:   Food and/or Nutrient Delivery: Continue current diet  Nutrition Education and Counseling: No recommendations at this time  Coordination of Nutrition Care: Continue to monitor while inpatient, Interdisciplinary rounds    Goals:  po intake at least 50% of most meals x 5-7 days       Nutrition Monitoring and Evaluation:   Behavioral-Environmental Outcomes: None identified  Food/Nutrient Intake Outcomes: Food and nutrient intake, Supplement intake  Physical Signs/Symptoms Outcomes: Weight, Meal time behavior    Discharge Planning:    Continue current diet     Electronically signed by Karen Loya RD

## 2021-11-24 NOTE — PROGRESS NOTES
[facility]  Hospitalist Progress Note    NAME: Getachew Inman   :  3/7/1930   MRN:  [id number]     Total duration of encounter: 4 days      Interim Hospital Summary: 80 y.o. female who presented on 2021 with Closed fracture of multiple pubic rami, right, initial encounter ([facility] She has a past medical history of Colitis (), DJD (degenerative joint disease), Fracture, femoral ([facility] (2005), HTN (hypertension), Hyperlipemia, Persistent atrial fibrillation ([facility] (2018), and Ventricular ectopy. .    Pt admitted following unwitnessed fall in home , dx with R Inf/Sup pubic rami fractures. Tx with analgesics and PT/OT. Likely will need placement in LTC, at least temporarily. Plan for Ultram daily with breakfast for pain mngt during PT/OT. Subjective:     Chief Complaint / Reason for Physician Visit  \"OK\".   Discussed with RN   Sedation has been a/w Norco - last dose   AM dose Ultram to assist pain with PT/OT   Son feels she was near baseline on   Pt has R hip area pain c/o with activity  No pain when sedentary  Getting up to bedside chair with PT    Review of Systems:  Symptom Y/N Comments  Symptom Y/N Comments   Fever/Chills n   Chest Pain n    Poor Appetite n   Edema n    Cough n   Abdominal Pain n    Sputum n   Joint Pain n    SOB/GREENBERG n   Pruritis/Rash n    Nausea/vomit n   Tolerating PT/OT y    Diarrhea n   Tolerating Diet y    Constipation n   Other       No  c/o,   Urine culture c/w cystitis however    Current Facility-Administered Medications:     ciprofloxacin HCl (CIPRO) tablet 250 mg, 250 mg, Oral, Q12H, Angela Phillips MD, 250 mg at 21 214    traMADoL (ULTRAM) tablet 50 mg, 50 mg, Oral, DAILY WITH BREAKFAST, Angela Phillips MD, 50 mg at 21 08    furosemide (LASIX) tablet 20 mg, 20 mg, Oral, DAILY, Angela Phillips MD, 20 mg at 21 0913    influenza vaccine  (6 mos+)(PF) (FLUARIX/FLULAVAL/FLUZONE QUAD) injection 0.5 mL, 1 Each, IntraMUSCular, PRIOR TO DISCHARGE, Mega Antunez MD    acetaminophen (TYLENOL) tablet 500 mg, 500 mg, Oral, QID, Mega Antunez MD, 500 mg at 11/23/21 2145    apixaban (ELIQUIS) tablet 2.5 mg, 2.5 mg, Oral, Q12H, Mega Antunez MD, 2.5 mg at 11/23/21 2145    ascorbic acid (vitamin C) (VITAMIN C) tablet 500 mg, 500 mg, Oral, DAILY, Mega Antunez MD, 500 mg at 11/23/21 0913    cholecalciferol (VITAMIN D3) (1000 Units /25 mcg) tablet 1,000 Units, 1,000 Units, Oral, DAILY, Mega Antunez MD, 1,000 Units at 11/23/21 0913    isosorbide mononitrate ER (IMDUR) tablet 30 mg, 30 mg, Oral, DAILY, Mega Antunez MD, 30 mg at 11/23/21 0913    atorvastatin (LIPITOR) tablet 10 mg, 10 mg, Oral, QHS, Mega Antunez MD, 10 mg at 11/23/21 2144    melatonin tablet 10 mg, 10 mg, Oral, QHS, Mega Antunez MD, 10 mg at 11/23/21 2145    metoprolol tartrate (LOPRESSOR) tablet 100 mg, 100 mg, Oral, Q12H, Mega Antunez MD, 100 mg at 11/23/21 2145    potassium chloride SR (KLOR-CON 10) tablet 10 mEq, 10 mEq, Oral, DAILY, Mega Antunez MD, 10 mEq at 11/23/21 0913    sertraline (ZOLOFT) tablet 25 mg, 25 mg, Oral, DAILY, Mega Antunez MD, 25 mg at 11/23/21 0913    sodium chloride (NS) flush 5-40 mL, 5-40 mL, IntraVENous, Q8H, Mega Antunez MD, 10 mL at 11/23/21 2150    sodium chloride (NS) flush 5-40 mL, 5-40 mL, IntraVENous, PRN, Mega Antunez MD, 10 mL at 11/21/21 2109    bisacodyL (DULCOLAX) suppository 10 mg, 10 mg, Rectal, DAILY PRN, Mega Antunez MD    famotidine (PEPCID) tablet 20 mg, 20 mg, Oral, QPM, Mega Antunez MD, 20 mg at 11/23/21 1729    HYDROcodone-acetaminophen (NORCO) 5-325 mg per tablet 1 Tablet, 1 Tablet, Oral, Q4H PRN, Mega Antunez MD, 1 Tablet at 11/21/21 2108    ondansetron (ZOFRAN ODT) tablet 4 mg, 4 mg, Oral, Q8H PRN, Mega Antunez MD, 4 mg at 11/19/21 1813    Objective:     VITALS:   Patient Vitals for the past 12 hrs:   Temp Pulse Resp BP SpO2   11/23/21 2145  90  120/63    11/23/21 2009 Suha Juarez   96 %   11/23/21 2005     (!) 84 %   11/23/21 1640 98.3 °F (36.8 °C) 99 20 (!) 101/57 92 %       Intake/Output Summary (Last 24 hours) at 11/23/2021 2304  Last data filed at 11/23/2021 0720  Gross per 24 hour   Intake    Output 325 ml   Net -325 ml        PHYSICAL EXAM:  General: WD, WN. Sedate but easilty aroused, cooperative, no acute distress    EENT:  EOMI. Anicteric sclerae. MMM  Resp:  CTA bilaterally, no wheezing or rales. No accessory muscle use  CV:  Irreg Irreg rhythm,  Tr edema  GI:  Soft, Non distended, Non tender. +Bowel sounds  Neurologic:  Alert and oriented X 3, normal speech, nonfocal  Psych:   Depressed affect. Not anxious nor agitated  Skin:  No rashes. No jaundice    LABS:  I reviewed today's most current labs and imaging studies. Pertinent labs include:  Recent Labs     11/22/21  0535   WBC 7.7   HGB 9.8*   HCT 29.4*   *     Recent Labs     11/22/21  0535      K 4.1      CO2 32   *   BUN 29*   CREA 0.96   CA 8.7     CULTURE  Urine 11/19 >100,000 GNR E. Coli (pansusceptible)    RADIOLOGY:  CT HEAD 11/19: There is a small right parietal scalp contusion. The ventricles and  sulci are appropriate in size and configuration for age. No loss of gray-white  differentiation to suggest late acute or early subacute infarction. No mass  effect or intracranial hemorrhage.   IMPRESSION:  No acute intracranial abnormality. Small right parietal scalp contusion. CT C-SPINE 11/19:  No fracture or dislocation. Cervical alignment is normal. The paravertebral soft tissues are normal.    Degenerative disease causes the following stenoses:  C2-C3:  Mild canal stenosis. C3-C4:  Left and severe right neural foraminal stenosis. Moderate canal stenosis. C4-C5:  Severe bilateral neural foraminal stenosis. Mild canal stenosis. C5-C6:  Left and severe right neural foraminal stenosis. C6-C7:  Severe right neural foraminal stenosis. C7-T1:  No stenosis.    IMPRESSION:  No fracture. Degenerative stenoses as described above. R HIP XRAY 11/19:  Patient is post ORIF of right hip. Left hip is been replaced. No dislocation   There are acute fractures of the right superior and inferior pubic rami. Overall  bone mineral density is decreased. Question right sacral alar fracture   IMPRESSION  1. Acute right superior and inferior pubic rami fractures. Possible right sacral alar fracture  2. Severe osteopenia    EKG 11/20: Afib 86 bpm, LAD, LBBB, NSC      Procedures: see electronic medical records for all procedures/Xrays and details which were not copied into this note but were reviewed prior to creation of Plan. Assessment / Plan:  Principal Problem:    Closed fracture of multiple pubic rami, right, initial encounter ([facility] (11/19/2021)  Active Problems:    Fall from ground level (11/19/2021)  Non surgical mngt  Pain Mngt - will continue one Ultram in AM , plan to advance if needed.   Cont Tyl qid  PT/OT to tolerance  Placement SNF Rehab vs ICF, with goal to return home with caregivers (CM plan in progress)  Tylenol qid / Ultram qd 0800 and prn Norco        Persistent atrial fibrillation (HCC) (7/22/2018)    Chronic systolic heart failure ([facility] (11/19/2021)  Cont current home tx Eliquis, Lasix, Imdur, Metoprolol, KCl  Over diuresis with Lasix 40mg bid, noted jump in BUN / Cr from prior  Reduce Lasix 20mg daily, Encourage fluids  F/u BMP back to baseline, follow on low dose Lasix daily       HTN (hypertension) ()  Cont home tx Lasix, Metoprolol  Lasix tapered from 40/40 to 20 daily  F/u MOISE improved       Hyperlipemia ()  Cont home tx Lipitor       DJD (degenerative joint disease) ()  Cont PT/OT   Symptom pre-tx Ultram with breakfast        SAFETY:   Code Status:DNR  DVT prophylaxis:Eliquis  Stress Ulcer prophylaxis: Pepcid  Bladder catheter:no  Family Contact Info:  Primary Emergency Contact: milligan,claudia, Ivan Phone: [phone]  Bedded: [facility] Room 206/01  Disposition: TBD, planning inpt rehab on d/c  Admission status:  Inpatient      Reviewed most current lab test results and cultures  YES  Reviewed most current radiology test results   YES  Review and summation of old records today    NO  Reviewed patient's current orders and MAR    YES  PMH/SH reviewed - no change compared to H&P    Care Plan discussed with:                                   Comments  Patient x     Family  x Caregive in room   RN x     Care Manager x      Consultant                           Multidiciplinary team rounds were held today with , nursing, pharmacist and clinical coordinator. Patient's plan of care was discussed; medications were reviewed and discharge planning was addressed.         ____________________________________________    Total NON Critical Care TIME:  35   Minutes        Comments   >50% of visit spent in counseling and coordination of care   x      Signed: Ella Espinosa MD  [facility] Hospitalist  [phone]

## 2021-11-24 NOTE — ROUTINE PROCESS
Discharge instructions reviewed with report was called to the [address] [place] [address] Drive belongings returned: n/a  Home meds returned: n.a  To front entrance via stretcher  Discharged to [facility] with  [address] [facility] @ [phone]

## 2021-11-24 NOTE — PROGRESS NOTES
Jie Hillman from [facility]. SHE HAS ACCEPTED PATIENT. PATIENT NEEDS TO BE AT LN BY 5PM. CANNOT ACCEPT TOMORROW DUE TO HOLIDAY. WILL NEED DC SUMMARY BY 2PM. Nrsg supervisor aware. Will attempt to make squad arrangements in time.

## 2021-11-24 NOTE — PROGRESS NOTES
Problem: Pressure Injury - Risk of  Goal: *Prevention of pressure injury  Description: Document Mike Scale and appropriate interventions in the flowsheet. Outcome: Progressing Towards Goal  Note: Pressure Injury Interventions:  Sensory Interventions: Assess changes in LOC, Float heels, Keep linens dry and wrinkle-free, Minimize linen layers    Moisture Interventions: Absorbent underpads, Minimize layers, Internal/External urinary devices    Activity Interventions: Increase time out of bed    Mobility Interventions: PT/OT evaluation    Nutrition Interventions: Document food/fluid/supplement intake    Friction and Shear Interventions: Apply protective barrier, creams and emollients, Lift sheet, Minimize layers                Problem: Patient Education: Go to Patient Education Activity  Goal: Patient/Family Education  Outcome: Progressing Towards Goal     Problem: Pain  Goal: *Control of Pain  Outcome: Progressing Towards Goal     Problem: General Medical Care Plan  Goal: *Vital signs within specified parameters  Outcome: Resolved/Met  Goal: *Labs within defined limits  Outcome: Resolved/Met  Goal: *Absence of infection signs and symptoms  Outcome: Resolved/Met  Goal: *Optimal pain control at patient's stated goal  Outcome: Progressing Towards Goal  Goal: *Skin integrity maintained  Outcome: Progressing Towards Goal  Goal: *Fluid volume balance  Outcome: Resolved/Met  Goal: *Optimize nutritional status  Outcome: Resolved/Met  Goal: *Anxiety reduced or absent  Outcome: Resolved/Met  Goal: *Progressive mobility and function (eg: ADL's)  Outcome: Progressing Towards Goal     Problem: Patient Education: Go to Patient Education Activity  Goal: Patient/Family Education  Outcome: Resolved/Met     Problem: Falls - Risk of  Goal: *Absence of Falls  Description: Document Sheila Fall Risk and appropriate interventions in the flowsheet.   Outcome: Progressing Towards Goal  Note: Fall Risk Interventions:  Mobility Interventions: Bed/chair exit alarm, Patient to call before getting OOB, Utilize walker, cane, or other assistive device    Mentation Interventions: Adequate sleep, hydration, pain control, Bed/chair exit alarm, Door open when patient unattended, Room close to nurse's station, Update white board    Medication Interventions: Bed/chair exit alarm, Patient to call before getting OOB, Teach patient to arise slowly    Elimination Interventions: Bed/chair exit alarm, Call light in reach, Patient to call for help with toileting needs, Stay With Me (per policy)    History of Falls Interventions: Bed/chair exit alarm, Door open when patient unattended, Room close to nurse's station         Problem: Patient Education: Go to Patient Education Activity  Goal: Patient/Family Education  Outcome: Progressing Towards Goal     Problem: Hypertension  Goal: *Blood pressure within specified parameters  Outcome: Progressing Towards Goal  Goal: *Fluid volume balance  Outcome: Progressing Towards Goal  Goal: *Labs within defined limits  Outcome: Progressing Towards Goal     Problem: Patient Education: Go to Patient Education Activity  Goal: Patient/Family Education  Outcome: Progressing Towards Goal

## 2021-11-24 NOTE — DISCHARGE SUMMARY
[facility]  Hospitalist Discharge Summary    Patient ID:  Felipa Ba  [id number]  80 y.o.  3/7/1930    PCP on record: Shanna Herrmann NP    Admit date: 11/19/2021  Discharge date and time: 11/24/2021     DISCHARGE DIAGNOSIS:    Principal Problem:    Closed fracture of multiple pubic rami, right, initial encounter ([facility] [address] (11/19/2021)    Active Problems:    Fall from ground level (11/19/2021)    Persistent atrial fibrillation (HCC) (7/22/2018)    Chronic systolic heart failure ([address] (11/19/2021)    HTN (hypertension) ()    Hyperlipemia ()    DJD (degenerative joint disease) ()    Cystitis (11/24/2021)    CONSULTATIONS:  None    Excerpted HPI from H&P of Jose Antonio Pena MD:  80 y.o. female presenting for admission to [facility] for further evaluation and treatment for Closed fracture of multiple pubic rami, right, initial encounter ([facility] [address] She  has a past medical history of Colitis (2013), DJD (degenerative joint disease), Fracture, femoral ([address] (09/2005), HTN (hypertension), Hyperlipemia, Persistent atrial fibrillation ([address] (2018), and Ventricular ectopy. She lives alone but near her son. She fell this morning after getting up with the intent to get to the bathroom. She cannot state what happened but she found herself on the floor with complaints of significant pain, and was able to contact her caregivers with her med-alert necklace. She was found on the floor with suspicion that she had fallen while ambulating with her cane. Not able to get up. Her pain was markedly aggravated by any movement. Assessment in the ED was noted for a pubic bone fracture. She is admitted inpatient for pain management, therapy and placement to SNF Rehab when able    ______________________________________________________________________  DISCHARGE SUMMARY/HOSPITAL COURSE:  for full details see H&P, daily progress notes, labs, consult notes.      Pt admitted following unwitnessed fall in home 11/19, dx with R Inf/Sup pubic rami fractures. Tx with analgesics and PT/OT. Likely will need placement in LTC, at least temporarily. Plan for Ultram daily with breakfast for pain mngt during PT/OT. Principal Problem:    Closed fracture of multiple pubic rami, right, initial encounter ([facility] [address] (11/19/2021)  Active Problems:    Fall from ground level (11/19/2021)  Non surgical mngt  Pain Mngt - will continue one Ultram in AM , plan to advance if needed. Cont Tyl qid scheduled  PT/OT to tolerance  Placement SNF Rehab, with goal to return home with caregivers (CM plan in progress)  Tylenol qid / Ultram qd 0800 and prn Norco        Persistent atrial fibrillation (HCC) (7/22/2018)    Chronic systolic heart failure ([facility] [address] (11/19/2021)  Cont current home tx Eliquis, Lasix, Imdur, Metoprolol, KCl  Over diuresis with Lasix 40mg bid, noted jump in BUN / Cr from prior  Reduce Lasix 20mg daily, Encourage fluids  F/u BMP back to baseline, follow on low dose Lasix daily  Plan adjustment for worsening SOB / pedal edema       HTN (hypertension) ()  Cont home tx Lasix, Metoprolol  Lasix tapered from 40/40 to 20 daily due to MOISE following admission  F/u MOISE improved  Titrate diuretic up if concerns with increased fluid retention or noted       Hyperlipemia ()  Patient received Lipitor during the hospitalization  Patient to resume Mevacor on discharge       DJD (degenerative joint disease) ()  Cont PT/OT   Receiving scheduled Tylenol for daily pains  tx Ultram with breakfast for new pelvic paingiven prior to therapy each morning  This could be advanced to twice daily with breakfast and lunch if necessary      Cystitis  Abnormal urinalysis obtained on admission  Patient had no significant symptoms, some suprapubic tenderness was present  Culture grew E.  Coli  Patient received 3 days of Cipro to 50 mg twice daily  Follow-up urinalysis is advised in 1 week  _______________________________________________________________________  Patient seen and examined by me on discharge day. Pertinent Findings:  Visit Vitals  /71 (BP 1 Location: Left upper arm, BP Patient Position: At rest)   Pulse 90   Temp 97.4 °F (36.3 °C)   Resp 20   Ht 5' 5\" (1.651 m)   Wt 64.8 kg (142 lb 12.8 oz)   SpO2 93%   Breastfeeding No   BMI 23.76 kg/m²     Gen:    Not in distress  Chest: Nonlabored respiration, Clear lungs  CVS:   Regular rhythm.   No edema  Abd:  Soft, not distended, not tender  Neuro:  Alert, nonfocal, weak    LABS  Results for Reyna Tapia (MRN [id number]) as of 11/24/2021 09:33   11/19/2021 08:09 11/20/2021 06:21 11/22/2021 05:35   WBC 17.0 (H) 10.8 7.7   NRBC 0.0 0.0 0.0   RBC 4.39 3.74 (L) 3.09 (L)   HGB 13.7 11.8 9.8 (L)   HCT 41.8 36.1 29.4 (L)   MCV 95.2 96.5 95.1   MCH 31.2 31.6 31.7   MCHC 32.8 32.7 33.3   RDW 14.4 14.6 (H) 14.3   PLATELET 721 522 644 (L)   MPV 10.4 11.0 10.9   NEUTROPHILS 86 (H) 76 (H) 70   LYMPHOCYTE 7 (L) 13 14   MONOCYTES 5 7 10   EOSINOPHILS 0 3 4   BASOPHILS 0 0 1   IMM GRAN 2 (H) 1 (H) 1 (H)     Results for Reyna Tapia (MRN [id number]) as of 11/24/2021 09:33   11/20/2021 01:00   Color YELLOW/STRAW   Appearance CLEAR   Specific gravity 1.025   pH (UA) 5.5   Protein Negative   Glucose Negative   Ketone Negative   Blood TRACE (A)   Bilirubin Negative   Urobilinogen 0.2   Nitrites Negative   Leukocyte Esterase MODERATE (A)   Epithelial cells FEW   Mucus 1+   WBC    RBC 10-20   Bacteria 2+ (A)     Results for Reyna Tapia (MRN [id number]) as of 11/24/2021 09:33   11/19/2021 08:09 11/20/2021 06:21 11/22/2021 05:35   Sodium 140 137 139   Potassium 3.5 4.0 4.1   Chloride 101 98 101   CO2 32 30 32   Anion gap 7 9 6   Glucose 147 (H) 122 (H) 110 (H)   BUN 23 (H) 33 (H) 29 (H)   Creatinine 1.14 (H) 1.47 (H) 0.96   BUN/Cr ratio 20 22 (H) 30 (H)   Calcium 9.1 8.8 8.7   Magnesium  2.5 (H)    GFR est AA 54 (L) 40 (L) >60   Bilirubin, total 0.9     Protein, total 8.0     Albumin 3.4 (L)     Globulin 4.6 (H)     A-G Ratio 0.7 (L)     ALT 30     AST 29     Alk. phos 122 (H)       CULTURE  Urine 11/19 >100,000 GNRm  ID as E. Coli (pansusceptible)     RADIOLOGY:  CT HEAD 11/19: There is a small right parietal scalp contusion. The ventricles and  sulci are appropriate in size and configuration for age. No loss of gray-white  differentiation to suggest late acute or early subacute infarction. No mass  effect or intracranial hemorrhage.   IMPRESSION:  No acute intracranial abnormality. Small right parietal scalp contusion.     CT C-SPINE 11/19:  No fracture or dislocation. Cervical alignment is normal. The paravertebral soft tissues are normal.    Degenerative disease causes the following stenoses:  C2-C3:  Mild canal stenosis. C3-C4:  Left and severe right neural foraminal stenosis. Moderate canal stenosis. C4-C5:  Severe bilateral neural foraminal stenosis. Mild canal stenosis. C5-C6:  Left and severe right neural foraminal stenosis. C6-C7:  Severe right neural foraminal stenosis. C7-T1:  No stenosis.    IMPRESSION:  No fracture. Degenerative stenoses as described above.      R HIP XRAY 11/19:  Patient is post ORIF of right hip. Left hip is been replaced. No dislocation   There are acute fractures of the right superior and inferior pubic rami. Overall  bone mineral density is decreased. Question right sacral alar fracture   IMPRESSION  1. Acute right superior and inferior pubic rami fractures. Possible right sacral alar fracture  2. Severe osteopenia     EKG 11/20: Afib 86 bpm, LAD, LBBB, NSC    _______________________________________________________________________  DISCHARGE MEDICATIONS:   Current Discharge Medication List      START taking these medications    Details   bisacodyL (DULCOLAX) 10 mg supp Insert 10 mg into rectum daily as needed for Constipation.   Start date: 11/24/2021      famotidine (PEPCID) 20 mg tablet Take 1 Tablet by mouth every evening. Start date: 11/24/2021      traMADoL (ULTRAM) 50 mg tablet Take 1 Tablet by mouth daily (with breakfast) for 14 days. Max Daily Amount: 50 mg.  Qty: 14 Tablet, Refills: 0  Start date: 11/25/2021, End date: 12/9/2021    Associated Diagnoses: Closed fracture of multiple pubic rami, right, initial encounter ([facility] [address]         CONTINUE these medications which have CHANGED    Details   acetaminophen (Tylenol Extra Strength) 500 mg tablet Take 1 Tablet by mouth four (4) times daily. Start date: 11/24/2021      furosemide (LASIX) 20 mg tablet Take 1 Tablet by mouth daily. Qty: 30 Tablet, Refills: 0  Start date: 11/24/2021      !! sertraline (ZOLOFT) 25 mg tablet Take 1 Tablet by mouth daily. Qty: 30 Tablet, Refills: 0  Start date: 11/25/2021       !! - Potential duplicate medications found. Please discuss with provider. CONTINUE these medications which have NOT CHANGED    Details   metoprolol tartrate (LOPRESSOR) 100 mg IR tablet TAKE 1 TABLET EVERY 12     HOURS  Qty: 90 Tablet, Refills: 1      isosorbide mononitrate ER (IMDUR) 30 mg tablet Take 1 Tablet by mouth daily. Qty: 90 Tablet, Refills: 1      apixaban (ELIQUIS) 2.5 mg tablet Take 1 Tablet by mouth two (2) times a day. Qty: 180 Tablet, Refills: 1      !! sertraline (ZOLOFT) 50 mg tablet Take 1 Tablet by mouth daily. Qty: 90 Tablet, Refills: 1      potassium chloride SR (KLOR-CON 10) 10 mEq tablet Take 1 Tablet by mouth daily. Indications: prevention of low potassium in the blood  Qty: 90 Tablet, Refills: 1      lovastatin (MEVACOR) 20 mg tablet TAKE 1 TABLET BY MOUTH  NIGHTLY  Qty: 90 Tab, Refills: 3    Comments: PATIENT ID : [id number]  Associated Diagnoses: Pure hypercholesterolemia      melatonin 10 mg tab Take 10 mg by mouth nightly. Qty: 90 Tab, Refills: 1      ascorbic acid (VITAMIN C PO) Take 1,000 mg by mouth daily. cholecalciferol, vitamin D3, (VITAMIN D3 PO) Take 1,000 Units by mouth daily.       C/sourcherry/celery/grape seed (TART CHERRY PO) Take 465 mg by mouth daily. pyridoxine, vitamin B6, (Vitamin B-6) 100 mg tablet Take 100 mg by mouth daily. magnesium 200 mg tab Take 1 Tablet by mouth daily. !! - Potential duplicate medications found. Please discuss with provider.           My Recommended  Diet: Cardiac  Activity: Up only with assistance  Wound Care: none  Follow-up labs: U/A for f/u UTI 1 week,  F/u BMP 1 week    ______________________________________________________________________  DISPOSITION:    Home with Family:    Home with HH/PT/OT/RN:    SNF/LTC: [facility]   KASSI:    OTHER:        Condition at Discharge:  Stable  _____________________________________________________________________  Follow up with:   PCP : Jalene Barthel, NP  Follow-up Information     Follow up With Specialties Details Why Contact Info    Jalene Barthel, NP Nurse Practitioner F/u in 2 weeks following d/c from [facility] [address] [address]  [phone]          Total time in minutes spent coordinating this discharge (includes going over instructions, follow-up, prescriptions, and preparing report for sign off to her PCP) :35 minutes    Signed:  Lauren Adams MD  [facility] Hospitalist  [phone]

## 2021-11-24 NOTE — ROUTINE PROCESS
Bedside and Verbal shift change report given to Geoff (oncoming nurse) by Roopa Krishna RN (offgoing nurse). Report included the following information SBAR. Alba score 4  Bed/chair alarm is in use. If in use it is set at the highest volume. Intravenous fluids were administered, SL  Patient Vitals for the past 12 hrs:   Temp Pulse Resp BP SpO2   11/23/21 1640 98.3 °F (36.8 °C) 99 20 (!) 101/57 92 %   11/23/21 0754 97.3 °F (36.3 °C) 85 20 120/63 100 %     No flowsheet data found. All lab results for the last 24 hours reviewed.

## 2021-11-24 NOTE — DISCHARGE INSTRUCTIONS
DISCHARGE SUMMARY from Nurse    PATIENT INSTRUCTIONS:    After general anesthesia or intravenous sedation, for 24 hours or while taking prescription Narcotics:  · Limit your activities  · Do not drive and operate hazardous machinery  · Do not make important personal or business decisions  · Do  not drink alcoholic beverages  · If you have not urinated within 8 hours after discharge, please contact your surgeon on call. Report the following to your surgeon:  · Excessive pain, swelling, redness or odor of or around the surgical area  · Temperature over 100.5  · Nausea and vomiting lasting longer than 4 hours or if unable to take medications  · Any signs of decreased circulation or nerve impairment to extremity: change in color, persistent  numbness, tingling, coldness or increase pain  · Any questions    What to do at Home:  Recommended activity: Activity as tolerated,     If you experience any of the following symptoms increased pain, please follow up with staff at the [facility]. *  Please give a list of your current medications to your Primary Care Provider. *  Please update this list whenever your medications are discontinued, doses are      changed, or new medications (including over-the-counter products) are added. *  Please carry medication information at all times in case of emergency situations. These are general instructions for a healthy lifestyle:    No smoking/ No tobacco products/ Avoid exposure to second hand smoke  Surgeon General's Warning:  Quitting smoking now greatly reduces serious risk to your health.     Obesity, smoking, and sedentary lifestyle greatly increases your risk for illness    A healthy diet, regular physical exercise & weight monitoring are important for maintaining a healthy lifestyle    You may be retaining fluid if you have a history of heart failure or if you experience any of the following symptoms:  Weight gain of 3 pounds or more overnight or 5 pounds in a week, increased swelling in our hands or feet or shortness of breath while lying flat in bed. Please call your doctor as soon as you notice any of these symptoms; do not wait until your next office visit. The discharge information has been reviewed with the NH staff. The NH staff verbalized understanding. Discharge medications reviewed with the NH staff and appropriate educational materials and side effects teaching were provided.   ___________________________________________________________________________________________________________________________________

## 2021-11-29 LAB
BASOPHILS # BLD: 0 K/UL (ref 0–0.1)
BASOPHILS NFR BLD: 0 % (ref 0–1)
DIFFERENTIAL METHOD BLD: ABNORMAL
EOSINOPHIL # BLD: 0 K/UL (ref 0–0.4)
EOSINOPHIL NFR BLD: 0 % (ref 0–7)
ERYTHROCYTE [DISTWIDTH] IN BLOOD BY AUTOMATED COUNT: 14.4 % (ref 11.5–14.5)
HCT VFR BLD AUTO: 41.8 % (ref 35–47)
HGB BLD-MCNC: 13.7 G/DL (ref 11.5–16)
IMM GRANULOCYTES # BLD AUTO: 0.3 K/UL (ref 0–0.04)
IMM GRANULOCYTES NFR BLD AUTO: 2 % (ref 0–0.5)
LYMPHOCYTES # BLD: 1.2 K/UL (ref 0.8–3.5)
LYMPHOCYTES NFR BLD: 7 % (ref 12–49)
MCH RBC QN AUTO: 31.2 PG (ref 26–34)
MCHC RBC AUTO-ENTMCNC: 32.8 G/DL (ref 30–36.5)
MCV RBC AUTO: 95.2 FL (ref 80–99)
MONOCYTES # BLD: 0.9 K/UL (ref 0–1)
MONOCYTES NFR BLD: 5 % (ref 5–13)
NEUTS SEG # BLD: 14.6 K/UL (ref 1.8–8)
NEUTS SEG NFR BLD: 86 % (ref 32–75)
NRBC # BLD: 0 K/UL (ref 0–0.01)
NRBC BLD-RTO: 0 PER 100 WBC
PLATELET # BLD AUTO: 217 K/UL (ref 150–400)
PMV BLD AUTO: 10.4 FL (ref 8.9–12.9)
RBC # BLD AUTO: 4.39 M/UL (ref 3.8–5.2)
RBC MORPH BLD: ABNORMAL
WBC # BLD AUTO: 17 K/UL (ref 3.6–11)

## 2022-02-08 RX ORDER — ISOSORBIDE MONONITRATE 30 MG/1
30 TABLET, EXTENDED RELEASE ORAL DAILY
Qty: 90 TABLET | Refills: 1 | Status: SHIPPED | OUTPATIENT
Start: 2022-02-08 | End: 2022-07-13 | Stop reason: ALTCHOICE

## 2022-02-08 RX ORDER — POTASSIUM CHLORIDE 750 MG/1
10 TABLET, FILM COATED, EXTENDED RELEASE ORAL DAILY
Qty: 90 TABLET | Refills: 1 | Status: SHIPPED | OUTPATIENT
Start: 2022-02-08

## 2022-03-09 NOTE — PROGRESS NOTES
Stephan Inman is a 80 y.o. female is here for routine f/u. Pmhx  chronic systolic/diastolic CHF. Persistent/chronic afib on OAC, rate controlled. Hx hypertension, LBBB, ASCVD, colitis    Last seen by Dr Jose Ramos in 9/2021Reinhold Cover at [facility] 10/6-10/14/20--acute on chronic systolic/diastolic CHF. Persistent/chronic afib on OAC, rate controlled. Hx hypertension, LBBB, ASCVD, colitis. Lost  in January. Mild stable [place], no other CV sx or complaints     She was admitted at [facility] in 11/19-11/24/2021 s/p GLF, Closed fracture of multiple pubic rami, right--non surgical management. She was dc to SNF with PT/OT. Recently treated for PNA    Today, c/o leg swelling, some residual sob, not any worse from previous. Wt up 8 lbs. No cp    The patient denies chest pain, orthopnea, PND,  palpitations, syncope, presyncope or fatigue.        Patient Active Problem List    Diagnosis Date Noted    Acute pancreatitis 07/23/2018    Cystitis 11/24/2021    Closed fracture of multiple pubic rami, right, initial encounter ([address] 11/19/2021    Fall from ground level 11/19/2021    Chronic systolic heart failure ([address] 11/19/2021    Carotid artery disease, unspecified laterality ([address] 14/50/9496    Systolic CHF, acute ([address] 10/11/2020    Acute left-sided CHF (congestive heart failure) ([address] 10/06/2020    New onset left bundle branch block (LBBB) 10/06/2020    Coumadin toxicity 10/06/2020    Persistent atrial fibrillation ([address] 07/22/2018    Abnormal EKG 10/13/2016    PAC (premature atrial contraction) 10/13/2016    Primary osteoarthritis 10/03/2016    HTN (hypertension)     DJD (degenerative joint disease)     Colitis     Ventricular ectopy     Hyperlipemia       Bola Haque NP  Past Medical History:   Diagnosis Date    Colitis 2013    C diff; no recent flare, normal colonoscopy 2014    DJD (degenerative joint disease)     Fracture, femoral ([address] 09/2005    HTN (hypertension)     Hyperlipemia     Persistent atrial fibrillation ([address] 2018    Ventricular ectopy     no symptoms      Past Surgical History:   Procedure Laterality Date    HX CATARACT REMOVAL      bilat    HX COLONOSCOPY  2014    WNL    HX ORTHOPAEDIC  2005    right femur fx    HX ORTHOPAEDIC  2012    Lt TKA    HX REFRACTIVE SURGERY  06/15/2017    rt     Allergies   Allergen Reactions    Ambien [Zolpidem] Other (comments)     Legs got heavy    Aminobenzoic Acid Unknown (comments)    Bactrim [Sulfamethoprim Ds] Unknown (comments)     Patient cant remember her reaction    Hydroxyzine Other (comments)     Legs got heavy    Sulfamethoxazole-Trimethoprim Unknown (comments)     Other reaction(s): vomiting and abd pain      Family History   Problem Relation Age of Onset    Cancer Mother         Leukemia    Heart Disease Mother     Cancer Sister         Ovarian    No Known Problems Brother     Heart Failure Brother     Cancer Brother         Brain tumor      Social History     Socioeconomic History    Marital status:      Spouse name: Not on file    Number of children: Not on file    Years of education: Not on file    Highest education level: Not on file   Occupational History    Not on file   Tobacco Use    Smoking status: Never Smoker    Smokeless tobacco: Never Used   Vaping Use    Vaping Use: Never used   Substance and Sexual Activity    Alcohol use: No     Alcohol/week: 0.0 standard drinks    Drug use: Never    Sexual activity: Not on file   Other Topics Concern     Service No    Blood Transfusions Yes    Caffeine Concern No    Occupational Exposure No    Hobby Hazards No    Sleep Concern Yes     Comment: insomnia    Stress Concern No    Weight Concern Yes     Comment: over weight    Special Diet No    Back Care No    Exercise Yes    Bike Helmet No    Seat Belt Yes    Self-Exams Yes   Social History Narrative    Not on file     Social Determinants of Health     Financial Resource Strain:     Difficulty of Paying Living Expenses: Not on file   Food Insecurity:     Worried About Running Out of Food in the Last Year: Not on file    Ran Out of Food in the Last Year: Not on file   Transportation Needs:     Lack of Transportation (Medical): Not on file    Lack of Transportation (Non-Medical): Not on file   Physical Activity:     Days of Exercise per Week: Not on file    Minutes of Exercise per Session: Not on file   Stress:     Feeling of Stress : Not on file   Social Connections:     Frequency of Communication with Friends and Family: Not on file    Frequency of Social Gatherings with Friends and Family: Not on file    Attends [employer] Services: Not on file    Active Member of [address] [place] [address] Rewarding Return or Organizations: Not on file    Attends Club or Organization Meetings: Not on file    Marital Status: Not on file   Intimate Partner Violence:     Fear of Current or Ex-Partner: Not on file    Emotionally Abused: Not on file    Physically Abused: Not on file    Sexually Abused: Not on file   Housing Stability:     Unable to Pay for Housing in the Last Year: Not on file    Number of [place] in the Last Year: Not on file    Unstable Housing in the Last Year: Not on file      Current Outpatient Medications   Medication Sig    potassium chloride SR (KLOR-CON 10) 10 mEq tablet Take 1 Tablet by mouth daily. Indications: prevention of low potassium in the blood    isosorbide mononitrate ER (IMDUR) 30 mg tablet Take 1 Tablet by mouth daily.  bisacodyL (DULCOLAX) 10 mg supp Insert 10 mg into rectum daily as needed for Constipation.  famotidine (PEPCID) 20 mg tablet Take 1 Tablet by mouth every evening.  acetaminophen (Tylenol Extra Strength) 500 mg tablet Take 1 Tablet by mouth four (4) times daily.  furosemide (LASIX) 20 mg tablet Take 1 Tablet by mouth daily.  sertraline (ZOLOFT) 25 mg tablet Take 1 Tablet by mouth daily.     metoprolol tartrate (LOPRESSOR) 100 mg IR tablet TAKE 1 TABLET EVERY 12     HOURS    apixaban (ELIQUIS) 2.5 mg tablet Take 1 Tablet by mouth two (2) times a day.  sertraline (ZOLOFT) 50 mg tablet Take 1 Tablet by mouth daily. (Patient taking differently: Take 25 mg by mouth daily.)    lovastatin (MEVACOR) 20 mg tablet TAKE 1 TABLET BY MOUTH  NIGHTLY    melatonin 10 mg tab Take 10 mg by mouth nightly.  ascorbic acid (VITAMIN C PO) Take 1,000 mg by mouth daily.  cholecalciferol, vitamin D3, (VITAMIN D3 PO) Take 1,000 Units by mouth daily.  C/sourcherry/celery/grape seed (TART CHERRY PO) Take 465 mg by mouth daily.  pyridoxine, vitamin B6, (Vitamin B-6) 100 mg tablet Take 100 mg by mouth daily.  magnesium 200 mg tab Take 1 Tablet by mouth daily. No current facility-administered medications for this visit. Review of Symptoms:    CONST  No weight change. No fever, chills, sweats    ENT No visual changes, URI sx, sore throat    CV  See HPI   RESP  No cough, or sputum, wheezing. Also see HPI   GI  No abdominal pain or change in bowel habits. No heartburn or dysphagia. No melena or rectal bleeding.   No dysuria, urgency, frequency, hematuria   MSKEL  No joint pain, swelling. No muscle pain. SKIN  No rash or lesions. NEURO  No headache, syncope, or seizure. No weakness, loss of sensation, or paresthesias. PSYCH  No low mood or depression  No anxiety. HE/LYMPH  No easy bruising, abnormal bleeding, or enlarged glands. Physical ExamPhysical Exam:    There were no vitals taken for this visit. Gen: NAD  HEENT:  PERRL, throat clear  Neck: no adenopathy, no thyromegaly, no JVD   Heart:  irregular,Nl S1S2,  I/VI murmur, no gallop or rub. Lungs:  clear  Abdomen:   Soft, non-tender, bowel sounds are active.    Extremities:  + 2  Pulse: symmetric  Neuro: A&O times 3, No focal neuro deficits    Cardiographics    ECG: afib, no acute changes    Labs:   Lab Results   Component Value Date/Time    Sodium 139 11/22/2021 05:35 AM    Sodium 137 11/20/2021 06:21 AM    Sodium 140 11/19/2021 08:09 AM    Sodium 136 10/14/2020 06:19 AM    Sodium 139 10/13/2020 05:56 AM    Potassium 4.1 11/22/2021 05:35 AM    Potassium 4.0 11/20/2021 06:21 AM    Potassium 3.5 11/19/2021 08:09 AM    Potassium 3.7 10/14/2020 06:19 AM    Potassium 3.8 10/13/2020 05:56 AM    Chloride 101 11/22/2021 05:35 AM    Chloride 98 11/20/2021 06:21 AM    Chloride 101 11/19/2021 08:09 AM    Chloride 97 10/14/2020 06:19 AM    Chloride 99 10/13/2020 05:56 AM    CO2 32 11/22/2021 05:35 AM    CO2 30 11/20/2021 06:21 AM    CO2 32 11/19/2021 08:09 AM    CO2 31 10/14/2020 06:19 AM    CO2 31 10/13/2020 05:56 AM    Anion gap 6 11/22/2021 05:35 AM    Anion gap 9 11/20/2021 06:21 AM    Anion gap 7 11/19/2021 08:09 AM    Anion gap 8 10/14/2020 06:19 AM    Anion gap 9 10/13/2020 05:56 AM    Glucose 110 (H) 11/22/2021 05:35 AM    Glucose 122 (H) 11/20/2021 06:21 AM    Glucose 147 (H) 11/19/2021 08:09 AM    Glucose 98 10/14/2020 06:19 AM    Glucose 108 (H) 10/13/2020 05:56 AM    BUN 29 (H) 11/22/2021 05:35 AM    BUN 33 (H) 11/20/2021 06:21 AM    BUN 23 (H) 11/19/2021 08:09 AM    BUN 23 (H) 10/14/2020 06:19 AM    BUN 22 (H) 10/13/2020 05:56 AM    Creatinine 0.96 11/22/2021 05:35 AM    Creatinine 1.47 (H) 11/20/2021 06:21 AM    Creatinine 1.14 (H) 11/19/2021 08:09 AM    Creatinine 0.88 10/14/2020 06:19 AM    Creatinine 0.76 10/13/2020 05:56 AM    BUN/Creatinine ratio 30 (H) 11/22/2021 05:35 AM    BUN/Creatinine ratio 22 (H) 11/20/2021 06:21 AM    BUN/Creatinine ratio 20 11/19/2021 08:09 AM    BUN/Creatinine ratio 26 (H) 10/14/2020 06:19 AM    BUN/Creatinine ratio 29 (H) 10/13/2020 05:56 AM    GFR est AA >60 11/22/2021 05:35 AM    GFR est AA 40 (L) 11/20/2021 06:21 AM    GFR est AA 54 (L) 11/19/2021 08:09 AM    GFR est AA >60 10/14/2020 06:19 AM    GFR est AA >60 10/13/2020 05:56 AM    GFR est non-AA 54 (L) 11/22/2021 05:35 AM    GFR est non-AA 33 (L) 11/20/2021 06:21 AM    GFR est non-AA 45 (L) 11/19/2021 08:09 AM    GFR est non-AA >60 10/14/2020 06:19 AM    GFR est non-AA >60 10/13/2020 05:56 AM    Calcium 8.7 11/22/2021 05:35 AM    Calcium 8.8 11/20/2021 06:21 AM    Calcium 9.1 11/19/2021 08:09 AM    Calcium 8.7 10/14/2020 06:19 AM    Calcium 8.8 10/13/2020 05:56 AM    Bilirubin, total 0.9 11/19/2021 08:09 AM    Bilirubin, total 1.2 (H) 10/14/2020 06:19 AM    Bilirubin, total 1.4 (H) 10/13/2020 05:56 AM    Bilirubin, total 1.2 (H) 10/06/2020 12:54 PM    Bilirubin, total 0.9 06/03/2019 10:58 AM    Alk. phosphatase 122 (H) 11/19/2021 08:09 AM    Alk. phosphatase 75 10/14/2020 06:19 AM    Alk. phosphatase 78 10/13/2020 05:56 AM    Alk. phosphatase 80 10/06/2020 12:54 PM    Alk.  phosphatase 84 06/03/2019 10:58 AM    Protein, total 8.0 11/19/2021 08:09 AM    Protein, total 6.3 (L) 10/14/2020 06:19 AM    Protein, total 6.7 10/13/2020 05:56 AM    Protein, total 7.2 10/06/2020 12:54 PM    Protein, total 7.2 06/03/2019 10:58 AM    Albumin 3.4 (L) 11/19/2021 08:09 AM    Albumin 2.3 (L) 10/14/2020 06:19 AM    Albumin 2.4 (L) 10/13/2020 05:56 AM    Albumin 2.8 (L) 10/06/2020 12:54 PM    Albumin 4.4 06/03/2019 10:58 AM    Globulin 4.6 (H) 11/19/2021 08:09 AM    Globulin 4.0 10/14/2020 06:19 AM    Globulin 4.3 (H) 10/13/2020 05:56 AM    Globulin 4.4 (H) 10/06/2020 12:54 PM    Globulin 4.1 (H) 07/22/2018 08:00 PM    A-G Ratio 0.7 (L) 11/19/2021 08:09 AM    A-G Ratio 0.6 (L) 10/14/2020 06:19 AM    A-G Ratio 0.6 (L) 10/13/2020 05:56 AM    A-G Ratio 0.6 (L) 10/06/2020 12:54 PM    A-G Ratio 1.6 06/03/2019 10:58 AM    ALT (SGPT) 30 11/19/2021 08:09 AM    ALT (SGPT) 25 10/14/2020 06:19 AM    ALT (SGPT) 31 10/13/2020 05:56 AM    ALT (SGPT) 36 10/06/2020 12:54 PM    ALT (SGPT) 20 06/03/2019 10:58 AM     Lab Results   Component Value Date/Time    CK 45 10/07/2020 05:41 AM     Lab Results   Component Value Date/Time    Cholesterol, total 164 12/12/2018 09:28 AM    Cholesterol, total 154 07/23/2018 05:35 AM    Cholesterol, total 159 06/13/2018 10:26 AM    Cholesterol, total 163 12/19/2017 09:09 AM    Cholesterol, total 154 06/20/2017 09:29 AM    HDL Cholesterol 49 12/12/2018 09:28 AM    HDL Cholesterol 55 07/23/2018 05:35 AM    HDL Cholesterol 48 06/13/2018 10:26 AM    HDL Cholesterol 51 12/19/2017 09:09 AM    HDL Cholesterol 47 06/20/2017 09:29 AM    LDL, calculated 95 12/12/2018 09:28 AM    LDL, calculated 89.2 07/23/2018 05:35 AM    LDL, calculated 92 06/13/2018 10:26 AM    LDL, calculated 95 12/19/2017 09:09 AM    LDL, calculated 87 06/20/2017 09:29 AM    Triglyceride 102 12/12/2018 09:28 AM    Triglyceride 49 07/23/2018 05:35 AM    Triglyceride 95 06/13/2018 10:26 AM    Triglyceride 85 12/19/2017 09:09 AM    Triglyceride 98 06/20/2017 09:29 AM    CHOL/HDL Ratio 2.8 07/23/2018 05:35 AM     No results found for this or any previous visit.     Assessment:         Patient Active Problem List    Diagnosis Date Noted    Acute pancreatitis 07/23/2018    Cystitis 11/24/2021    Closed fracture of multiple pubic rami, right, initial encounter ([facility] 11/19/2021    Fall from ground level 11/19/2021    Chronic systolic heart failure ([facility] 11/19/2021    Carotid artery disease, unspecified laterality ([facility] 59/08/6565    Systolic CHF, acute ([facility] 10/11/2020    Acute left-sided CHF (congestive heart failure) ([facility] 10/06/2020    New onset left bundle branch block (LBBB) 10/06/2020    Coumadin toxicity 10/06/2020    Persistent atrial fibrillation ([facility] 07/22/2018    Abnormal EKG 10/13/2016    PAC (premature atrial contraction) 10/13/2016    Primary osteoarthritis 10/03/2016    HTN (hypertension)     DJD (degenerative joint disease)     Colitis     Ventricular ectopy     Hyperlipemia           Plan:     Chronic AF  Continue BB for rate control  Continue low dose eliquis for embolic CVA prophylaxis    Combined chronic systolic / diastolic HF  EF 34-46% mild MR/TR per echo in 10/2020  Continue BB  Wt up 8 lbs from last OV (134 to now 142)  Will increase Lasix to 40 mg daily  Continue Leg elevation, compression stockings  Continue K supplement  Repeat labs in 2 weeks  Serum Cr 0.96 in 11/2021    HTN  Controlled with current therapy      HLD  On statin  Lipids and labs followed by PCP        Continue current care and f/u in 1 mos        Melodie Triplett NP

## 2022-03-16 ENCOUNTER — OFFICE VISIT (OUTPATIENT)
Dept: CARDIOLOGY CLINIC | Age: 87
End: 2022-03-16
Payer: MEDICARE

## 2022-03-16 VITALS
WEIGHT: 142 LBS | RESPIRATION RATE: 18 BRPM | HEART RATE: 96 BPM | DIASTOLIC BLOOD PRESSURE: 70 MMHG | SYSTOLIC BLOOD PRESSURE: 110 MMHG | BODY MASS INDEX: 23.66 KG/M2 | TEMPERATURE: 98.6 F | OXYGEN SATURATION: 92 % | HEIGHT: 65 IN

## 2022-03-16 DIAGNOSIS — I49.1 PAC (PREMATURE ATRIAL CONTRACTION): ICD-10-CM

## 2022-03-16 DIAGNOSIS — I50.22 CHRONIC SYSTOLIC HEART FAILURE (HCC): ICD-10-CM

## 2022-03-16 DIAGNOSIS — I44.7 LBBB (LEFT BUNDLE BRANCH BLOCK): ICD-10-CM

## 2022-03-16 DIAGNOSIS — E78.2 MIXED HYPERLIPIDEMIA: ICD-10-CM

## 2022-03-16 DIAGNOSIS — I25.10 ASCVD (ARTERIOSCLEROTIC CARDIOVASCULAR DISEASE): ICD-10-CM

## 2022-03-16 DIAGNOSIS — I48.19 PERSISTENT ATRIAL FIBRILLATION (HCC): Primary | ICD-10-CM

## 2022-03-16 DIAGNOSIS — I10 ESSENTIAL HYPERTENSION: ICD-10-CM

## 2022-03-16 DIAGNOSIS — R06.02 SOB (SHORTNESS OF BREATH): ICD-10-CM

## 2022-03-16 PROCEDURE — 1101F PT FALLS ASSESS-DOCD LE1/YR: CPT | Performed by: NURSE PRACTITIONER

## 2022-03-16 PROCEDURE — 93000 ELECTROCARDIOGRAM COMPLETE: CPT | Performed by: INTERNAL MEDICINE

## 2022-03-16 PROCEDURE — 1090F PRES/ABSN URINE INCON ASSESS: CPT | Performed by: NURSE PRACTITIONER

## 2022-03-16 PROCEDURE — G8536 NO DOC ELDER MAL SCRN: HCPCS | Performed by: NURSE PRACTITIONER

## 2022-03-16 PROCEDURE — G8427 DOCREV CUR MEDS BY ELIG CLIN: HCPCS | Performed by: NURSE PRACTITIONER

## 2022-03-16 PROCEDURE — 99214 OFFICE O/P EST MOD 30 MIN: CPT | Performed by: NURSE PRACTITIONER

## 2022-03-16 PROCEDURE — G8420 CALC BMI NORM PARAMETERS: HCPCS | Performed by: NURSE PRACTITIONER

## 2022-03-16 PROCEDURE — G8432 DEP SCR NOT DOC, RNG: HCPCS | Performed by: NURSE PRACTITIONER

## 2022-03-16 RX ORDER — FUROSEMIDE 40 MG/1
40 TABLET ORAL DAILY
Qty: 90 TABLET | Refills: 1 | Status: SHIPPED | OUTPATIENT
Start: 2022-03-16 | End: 2022-07-13

## 2022-03-16 NOTE — PROGRESS NOTES
Identified pt with two pt identifiers(name and ). Reviewed record in preparation for visit and have obtained necessary documentation. Chief Complaint   Patient presents with    Irregular Heart Beat     6 month follow up    CHF    Hypertension    Cholesterol Problem      Vitals:    22 1105   BP: 110/70   Pulse: 96   Resp: 18   Temp: 98.6 °F (37 °C)   TempSrc: Temporal   SpO2: 92%   Weight: 142 lb (64.4 kg)   Height: 5' 5\" (1.651 m)   PainSc:   0 - No pain       Medications reviewed/approved by provider. Health Maintenance Review: Patient reminded of \"due or due soon\" health maintenance. I have asked the patient to contact his/her primary care provider (PCP) for follow-up on his/her health maintenance. Coordination of Care Questionnaire:  :   1) Have you been to an emergency room, urgent care, or hospitalized since your last visit? If yes, where when, and reason for visit? Yes, [facility] admission 2021 for fall/fracture. 2. Have seen or consulted any other health care provider since your last visit? If yes, where when, and reason for visit? NO    Patient is accompanied by friend Sage Kelley) I have received verbal consent from Abhinav Inman to discuss any/all medical information while they are present in the room.

## 2022-03-17 ENCOUNTER — TELEPHONE (OUTPATIENT)
Dept: CARDIOLOGY CLINIC | Age: 87
End: 2022-03-17

## 2022-03-17 RX ORDER — POLYETHYLENE GLYCOL 3350 17 G/17G
17 POWDER, FOR SOLUTION ORAL DAILY
Status: ON HOLD | COMMUNITY
End: 2022-04-07

## 2022-03-17 RX ORDER — PANTOPRAZOLE SODIUM 40 MG/1
40 TABLET, DELAYED RELEASE ORAL DAILY
COMMUNITY

## 2022-03-17 RX ORDER — LABETALOL 100 MG/1
100 TABLET, FILM COATED ORAL 2 TIMES DAILY
COMMUNITY
End: 2022-07-13 | Stop reason: ALTCHOICE

## 2022-03-17 RX ORDER — THERA TABS 400 MCG
1 TAB ORAL DAILY
COMMUNITY

## 2022-03-17 NOTE — TELEPHONE ENCOUNTER
[facility] sent over pts MAR via fax today. Updated med rec and sent it to the provider. Pt was taking lasix 20 mg daily with an additional 20 mg to equal 40 mg every Wed. Changed yesterday by Mandi Lagunas NP for lasix 40 mg daily.

## 2022-03-18 PROBLEM — N30.90 CYSTITIS: Status: ACTIVE | Noted: 2021-11-24
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
## 2022-03-18 PROBLEM — I77.9 CAROTID ARTERY DISEASE, UNSPECIFIED LATERALITY (HCC): Status: ACTIVE | Noted: 2021-01-27

## 2022-03-18 PROBLEM — I48.19 PERSISTENT ATRIAL FIBRILLATION (HCC): Status: ACTIVE | Noted: 2018-07-22

## 2022-03-18 PROBLEM — S32.591A CLOSED FRACTURE OF MULTIPLE PUBIC RAMI, RIGHT, INITIAL ENCOUNTER (HCC): Status: ACTIVE | Noted: 2021-11-19

## 2022-03-19 PROBLEM — I50.22 CHRONIC SYSTOLIC HEART FAILURE (HCC): Status: ACTIVE | Noted: 2021-11-19

## 2022-03-19 PROBLEM — T45.511A COUMADIN TOXICITY: Status: ACTIVE | Noted: 2020-10-06

## 2022-03-19 PROBLEM — I44.7 NEW ONSET LEFT BUNDLE BRANCH BLOCK (LBBB): Status: ACTIVE | Noted: 2020-10-06

## 2022-03-20 PROBLEM — I50.1 ACUTE LEFT-SIDED CHF (CONGESTIVE HEART FAILURE) (HCC): Status: ACTIVE | Noted: 2020-10-06

## 2022-03-20 PROBLEM — K85.90 ACUTE PANCREATITIS: Status: ACTIVE | Noted: 2018-07-23

## 2022-03-20 PROBLEM — I50.21 SYSTOLIC CHF, ACUTE (HCC): Status: ACTIVE | Noted: 2020-10-11

## 2022-04-04 ENCOUNTER — TELEPHONE (OUTPATIENT)
Dept: CARDIOLOGY CLINIC | Age: 87
End: 2022-04-04

## 2022-04-04 NOTE — TELEPHONE ENCOUNTER
----- Message from Eric Bolanos NP sent at 3/31/2022 12:20 PM EDT -----  Received labs drawn 3/28/2022. Kidney fxn stable, lytes ok. Continue lasix 40 mg daily. Ok for 6 mos follow up if breathing better, wt down.  She can cancel April 27 follow up if better,      Serum Cr 1.05  K 4.2  Magnesium 2.0  Na 140

## 2022-04-07 ENCOUNTER — APPOINTMENT (OUTPATIENT)
Dept: ULTRASOUND IMAGING | Age: 87
DRG: 291 | End: 2022-04-07
Attending: EMERGENCY MEDICINE
Payer: MEDICARE

## 2022-04-07 ENCOUNTER — APPOINTMENT (OUTPATIENT)
Dept: GENERAL RADIOLOGY | Age: 87
DRG: 291 | End: 2022-04-07
Attending: EMERGENCY MEDICINE
Payer: MEDICARE

## 2022-04-07 ENCOUNTER — HOSPITAL ENCOUNTER (INPATIENT)
Age: 87
LOS: 4 days | Discharge: HOME HEALTH | DRG: 291 | End: 2022-04-11
Attending: EMERGENCY MEDICINE | Admitting: INTERNAL MEDICINE
Payer: MEDICARE

## 2022-04-07 ENCOUNTER — APPOINTMENT (OUTPATIENT)
Dept: CT IMAGING | Age: 87
DRG: 291 | End: 2022-04-07
Attending: EMERGENCY MEDICINE
Payer: MEDICARE

## 2022-04-07 DIAGNOSIS — J81.0 ACUTE PULMONARY EDEMA (HCC): Primary | ICD-10-CM

## 2022-04-07 DIAGNOSIS — R09.02 HYPOXEMIA: ICD-10-CM

## 2022-04-07 PROBLEM — I48.91 A-FIB (HCC): Status: ACTIVE | Noted: 2022-04-07

## 2022-04-07 PROBLEM — J90 PLEURAL EFFUSION: Status: ACTIVE | Noted: 2022-04-07

## 2022-04-07 PROBLEM — I50.9 CONGESTIVE HEART FAILURE (CHF) (HCC): Status: ACTIVE | Noted: 2022-04-07

## 2022-04-07 PROBLEM — J81.1 PULMONARY EDEMA: Status: ACTIVE | Noted: 2022-04-07

## 2022-04-07 PROBLEM — R79.89 ELEVATED BRAIN NATRIURETIC PEPTIDE (BNP) LEVEL: Status: ACTIVE | Noted: 2022-04-07

## 2022-04-07 LAB
ALBUMIN SERPL-MCNC: 3.2 G/DL (ref 3.5–5)
ALBUMIN/GLOB SERPL: 0.7 {RATIO} (ref 1.1–2.2)
ALP SERPL-CCNC: 104 U/L (ref 45–117)
ALT SERPL-CCNC: 17 U/L (ref 12–78)
ANION GAP SERPL CALC-SCNC: 7 MMOL/L (ref 5–15)
APPEARANCE UR: CLEAR
AST SERPL-CCNC: 24 U/L (ref 15–37)
BACTERIA URNS QL MICRO: NEGATIVE /HPF
BASOPHILS # BLD: 0.1 K/UL (ref 0–0.1)
BASOPHILS NFR BLD: 1 % (ref 0–1)
BILIRUB SERPL-MCNC: 1.3 MG/DL (ref 0.2–1)
BILIRUB UR QL: NEGATIVE
BNP SERPL-MCNC: 9861 PG/ML (ref 0–450)
BUN SERPL-MCNC: 24 MG/DL (ref 6–20)
BUN/CREAT SERPL: 26 (ref 12–20)
CALCIUM SERPL-MCNC: 9.3 MG/DL (ref 8.5–10.1)
CHLORIDE SERPL-SCNC: 106 MMOL/L (ref 97–108)
CO2 SERPL-SCNC: 30 MMOL/L (ref 21–32)
COLOR UR: ABNORMAL
COMMENT, HOLDF: NORMAL
CREAT SERPL-MCNC: 0.93 MG/DL (ref 0.55–1.02)
DIFFERENTIAL METHOD BLD: ABNORMAL
EOSINOPHIL # BLD: 0.1 K/UL (ref 0–0.4)
EOSINOPHIL NFR BLD: 1 % (ref 0–7)
EPITH CASTS URNS QL MICRO: ABNORMAL /LPF
ERYTHROCYTE [DISTWIDTH] IN BLOOD BY AUTOMATED COUNT: 16.8 % (ref 11.5–14.5)
GLOBULIN SER CALC-MCNC: 4.3 G/DL (ref 2–4)
GLUCOSE BLD STRIP.AUTO-MCNC: 137 MG/DL (ref 65–117)
GLUCOSE SERPL-MCNC: 120 MG/DL (ref 65–100)
GLUCOSE UR STRIP.AUTO-MCNC: NEGATIVE MG/DL
HCT VFR BLD AUTO: 39 % (ref 35–47)
HGB BLD-MCNC: 12.2 G/DL (ref 11.5–16)
HGB UR QL STRIP: ABNORMAL
IMM GRANULOCYTES # BLD AUTO: 0 K/UL (ref 0–0.04)
IMM GRANULOCYTES NFR BLD AUTO: 0 % (ref 0–0.5)
INR PPP: 1.3 (ref 0.9–1.1)
KETONES UR QL STRIP.AUTO: NEGATIVE MG/DL
LACTATE SERPL-SCNC: 1.4 MMOL/L (ref 0.4–2)
LEUKOCYTE ESTERASE UR QL STRIP.AUTO: NEGATIVE
LYMPHOCYTES # BLD: 1.1 K/UL (ref 0.8–3.5)
LYMPHOCYTES NFR BLD: 15 % (ref 12–49)
MCH RBC QN AUTO: 28.6 PG (ref 26–34)
MCHC RBC AUTO-ENTMCNC: 31.3 G/DL (ref 30–36.5)
MCV RBC AUTO: 91.3 FL (ref 80–99)
MONOCYTES # BLD: 0.7 K/UL (ref 0–1)
MONOCYTES NFR BLD: 10 % (ref 5–13)
NEUTS SEG # BLD: 5.2 K/UL (ref 1.8–8)
NEUTS SEG NFR BLD: 73 % (ref 32–75)
NITRITE UR QL STRIP.AUTO: NEGATIVE
NRBC # BLD: 0 K/UL (ref 0–0.01)
NRBC BLD-RTO: 0 PER 100 WBC
PH UR STRIP: 6.5 [PH] (ref 5–8)
PLATELET # BLD AUTO: 233 K/UL (ref 150–400)
PMV BLD AUTO: 10.8 FL (ref 8.9–12.9)
POTASSIUM SERPL-SCNC: 4.4 MMOL/L (ref 3.5–5.1)
PROT SERPL-MCNC: 7.5 G/DL (ref 6.4–8.2)
PROT UR STRIP-MCNC: NEGATIVE MG/DL
PROTHROMBIN TIME: 13.1 SEC (ref 9–11.1)
RBC # BLD AUTO: 4.27 M/UL (ref 3.8–5.2)
RBC #/AREA URNS HPF: ABNORMAL /HPF (ref 0–5)
SAMPLES BEING HELD,HOLD: NORMAL
SERVICE CMNT-IMP: ABNORMAL
SODIUM SERPL-SCNC: 143 MMOL/L (ref 136–145)
SP GR UR REFRACTOMETRY: 1.01 (ref 1–1.03)
TROPONIN-HIGH SENSITIVITY: 24 NG/L (ref 0–51)
UA: UC IF INDICATED,UAUC: ABNORMAL
UROBILINOGEN UR QL STRIP.AUTO: 0.2 EU/DL (ref 0.2–1)
WBC # BLD AUTO: 7.1 K/UL (ref 3.6–11)
WBC URNS QL MICRO: ABNORMAL /HPF (ref 0–4)

## 2022-04-07 PROCEDURE — 84484 ASSAY OF TROPONIN QUANT: CPT

## 2022-04-07 PROCEDURE — 81001 URINALYSIS AUTO W/SCOPE: CPT

## 2022-04-07 PROCEDURE — 94762 N-INVAS EAR/PLS OXIMTRY CONT: CPT

## 2022-04-07 PROCEDURE — 93005 ELECTROCARDIOGRAM TRACING: CPT

## 2022-04-07 PROCEDURE — 71045 X-RAY EXAM CHEST 1 VIEW: CPT

## 2022-04-07 PROCEDURE — 36415 COLL VENOUS BLD VENIPUNCTURE: CPT

## 2022-04-07 PROCEDURE — 87040 BLOOD CULTURE FOR BACTERIA: CPT

## 2022-04-07 PROCEDURE — 65660000000 HC RM CCU STEPDOWN

## 2022-04-07 PROCEDURE — 77010033678 HC OXYGEN DAILY

## 2022-04-07 PROCEDURE — 74011000250 HC RX REV CODE- 250: Performed by: INTERNAL MEDICINE

## 2022-04-07 PROCEDURE — 83605 ASSAY OF LACTIC ACID: CPT

## 2022-04-07 PROCEDURE — 94760 N-INVAS EAR/PLS OXIMETRY 1: CPT

## 2022-04-07 PROCEDURE — 99285 EMERGENCY DEPT VISIT HI MDM: CPT

## 2022-04-07 PROCEDURE — 74011000258 HC RX REV CODE- 258: Performed by: EMERGENCY MEDICINE

## 2022-04-07 PROCEDURE — 74011250637 HC RX REV CODE- 250/637: Performed by: INTERNAL MEDICINE

## 2022-04-07 PROCEDURE — 96367 TX/PROPH/DG ADDL SEQ IV INF: CPT

## 2022-04-07 PROCEDURE — 74011000250 HC RX REV CODE- 250: Performed by: EMERGENCY MEDICINE

## 2022-04-07 PROCEDURE — 70450 CT HEAD/BRAIN W/O DYE: CPT

## 2022-04-07 PROCEDURE — 74011250636 HC RX REV CODE- 250/636: Performed by: EMERGENCY MEDICINE

## 2022-04-07 PROCEDURE — 80053 COMPREHEN METABOLIC PANEL: CPT

## 2022-04-07 PROCEDURE — 85025 COMPLETE CBC W/AUTO DIFF WBC: CPT

## 2022-04-07 PROCEDURE — 96375 TX/PRO/DX INJ NEW DRUG ADDON: CPT

## 2022-04-07 PROCEDURE — 87077 CULTURE AEROBIC IDENTIFY: CPT

## 2022-04-07 PROCEDURE — 74011250636 HC RX REV CODE- 250/636: Performed by: INTERNAL MEDICINE

## 2022-04-07 PROCEDURE — 96365 THER/PROPH/DIAG IV INF INIT: CPT

## 2022-04-07 PROCEDURE — 83880 ASSAY OF NATRIURETIC PEPTIDE: CPT

## 2022-04-07 PROCEDURE — 82962 GLUCOSE BLOOD TEST: CPT

## 2022-04-07 PROCEDURE — 85610 PROTHROMBIN TIME: CPT

## 2022-04-07 RX ORDER — ONDANSETRON 4 MG/1
4 TABLET, ORALLY DISINTEGRATING ORAL
Status: DISCONTINUED | OUTPATIENT
Start: 2022-04-07 | End: 2022-04-11 | Stop reason: HOSPADM

## 2022-04-07 RX ORDER — CYANOCOBALAMIN (VITAMIN B-12) 500 MCG
1000 TABLET ORAL DAILY
Status: DISCONTINUED | OUTPATIENT
Start: 2022-04-08 | End: 2022-04-11 | Stop reason: HOSPADM

## 2022-04-07 RX ORDER — FACIAL-BODY WIPES
10 EACH TOPICAL
Status: DISCONTINUED | OUTPATIENT
Start: 2022-04-07 | End: 2022-04-11 | Stop reason: HOSPADM

## 2022-04-07 RX ORDER — ACETAMINOPHEN 500 MG
500 TABLET ORAL
Status: DISCONTINUED | OUTPATIENT
Start: 2022-04-07 | End: 2022-04-08 | Stop reason: SDUPTHER

## 2022-04-07 RX ORDER — DILTIAZEM HYDROCHLORIDE 5 MG/ML
10 INJECTION INTRAVENOUS
Status: COMPLETED | OUTPATIENT
Start: 2022-04-07 | End: 2022-04-07

## 2022-04-07 RX ORDER — ACETAMINOPHEN 325 MG/1
650 TABLET ORAL
Status: DISCONTINUED | OUTPATIENT
Start: 2022-04-07 | End: 2022-04-08 | Stop reason: SDUPTHER

## 2022-04-07 RX ORDER — ATORVASTATIN CALCIUM 10 MG/1
10 TABLET, FILM COATED ORAL DAILY
Status: DISCONTINUED | OUTPATIENT
Start: 2022-04-08 | End: 2022-04-11 | Stop reason: HOSPADM

## 2022-04-07 RX ORDER — ACETAMINOPHEN 650 MG/1
650 SUPPOSITORY RECTAL
Status: DISCONTINUED | OUTPATIENT
Start: 2022-04-07 | End: 2022-04-11 | Stop reason: HOSPADM

## 2022-04-07 RX ORDER — ACETAMINOPHEN 325 MG/1
650 TABLET ORAL
Status: DISCONTINUED | OUTPATIENT
Start: 2022-04-07 | End: 2022-04-11 | Stop reason: HOSPADM

## 2022-04-07 RX ORDER — ISOSORBIDE MONONITRATE 30 MG/1
30 TABLET, EXTENDED RELEASE ORAL DAILY
Status: DISCONTINUED | OUTPATIENT
Start: 2022-04-08 | End: 2022-04-11 | Stop reason: HOSPADM

## 2022-04-07 RX ORDER — LABETALOL 100 MG/1
100 TABLET, FILM COATED ORAL 2 TIMES DAILY
Status: DISCONTINUED | OUTPATIENT
Start: 2022-04-07 | End: 2022-04-11 | Stop reason: HOSPADM

## 2022-04-07 RX ORDER — FUROSEMIDE 10 MG/ML
40 INJECTION INTRAMUSCULAR; INTRAVENOUS
Status: COMPLETED | OUTPATIENT
Start: 2022-04-07 | End: 2022-04-07

## 2022-04-07 RX ORDER — POLYETHYLENE GLYCOL 3350 17 G/17G
17 POWDER, FOR SOLUTION ORAL DAILY
Status: DISCONTINUED | OUTPATIENT
Start: 2022-04-07 | End: 2022-04-11 | Stop reason: HOSPADM

## 2022-04-07 RX ORDER — SODIUM CHLORIDE 0.9 % (FLUSH) 0.9 %
5-40 SYRINGE (ML) INJECTION EVERY 8 HOURS
Status: DISCONTINUED | OUTPATIENT
Start: 2022-04-07 | End: 2022-04-11 | Stop reason: HOSPADM

## 2022-04-07 RX ORDER — LANOLIN ALCOHOL/MO/W.PET/CERES
200 CREAM (GRAM) TOPICAL DAILY
Status: DISCONTINUED | OUTPATIENT
Start: 2022-04-08 | End: 2022-04-11 | Stop reason: HOSPADM

## 2022-04-07 RX ORDER — FAMOTIDINE 20 MG/1
20 TABLET, FILM COATED ORAL EVERY EVENING
Status: DISCONTINUED | OUTPATIENT
Start: 2022-04-07 | End: 2022-04-11 | Stop reason: HOSPADM

## 2022-04-07 RX ORDER — ONDANSETRON 2 MG/ML
4 INJECTION INTRAMUSCULAR; INTRAVENOUS
Status: DISCONTINUED | OUTPATIENT
Start: 2022-04-07 | End: 2022-04-11 | Stop reason: HOSPADM

## 2022-04-07 RX ORDER — PANTOPRAZOLE SODIUM 40 MG/1
40 TABLET, DELAYED RELEASE ORAL DAILY
Status: DISCONTINUED | OUTPATIENT
Start: 2022-04-08 | End: 2022-04-11 | Stop reason: HOSPADM

## 2022-04-07 RX ORDER — SODIUM CHLORIDE 0.9 % (FLUSH) 0.9 %
5-10 SYRINGE (ML) INJECTION AS NEEDED
Status: DISCONTINUED | OUTPATIENT
Start: 2022-04-07 | End: 2022-04-11 | Stop reason: HOSPADM

## 2022-04-07 RX ORDER — CHOLECALCIFEROL (VITAMIN D3) 125 MCG
10 CAPSULE ORAL
Status: DISCONTINUED | OUTPATIENT
Start: 2022-04-07 | End: 2022-04-11 | Stop reason: HOSPADM

## 2022-04-07 RX ORDER — POLYETHYLENE GLYCOL 3350 17 G/17G
17 POWDER, FOR SOLUTION ORAL DAILY PRN
Status: DISCONTINUED | OUTPATIENT
Start: 2022-04-07 | End: 2022-04-11 | Stop reason: HOSPADM

## 2022-04-07 RX ORDER — SODIUM CHLORIDE 0.9 % (FLUSH) 0.9 %
5-40 SYRINGE (ML) INJECTION AS NEEDED
Status: DISCONTINUED | OUTPATIENT
Start: 2022-04-07 | End: 2022-04-11 | Stop reason: HOSPADM

## 2022-04-07 RX ORDER — SERTRALINE HYDROCHLORIDE 50 MG/1
25 TABLET, FILM COATED ORAL DAILY
Status: DISCONTINUED | OUTPATIENT
Start: 2022-04-08 | End: 2022-04-11 | Stop reason: HOSPADM

## 2022-04-07 RX ADMIN — CEFTRIAXONE SODIUM 2 G: 2 INJECTION, POWDER, FOR SOLUTION INTRAMUSCULAR; INTRAVENOUS at 13:00

## 2022-04-07 RX ADMIN — DILTIAZEM HYDROCHLORIDE 10 MG: 5 INJECTION INTRAVENOUS at 12:40

## 2022-04-07 RX ADMIN — AZITHROMYCIN MONOHYDRATE 500 MG: 500 INJECTION, POWDER, LYOPHILIZED, FOR SOLUTION INTRAVENOUS at 13:35

## 2022-04-07 RX ADMIN — FUROSEMIDE 40 MG: 10 INJECTION, SOLUTION INTRAMUSCULAR; INTRAVENOUS at 16:11

## 2022-04-07 RX ADMIN — SODIUM CHLORIDE, PRESERVATIVE FREE 10 ML: 5 INJECTION INTRAVENOUS at 22:14

## 2022-04-07 RX ADMIN — APIXABAN 2.5 MG: 2.5 TABLET, FILM COATED ORAL at 22:08

## 2022-04-07 RX ADMIN — FUROSEMIDE 40 MG: 10 INJECTION, SOLUTION INTRAMUSCULAR; INTRAVENOUS at 13:32

## 2022-04-07 RX ADMIN — Medication 10 MG: at 22:08

## 2022-04-07 RX ADMIN — LABETALOL HYDROCHLORIDE 100 MG: 100 TABLET, FILM COATED ORAL at 17:42

## 2022-04-07 NOTE — ED NOTES
Aspen Hightower pts friend who is listed as a . Per Lorrie Scheuermann, patients son Musa Lehman is the POA and he is currently in [facility] after having a medical emergency.   Lorrie Scheuermann did report that she did call this patients daughter and told her that this patient was in the hospital

## 2022-04-07 NOTE — PROGRESS NOTES
Pharmacy Medication Reconciliation     The patient was interviewed regarding current PTA medication list, use and drug allergies. Clarified PTA med list with patient. The patient was questioned regarding use of any:  inhalers, topical products, over the counter medications, herbal medications, vitamin products or ophthalmic/nasal/otic medication use. Allergies were verified. Allergy Update: Ambien, Aminobenzoic acid, bactrim DS, hydroxyzine    Recommendations/Findings: The following amendments were made to the patient's active medication list on file at [facility]:   1) Additions: none    2) Deletions: Toprol. Miralax, Sertraline 50     3) Changes: none    Counseling provided includes side effects/adverse drug reaction: yes    Drug Interactions and duplicate therapies include: N/A    PTA medication list was corrected to the following:     Prior to Admission Medications   Prescriptions Last Dose Informant Patient Reported? Taking?   acetaminophen (Tylenol Extra Strength) 500 mg tablet 4/6/2022 at Unknown time  Yes Yes   Sig: Take 500 mg by mouth every eight (8) hours as needed. apixaban (ELIQUIS) 2.5 mg tablet 4/7/2022 at 0600 Self No Yes   Sig: Take 1 Tablet by mouth two (2) times a day. ascorbic acid (VITAMIN C PO) 4/6/2022 at Unknown time Self Yes Yes   Sig: Take 1,000 mg by mouth daily. bisacodyL (DULCOLAX) 10 mg supp 4/6/2022 at Unknown time  Yes Yes   Sig: Insert 10 mg into rectum daily as needed for Constipation. cholecalciferol, vitamin D3, (VITAMIN D3 PO) 4/6/2022 at Unknown time Self Yes Yes   Sig: Take 1,000 Units by mouth daily. furosemide (LASIX) 40 mg tablet 4/6/2022 at 0600  No Yes   Sig: Take 1 Tablet by mouth daily. isosorbide mononitrate ER (IMDUR) 30 mg tablet 4/6/2022 at 0600  No Yes   Sig: Take 1 Tablet by mouth daily. labetaloL (NORMODYNE) 100 mg tablet 4/6/2022 at 0600  Yes Yes   Sig: Take 100 mg by mouth two (2) times a day.    lovastatin (MEVACOR) 20 mg tablet 4/6/2022 at 2000 Self No Yes   Sig: TAKE 1 TABLET BY MOUTH  NIGHTLY   magnesium 200 mg tab 4/6/2022 at Unknown time Self Yes Yes   Sig: Take 1 Tablet by mouth daily. melatonin 10 mg tab 4/6/2022 at 2000 Self No Yes   Sig: Take 10 mg by mouth nightly. pantoprazole (PROTONIX) 40 mg tablet 4/6/2022 at Unknown time  Yes Yes   Sig: Take 40 mg by mouth daily. potassium chloride SR (KLOR-CON 10) 10 mEq tablet 4/6/2022 at 0600  No Yes   Sig: Take 1 Tablet by mouth daily. Indications: prevention of low potassium in the blood   pyridoxine, vitamin B6, (Vitamin B-6) 100 mg tablet 4/6/2022 at Unknown time Self Yes Yes   Sig: Take 100 mg by mouth daily. sertraline (ZOLOFT) 25 mg tablet 4/6/2022 at 0600  No Yes   Sig: Take 1 Tablet by mouth daily. therapeutic multivitamin (Thera) tablet 4/6/2022 at Unknown time  Yes Yes   Sig: Take 1 Tablet by mouth daily.       Facility-Administered Medications: None        Thank you,  Ruth Buckley, PHARMD

## 2022-04-07 NOTE — H&P
[facility]   Admission History & Physical        4/7/2022 2:54 PM  Patient: Pietro Inman 3/7/1930  PCP: Twyla Smith NP    HISTORY  Chief Complaint:   Chief Complaint   Patient presents with    Altered mental status       HPI: 80 y.o. female presenting for admission to [facility] for further evaluation and treatment for confusion and altered mental .  She  has a past medical history of Colitis (2013), DJD (degenerative joint disease), Fracture, femoral ([facility] [address] (09/2005), HTN (hypertension), Hyperlipemia, Persistent atrial fibrillation ([facility] [address] (2018), and Ventricular ectopy. She lives in [facility] living [facility]. The staff reported the patient was found to be confused with  thick speech. No focal sensorimotor deficit, no fever no headache or neck pain or stiffness. The patient states she has been having analyzed weakness, with cough , body ache ,lack of appetite, and shortness of breath. No chest pain no abdominal pain, no urinary symptom. Work-up done in the ER revealed elevated BNP at 9, 861, chest x-ray revealed bilateral pleural effusion, right more than the left with basilar consolidation. Hypoxemia with O2 of 85 was noted. The patient was felt to have a combination of fluid overload CHF and early pneumonia. In the ER she received antibiotic Rocephin and Zithromax, Lasix 40 mg IV was given, and she was admitted to [place] [address].     Past Medical History:  Past Medical History:   Diagnosis Date    Colitis 2013    C diff; no recent flare, normal colonoscopy 2014    DJD (degenerative joint disease)     Fracture, femoral ([facility] [address] 09/2005    HTN (hypertension)     Hyperlipemia     Persistent atrial fibrillation ([facility] [address] 2018    Ventricular ectopy     no symptoms       Past Surgical History:  Past Surgical History:   Procedure Laterality Date    HX CATARACT REMOVAL      bilat    HX COLONOSCOPY  2014    WNL    HX ORTHOPAEDIC  2005    right femur fx    HX ORTHOPAEDIC  2012 Lt TKA    HX REFRACTIVE SURGERY  06/15/2017    rt       Medication:  Prior to Admission medications    Medication Sig Start Date End Date Taking? Authorizing Provider   labetaloL (NORMODYNE) 100 mg tablet Take 100 mg by mouth two (2) times a day. Provider, Historical   pantoprazole (PROTONIX) 40 mg tablet Take 40 mg by mouth daily. Provider, Historical   polyethylene glycol (Miralax) 17 gram/dose powder Take 17 g by mouth daily. Provider, Historical   therapeutic multivitamin (Thera) tablet Take 1 Tablet by mouth daily. Provider, Historical   furosemide (LASIX) 40 mg tablet Take 1 Tablet by mouth daily. 3/16/22   Michael Castellanos NP   potassium chloride SR (KLOR-CON 10) 10 mEq tablet Take 1 Tablet by mouth daily. Indications: prevention of low potassium in the blood 2/8/22   Jeffrey Jacobs NP   isosorbide mononitrate ER (IMDUR) 30 mg tablet Take 1 Tablet by mouth daily. 2/8/22   Jeffrey Jacobs NP   bisacodyL (DULCOLAX) 10 mg supp Insert 10 mg into rectum daily as needed for Constipation. Patient not taking: Reported on 3/17/2022 11/24/21   Joaquin Wall MD   famotidine (PEPCID) 20 mg tablet Take 1 Tablet by mouth every evening. Patient not taking: Reported on 3/17/2022 11/24/21   Joaquin Wall MD   acetaminophen (Tylenol Extra Strength) 500 mg tablet Take 500 mg by mouth every eight (8) hours as needed. 11/24/21   Joaquin Wall MD   sertraline (ZOLOFT) 25 mg tablet Take 1 Tablet by mouth daily. 11/25/21   Joaquin Wall MD   metoprolol tartrate (LOPRESSOR) 100 mg IR tablet TAKE 1 TABLET EVERY 12     HOURS  Patient not taking: Reported on 3/17/2022 10/19/21   Jeffrey Jacobs NP   apixaban (ELIQUIS) 2.5 mg tablet Take 1 Tablet by mouth two (2) times a day. 8/5/21   Jeffrey Jacobs, HANNAH   sertraline (ZOLOFT) 50 mg tablet Take 1 Tablet by mouth daily.   Patient not taking: Reported on 3/17/2022 8/5/21   Jeffrey Jacobs NP   lovastatin (MEVACOR) 20 mg tablet TAKE 1 TABLET BY MOUTH  NIGHTLY 3/3/21 Jeffrey Jacobs, NP   melatonin 10 mg tab Take 10 mg by mouth nightly. 2/8/21   Jeffrey Jacobs, NP   ascorbic acid (VITAMIN C PO) Take 1,000 mg by mouth daily. Patient not taking: Reported on 3/17/2022    Provider, Historical   cholecalciferol, vitamin D3, (VITAMIN D3 PO) Take 1,000 Units by mouth daily. Patient not taking: Reported on 3/17/2022    Provider, Historical   C/sourcherry/celery/grape seed (TART CHERRY PO) Take 465 mg by mouth daily. Patient not taking: Reported on 3/17/2022    Provider, Historical   pyridoxine, vitamin B6, (Vitamin B-6) 100 mg tablet Take 100 mg by mouth daily. Provider, Historical   magnesium 200 mg tab Take 1 Tablet by mouth daily. Patient not taking: Reported on 3/17/2022    Provider, Historical       Allergies:   Allergies   Allergen Reactions    Ambien [Zolpidem] Other (comments)     Legs got heavy    Aminobenzoic Acid Unknown (comments)    Bactrim [Sulfamethoprim Ds] Unknown (comments)     Patient cant remember her reaction    Hydroxyzine Other (comments)     Legs got heavy    Sulfamethoxazole-Trimethoprim Unknown (comments)     Other reaction(s): vomiting and abd pain       Social History:  Social History     Tobacco Use    Smoking status: Never Smoker    Smokeless tobacco: Never Used   Vaping Use    Vaping Use: Never used   Substance Use Topics    Alcohol use: No     Alcohol/week: 0.0 standard drinks    Drug use: Never       Family History:  Family History   Problem Relation Age of Onset    Cancer Mother         Leukemia    Heart Disease Mother     Cancer Sister         Ovarian    No Known Problems Brother     Heart Failure Brother     Cancer Brother         Brain tumor     ROS:  Total of 12 systems reviewed as follows:  POSITIVE= bolded text  Negative = text not bolded       General:  fever, chills, sweats, generalized weakness, weight loss/gain, loss of appetite   Eyes:    blurred vision, eye pain, loss of vision, double vision  ENT:    rhinorrhea, pharyngitis   Respiratory:  cough, sputum production, SOB, [place]EN[place]G, wheezing, pleuritic pain   Cardiology:   chest pain, palpitations, orthopnea, PND, edema, syncope   Gastrointestinal:  abdominal pain , N/V, diarrhea, dysphagia, constipation, bleeding   Genitourinary:  frequency, urgency, dysuria, hematuria, incontinence, prostatism   Muskuloskeletal: arthralgia, myalgia, back pain  Hematology:   easy bruising, nose or gum bleeding, lymphadenopathy   Dermatological: rash, ulceration, pruritis, color change / jaundice  Endocrine:   hot flashes or polydipsia   Neurological:  headache, dizziness, confusion, focal weakness, paresthesia, speech difficulties, memory                                      loss, gait difficulty  Psychological: feelings of anxiety, depression, agitation      PHYSICAL EXAM:  Patient Vitals for the past 24 hrs:   Temp Pulse Resp BP SpO2   04/07/22 1332 -- 96 -- 135/68 --   04/07/22 1307 -- 99 18 (!) 149/75 97 %   04/07/22 1240 -- (!) 105 -- (!) 152/82 --   04/07/22 1214 98 °F (36.7 °C) -- -- -- --   04/07/22 1152 -- (!) 111 20 (!) 131/97 99 %       General:    Alert, cooperative, no distress, appears stated age. Looks worried  HEENT: Atraumatic, anicteric sclerae, pink conjunctivae     No oral ulcers, mucosa moist, throat clear, dentition fair  Neck:  Supple, symmetrical;   thyroid non tender  Lungs:   Reduced air entry in the bases bilaterally. No wheezing or rhonchi right lower    . Rales  Bilateral, tachypnea mild labored breathing  Chest wall:  No tenderness. No accessory muscle use. Heart:   Regular rhythm. No  murmur. No edema  Abdomen:   Soft, non-tender. Not distended. Bowel sounds normal  Extremities: No cyanosis. No clubbing, 2+ pedal edema noted    Capillary refill normal,  Radial pulse 2+  Skin                  no rash or lesions       Not pale. Not jaundiced. No rashes   Psych:  Good insight. Not depressed. anxious , agitated. Neurologic: EOMs intact. No facial asymmetry. No aphasia or slurred speech. Symmetrical strength, Sensation grossly intact. Alert and oriented X 3. Lab Data Reviewed:    Recent Results (from the past 24 hour(s))   GLUCOSE, POC    Collection Time: 04/07/22 11:51 AM   Result Value Ref Range    Glucose (POC) 137 (H) 65 - 117 mg/dL    Performed by Lamar Mccormick (FITO)    EKG, 12 LEAD, INITIAL    Collection Time: 04/07/22 11:54 AM   Result Value Ref Range    Ventricular Rate 113 BPM    Atrial Rate 42 BPM    QRS Duration 126 ms    Q-T Interval 358 ms    QTC Calculation (Bezet) 491 ms    Calculated R Axis -77 degrees    Calculated T Axis 93 degrees    Diagnosis       Atrial fibrillation with rapid ventricular response with premature   ventricular or aberrantly conducted complexes  Left axis deviation  Nonspecific intraventricular block  Abnormal QRS-T angle, consider primary T wave abnormality  Abnormal ECG  When compared with ECG of 20-NOV-2021 10:14,  Nonspecific intraventricular block has replaced Left bundle branch block     CBC WITH AUTOMATED DIFF    Collection Time: 04/07/22 12:16 PM   Result Value Ref Range    WBC 7.1 3.6 - 11.0 K/uL    RBC 4.27 3.80 - 5.20 M/uL    HGB 12.2 11.5 - 16.0 g/dL    HCT 39.0 35.0 - 47.0 %    MCV 91.3 80.0 - 99.0 FL    MCH 28.6 26.0 - 34.0 PG    MCHC 31.3 30.0 - 36.5 g/dL    RDW 16.8 (H) 11.5 - 14.5 %    PLATELET 569 648 - 903 K/uL    MPV 10.8 8.9 - 12.9 FL    NRBC 0.0 0  WBC    ABSOLUTE NRBC 0.00 0.00 - 0.01 K/uL    NEUTROPHILS 73 32 - 75 %    LYMPHOCYTES 15 12 - 49 %    MONOCYTES 10 5 - 13 %    EOSINOPHILS 1 0 - 7 %    BASOPHILS 1 0 - 1 %    IMMATURE GRANULOCYTES 0 0.0 - 0.5 %    ABS. NEUTROPHILS 5.2 1.8 - 8.0 K/UL    ABS. LYMPHOCYTES 1.1 0.8 - 3.5 K/UL    ABS. MONOCYTES 0.7 0.0 - 1.0 K/UL    ABS. EOSINOPHILS 0.1 0.0 - 0.4 K/UL    ABS. BASOPHILS 0.1 0.0 - 0.1 K/UL    ABS. IMM.  GRANS. 0.0 0.00 - 0.04 K/UL    DF AUTOMATED     METABOLIC PANEL, COMPREHENSIVE    Collection Time: 04/07/22 12:16 PM   Result Value Ref Range    Sodium 143 136 - 145 mmol/L    Potassium 4.4 3.5 - 5.1 mmol/L    Chloride 106 97 - 108 mmol/L    CO2 30 21 - 32 mmol/L    Anion gap 7 5 - 15 mmol/L    Glucose 120 (H) 65 - 100 mg/dL    BUN 24 (H) 6 - 20 MG/DL    Creatinine 0.93 0.55 - 1.02 MG/DL    BUN/Creatinine ratio 26 (H) 12 - 20      GFR est AA >60 >60 ml/min/1.73m2    GFR est non-AA 56 (L) >60 ml/min/1.73m2    Calcium 9.3 8.5 - 10.1 MG/DL    Bilirubin, total 1.3 (H) 0.2 - 1.0 MG/DL    ALT (SGPT) 17 12 - 78 U/L    AST (SGOT) 24 15 - 37 U/L    Alk. phosphatase 104 45 - 117 U/L    Protein, total 7.5 6.4 - 8.2 g/dL    Albumin 3.2 (L) 3.5 - 5.0 g/dL    Globulin 4.3 (H) 2.0 - 4.0 g/dL    A-G Ratio 0.7 (L) 1.1 - 2.2     PROTHROMBIN TIME + INR    Collection Time: 04/07/22 12:16 PM   Result Value Ref Range    INR 1.3 (H) 0.9 - 1.1      Prothrombin time 13.1 (H) 9.0 - 11.1 sec   LACTIC ACID    Collection Time: 04/07/22 12:16 PM   Result Value Ref Range    Lactic acid 1.4 0.4 - 2.0 MMOL/L   TROPONIN-HIGH SENSITIVITY    Collection Time: 04/07/22 12:16 PM   Result Value Ref Range    Troponin-High Sensitivity 24 0 - 51 ng/L   NT-PRO BNP    Collection Time: 04/07/22 12:16 PM   Result Value Ref Range    NT pro-BNP 9,861 (H) 0 - 450 PG/ML   SAMPLES BEING HELD    Collection Time: 04/07/22 12:16 PM   Result Value Ref Range    SAMPLES BEING HELD 1SST     COMMENT        Add-on orders for these samples will be processed based on acceptable specimen integrity and analyte stability, which may vary by analyte. EKG: Atrial fibrillation with rapid rate,  normal QRS duration, no acute ST-T wave change, no acute ischemia  Radiology:  XR CHEST SNGL V   Final Result   Bilateral pleural effusions, right greater than left with right   basilar consolidation. CT HEAD WO CONT   Final Result   1. No evidence of intracranial hemorrhage. 2. Presence of subtle periventricular low density compatible with white matter   disease.    3. Interval resolution of the previously described right posterior parietal   scalp soft tissue swelling. Echocardiography done June 2018 :  LEFT VENTRICLE: Size was normal. Systolic function was normal. Ejection  fraction was estimated in the range of 65 % to 70 %. There were no  regional wall motion abnormalities. Wall thickness was normal. DOPPLER:  The study was not technically sufficient to allow evaluation of LV  diastolic function.  RIGHT VENTRICLE: The size was normal. Systolic function was normal.     ASSESSMENT / PLAN    1. Congestive heart failure     Symptoms include shortness  of breath, tachypnea  with low saturation of 85% . Exam revealed bilateral pulmonary crackles and bilateral lower extremity pitting. Labs revealed BNP of 9,861. Continue oxygen therapy, start Lasix 40 mg IV every 8 hour, continue potassium supplement. Close monitoring of fluid  input output. Continue isosorbide mononitrate ER 30 mg daily    2. Pneumonia: The patient has cough shortness of breath wheezing tachycardia. Exam revealed decreased air entry and wheezing hypoxia with oxygen saturation  84%. Chest x-ray showed basal consolidation. The first dose of antibiotic Rocephin 2 g IV x1 and Zithromax 500 mg IV in the ER. Continue Rocephin and  Zithromax. Adjust  Oxygen to keep O2 sat more than 92%. Add  antitussive Tylenol as needed    3. Hypoxia: Secondary to both fluid overload and lung infection. Expect hypoxemia to improve was pneumonia and CHF is under control    4. Confusion: Improved, CT scan of the head revealed no acute finding. No focal neuro motor or sensory deficit. No symptoms of headache or neck stiffness to suggest meningitis. Confusion is most likely secondary to the acute illness. Continue close neuro monitoring     4. Atrial fibrillation ; The patient ha and an episode of tachycardia in the ER that responded to Cardizem. Current heart rate is under 100/abelino   Continue telemetry monitoring and  Apixaban.   Consider Cardizem if developed RVR    5. Hypertension: Controlled pressure is 118/70 continue home meds labetalol 100 mg tbid  6. Hyperlipidemia continue statin therapy: Lipitor 10 mg daily  7. Anxiety: Stable,  continue Zoloft 25    SAFETY: In place  Code Status:  DVT prophylaxis: Continue apixaban  Stress Ulcer prophylaxis: Continue proton  Bladder catheter: No  Family Contact Info:  Primary Emergency Contact: milligan,claudia, Ivan Phone: [phone]  Lilo Punt [facility] Room ED02/02  Disposition: TBD, likely home when stable  Admission status: Patient resides in long-term facility    -Tentative plan of care discussed with patient / family, who demonstrated understanding and is in agreement. -  Discussed with RN. -Time spent with the patient is 75 minutes with more than 50% of this in direct patient care, counseling, reviewing previous record and coordination of care.  -Complex decision making was performed, which includes reviewing the patient's available past medical records, laboratory results, and x-ray films.      Gato Nogueira MD  [facility] Hospitalist  [phone]

## 2022-04-07 NOTE — ED PROVIDER NOTES
EMERGENCY DEPARTMENT HISTORY AND PHYSICAL EXAM          Date: 4/7/2022  Patient Name: Allie Inman    History of Presenting Illness     Chief Complaint   Patient presents with    Altered mental status       History Provided By: Patient    HPI: Allie Inman is a 80 y.o. female, pmhx hypertension, high cholesterol, A. fib on Eliquis, who presents via EMS to the ED c/o confusion    Unknown last known well as nobody from her home facility knows the last time patient was seen (or they could not communicate this with the paramedics that are here); they did not call the ER to provide us report. This morning at 11 AM they found her infused with thick speech. Patient unable to provide HPI and ROS about today's events but she states she has been feeling poorly for few days with a cough. She does also note that she feels short of breath  On medic arrival they found her awake and alert and answering appropriately. They noted her oxygen to be 85% her respiratory rate to be increased and her heart rate to be elevated. They put her on nasal cannula oxygen and transported without event. PCP: Anshu Poole NP    Allergies: Multiple  Social: Former tobacco, lives at [facility] living  There are no other complaints, changes, or physical findings at this time.      Current Facility-Administered Medications   Medication Dose Route Frequency Provider Last Rate Last Admin    sodium chloride (NS) flush 5-10 mL  5-10 mL IntraVENous PRN Poonam Price MD        cefTRIAXone (ROCEPHIN) 2 g in 0.9% sodium chloride (MBP/ADV) 50 mL MBP  2 g IntraVENous Q24H Poonam Price MD   IV Completed at 04/07/22 1332    azithromycin (ZITHROMAX) 500 mg in 0.9% sodium chloride 250 mL (VIAL-MATE)  500 mg IntraVENous Q24H Poonam Price  mL/hr at 04/07/22 1335 500 mg at 04/07/22 1335    acetaminophen (TYLENOL) tablet 650 mg  650 mg Oral Q6H PRN Poonam Price MD         Current Outpatient Medications   Medication Sig Dispense Refill    labetaloL (NORMODYNE) 100 mg tablet Take 100 mg by mouth two (2) times a day.  pantoprazole (PROTONIX) 40 mg tablet Take 40 mg by mouth daily.  polyethylene glycol (Miralax) 17 gram/dose powder Take 17 g by mouth daily.  therapeutic multivitamin (Thera) tablet Take 1 Tablet by mouth daily.  furosemide (LASIX) 40 mg tablet Take 1 Tablet by mouth daily. 90 Tablet 1    potassium chloride SR (KLOR-CON 10) 10 mEq tablet Take 1 Tablet by mouth daily. Indications: prevention of low potassium in the blood 90 Tablet 1    isosorbide mononitrate ER (IMDUR) 30 mg tablet Take 1 Tablet by mouth daily. 90 Tablet 1    bisacodyL (DULCOLAX) 10 mg supp Insert 10 mg into rectum daily as needed for Constipation. (Patient not taking: Reported on 3/17/2022)      famotidine (PEPCID) 20 mg tablet Take 1 Tablet by mouth every evening. (Patient not taking: Reported on 3/17/2022)      acetaminophen (Tylenol Extra Strength) 500 mg tablet Take 500 mg by mouth every eight (8) hours as needed.  sertraline (ZOLOFT) 25 mg tablet Take 1 Tablet by mouth daily. 30 Tablet 0    metoprolol tartrate (LOPRESSOR) 100 mg IR tablet TAKE 1 TABLET EVERY 12     HOURS (Patient not taking: Reported on 3/17/2022) 90 Tablet 1    apixaban (ELIQUIS) 2.5 mg tablet Take 1 Tablet by mouth two (2) times a day. 180 Tablet 1    sertraline (ZOLOFT) 50 mg tablet Take 1 Tablet by mouth daily. (Patient not taking: Reported on 3/17/2022) 90 Tablet 1    lovastatin (MEVACOR) 20 mg tablet TAKE 1 TABLET BY MOUTH  NIGHTLY 90 Tab 3    melatonin 10 mg tab Take 10 mg by mouth nightly. 90 Tab 1    ascorbic acid (VITAMIN C PO) Take 1,000 mg by mouth daily. (Patient not taking: Reported on 3/17/2022)      cholecalciferol, vitamin D3, (VITAMIN D3 PO) Take 1,000 Units by mouth daily. (Patient not taking: Reported on 3/17/2022)      C/sourcherry/celery/grape seed (TART CHERRY PO) Take 465 mg by mouth daily. (Patient not taking: Reported on 3/17/2022)      pyridoxine, vitamin B6, (Vitamin B-6) 100 mg tablet Take 100 mg by mouth daily.  magnesium 200 mg tab Take 1 Tablet by mouth daily. (Patient not taking: Reported on 3/17/2022)         Past History     Past Medical History:  Past Medical History:   Diagnosis Date    Colitis 2013    C diff; no recent flare, normal colonoscopy 2014    DJD (degenerative joint disease)     Fracture, femoral ([address] 09/2005    HTN (hypertension)     Hyperlipemia     Persistent atrial fibrillation (HCC) 2018    Ventricular ectopy     no symptoms       Past Surgical History:  Past Surgical History:   Procedure Laterality Date    HX CATARACT REMOVAL      bilat    HX COLONOSCOPY  2014    WNL    HX ORTHOPAEDIC  2005    right femur fx    HX ORTHOPAEDIC  2012    Lt TKA    HX REFRACTIVE SURGERY  06/15/2017    rt       Family History:  Family History   Problem Relation Age of Onset    Cancer Mother         Leukemia    Heart Disease Mother     Cancer Sister         Ovarian    No Known Problems Brother     Heart Failure Brother     Cancer Brother         Brain tumor       Social History:  Social History     Tobacco Use    Smoking status: Never Smoker    Smokeless tobacco: Never Used   Vaping Use    Vaping Use: Never used   Substance Use Topics    Alcohol use: No     Alcohol/week: 0.0 standard drinks    Drug use: Never       Allergies: Allergies   Allergen Reactions    Ambien [Zolpidem] Other (comments)     Legs got heavy    Aminobenzoic Acid Unknown (comments)    Bactrim [Sulfamethoprim Ds] Unknown (comments)     Patient cant remember her reaction    Hydroxyzine Other (comments)     Legs got heavy    Sulfamethoxazole-Trimethoprim Unknown (comments)     Other reaction(s): vomiting and abd pain         Review of Systems   Review of Systems   Constitutional: Negative for activity change, appetite change, chills, fever and unexpected weight change.    HENT: Negative for congestion. Eyes: Negative for pain and visual disturbance. Respiratory: Positive for cough and shortness of breath. Cardiovascular: Negative for chest pain. Gastrointestinal: Negative for abdominal pain, diarrhea, nausea and vomiting. Genitourinary: Negative for dysuria. Musculoskeletal: Negative for back pain. Skin: Negative for rash. Neurological: Negative for headaches. Psychiatric/Behavioral: Positive for confusion (According to [facility] staff). Physical Exam   Physical Exam  Vitals and nursing note reviewed. Constitutional:       Appearance: She is well-developed. She is not diaphoretic. HENT:      Head: Normocephalic and atraumatic. Eyes:      General:         Right eye: No discharge. Left eye: No discharge. Conjunctiva/sclera: Conjunctivae normal.      Pupils: Pupils are equal, round, and reactive to light. Cardiovascular:      Rate and Rhythm: Tachycardia present. Rhythm irregular. Heart sounds: Normal heart sounds. No murmur heard. No friction rub. Comments: Bilateral lower extremity edema does appear left greater than right  Pulmonary:      Effort: Tachypnea and accessory muscle usage present. No respiratory distress. Breath sounds: Decreased air movement present. Wheezing (Left lower to mid lung) and rales (Bilateral bases) present. Abdominal:      General: Bowel sounds are normal. There is no distension. Palpations: Abdomen is soft. Tenderness: There is no abdominal tenderness. Musculoskeletal:         General: No swelling or tenderness. Normal range of motion. Cervical back: Normal range of motion and neck supple. Skin:     General: Skin is warm and dry. Coloration: Skin is not pale. Findings: No rash. Neurological:      Mental Status: She is alert and oriented to person, place, and time. GCS: GCS eye subscore is 4. GCS verbal subscore is 5. GCS motor subscore is 6.       Cranial Nerves: No cranial nerve deficit, dysarthria or facial asymmetry. Motor: No weakness or abnormal muscle tone. Diagnostic Study Results     Labs -     Recent Results (from the past 12 hour(s))   GLUCOSE, POC    Collection Time: 04/07/22 11:51 AM   Result Value Ref Range    Glucose (POC) 137 (H) 65 - 117 mg/dL    Performed by Hasmukh Tee (RN)    EKG, 12 LEAD, INITIAL    Collection Time: 04/07/22 11:54 AM   Result Value Ref Range    Ventricular Rate 113 BPM    Atrial Rate 42 BPM    QRS Duration 126 ms    Q-T Interval 358 ms    QTC Calculation (Bezet) 491 ms    Calculated R Axis -77 degrees    Calculated T Axis 93 degrees    Diagnosis       Atrial fibrillation with rapid ventricular response with premature   ventricular or aberrantly conducted complexes  Left axis deviation  Nonspecific intraventricular block  Abnormal QRS-T angle, consider primary T wave abnormality  Abnormal ECG  When compared with ECG of 20-NOV-2021 10:14,  Nonspecific intraventricular block has replaced Left bundle branch block     CBC WITH AUTOMATED DIFF    Collection Time: 04/07/22 12:16 PM   Result Value Ref Range    WBC 7.1 3.6 - 11.0 K/uL    RBC 4.27 3.80 - 5.20 M/uL    HGB 12.2 11.5 - 16.0 g/dL    HCT 39.0 35.0 - 47.0 %    MCV 91.3 80.0 - 99.0 FL    MCH 28.6 26.0 - 34.0 PG    MCHC 31.3 30.0 - 36.5 g/dL    RDW 16.8 (H) 11.5 - 14.5 %    PLATELET 185 855 - 292 K/uL    MPV 10.8 8.9 - 12.9 FL    NRBC 0.0 0  WBC    ABSOLUTE NRBC 0.00 0.00 - 0.01 K/uL    NEUTROPHILS 73 32 - 75 %    LYMPHOCYTES 15 12 - 49 %    MONOCYTES 10 5 - 13 %    EOSINOPHILS 1 0 - 7 %    BASOPHILS 1 0 - 1 %    IMMATURE GRANULOCYTES 0 0.0 - 0.5 %    ABS. NEUTROPHILS 5.2 1.8 - 8.0 K/UL    ABS. LYMPHOCYTES 1.1 0.8 - 3.5 K/UL    ABS. MONOCYTES 0.7 0.0 - 1.0 K/UL    ABS. EOSINOPHILS 0.1 0.0 - 0.4 K/UL    ABS. BASOPHILS 0.1 0.0 - 0.1 K/UL    ABS. IMM.  GRANS. 0.0 0.00 - 0.04 K/UL    DF AUTOMATED     METABOLIC PANEL, COMPREHENSIVE    Collection Time: 04/07/22 12:16 PM   Result Value Ref Range    Sodium 143 136 - 145 mmol/L    Potassium 4.4 3.5 - 5.1 mmol/L    Chloride 106 97 - 108 mmol/L    CO2 30 21 - 32 mmol/L    Anion gap 7 5 - 15 mmol/L    Glucose 120 (H) 65 - 100 mg/dL    BUN 24 (H) 6 - 20 MG/DL    Creatinine 0.93 0.55 - 1.02 MG/DL    BUN/Creatinine ratio 26 (H) 12 - 20      GFR est AA >60 >60 ml/min/1.73m2    GFR est non-AA 56 (L) >60 ml/min/1.73m2    Calcium 9.3 8.5 - 10.1 MG/DL    Bilirubin, total 1.3 (H) 0.2 - 1.0 MG/DL    ALT (SGPT) 17 12 - 78 U/L    AST (SGOT) 24 15 - 37 U/L    Alk. phosphatase 104 45 - 117 U/L    Protein, total 7.5 6.4 - 8.2 g/dL    Albumin 3.2 (L) 3.5 - 5.0 g/dL    Globulin 4.3 (H) 2.0 - 4.0 g/dL    A-G Ratio 0.7 (L) 1.1 - 2.2     PROTHROMBIN TIME + INR    Collection Time: 04/07/22 12:16 PM   Result Value Ref Range    INR 1.3 (H) 0.9 - 1.1      Prothrombin time 13.1 (H) 9.0 - 11.1 sec   LACTIC ACID    Collection Time: 04/07/22 12:16 PM   Result Value Ref Range    Lactic acid 1.4 0.4 - 2.0 MMOL/L   TROPONIN-HIGH SENSITIVITY    Collection Time: 04/07/22 12:16 PM   Result Value Ref Range    Troponin-High Sensitivity 24 0 - 51 ng/L   NT-PRO BNP    Collection Time: 04/07/22 12:16 PM   Result Value Ref Range    NT pro-BNP 9,861 (H) 0 - 450 PG/ML   SAMPLES BEING HELD    Collection Time: 04/07/22 12:16 PM   Result Value Ref Range    SAMPLES BEING HELD 1SST     COMMENT        Add-on orders for these samples will be processed based on acceptable specimen integrity and analyte stability, which may vary by analyte. Radiologic Studies -   XR CHEST SNGL V   Final Result   Bilateral pleural effusions, right greater than left with right   basilar consolidation. CT HEAD WO CONT   Final Result   1. No evidence of intracranial hemorrhage. 2. Presence of subtle periventricular low density compatible with white matter   disease. 3. Interval resolution of the previously described right posterior parietal   scalp soft tissue swelling.         CT Results  (Last 48 hours) 04/07/22 1204  CT HEAD WO CONT Final result    Impression:  1. No evidence of intracranial hemorrhage. 2. Presence of subtle periventricular low density compatible with white matter   disease. 3. Interval resolution of the previously described right posterior parietal   scalp soft tissue swelling. Narrative:  EXAM: CT HEAD WO CONT       INDICATION: ams       COMPARISON: CT brain dated 11/19/2021. CONTRAST: None. TECHNIQUE: Unenhanced CT of the head was performed using 5 mm images. Brain and   bone windows were generated. CT dose reduction was achieved through use of a   standardized protocol tailored for this examination and automatic exposure   control for dose modulation. FINDINGS:   No calvarial abnormalities are detected. There has been interval resolution of   the previously described focal scalp swelling in the right posterior parietal   region. Some periventricular low density is present. This is compatible with   white matter disease. There is deviation of the anterior aspect of the nasal   septum towards the right. CXR Results  (Last 48 hours)               04/07/22 1208  XR CHEST SNGL V Final result    Impression:  Bilateral pleural effusions, right greater than left with right   basilar consolidation. Narrative:  EXAM: XR CHEST SNGL V       INDICATION: Eval for Infiltrate       COMPARISON: January 25       FINDINGS: A portable AP radiograph of the chest was obtained at 1205 hours. The   patient is on a cardiac monitor. There are bilateral pleural effusions, greater   on the right with consolidation in the right lower lobe. There is   atherosclerosis of the aorta. Degenerative changes of the shoulders. Medical Decision Making   I am the first provider for this patient. I reviewed the vital signs, available nursing notes, past medical history, past surgical history, family history and social history.     Vital Signs-Reviewed the patient's vital signs. Patient Vitals for the past 12 hrs:   Temp Pulse Resp BP SpO2   04/07/22 1332 -- 96 -- 135/68 --   04/07/22 1307 -- 99 18 (!) 149/75 97 %   04/07/22 1240 -- (!) 105 -- (!) 152/82 --   04/07/22 1214 98 °F (36.7 °C) -- -- -- --   04/07/22 1152 -- (!) 111 20 (!) 131/97 99 %       Pulse Oximetry Analysis - 98% on 2 L nasal cannula    Cardiac Monitor:   Rate: 111bpm  Rhythm: Atrial Fibrillation      Records Reviewed: Nursing Notes, Old Medical Records, Previous electrocardiograms, Ambulance Run Sheet, Previous Radiology Studies and Previous Laboratory Studies    Provider Notes (Medical Decision Making):   MDM: Elderly female sent to the ER for thick speech with confusion. Medics found her hypoxic and placed on oxygen. On arrival here her neurologic exam is without acute findings. She is able to provide appropriate history and answer questions appropriately. She follows commands without any focal deficits exhibited. She does appear to be in respiratory distress despite being on oxygen nasal cannula. On exam she appears peripherally overloaded and is tachycardic and irregular rhythm. Broad evaluation to be completed to include differential of TIA, neurocardiogenic heart failure, heart failure, pneumonia, sepsis. Given her fluid overload on exam I do not feel fluids are warranted at this time as she is not hypotensive and does not appear in hypovolemic shock. ED Course:   Initial assessment performed. The patients presenting problems have been discussed, and they are in agreement with the care plan formulated and outlined with them. I have encouraged them to ask questions as they arise throughout their visit. EKG interpretation: (Preliminary)  Rhythm: Atrial fibrillation at a rate of 113 bpm with occasional PVCs; widened QRS consistent with an intraventricular block; prolonged QTc interval; left axis deviation.   This EKG was interpreted by ED Provider Bryan Soto MD    PROGRESS NOTE:    ED Course as of 04/07/22 1415   Thu Apr 07, 2022   1230 X-ray with bilateral pleural effusions as well as what appears to be a developing infiltrate on the right. Antibiotics to be added to previous labs. Await cardiac enzymes but CBC and chemistry without any acute abnormalities. [JT]      ED Course User Index  [JT] Lucy Price MD      1:30 PM  Patient continues resting comfortably. After diltiazem x1 heart rate is ranging mid 90s to low 100s. Blood pressure remained stable and oxygen is stable on 2 L nasal cannula. BNP significantly elevated and IV diuretics were provided. Will discuss with hospitalist for admission given normal troponin. CONSULT NOTE:   2:00 PM  Raymond Ramos MD spoke with Dr. Pam Germain  Specialty: Hospitalist  Discussed pt's hx, disposition, and available diagnostic and imaging results. Reviewed care plans. Consultant agrees with plans as outlined. She will evaluate patient for admission. Critical Care Time:   0      Diagnosis     Clinical Impression:   1. Acute pulmonary edema (HCC)    2. Hypoxemia        PLAN:  Admission for further management and care      Please note, this dictation was completed with [employer], the [employer] voice recognition software. Quite often unanticipated grammatical, syntax, homophones, and other interpretive errors are inadvertently transcribed by the computer software. Please disregard these errors. Please excuse any errors that have escaped final proof reading.

## 2022-04-07 NOTE — PROGRESS NOTES
Physical Therapy Screening:    An [facility] screening referral was triggered for physical therapy based on results obtained during the nursing admission assessment. The patients chart was reviewed and the patient is appropriate for a skilled therapy evaluation if there is a decline in functional mobility from baseline. Please order a consult for physical therapy if you are in agreement and would like an evaluation to be completed. Thank you.     Octavio Martinez, PT

## 2022-04-07 NOTE — Clinical Note
Status[de-identified] INPATIENT [101]   Type of Bed: Telemetry [19]   Cardiac Monitoring Required?: Yes   Inpatient Hospitalization Certified Necessary for the Following Reasons: 9.  Other (further clarification in H&P documentation)   Admitting Diagnosis: Pulmonary edema [635638]   Admitting Diagnosis: A-fib [facility]) [[id number]]   Admitting Physician: Kaylan Zaragoza [facility] [address]   Attending Physician: Gilda Irwin [[id number]]   Estimated Length of Stay: < 96 Hours   Discharge Plan[de-identified] Other (Specify)

## 2022-04-07 NOTE — ED TRIAGE NOTES
Pt arrived by EMS for altered mental status with ? Facial droop and thick speech-slurred . Last well known time unknown but [employer] reports they noted a change in this patient at 11 AM. Pt arrived awake alert and oriented x 4, pt is [place], no facial droop noted and no speech abnormality noted. Pt does have a productive cough. Pt was placed on 4LNC by EMS PTA for room air SpO2 of 85%. SpO2 improvement to 97% with oxygen. Finger stick 164.   Pt educated on ER flow

## 2022-04-07 NOTE — ROUTINE PROCESS
Bedside and Verbal shift change report given to RUPA Villalpando RN (oncoming nurse) by Davonte Walsh RN (offgoing nurse). Report included the following information SBAR, ED Summary, MAR, Recent Results and Cardiac Rhythm A-Fib. Alba score 3  Bed/chair alarm is in use. If in use it is set at the highest volume.     SL  Patient Vitals for the past 12 hrs:   Temp Pulse Resp BP SpO2   04/07/22 1600 -- 94 -- -- --   04/07/22 1539 97.6 °F (36.4 °C) 98 20 (!) 151/80 100 %   04/07/22 1454 98 °F (36.7 °C) 100 18 118/70 97 %   04/07/22 1332 -- 96 -- 135/68 --   04/07/22 1307 -- 99 18 (!) 149/75 97 %   04/07/22 1240 -- (!) 105 -- (!) 152/82 --   04/07/22 1214 98 °F (36.7 °C) -- -- -- --   04/07/22 1152 -- (!) 111 20 (!) 131/97 99 %

## 2022-04-07 NOTE — ED NOTES
TRANSFER - OUT REPORT:    Verbal report given to [de-identified] M RN(name) on Haley Inman  being transferred to [facility](unit) for routine progression of care       Report consisted of patients Situation, Background, Assessment and   Recommendations(SBAR). Information from the following report(s) SBAR, ED Summary, Recent Results and Cardiac Rhythm afib was reviewed with the receiving nurse. Lines:   Peripheral IV 04/07/22 Right Antecubital (Active)   Site Assessment Clean, dry, & intact 04/07/22 1212   Phlebitis Assessment 0 04/07/22 1212   Infiltration Assessment 0 04/07/22 1212   Dressing Status Clean, dry, & intact 04/07/22 1212   Dressing Type 4 X 4 04/07/22 1212   Hub Color/Line Status Green 04/07/22 1212        Opportunity for questions and clarification was provided.       Patient transported with:   O2 @ 2 liters

## 2022-04-07 NOTE — ED NOTES
This writer attempted to reach pts Son and no answer. This writer called to [employer] and they have the same number we have on file, [employer] will attempt to reach this pts friend to see if she can reach this pts son.

## 2022-04-08 ENCOUNTER — APPOINTMENT (OUTPATIENT)
Dept: ULTRASOUND IMAGING | Age: 87
DRG: 291 | End: 2022-04-08
Attending: EMERGENCY MEDICINE
Payer: MEDICARE

## 2022-04-08 LAB
ANION GAP SERPL CALC-SCNC: 8 MMOL/L (ref 5–15)
BASOPHILS # BLD: 0 K/UL (ref 0–0.1)
BASOPHILS NFR BLD: 0 % (ref 0–1)
BUN SERPL-MCNC: 23 MG/DL (ref 6–20)
BUN/CREAT SERPL: 26 (ref 12–20)
CALCIUM SERPL-MCNC: 9 MG/DL (ref 8.5–10.1)
CHLORIDE SERPL-SCNC: 103 MMOL/L (ref 97–108)
CO2 SERPL-SCNC: 30 MMOL/L (ref 21–32)
CREAT SERPL-MCNC: 0.87 MG/DL (ref 0.55–1.02)
DIFFERENTIAL METHOD BLD: ABNORMAL
ECHO AO ROOT DIAM: 3.2 CM
ECHO AO ROOT INDEX: 1.88 CM/M2
ECHO AR MAX VEL PISA: 3.6 M/S
ECHO AV PEAK GRADIENT: 5 MMHG
ECHO AV PEAK VELOCITY: 1.2 M/S
ECHO AV REGURGITANT PHT: 460 MILLISECOND
ECHO EST RA PRESSURE: 10 MMHG
ECHO LA DIAMETER INDEX: 3 CM/M2
ECHO LA DIAMETER: 5.1 CM
ECHO LA TO AORTIC ROOT RATIO: 1.59
ECHO LA VOL 2C: 115 ML (ref 22–52)
ECHO LA VOL 4C: 88 ML (ref 22–52)
ECHO LA VOLUME AREA LENGTH: 107 ML
ECHO LA VOLUME INDEX A2C: 68 ML/M2 (ref 16–34)
ECHO LA VOLUME INDEX A4C: 52 ML/M2 (ref 16–34)
ECHO LA VOLUME INDEX AREA LENGTH: 63 ML/M2 (ref 16–34)
ECHO LV FRACTIONAL SHORTENING: 28 % (ref 28–44)
ECHO LV INTERNAL DIMENSION DIASTOLE INDEX: 2.35 CM/M2
ECHO LV INTERNAL DIMENSION DIASTOLIC: 4 CM (ref 3.9–5.3)
ECHO LV INTERNAL DIMENSION SYSTOLIC INDEX: 1.71 CM/M2
ECHO LV INTERNAL DIMENSION SYSTOLIC: 2.9 CM
ECHO LV IVSD: 1.1 CM (ref 0.6–0.9)
ECHO LV MASS 2D: 145.6 G (ref 67–162)
ECHO LV MASS INDEX 2D: 85.7 G/M2 (ref 43–95)
ECHO LV POSTERIOR WALL DIASTOLIC: 1.1 CM (ref 0.6–0.9)
ECHO LV RELATIVE WALL THICKNESS RATIO: 0.55
ECHO LVOT AREA: 3.1 CM2
ECHO LVOT DIAM: 2 CM
ECHO MV REGURGITANT ALIASING (NYQUIST) VELOCITY: 24 CM/S
ECHO MV REGURGITANT VELOCITY PISA: 4.4 M/S
ECHO MV REGURGITANT VTIA: 128.3 CM
ECHO RIGHT VENTRICULAR SYSTOLIC PRESSURE (RVSP): 64 MMHG
ECHO TV REGURGITANT MAX VELOCITY: 3.66 M/S
ECHO TV REGURGITANT PEAK GRADIENT: 54 MMHG
EOSINOPHIL # BLD: 0.1 K/UL (ref 0–0.4)
EOSINOPHIL NFR BLD: 1 % (ref 0–7)
ERYTHROCYTE [DISTWIDTH] IN BLOOD BY AUTOMATED COUNT: 16.7 % (ref 11.5–14.5)
GLUCOSE SERPL-MCNC: 93 MG/DL (ref 65–100)
HCT VFR BLD AUTO: 36.2 % (ref 35–47)
HGB BLD-MCNC: 11.6 G/DL (ref 11.5–16)
IMM GRANULOCYTES # BLD AUTO: 0 K/UL (ref 0–0.04)
IMM GRANULOCYTES NFR BLD AUTO: 0 % (ref 0–0.5)
LYMPHOCYTES # BLD: 0.9 K/UL (ref 0.8–3.5)
LYMPHOCYTES NFR BLD: 13 % (ref 12–49)
MCH RBC QN AUTO: 28.7 PG (ref 26–34)
MCHC RBC AUTO-ENTMCNC: 32 G/DL (ref 30–36.5)
MCV RBC AUTO: 89.6 FL (ref 80–99)
MONOCYTES # BLD: 0.7 K/UL (ref 0–1)
MONOCYTES NFR BLD: 10 % (ref 5–13)
NEUTS SEG # BLD: 5.3 K/UL (ref 1.8–8)
NEUTS SEG NFR BLD: 76 % (ref 32–75)
NRBC # BLD: 0 K/UL (ref 0–0.01)
NRBC BLD-RTO: 0 PER 100 WBC
PLATELET # BLD AUTO: 205 K/UL (ref 150–400)
PMV BLD AUTO: 11 FL (ref 8.9–12.9)
POTASSIUM SERPL-SCNC: 3.6 MMOL/L (ref 3.5–5.1)
RBC # BLD AUTO: 4.04 M/UL (ref 3.8–5.2)
SODIUM SERPL-SCNC: 141 MMOL/L (ref 136–145)
WBC # BLD AUTO: 7.1 K/UL (ref 3.6–11)

## 2022-04-08 PROCEDURE — 77030038269 HC DRN EXT URIN PURWCK BARD -A

## 2022-04-08 PROCEDURE — 93306 TTE W/DOPPLER COMPLETE: CPT

## 2022-04-08 PROCEDURE — 74011250636 HC RX REV CODE- 250/636: Performed by: INTERNAL MEDICINE

## 2022-04-08 PROCEDURE — 77010033678 HC OXYGEN DAILY

## 2022-04-08 PROCEDURE — 74011000250 HC RX REV CODE- 250: Performed by: INTERNAL MEDICINE

## 2022-04-08 PROCEDURE — 36415 COLL VENOUS BLD VENIPUNCTURE: CPT

## 2022-04-08 PROCEDURE — 85025 COMPLETE CBC W/AUTO DIFF WBC: CPT

## 2022-04-08 PROCEDURE — 93306 TTE W/DOPPLER COMPLETE: CPT | Performed by: INTERNAL MEDICINE

## 2022-04-08 PROCEDURE — 74011250637 HC RX REV CODE- 250/637: Performed by: INTERNAL MEDICINE

## 2022-04-08 PROCEDURE — 80048 BASIC METABOLIC PNL TOTAL CA: CPT

## 2022-04-08 PROCEDURE — 65660000000 HC RM CCU STEPDOWN

## 2022-04-08 PROCEDURE — 94760 N-INVAS EAR/PLS OXIMETRY 1: CPT

## 2022-04-08 PROCEDURE — 74011000258 HC RX REV CODE- 258: Performed by: INTERNAL MEDICINE

## 2022-04-08 PROCEDURE — 77030037878 HC DRSG MEPILEX >48IN BORD MOLN -B

## 2022-04-08 RX ORDER — AZITHROMYCIN 250 MG/1
500 TABLET, FILM COATED ORAL DAILY
Status: DISCONTINUED | OUTPATIENT
Start: 2022-04-08 | End: 2022-04-10

## 2022-04-08 RX ADMIN — FAMOTIDINE 20 MG: 20 TABLET, FILM COATED ORAL at 19:12

## 2022-04-08 RX ADMIN — LABETALOL HYDROCHLORIDE 100 MG: 100 TABLET, FILM COATED ORAL at 10:05

## 2022-04-08 RX ADMIN — ATORVASTATIN CALCIUM 10 MG: 10 TABLET, FILM COATED ORAL at 10:07

## 2022-04-08 RX ADMIN — CEFTRIAXONE SODIUM 2 G: 2 INJECTION, POWDER, FOR SOLUTION INTRAMUSCULAR; INTRAVENOUS at 14:38

## 2022-04-08 RX ADMIN — ISOSORBIDE MONONITRATE 30 MG: 30 TABLET, EXTENDED RELEASE ORAL at 10:04

## 2022-04-08 RX ADMIN — APIXABAN 2.5 MG: 2.5 TABLET, FILM COATED ORAL at 21:40

## 2022-04-08 RX ADMIN — SERTRALINE 25 MG: 50 TABLET, FILM COATED ORAL at 10:04

## 2022-04-08 RX ADMIN — LABETALOL HYDROCHLORIDE 100 MG: 100 TABLET, FILM COATED ORAL at 19:12

## 2022-04-08 RX ADMIN — CHOLECALCIFEROL (VITAMIN D3) 10 MCG (400 UNIT) TABLET 1000 UNITS: at 10:05

## 2022-04-08 RX ADMIN — SODIUM CHLORIDE, PRESERVATIVE FREE 10 ML: 5 INJECTION INTRAVENOUS at 05:19

## 2022-04-08 RX ADMIN — PANTOPRAZOLE SODIUM 40 MG: 40 TABLET, DELAYED RELEASE ORAL at 10:07

## 2022-04-08 RX ADMIN — APIXABAN 2.5 MG: 2.5 TABLET, FILM COATED ORAL at 10:07

## 2022-04-08 RX ADMIN — SODIUM CHLORIDE, PRESERVATIVE FREE 10 ML: 5 INJECTION INTRAVENOUS at 21:40

## 2022-04-08 RX ADMIN — Medication 200 MG: at 10:07

## 2022-04-08 RX ADMIN — AZITHROMYCIN MONOHYDRATE 500 MG: 250 TABLET ORAL at 14:39

## 2022-04-08 RX ADMIN — MULTIPLE VITAMINS W/ MINERALS TAB 1 TABLET: TAB at 10:07

## 2022-04-08 NOTE — PROGRESS NOTES
Bedside and Verbal shift change report given to DAWOOD Wilson (oncoming nurse) by DAWOOD Martinez (offgoing nurse). Report included the following information SBAR and Kardex. Alba score 3  Bed/chair alarm yes in use. If in use it is set at the highest volume. Intravenous fluids were administered, none   Patient Vitals for the past 12 hrs:   Temp Pulse Resp BP SpO2   04/08/22 1600 -- 89 -- -- --   04/08/22 1521 97.9 °F (36.6 °C) 88 20 137/77 96 %   04/08/22 1200 -- 98 -- -- --   04/08/22 1113 97.5 °F (36.4 °C) (!) 101 20 122/74 96 %   04/08/22 0800 -- (!) 102 -- -- --     No flowsheet data found. Lab results reviewed. For significant abnormal values and values requiring intervention, see assessment and plan. initiation of breastfeeding/breast milk feeding

## 2022-04-08 NOTE — PROGRESS NOTES
Problem: Falls - Risk of  Goal: *Absence of Falls  Description: Document Murphy Christie Fall Risk and appropriate interventions in the flowsheet.   Outcome: Progressing Towards Goal  Note: Fall Risk Interventions:  Mobility Interventions: Bed/chair exit alarm,Patient to call before getting OOB,Utilize walker, cane, or other assistive device         Medication Interventions: Bed/chair exit alarm,Patient to call before getting OOB    Elimination Interventions: Bed/chair exit alarm,Call light in reach,Patient to call for help with toileting needs

## 2022-04-08 NOTE — PROGRESS NOTES
Problem: Falls - Risk of  Goal: *Absence of Falls  Description: Document Karel Wynn Fall Risk and appropriate interventions in the flowsheet. Outcome: Progressing Towards Goal  Note: Fall Risk Interventions:  Mobility Interventions: Bed/chair exit alarm,Patient to call before getting OOB         Medication Interventions: Bed/chair exit alarm,Patient to call before getting OOB    Elimination Interventions: Call light in reach,Bed/chair exit alarm,Patient to call for help with toileting needs              Problem: Pressure Injury - Risk of  Goal: *Prevention of pressure injury  Description: Document Mike Scale and appropriate interventions in the flowsheet.   Outcome: Progressing Towards Goal  Note: Pressure Injury Interventions:  Sensory Interventions: Assess changes in LOC,Keep linens dry and wrinkle-free    Moisture Interventions: Internal/External urinary devices    Activity Interventions: Increase time out of bed,PT/OT evaluation    Mobility Interventions: PT/OT evaluation    Nutrition Interventions: Document food/fluid/supplement intake                     Problem: Fluid Volume - Risk of, Imbalanced  Goal: *Balanced intake and output  Outcome: Progressing Towards Goal

## 2022-04-08 NOTE — PROGRESS NOTES
Pts daughter in the room most of the shift today. Pt cooperative and pleasant. Remains on O2 at 2 liters. Heels elevated off of bed to prevent breakdown. . Pt noted to have yeast like reddened areas under both breasts, nursing antifungal ointment applied under each breast after cleansing area with soap and water.

## 2022-04-08 NOTE — PROGRESS NOTES
Care Management Interventions  PCP Verified by CM: Yes Ilan Sommers NP facility provider)  Palliative Care Criteria Met (RRAT>21 & CHF Dx)?: No (No MD order for Palliative Care)  Transition of Care Consult (CM Consult): Assisted Living  Discharge Durable Medical Equipment: No  Physical Therapy Consult: No  Occupational Therapy Consult: No  Speech Therapy Consult: No  Support Systems: Child(wen)  Discharge Location  Patient Expects to be Discharged to[de-identified] Assisted Living [facility])     Met with the patient and her daughter to review and discuss the plan of care and disposition plans. Patient is a resident at [facility]. The staff reported her being confused with thick speech. Patient will be returning to Assisted Living. Will assess the need for skilled care. Patient does have a DDNR that was faxed from the facility and will be placed in her chart. Care Management Letter with contact information given to the patient's daughter and encouraged her to call if she had any needs, issues, questions or concerns. 1st IMM notification letter was provided and explained at Patient Access by [facility]. Reason for Admission:  CHF and Pneumonia                     RUR Score:     11%  LOW                Plan for utilizing home health:          PCP: First and Last name:  Aden Gallardo NP   Patient is being followed by the facility provider, Nura Desai                       Current Advanced Directive/Advance Care Plan: DNR      Healthcare Decision Maker:   Click here to complete [address] including selection of the Healthcare Decision Maker Relationship (ie \"Primary\")             Primary Decision MakerChrisandbrandan Morgan Daughter - [phone]    Primary Decision Maker: Kishor Schmitt - [phone]                  Transition of Care Plan:                    Return to [place] [address]. (Assisted Living)    Advance Care Planning     General Advance Care Planning (ACP) Conversation      Date of Conversation: 4/7/2022  Conducted with: Patient with Decision Making Capacity and Healthcare Decision Maker: Next of Kin by law (only applies in absence of a Healthcare Power of  or Legal Guardian)    Healthcare Decision Maker:     Primary Decision Maker: milligan,claudia - Daughter - [phone]    Primary Decision Maker: Spring Garcia - [phone]  Click here to [address] including selection of the Healthcare Decision Maker Relationship (ie \"Primary\")      Today we documented desired Decision Maker(s), who is (are) NOT Legal Next of [address]. ACP documents are required for decision maker authority.     Content/Action Overview:   Has NO ACP documents/care preferences - information provided, considering goals and options  Reviewed DNR/DNI and patient confirms current DNR status - completed forms on file (place new order if needed)  Topics discussed: NA  Additional Comments: NA     Length of Voluntary ACP Conversation in minutes:  25 minutes    Ike Almonte

## 2022-04-08 NOTE — PROGRESS NOTES
Bedside shift change report given to SUKHWINDER Hussein RN (oncoming nurse) by Rob Garcia RN (offgoing nurse). Report included the following information Kardex and Intake/Output.

## 2022-04-08 NOTE — PROGRESS NOTES
[facility]  Hospitalist Progress Note    NAME: Zia Inman   :  3/7/1930   MRN:  [id number]     Total duration of encounter: 1 day    Main complaint on admission: Shortness of breath, congestion, confusion    Hospital course  80 y.o. female presenting for admission to [facility] for further evaluation and treatment for confusion and altered mental. She  has a past medical history of Colitis , Degenerative joint disease, Femoral Fracture,  HTN , Hyperlipemia, Persistent atrial fibrillation on anticoagulation with apixaban , and Ventricular ectopy. She presented with shortness of breath, cough ,and confusion. Initial evaluation revealed congestive heart failure and  Pneumonia. She was a started on Lasix 40 IV every 6 hours with good diuresis. Antibiotics Rocephin and Zithromax was continued to cover pneumo. She also has history of A. Fib with controlled rate, apixaban continued. .  She is doing better and reported improvement, no fever, shortness of breath is less, cough almost subsided. Subjective and review of system:  She was doing much better today. Duresed well with negative fluid balance. Good appetite.    Minimal cough, shortness of breath is much less today  No nausea or vomiting no diarrhea or consider, no chest pain, no abdominal pain, no joint pain or effusion, no skin rash, concerns 1year-old male with left, headache no neck pain or stiffness    Current Facility-Administered Medications:     azithromycin (ZITHROMAX) tablet 500 mg, 500 mg, Oral, DAILY, Eva Jaeger MD, 500 mg at 22 1439    sodium chloride (NS) flush 5-10 mL, 5-10 mL, IntraVENous, PRN, Emily Price MD    sodium chloride (NS) flush 5-40 mL, 5-40 mL, IntraVENous, Q8H, Eva Jaeger MD, 10 mL at 22 0519    sodium chloride (NS) flush 5-40 mL, 5-40 mL, IntraVENous, PRN, Eva Jaeger MD    acetaminophen (TYLENOL) tablet 650 mg, 650 mg, Oral, Q6H PRN **OR** acetaminophen (TYLENOL) suppository 650 mg, 650 mg, Rectal, Q6H PRN, Eva Jaeger MD    polyethylene glycol (MIRALAX) packet 17 g, 17 g, Oral, DAILY PRN, Eva Jaeger MD    ondansetron (ZOFRAN ODT) tablet 4 mg, 4 mg, Oral, Q8H PRN **OR** ondansetron (ZOFRAN) injection 4 mg, 4 mg, IntraVENous, Q6H PRN, Eva Jaeger MD    cefTRIAXone (ROCEPHIN) 2 g in 0.9% sodium chloride (MBP/ADV) 50 mL MBP, 2 g, IntraVENous, Q24H, Eva Jaeger MD, Last Rate: 100 mL/hr at 04/08/22 1438, 2 g at 04/08/22 1438    apixaban (ELIQUIS) tablet 2.5 mg, 2.5 mg, Oral, Q12H, Eva Jaeger MD, 2.5 mg at 04/08/22 1007    famotidine (PEPCID) tablet 20 mg, 20 mg, Oral, QPM, Eva Jaeger MD    bisacodyL (DULCOLAX) suppository 10 mg, 10 mg, Rectal, DAILY PRN, Eva Weston MD    isosorbide mononitrate ER (IMDUR) tablet 30 mg, 30 mg, Oral, DAILY, Eva Jaeger MD, 30 mg at 04/08/22 1004    atorvastatin (LIPITOR) tablet 10 mg, 10 mg, Oral, DAILY, Eva Jaeger MD, 10 mg at 04/08/22 1007    melatonin tablet 10 mg, 10 mg, Oral, QHS, Eva Jaeger MD, 10 mg at 04/07/22 2208    pantoprazole (PROTONIX) tablet 40 mg, 40 mg, Oral, DAILY, Eva Jaeger MD, 40 mg at 04/08/22 1007    polyethylene glycol (MIRALAX) packet 17 g, 17 g, Oral, DAILY, Eva Jaeger MD    magnesium oxide (MAG-OX) tablet 200 mg, 200 mg, Oral, DAILY, Eva Jaeger MD, 200 mg at 04/08/22 1007    labetaloL (NORMODYNE) tablet 100 mg, 100 mg, Oral, BID, Eva Jaeger MD, 100 mg at 04/08/22 1005    cholecalciferol (VITAMIN D3) (400 Units /10 mcg) tablet 1,000 Units, 1,000 Units, Oral, DAILY, Eva Jaeger MD, 1,000 Units at 04/08/22 1005    sertraline (ZOLOFT) tablet 25 mg, 25 mg, Oral, DAILY, Eva Jaeger MD, 25 mg at 04/08/22 1004    multivitamin, tx-iron-ca-min (THERA-M w/ IRON) tablet 1 Tablet, 1 Tablet, Oral, DAILY, Kaia Huizar MD, 1 Tablet at 04/08/22 1007    Objective:     VITALS:   Patient Vitals for the past 12 hrs:   Temp Pulse Resp BP SpO2   04/08/22 1600 -- 89 -- -- --   04/08/22 1521 97.9 °F (36.6 °C) 88 20 137/77 96 %   04/08/22 1200 -- 98 -- -- --   04/08/22 1113 97.5 °F (36.4 °C) (!) 101 20 122/74 96 %   04/08/22 0800 -- (!) 102 -- -- --   04/08/22 0725 97.4 °F (36.3 °C) 99 20 (!) 142/82 96 %   04/08/22 0721 -- -- -- -- 96 %       Intake/Output Summary (Last 24 hours) at 4/8/2022 1838  Last data filed at 4/8/2022 1521  Gross per 24 hour   Intake --   Output 2050 ml   Net -2050 ml      PHYSICAL EXAM:  General: WD, WN. Alert, cooperative, no acute distress    EENT:  EOMI. Anicteric sclerae. MMM  Resp:  Equal air entry bilateral, no wheezing . No accessory muscle use, no rhonchi, less rales compared to                         yesterday  CV:  Regular  rhythm,  1+ pedal edema  GI:  Soft, Non distended, Non tender. +Bowel sounds  Neurologic:  Alert and oriented X 3, normal speech,   Psych:   Good insight. Not anxious nor agitated  Skin:  No rashes. No jaundice    Recent Labs     04/08/22  0517 04/07/22  1216   WBC 7.1 7.1   HGB 11.6 12.2   HCT 36.2 39.0    233     Recent Labs     04/08/22  0517 04/07/22  1216    143   K 3.6 4.4    106   CO2 30 30   GLU 93 120*   BUN 23* 24*   CREA 0.87 0.93   CA 9.0 9.3   ALB  --  3.2*   TBILI  --  1.3*   ALT  --  17   INR  --  1.3*     RADIOLOGY:  Chest x-ray  04/07/2022 :  Bilateral pleural effusions, right greater than left with right  basilar consolidation. Assessment and plan  Acute decompensation of congestive heart failure   Pulmonary edema  Pneumonia with hypoxia   Confusion  Atrial fibrillation  Hypertension    Acute decompensation of congestive heart failure   The patient diuresed well, with negative fluid balance. Reported feeling a lot better , shortness of breath is less, able to sleep better at night. Continue Lasix 40 mg IV every 6 hours.    Pneumonia   Cough is much less, almost subsided  Continue Rocephin and Zithromax antibiotic   Hypoxia improved, saturation remained in the upper 90s with oxygen therapy 2 L nasal cannula. Atrial fibrillation   Rate remained controlled, continue apixaban  Confusion    Resolved, she alert and oriented oriented X 3. Hypertension: Controlled.   Anxiety : Stable the patient look a lot less anxious    SAFETY: In place  Code Status:  DVT prophylaxis: Continue apixaban  Stress Ulcer prophylaxis: Continue proton  Bladder catheter: No  Family Contact Info:  Primary Emergency Contact: milligan,sharona, Home Phone: [phone]  Stephanie Peters [facility] Room ED02/02  Disposition: TBD, likely home when stable  Admission status: Patient resides in long-term facility    Reviewed most current lab test results and cultures  YES  Reviewed most current radiology test results   YES  Review and summation of old records today    NO  Reviewed patient's current orders and MAR    YES  PMH/SH reviewed - no change compared to H&P    Discussed with the patient :  Time spent is 45-minute, with more than 50% in direct patient care counseling coordination of care    Signed: Karla Alves MD  [facility] Hospitalist  [phone]

## 2022-04-09 LAB
ALBUMIN SERPL-MCNC: 2.8 G/DL (ref 3.5–5)
ALBUMIN/GLOB SERPL: 0.7 {RATIO} (ref 1.1–2.2)
ALP SERPL-CCNC: 97 U/L (ref 45–117)
ALT SERPL-CCNC: 13 U/L (ref 12–78)
ANION GAP SERPL CALC-SCNC: 7 MMOL/L (ref 5–15)
AST SERPL-CCNC: 22 U/L (ref 15–37)
BASOPHILS # BLD: 0 K/UL (ref 0–0.1)
BASOPHILS NFR BLD: 0 % (ref 0–1)
BILIRUB SERPL-MCNC: 1 MG/DL (ref 0.2–1)
BUN SERPL-MCNC: 20 MG/DL (ref 6–20)
BUN/CREAT SERPL: 21 (ref 12–20)
CALCIUM SERPL-MCNC: 8.3 MG/DL (ref 8.5–10.1)
CHLORIDE SERPL-SCNC: 102 MMOL/L (ref 97–108)
CO2 SERPL-SCNC: 31 MMOL/L (ref 21–32)
CREAT SERPL-MCNC: 0.96 MG/DL (ref 0.55–1.02)
DIFFERENTIAL METHOD BLD: ABNORMAL
EOSINOPHIL # BLD: 0.1 K/UL (ref 0–0.4)
EOSINOPHIL NFR BLD: 2 % (ref 0–7)
ERYTHROCYTE [DISTWIDTH] IN BLOOD BY AUTOMATED COUNT: 16.5 % (ref 11.5–14.5)
GLOBULIN SER CALC-MCNC: 4 G/DL (ref 2–4)
GLUCOSE SERPL-MCNC: 102 MG/DL (ref 65–100)
HCT VFR BLD AUTO: 34.8 % (ref 35–47)
HGB BLD-MCNC: 11 G/DL (ref 11.5–16)
IMM GRANULOCYTES # BLD AUTO: 0 K/UL (ref 0–0.04)
IMM GRANULOCYTES NFR BLD AUTO: 0 % (ref 0–0.5)
LYMPHOCYTES # BLD: 0.7 K/UL (ref 0.8–3.5)
LYMPHOCYTES NFR BLD: 11 % (ref 12–49)
MCH RBC QN AUTO: 28.9 PG (ref 26–34)
MCHC RBC AUTO-ENTMCNC: 31.6 G/DL (ref 30–36.5)
MCV RBC AUTO: 91.3 FL (ref 80–99)
MONOCYTES # BLD: 0.7 K/UL (ref 0–1)
MONOCYTES NFR BLD: 11 % (ref 5–13)
NEUTS SEG # BLD: 5.3 K/UL (ref 1.8–8)
NEUTS SEG NFR BLD: 76 % (ref 32–75)
NRBC # BLD: 0 K/UL (ref 0–0.01)
NRBC BLD-RTO: 0 PER 100 WBC
PLATELET # BLD AUTO: 194 K/UL (ref 150–400)
PMV BLD AUTO: 10.3 FL (ref 8.9–12.9)
POTASSIUM SERPL-SCNC: 3.9 MMOL/L (ref 3.5–5.1)
PROT SERPL-MCNC: 6.8 G/DL (ref 6.4–8.2)
RBC # BLD AUTO: 3.81 M/UL (ref 3.8–5.2)
RBC MORPH BLD: ABNORMAL
SODIUM SERPL-SCNC: 140 MMOL/L (ref 136–145)
VIT B12 SERPL-MCNC: 783 PG/ML (ref 193–986)
WBC # BLD AUTO: 6.8 K/UL (ref 3.6–11)

## 2022-04-09 PROCEDURE — 85025 COMPLETE CBC W/AUTO DIFF WBC: CPT

## 2022-04-09 PROCEDURE — 74011250636 HC RX REV CODE- 250/636: Performed by: INTERNAL MEDICINE

## 2022-04-09 PROCEDURE — 82607 VITAMIN B-12: CPT

## 2022-04-09 PROCEDURE — 74011250637 HC RX REV CODE- 250/637: Performed by: INTERNAL MEDICINE

## 2022-04-09 PROCEDURE — 80053 COMPREHEN METABOLIC PANEL: CPT

## 2022-04-09 PROCEDURE — 74011000250 HC RX REV CODE- 250: Performed by: INTERNAL MEDICINE

## 2022-04-09 PROCEDURE — 77010033678 HC OXYGEN DAILY

## 2022-04-09 PROCEDURE — 74011000258 HC RX REV CODE- 258: Performed by: INTERNAL MEDICINE

## 2022-04-09 PROCEDURE — 94760 N-INVAS EAR/PLS OXIMETRY 1: CPT

## 2022-04-09 PROCEDURE — 65660000000 HC RM CCU STEPDOWN

## 2022-04-09 RX ORDER — FUROSEMIDE 10 MG/ML
40 INJECTION INTRAMUSCULAR; INTRAVENOUS DAILY
Status: DISCONTINUED | OUTPATIENT
Start: 2022-04-10 | End: 2022-04-09

## 2022-04-09 RX ORDER — FUROSEMIDE 10 MG/ML
40 INJECTION INTRAMUSCULAR; INTRAVENOUS DAILY
Status: DISCONTINUED | OUTPATIENT
Start: 2022-04-09 | End: 2022-04-10

## 2022-04-09 RX ORDER — FLUCONAZOLE 2 MG/ML
200 INJECTION, SOLUTION INTRAVENOUS EVERY 24 HOURS
Status: DISCONTINUED | OUTPATIENT
Start: 2022-04-09 | End: 2022-04-10

## 2022-04-09 RX ADMIN — ATORVASTATIN CALCIUM 10 MG: 10 TABLET, FILM COATED ORAL at 10:26

## 2022-04-09 RX ADMIN — ISOSORBIDE MONONITRATE 30 MG: 30 TABLET, EXTENDED RELEASE ORAL at 10:26

## 2022-04-09 RX ADMIN — SODIUM CHLORIDE, PRESERVATIVE FREE 10 ML: 5 INJECTION INTRAVENOUS at 07:15

## 2022-04-09 RX ADMIN — FUROSEMIDE 40 MG: 10 INJECTION, SOLUTION INTRAMUSCULAR; INTRAVENOUS at 20:16

## 2022-04-09 RX ADMIN — SERTRALINE 25 MG: 50 TABLET, FILM COATED ORAL at 10:26

## 2022-04-09 RX ADMIN — APIXABAN 2.5 MG: 2.5 TABLET, FILM COATED ORAL at 10:26

## 2022-04-09 RX ADMIN — Medication 10 MG: at 20:16

## 2022-04-09 RX ADMIN — LABETALOL HYDROCHLORIDE 100 MG: 100 TABLET, FILM COATED ORAL at 17:28

## 2022-04-09 RX ADMIN — MULTIPLE VITAMINS W/ MINERALS TAB 1 TABLET: TAB at 10:25

## 2022-04-09 RX ADMIN — PANTOPRAZOLE SODIUM 40 MG: 40 TABLET, DELAYED RELEASE ORAL at 07:15

## 2022-04-09 RX ADMIN — APIXABAN 2.5 MG: 2.5 TABLET, FILM COATED ORAL at 20:17

## 2022-04-09 RX ADMIN — CEFTRIAXONE SODIUM 2 G: 2 INJECTION, POWDER, FOR SOLUTION INTRAMUSCULAR; INTRAVENOUS at 14:44

## 2022-04-09 RX ADMIN — SODIUM CHLORIDE, PRESERVATIVE FREE 10 ML: 5 INJECTION INTRAVENOUS at 20:25

## 2022-04-09 RX ADMIN — Medication 200 MG: at 10:27

## 2022-04-09 RX ADMIN — SODIUM CHLORIDE, PRESERVATIVE FREE 10 ML: 5 INJECTION INTRAVENOUS at 14:45

## 2022-04-09 RX ADMIN — FAMOTIDINE 20 MG: 20 TABLET, FILM COATED ORAL at 17:28

## 2022-04-09 RX ADMIN — AZITHROMYCIN MONOHYDRATE 500 MG: 250 TABLET ORAL at 10:24

## 2022-04-09 RX ADMIN — FLUCONAZOLE 200 MG: 200 INJECTION, SOLUTION INTRAVENOUS at 13:32

## 2022-04-09 RX ADMIN — CHOLECALCIFEROL (VITAMIN D3) 10 MCG (400 UNIT) TABLET 1000 UNITS: at 10:27

## 2022-04-09 RX ADMIN — LABETALOL HYDROCHLORIDE 100 MG: 100 TABLET, FILM COATED ORAL at 10:25

## 2022-04-09 RX ADMIN — POLYETHYLENE GLYCOL 3350 17 G: 17 POWDER, FOR SOLUTION ORAL at 10:23

## 2022-04-09 NOTE — PROGRESS NOTES
Problem: Pressure Injury - Risk of  Goal: *Prevention of pressure injury  Description: Document Mike Scale and appropriate interventions in the flowsheet.   Outcome: Progressing Towards Goal  Note: Pressure Injury Interventions:  Sensory Interventions: Assess changes in LOC,Float heels,Keep linens dry and wrinkle-free    Moisture Interventions: Absorbent underpads,Internal/External urinary devices    Activity Interventions: Increase time out of bed    Mobility Interventions: PT/OT evaluation    Nutrition Interventions: Document food/fluid/supplement intake,Offer support with meals,snacks and hydration    Friction and Shear Interventions: HOB 30 degrees or less,Lift sheet                Problem: Patient Education: Go to Patient Education Activity  Goal: Patient/Family Education  Outcome: Progressing Towards Goal

## 2022-04-09 NOTE — PROGRESS NOTES
Bedside shift change report given to ANDREW Owens (oncoming nurse) by Luisito Li (offgoing nurse). Report included the following information SBAR, Kardex and MAR.

## 2022-04-09 NOTE — PROGRESS NOTES
[facility]  Hospitalist Progress Note    NAME: Ember Inman   :  3/7/1930   MRN:  [id number]     Total duration of encounter: 2 days    Hospital course  80 y. o. female presenting for admission to [facility] for further evaluation and treatment for confusion and altered mental . She  has a past medical history of Colitis , DJD (degenerative joint disease), Femoral Fracture , HTN Hypertension, Hyperlipemia, Persistent atrial fibrillation , and Ventricular ectopy  She presented with shortness of breath and  confusion. Initial evaluation revealed congestive heart failure and  Pneumonia . Chest x-ray revealed pleural effusion pulmonary congestion, BNP is elevated  She is treated with Lasix 40 IV every 6 hours with good diuresis. Lasix is gradually reduced   Pneumonia treated with antibiotics Rocephin and Zithromax. Confusion resolved, it is most likely due to current acute pneumonia and CHF decompensated. No evidence of acute CVA . No focal sensorimotor deficit. She also has history of A. Fib with controlled rate, preadmission apixaban continue. She is doing better and improved   No further complaints or acute events     Subjective:     Chief Complaint / Reason for Physician Visit:  Discussed with RN, concerns  Subjective / review of system:   She was doing much better today. Diuresed well with negative fluid balance. Good appetite. Good bowel movement. Shortness of breath subsided a lot, almost resolved.    No cough , no nausea or vomiting, no abdominal pain no diarrhea    No chest pain, no abdominal pain, no acute joint pain or swelling,   No skin rash,no, headache no neck pain or stiffness    Current Facility-Administered Medications:     fluconazole (DIFLUCAN) 200mg/100 mL IVPB (premix), 200 mg, IntraVENous, Q24H, Eva Jaeger MD    azithromycin (ZITHROMAX) tablet 500 mg, 500 mg, Oral, DAILY, Eva Jaeger MD, 500 mg at 22 1039    sodium chloride (NS) flush 5-10 mL, 5-10 mL, IntraVENous, PRN, Vladimir Price MD    sodium chloride (NS) flush 5-40 mL, 5-40 mL, IntraVENous, Q8H, Eva Jaeger MD, 10 mL at 04/09/22 0715    sodium chloride (NS) flush 5-40 mL, 5-40 mL, IntraVENous, PRN, Eva Jaeger MD    acetaminophen (TYLENOL) tablet 650 mg, 650 mg, Oral, Q6H PRN **OR** acetaminophen (TYLENOL) suppository 650 mg, 650 mg, Rectal, Q6H PRN, Eva Jaeger MD    polyethylene glycol (MIRALAX) packet 17 g, 17 g, Oral, DAILY PRN, Eva Jaeger MD    ondansetron (ZOFRAN ODT) tablet 4 mg, 4 mg, Oral, Q8H PRN **OR** ondansetron (ZOFRAN) injection 4 mg, 4 mg, IntraVENous, Q6H PRN, Eva Jaeger MD    cefTRIAXone (ROCEPHIN) 2 g in 0.9% sodium chloride (MBP/ADV) 50 mL MBP, 2 g, IntraVENous, Q24H, Eva Jaeger MD, Last Rate: 100 mL/hr at 04/08/22 1438, 2 g at 04/08/22 1438    apixaban (ELIQUIS) tablet 2.5 mg, 2.5 mg, Oral, Q12H, Eva Jaeger MD, 2.5 mg at 04/08/22 2140    famotidine (PEPCID) tablet 20 mg, 20 mg, Oral, QPM, Eva Jaeger MD, 20 mg at 04/08/22 1912    bisacodyL (DULCOLAX) suppository 10 mg, 10 mg, Rectal, DAILY PRN, Eva Angel MD    isosorbide mononitrate ER (IMDUR) tablet 30 mg, 30 mg, Oral, DAILY, Eva Jaeger MD, 30 mg at 04/08/22 1004    atorvastatin (LIPITOR) tablet 10 mg, 10 mg, Oral, DAILY, Eva Jaeger MD, 10 mg at 04/08/22 1007    melatonin tablet 10 mg, 10 mg, Oral, QHS, Eva Jaeger MD, 10 mg at 04/07/22 2208    pantoprazole (PROTONIX) tablet 40 mg, 40 mg, Oral, DAILY, Eva Jaeger MD, 40 mg at 04/09/22 0715    polyethylene glycol (MIRALAX) packet 17 g, 17 g, Oral, DAILY, Eva Jaeger MD    magnesium oxide (MAG-OX) tablet 200 mg, 200 mg, Oral, DAILY, Eva Jaeger MD, 200 mg at 04/08/22 1007    labetaloL (NORMODYNE) tablet 100 mg, 100 mg, Oral, BID, Eva Jaeger MD, 100 mg at 04/08/22 1912    cholecalciferol (VITAMIN D3) (400 Units /10 mcg) tablet 1,000 Units, 1,000 Units, Oral, DAILY, Alton MUELLER MD, 1,000 Units at 04/08/22 1005    sertraline (ZOLOFT) tablet 25 mg, 25 mg, Oral, DAILY, Eva Jaeger MD, 25 mg at 04/08/22 1004    multivitamin, tx-iron-ca-min (THERA-M w/ IRON) tablet 1 Tablet, 1 Tablet, Oral, DAILY, Zain Valentin MD, 1 Tablet at 04/08/22 1007    Objective:     VITALS:   Patient Vitals for the past 12 hrs:   Temp Pulse Resp BP SpO2   04/09/22 0711 97.6 °F (36.4 °C) 92 20 (!) 148/80 96 %   04/09/22 0400 97.8 °F (36.6 °C) 92 20 131/74 94 %   04/08/22 2355 97.8 °F (36.6 °C) 96 18 126/67 93 %   04/08/22 2155 -- -- -- -- 96 %       Intake/Output Summary (Last 24 hours) at 4/9/2022 0842  Last data filed at 4/8/2022 1521  Gross per 24 hour   Intake --   Output 350 ml   Net -350 ml      PHYSICAL EXAM:  General: WD, WN. Alert, cooperative, no acute distress    EENT:  EOMI. Anicteric sclerae. MMM  Resp:  Equal air entry bilaterally, no wheezing , few  rales. No accessory muscle use  CV:  Regular  rhythm,  No edema  GI:  Soft, Non distended, Non tender. +Bowel sounds  Neurologic:  Alert and oriented X 3, normal speech,   Psych:   Good insight. Not anxious nor agitated  Skin:  No rashes. No jaundice    LABS[de-identified]  Recent Labs     04/08/22  0517 04/07/22  1216   WBC 7.1 7.1   HGB 11.6 12.2   HCT 36.2 39.0    233     Recent Labs     04/08/22  0517 04/07/22  1216    143   K 3.6 4.4    106   CO2 30 30   GLU 93 120*   BUN 23* 24*   CREA 0.87 0.93   CA 9.0 9.3   ALB  --  3.2*   TBILI  --  1.3*   ALT  --  17   INR  --  1.3*     RADIOLOGY:    Echo 04/08/2022:    Left Ventricle: Left ventricle size is normal. Normal wall thickness. There are regional wall motion abnormalities. Moderately reduced left ventricular systolic function with a visually estimated EF of 35 - 40%.   Right Ventricle: Right ventricle is mildly dilated. Severely reduced systolic function.   Aortic Valve: Mild sclerosis of the aortic valve cusp. Mild regurgitation. No stenosis.     Mitral Valve: Mild to moderate transvalvular regurgitation with a posterior directed jet.   Tricuspid Valve: Moderate to severe transvalvular regurgitation. Severely elevated RVSP. RVSP is 64 mmHg.   Left Atrium: Left atrium is severely dilated. Left atrial volume index is severely increased (>48 mL/m2).   Right Atrium: Right atrium is dilated. Assessment and plan      Acute decompensation of congestive heart failure       Pulmonary edema      Pneumonia with hypoxia       Confusion      Atrial fibrillation      Hypertension    Acute decompensation of congestive heart failure    The patient diuresed well, with negative fluid balance. Shortness of breath almost resolved, able to sleep better at night. Continue Lasix 40 mg IV, reduce dose to every 8 hours, then switch to p.o. Pneumonia   Responding well to on Rocephin and Zithromax . No cough no fever    Hypoxia    controlled. O2 sat  remained in the upper 90s with oxygen therapy 2 L nasal cannula. Atrial fibrillation   Rate remained controlled, continue apixaban  Confusion  Resolved, remain alert, awake, oriented x 3 with sensible conversation  Hypertension: Controlled.   Anxiety : Stable      SAFETY: In place  Code Status:  DVT prophylaxis: Continue apixaban  Stress Ulcer prophylaxis: Continue proton  Bladder catheter: No  Family Contact Info:  Primary Emergency Contact: milligan,claudia, Ivan Phone: [phone]  Bedded: [facility] Room ED02/02  Disposition: TBD, likely home when stable  Admission status: Patient resides in long-term facility     Reviewed most current lab test results and cultures  YES  Reviewed most current radiology test results   YES  Review and summation of old records today    NO  Reviewed patient's current orders and MAR    YES  PMH/SH reviewed - no change compared to H&P     Discussed with the patient :  Time spent is 45-minute, with more than 50% in direct patient care counseling coordination of care    Signed: Jessica Gorman MD  [facility] Hospitalist  [phone]

## 2022-04-10 LAB
ATRIAL RATE: 42 BPM
CALCULATED R AXIS, ECG10: -77 DEGREES
CALCULATED T AXIS, ECG11: 93 DEGREES
DIAGNOSIS, 93000: NORMAL
Q-T INTERVAL, ECG07: 358 MS
QRS DURATION, ECG06: 126 MS
QTC CALCULATION (BEZET), ECG08: 491 MS
VENTRICULAR RATE, ECG03: 113 BPM

## 2022-04-10 PROCEDURE — 77030038269 HC DRN EXT URIN PURWCK BARD -A

## 2022-04-10 PROCEDURE — 74011000250 HC RX REV CODE- 250: Performed by: INTERNAL MEDICINE

## 2022-04-10 PROCEDURE — 74011000258 HC RX REV CODE- 258: Performed by: INTERNAL MEDICINE

## 2022-04-10 PROCEDURE — 74011250637 HC RX REV CODE- 250/637: Performed by: INTERNAL MEDICINE

## 2022-04-10 PROCEDURE — 74011250636 HC RX REV CODE- 250/636: Performed by: INTERNAL MEDICINE

## 2022-04-10 PROCEDURE — 65660000000 HC RM CCU STEPDOWN

## 2022-04-10 PROCEDURE — 94760 N-INVAS EAR/PLS OXIMETRY 1: CPT

## 2022-04-10 PROCEDURE — 77010033678 HC OXYGEN DAILY

## 2022-04-10 RX ORDER — FUROSEMIDE 40 MG/1
40 TABLET ORAL 2 TIMES DAILY
Status: DISCONTINUED | OUTPATIENT
Start: 2022-04-10 | End: 2022-04-11 | Stop reason: HOSPADM

## 2022-04-10 RX ORDER — CEFUROXIME AXETIL 250 MG/1
500 TABLET ORAL EVERY 12 HOURS
Status: DISCONTINUED | OUTPATIENT
Start: 2022-04-10 | End: 2022-04-11 | Stop reason: HOSPADM

## 2022-04-10 RX ORDER — NYSTATIN 100000 [USP'U]/G
POWDER TOPICAL 2 TIMES DAILY
Status: DISCONTINUED | OUTPATIENT
Start: 2022-04-10 | End: 2022-04-11 | Stop reason: HOSPADM

## 2022-04-10 RX ADMIN — SERTRALINE 25 MG: 50 TABLET, FILM COATED ORAL at 09:01

## 2022-04-10 RX ADMIN — ATORVASTATIN CALCIUM 10 MG: 10 TABLET, FILM COATED ORAL at 09:02

## 2022-04-10 RX ADMIN — SODIUM CHLORIDE, PRESERVATIVE FREE 10 ML: 5 INJECTION INTRAVENOUS at 21:42

## 2022-04-10 RX ADMIN — FUROSEMIDE 40 MG: 40 TABLET ORAL at 21:39

## 2022-04-10 RX ADMIN — CEFUROXIME AXETIL 500 MG: 250 TABLET ORAL at 21:38

## 2022-04-10 RX ADMIN — PANTOPRAZOLE SODIUM 40 MG: 40 TABLET, DELAYED RELEASE ORAL at 07:06

## 2022-04-10 RX ADMIN — SODIUM CHLORIDE, PRESERVATIVE FREE 10 ML: 5 INJECTION INTRAVENOUS at 06:07

## 2022-04-10 RX ADMIN — Medication 200 MG: at 09:00

## 2022-04-10 RX ADMIN — POLYETHYLENE GLYCOL 3350 17 G: 17 POWDER, FOR SOLUTION ORAL at 09:00

## 2022-04-10 RX ADMIN — APIXABAN 2.5 MG: 2.5 TABLET, FILM COATED ORAL at 20:58

## 2022-04-10 RX ADMIN — NYSTATIN: 100000 POWDER TOPICAL at 13:39

## 2022-04-10 RX ADMIN — Medication 10 MG: at 21:40

## 2022-04-10 RX ADMIN — AZITHROMYCIN MONOHYDRATE 500 MG: 250 TABLET ORAL at 09:01

## 2022-04-10 RX ADMIN — SODIUM CHLORIDE, PRESERVATIVE FREE 10 ML: 5 INJECTION INTRAVENOUS at 14:00

## 2022-04-10 RX ADMIN — APIXABAN 2.5 MG: 2.5 TABLET, FILM COATED ORAL at 09:02

## 2022-04-10 RX ADMIN — CHOLECALCIFEROL (VITAMIN D3) 10 MCG (400 UNIT) TABLET 1000 UNITS: at 09:00

## 2022-04-10 RX ADMIN — FAMOTIDINE 20 MG: 20 TABLET, FILM COATED ORAL at 17:08

## 2022-04-10 RX ADMIN — ISOSORBIDE MONONITRATE 30 MG: 30 TABLET, EXTENDED RELEASE ORAL at 09:00

## 2022-04-10 RX ADMIN — LABETALOL HYDROCHLORIDE 100 MG: 100 TABLET, FILM COATED ORAL at 09:01

## 2022-04-10 RX ADMIN — CEFTRIAXONE SODIUM 2 G: 2 INJECTION, POWDER, FOR SOLUTION INTRAMUSCULAR; INTRAVENOUS at 13:38

## 2022-04-10 RX ADMIN — MULTIPLE VITAMINS W/ MINERALS TAB 1 TABLET: TAB at 09:00

## 2022-04-10 RX ADMIN — NYSTATIN: 100000 POWDER TOPICAL at 17:09

## 2022-04-10 RX ADMIN — LABETALOL HYDROCHLORIDE 100 MG: 100 TABLET, FILM COATED ORAL at 17:08

## 2022-04-10 NOTE — PROGRESS NOTES
Problem: Falls - Risk of  Goal: *Absence of Falls  Description: Document Brenna Byrd Fall Risk and appropriate interventions in the flowsheet. Outcome: Progressing Towards Goal  Note: Fall Risk Interventions:  Mobility Interventions: Bed/chair exit alarm,Patient to call before getting OOB,PT Consult for assist device competence,Utilize walker, cane, or other assistive device         Medication Interventions: Bed/chair exit alarm,Teach patient to arise slowly,Patient to call before getting OOB    Elimination Interventions: Bed/chair exit alarm,Call light in reach,Patient to call for help with toileting needs,Stay With Me (per policy)              Problem: Patient Education: Go to Patient Education Activity  Goal: Patient/Family Education  Outcome: Progressing Towards Goal     Problem: Pressure Injury - Risk of  Goal: *Prevention of pressure injury  Description: Document Mike Scale and appropriate interventions in the flowsheet.   Outcome: Progressing Towards Goal  Note: Pressure Injury Interventions:  Sensory Interventions: Assess changes in LOC,Float heels,Keep linens dry and wrinkle-free,Maintain/enhance activity level    Moisture Interventions: Absorbent underpads,Internal/External urinary devices    Activity Interventions: Increase time out of bed    Mobility Interventions: PT/OT evaluation    Nutrition Interventions: Document food/fluid/supplement intake,Offer support with meals,snacks and hydration    Friction and Shear Interventions: Apply protective barrier, creams and emollients,HOB 30 degrees or less                Problem: Patient Education: Go to Patient Education Activity  Goal: Patient/Family Education  Outcome: Progressing Towards Goal     Problem: Fluid Volume - Risk of, Imbalanced  Goal: *Balanced intake and output  Outcome: Progressing Towards Goal

## 2022-04-10 NOTE — PROGRESS NOTES
[facility]  Hospitalist Progress Note    NAME: Valarie Inman   :  3/7/1930   MRN:  [id number]     Total duration of encounter: 3 days    Hospital course  80 y. o. female presenting for admission to [facility] for further evaluation and treatment for confusion and altered mental . She  has a past medical history of Colitis , DJD (degenerative joint disease), Femoral Fracture , HTN Hypertension, Hyperlipemia, Persistent atrial fibrillation , and Ventricular ectopy  She presented with shortness of breath and  confusion.  Initial evaluation revealed congestive heart failure and  Pneumonia .  She is treated with Lasix 40 IV every 6 hours with good diuresis.  Antibiotics Rocephin and Zithromax.  She also has history of A. Fib with controlled rate and apixaban.  She is doing better and improved  Today she sitting on a chair. Says she feels fine. Denied cough or shortness of breath. Is not confused. Able to carry sensible conversation. Will switch all IV medications to oral form and plan discharge in the morning       Subjective:   Chief Complaint / Reason for Physician Visit[de-identified] Confusion/shortness of breath  Discussed with RN, concerns  Subjective / review of system:  She was doing much better today.  Duresed well with negative fluid balance.  Good appetite.  No cough no shortness of breath, no nausea or vomiting no diarrhea or consider, no chest pain, no abdominal pain, no joint pain or effusion, no skin rash, concerns 1year-old male with left, headache no neck pain or stiffness    Current Facility-Administered Medications:     nystatin (MYCOSTATIN) 100,000 unit/gram powder, , Topical, BID, vEa Jaeger MD, Given at 04/10/22 170    furosemide (LASIX) injection 40 mg, 40 mg, IntraVENous, DAILY, Eva Jaeger MD, 40 mg at 22    azithromycin (ZITHROMAX) tablet 500 mg, 500 mg, Oral, DAILY, Eva Jaeger MD, 500 mg at 04/10/22 0901    sodium chloride (NS) flush 5-10 mL, 5-10 mL, IntraVENous, PRN, Osman Price MD    sodium chloride (NS) flush 5-40 mL, 5-40 mL, IntraVENous, Q8H, Eva Jaeger MD, 10 mL at 04/10/22 1400    sodium chloride (NS) flush 5-40 mL, 5-40 mL, IntraVENous, PRN, Eva Jaeger MD    acetaminophen (TYLENOL) tablet 650 mg, 650 mg, Oral, Q6H PRN **OR** acetaminophen (TYLENOL) suppository 650 mg, 650 mg, Rectal, Q6H PRN, Eva Jaeger MD    polyethylene glycol (MIRALAX) packet 17 g, 17 g, Oral, DAILY PRN, Eva Jaeger MD    ondansetron (ZOFRAN ODT) tablet 4 mg, 4 mg, Oral, Q8H PRN **OR** ondansetron (ZOFRAN) injection 4 mg, 4 mg, IntraVENous, Q6H PRN, Eva Jaeger MD    cefTRIAXone (ROCEPHIN) 2 g in 0.9% sodium chloride (MBP/ADV) 50 mL MBP, 2 g, IntraVENous, Q24H, Eva Jaeger MD, Stopped at 04/10/22 1400    apixaban (ELIQUIS) tablet 2.5 mg, 2.5 mg, Oral, Q12H, Eva Jaeger MD, 2.5 mg at 04/10/22 0902    famotidine (PEPCID) tablet 20 mg, 20 mg, Oral, QPM, Eva Jaeger MD, 20 mg at 04/10/22 1708    bisacodyL (DULCOLAX) suppository 10 mg, 10 mg, Rectal, DAILY PRN, Eva Fitzgerald MD    isosorbide mononitrate ER (IMDUR) tablet 30 mg, 30 mg, Oral, DAILY, Eva Jaeger MD, 30 mg at 04/10/22 0900    atorvastatin (LIPITOR) tablet 10 mg, 10 mg, Oral, DAILY, Eva Jaeger MD, 10 mg at 04/10/22 0902    melatonin tablet 10 mg, 10 mg, Oral, QHS, Eva Jaeger MD, 10 mg at 04/09/22 2016    pantoprazole (PROTONIX) tablet 40 mg, 40 mg, Oral, DAILY, Eva Jaeger MD, 40 mg at 04/10/22 0706    polyethylene glycol (MIRALAX) packet 17 g, 17 g, Oral, DAILY, Eva Jaeger MD, 17 g at 04/10/22 0900    magnesium oxide (MAG-OX) tablet 200 mg, 200 mg, Oral, DAILY, Eva Jaeger MD, 200 mg at 04/10/22 0900    labetaloL (NORMODYNE) tablet 100 mg, 100 mg, Oral, BID, Eva Jaeger MD, 100 mg at 04/10/22 1708    cholecalciferol (VITAMIN D3) (400 Units /10 mcg) tablet 1,000 Units, 1,000 Units, Oral, DAILY, Pratik Jaeger MD, 1,000 Units at 04/10/22 0900    sertraline (ZOLOFT) tablet 25 mg, 25 mg, Oral, DAILY, Eva Jaeger MD, 25 mg at 04/10/22 0901    multivitamin, tx-iron-ca-min (THERA-M w/ IRON) tablet 1 Tablet, 1 Tablet, Oral, DAILY, Eva Jaeger MD, 1 Tablet at 04/10/22 0900    Objective:     VITALS:   Patient Vitals for the past 12 hrs:   Temp Pulse Resp BP SpO2   04/10/22 1611 97.6 °F (36.4 °C) 86 20 128/74 94 %   04/10/22 1127 98.2 °F (36.8 °C) 86 20 134/80 94 %       Intake/Output Summary (Last 24 hours) at 4/10/2022 1955  Last data filed at 4/10/2022 1800  Gross per 24 hour   Intake 720 ml   Output 1925 ml   Net -1205 ml        PHYSICAL EXAM:  General: WD, WN. Alert, cooperative, no acute distress    EENT:  EOMI. Anicteric sclerae. MMM  Resp:  CTA bilaterally, no wheezing or rales. No accessory muscle use  CV:  Regular  rhythm,  No edema  GI:  Soft, Non distended, Non tender. +Bowel sounds  Neurologic:  Alert and oriented X 3, normal speech,   Psych:   Good insight. Not anxious nor agitated  Skin:  No rashes. No jaundice    LABS:  I reviewed today's most current labs and imaging studies. Pertinent labs include:  Recent Labs     04/09/22  0903 04/08/22  0517   WBC 6.8 7.1   HGB 11.0* 11.6   HCT 34.8* 36.2    205     Recent Labs     04/09/22  0903 04/08/22  0517    141   K 3.9 3.6    103   CO2 31 30   * 93   BUN 20 23*   CREA 0.96 0.87   CA 8.3* 9.0   ALB 2.8*  --    TBILI 1.0  --    ALT 13  --      RADIOLOGY:     Echo 04/08/2022:    Left Ventricle: Left ventricle size is normal. Normal wall thickness. There are regional wall motion abnormalities. Moderately reduced left ventricular systolic function with a visually estimated EF of 35 - 40%.   Right Ventricle: Right ventricle is mildly dilated. Severely reduced systolic function.   Aortic Valve: Mild sclerosis of the aortic valve cusp. Mild regurgitation. No stenosis.     Mitral Valve: Mild to moderate transvalvular regurgitation with a posterior directed jet.   Tricuspid Valve: Moderate to severe transvalvular regurgitation. Severely elevated RVSP. RVSP is 64 mmHg.   Left Atrium: Left atrium is severely dilated. Left atrial volume index is severely increased (>48 mL/m2).   Right Atrium: Right atrium is dilated.       Assessment and plan      Acute decompensation of congestive heart failure       Pulmonary edema      Pneumonia with hypoxia       Confusion      Atrial fibrillation      Hypertension     Acute Decompensation of Congestive Heart Failure    The patient diuresed well, with negative fluid balance.     Shortness of breath resolved, able to sleep better at night. Switch Lasix to Lasix 40 mg  PO BID.   Pneumonia   On Rocephin and Zithromax and  ceftriaxone since admission. Switch to p.o. medication Zithromax and Ceftin   Hypoxia  controlled. O2 sat  remained in the upper 90s with oxygen therapy 2 L nasal cannula. Atrial fibrillation   Rate remained controlled, continue apixaban  Confusion  Resolved, remain alert, awake, oriented x 3 with sensible conversation  Hypertension: Controlled. Anxiety : Stable     Disposition: Consider discharge in the morning.   We will switch her medications to p.o. form      SAFETY: In place  Code Status:  DVT prophylaxis: Continue apixaban  Stress Ulcer prophylaxis: Continue proton  Bladder catheter: No  Family Contact Info:  Primary Emergency Contact: milligan,claudia, Ivan Phone: [phone]  Bedded: [facility] Room ED02/02  Disposition: TBD, likely home when stable  Admission status: Patient resides in long-term facility     Reviewed most current lab test results and cultures  YES  Reviewed most current radiology test results   YES  Review and summation of old records today    NO  Reviewed patient's current orders and MAR    YES  PMH/SH reviewed - no change compared to H&P     Discussed with the patient :  Time spent is 45-minute, with more than 50% in direct patient care counseling coordination of care    Signed: Ayleen Arriola MD  [facility] Hospitalist  [phone]

## 2022-04-10 NOTE — ROUTINE PROCESS
Bedside and Verbal shift change report given to Zoe Camarena RN (oncoming nurse) by Eboni Sheets. Robert Raza RN (offgoing nurse). Report included the following information SBAR, Intake/Output, MAR, Recent Results and Cardiac Rhythm Afib/SR.

## 2022-04-10 NOTE — PROGRESS NOTES
Pt more confused today than yesterday. Can be redirected. Friends visitin at present and state this is normal for her.

## 2022-04-10 NOTE — PROGRESS NOTES
Cristina Perea CNA noted while bathing pt that she had a rash under her breast. She informed MD while MD in to see pt. Also, pt has a pressure area starting on mid-spine- mepelex applied. Heels cont to be reddened- mepelex to be reaapplied.

## 2022-04-11 VITALS
DIASTOLIC BLOOD PRESSURE: 70 MMHG | OXYGEN SATURATION: 94 % | RESPIRATION RATE: 20 BRPM | HEIGHT: 65 IN | WEIGHT: 130 LBS | TEMPERATURE: 96.7 F | SYSTOLIC BLOOD PRESSURE: 130 MMHG | BODY MASS INDEX: 21.66 KG/M2 | HEART RATE: 95 BPM

## 2022-04-11 PROBLEM — J90 PLEURAL EFFUSION: Status: ACTIVE | Noted: 2022-04-07

## 2022-04-11 PROBLEM — I50.9 CONGESTIVE HEART FAILURE (CHF) (HCC): Status: ACTIVE | Noted: 2022-04-07

## 2022-04-11 PROBLEM — J81.1 PULMONARY EDEMA: Status: ACTIVE | Noted: 2022-04-07

## 2022-04-11 PROBLEM — I48.91 A-FIB (HCC): Status: ACTIVE | Noted: 2022-04-07

## 2022-04-11 PROBLEM — R79.89 ELEVATED BRAIN NATRIURETIC PEPTIDE (BNP) LEVEL: Status: ACTIVE | Noted: 2022-04-07

## 2022-04-11 PROCEDURE — 74011250636 HC RX REV CODE- 250/636: Performed by: INTERNAL MEDICINE

## 2022-04-11 PROCEDURE — 77030037878 HC DRSG MEPILEX >48IN BORD MOLN -B

## 2022-04-11 PROCEDURE — 77010033678 HC OXYGEN DAILY

## 2022-04-11 PROCEDURE — 74011250637 HC RX REV CODE- 250/637: Performed by: INTERNAL MEDICINE

## 2022-04-11 PROCEDURE — 94760 N-INVAS EAR/PLS OXIMETRY 1: CPT

## 2022-04-11 RX ORDER — CEFUROXIME AXETIL 500 MG/1
500 TABLET ORAL EVERY 12 HOURS
Qty: 13 TABLET | Refills: 0 | Status: SHIPPED | OUTPATIENT
Start: 2022-04-11 | End: 2022-04-18

## 2022-04-11 RX ORDER — NYSTATIN 100000 [USP'U]/G
POWDER TOPICAL 2 TIMES DAILY
Qty: 60 G | Refills: 0 | Status: SHIPPED | OUTPATIENT
Start: 2022-04-11 | End: 2022-04-26

## 2022-04-11 RX ADMIN — ATORVASTATIN CALCIUM 10 MG: 10 TABLET, FILM COATED ORAL at 08:52

## 2022-04-11 RX ADMIN — APIXABAN 2.5 MG: 2.5 TABLET, FILM COATED ORAL at 08:51

## 2022-04-11 RX ADMIN — POLYETHYLENE GLYCOL 3350 17 G: 17 POWDER, FOR SOLUTION ORAL at 08:56

## 2022-04-11 RX ADMIN — FUROSEMIDE 40 MG: 40 TABLET ORAL at 08:53

## 2022-04-11 RX ADMIN — CHOLECALCIFEROL (VITAMIN D3) 10 MCG (400 UNIT) TABLET 1000 UNITS: at 08:52

## 2022-04-11 RX ADMIN — LABETALOL HYDROCHLORIDE 100 MG: 100 TABLET, FILM COATED ORAL at 08:51

## 2022-04-11 RX ADMIN — AZITHROMYCIN MONOHYDRATE 500 MG: 500 INJECTION, POWDER, LYOPHILIZED, FOR SOLUTION INTRAVENOUS at 00:00

## 2022-04-11 RX ADMIN — CEFUROXIME AXETIL 500 MG: 250 TABLET ORAL at 08:51

## 2022-04-11 RX ADMIN — Medication 200 MG: at 09:00

## 2022-04-11 RX ADMIN — NYSTATIN: 100000 POWDER TOPICAL at 09:00

## 2022-04-11 RX ADMIN — MULTIPLE VITAMINS W/ MINERALS TAB 1 TABLET: TAB at 09:00

## 2022-04-11 RX ADMIN — SERTRALINE 25 MG: 50 TABLET, FILM COATED ORAL at 08:51

## 2022-04-11 RX ADMIN — PANTOPRAZOLE SODIUM 40 MG: 40 TABLET, DELAYED RELEASE ORAL at 08:51

## 2022-04-11 RX ADMIN — ISOSORBIDE MONONITRATE 30 MG: 30 TABLET, EXTENDED RELEASE ORAL at 08:51

## 2022-04-11 RX ADMIN — POLYETHYLENE GLYCOL 3350 17 G: 17 POWDER, FOR SOLUTION ORAL at 08:55

## 2022-04-11 NOTE — DISCHARGE INSTRUCTIONS
DISCHARGE SUMMARY from Nurse    PATIENT INSTRUCTIONS:    What to do at Home:  Recommended activity: Activity as tolerated,     If you experience any recurrent symptoms , please follow up with staff at [facility] or return to the ED. *  Please give a list of your current medications to your Primary Care Provider. *  Please update this list whenever your medications are discontinued, doses are      changed, or new medications (including over-the-counter products) are added. *  Please carry medication information at all times in case of emergency situations. These are general instructions for a healthy lifestyle:    No smoking/ No tobacco products/ Avoid exposure to second hand smoke  Surgeon General's Warning:  Quitting smoking now greatly reduces serious risk to your health. Obesity, smoking, and sedentary lifestyle greatly increases your risk for illness    A healthy diet, regular physical exercise & weight monitoring are important for maintaining a healthy lifestyle    You may be retaining fluid if you have a history of heart failure or if you experience any of the following symptoms:  Weight gain of 3 pounds or more overnight or 5 pounds in a week, increased swelling in our hands or feet or shortness of breath while lying flat in bed. Please call your doctor as soon as you notice any of these symptoms; do not wait until your next office visit. The discharge information has been reviewed with the patient and caregiver. The patient and caregiver verbalized understanding. Discharge medications reviewed with the patient and caregiver and appropriate educational materials and side effects teaching were provided.   ___________________________________________________________________________________________________________________________________

## 2022-04-11 NOTE — PROGRESS NOTES
Transition of Care (PRASANNA) Plan:  Patient will be retuning to [facility] via facility chely [facility]. Patient has been referred for home health provided by [facility]. . Patient qualified for home oxygen. Referred to [facility]. RUR: 12% LOW    PRASANNA Transportation:      How is patient being transported at discharge? Facility chely [facility]     When? 4/11/22     Is transport scheduled? NA    Follow-up appointment and transportation:     PCP? Facility MD         Click here to complete [address] including selection of the Healthcare Decision Maker Relationship (ie \"Primary\")  @[email or web address]    Care Management Interventions  PCP Verified by CM:  Yes (Patient is seen by the facility MD)  Palliative Care Criteria Met (RRAT>21 & CHF Dx)?: No (No MD order for Palliative Care)  Transition of Care Consult (CM Consult): Home Health,Discharge Planning,Assisted Living ([facility])  Discharge Durable Medical Equipment: No  Physical Therapy Consult: No  Occupational Therapy Consult: No  Speech Therapy Consult: No  Support Systems: Child(wen),Assisted Living  [employer] Information Provided?: No  Discharge Location  Patient Expects to be Discharged to[de-identified] Assisted Living ([facility])

## 2022-04-11 NOTE — DISCHARGE SUMMARY
[facility]  Hospitalist Discharge Summary    Patient ID:  Therese Ding  [id number]  80 y.o.  3/7/1930    PCP on record: Cristofer French NP    Admit date: 4/7/2022  Discharge date and time: 4/11/2022     DISCHARGE DIAGNOSIS      Acute Decompensation of Congestive Heart Failure      Pneumonia (community-acquired pneumonia)    Hypoxia      Atrial fibrillation    Confusion    Hypertension    Anxiety      Consultation: None    DISCHARGE SUMMARY/HOSPITAL COURSE:  for full details see H&P, daily progress notes, labs, consult notes. Hospital course  80 y. o. female presenting for admission to [facility] with main symptom of confusion / altered mental ,shortness of breath ,and cough   She  has a past medical history of Colitis , DJD (degenerative joint disease), Femoral Fracture , HTN Hypertension, Hyperlipemia, Persistent atrial fibrillation , and Ventricular ectopy    Initial evaluation revealed congestive heart failure and  Pneumonia . Chest x-ray revealed pleural effusion and congestion, BNP was elevated, and chest exam revealed bilateral rales.   she is treated with Lasix 40 IV every 6 hours with good diuresis. Lasix  was gradually reduced ,shortness improved  and then resolved  Pneumonia treated with antibiotics Rocephin and Zithromax with good response and resolution of cough. Patient remained afebrile, no labored breathing. Acute confusion resolved. Acute confusion is felt to be secondary infection and metabolic management to (pneumonia and acute CHF )  Hypoxia noted in the ER. The patient has been on 2 L nasal cannula since admission. Oxygen sat remained in the upper 90s. Trial to wean the patient from oxygen was not successful. Will discharge patient is on oxygen 2 2 L nasal cannula.   She also has history of A. Fib with controlled rate , preadmission apixaban  continued  For the last 2 days she was able to get out of bed to chair.     She said she feels fine and ready to go home. She wants to be discharged early so she can go and thinks her hair      Review of system on discharge     No cough no shortness of breath . No nausea or vomiting no diarrhea or constipation      No chest pain, no abdominal pain. No skin rash,      No  headache no neck pain or stiffness    Patient seen and examined by me on discharge day. Pertinent Findings:  Visit Vitals  /68 (BP 1 Location: Left upper arm, BP Patient Position: At rest)   Pulse 88   Temp 98.9 °F (37.2 °C)   Resp 20   Ht 5' 5\" (1.651 m)   Wt 59 kg (130 lb)   SpO2 95%   BMI 21.63 kg/m²     Gen:    Not in distress  Chest: Nonlabored respiration, good equal air entry bilaterally, no rales or crackles  CVS:   Regular rhythm. No edema  Abd:  Soft, not distended, not tender  Neuro:  Alert, nonfocal, weak  Skin: Intertriginous dermatitis under the breasts  _______________________________________________________________________  DISCHARGE MEDICATIONS:   Current Discharge Medication List      START taking these medications    Details   nystatin (MYCOSTATIN) powder Apply  to affected area two (2) times a day for 15 days. Qty: 60 g, Refills: 0  Start date: 4/11/2022, End date: 4/26/2022      cefUROXime (CEFTIN) 500 mg tablet Take 1 Tablet by mouth every twelve (12) hours for 13 doses. Qty: 13 Tablet, Refills: 0  Start date: 4/11/2022, End date: 4/18/2022         CONTINUE these medications which have NOT CHANGED    Details   labetaloL (NORMODYNE) 100 mg tablet Take 100 mg by mouth two (2) times a day. pantoprazole (PROTONIX) 40 mg tablet Take 40 mg by mouth daily. therapeutic multivitamin (Thera) tablet Take 1 Tablet by mouth daily. furosemide (LASIX) 40 mg tablet Take 1 Tablet by mouth daily. Qty: 90 Tablet, Refills: 1      potassium chloride SR (KLOR-CON 10) 10 mEq tablet Take 1 Tablet by mouth daily.  Indications: prevention of low potassium in the blood  Qty: 90 Tablet, Refills: 1      isosorbide mononitrate ER (IMDUR) 30 mg tablet Take 1 Tablet by mouth daily. Qty: 90 Tablet, Refills: 1      bisacodyL (DULCOLAX) 10 mg supp Insert 10 mg into rectum daily as needed for Constipation. acetaminophen (Tylenol Extra Strength) 500 mg tablet Take 500 mg by mouth every eight (8) hours as needed. sertraline (ZOLOFT) 25 mg tablet Take 1 Tablet by mouth daily. Qty: 30 Tablet, Refills: 0      apixaban (ELIQUIS) 2.5 mg tablet Take 1 Tablet by mouth two (2) times a day. Qty: 180 Tablet, Refills: 1      lovastatin (MEVACOR) 20 mg tablet TAKE 1 TABLET BY MOUTH  NIGHTLY  Qty: 90 Tab, Refills: 3    Comments: PATIENT ID : [id number]  Associated Diagnoses: Pure hypercholesterolemia      melatonin 10 mg tab Take 10 mg by mouth nightly. Qty: 90 Tab, Refills: 1      ascorbic acid (VITAMIN C PO) Take 1,000 mg by mouth daily. cholecalciferol, vitamin D3, (VITAMIN D3 PO) Take 1,000 Units by mouth daily. pyridoxine, vitamin B6, (Vitamin B-6) 100 mg tablet Take 100 mg by mouth daily. magnesium 200 mg tab Take 1 Tablet by mouth daily.          STOP taking these medications       polyethylene glycol (Miralax) 17 gram/dose powder Comments:   Reason for Stopping:         famotidine (PEPCID) 20 mg tablet Comments:   Reason for Stopping:               My Recommended  Diet: Low-salt heart healthy diet  Activity: Increase gradually as tolerated  Wound Care: none  Follow-up labs: Per PCP      Disposition to long-term nursing facility  Condition at Discharge:  Stable  _____________________________________________________________________  Follow up with:   PCP : Twyla Smith NP  Follow-up Information     Follow up With Specialties Details Why [address], [place], NP Nurse Practitioner   [address] [phone]          Total time in minutes spent coordinating this discharge (includes going over instructions, follow-up, prescriptions, and preparing report for sign off to her PCP) :39 minutes    Signed:  Akin George MD  [facility] Hospitalist  [phone]

## 2022-04-11 NOTE — PROGRESS NOTES
Bedside shift change report given to Rylee Francisco RN (oncoming nurse) by Sammy Demarco RN (offgoing nurse). Report included the following information to include SBAR, neuro status, VS, MAR and plans to be discharged today.

## 2022-04-11 NOTE — PROGRESS NOTES
Incontinent of stool, cleaned and applied new diaper, put on oxygen 2 liters and wheeled to [employer].

## 2022-04-11 NOTE — PROGRESS NOTES
Discharge instructions reviewed with Bethany from [address] belongings returned: yes  Home meds returned: N/A  Sending with Oxygen tank on 2 liters NC  To front entrance via wheelchair  Discharged home with  [facility] DANIA ENCINAS  staff @ [phone]

## 2022-04-11 NOTE — PROGRESS NOTES
Report called, waiting for transport back to [facility], leaving on our Oxygen tank 2 liters NC.

## 2022-04-12 ENCOUNTER — HOSPITAL ENCOUNTER (INPATIENT)
Age: 87
LOS: 2 days | Discharge: HOME | DRG: 194 | End: 2022-04-14
Attending: EMERGENCY MEDICINE | Admitting: INTERNAL MEDICINE
Payer: MEDICARE

## 2022-04-12 ENCOUNTER — APPOINTMENT (OUTPATIENT)
Dept: CT IMAGING | Age: 87
DRG: 194 | End: 2022-04-12
Attending: EMERGENCY MEDICINE
Payer: MEDICARE

## 2022-04-12 ENCOUNTER — APPOINTMENT (OUTPATIENT)
Dept: GENERAL RADIOLOGY | Age: 87
DRG: 194 | End: 2022-04-12
Attending: EMERGENCY MEDICINE
Payer: MEDICARE

## 2022-04-12 DIAGNOSIS — J96.01 ACUTE RESPIRATORY FAILURE WITH HYPOXIA (HCC): Primary | ICD-10-CM

## 2022-04-12 DIAGNOSIS — G93.40 ENCEPHALOPATHY: ICD-10-CM

## 2022-04-12 PROBLEM — R40.1 OBTUNDATION: Status: ACTIVE | Noted: 2022-04-12

## 2022-04-12 PROBLEM — J96.90 RESPIRATORY FAILURE (HCC): Status: ACTIVE | Noted: 2022-04-12

## 2022-04-12 PROBLEM — J96.11 CHRONIC RESPIRATORY FAILURE WITH HYPOXIA (HCC): Status: ACTIVE | Noted: 2022-04-12

## 2022-04-12 LAB
ALBUMIN SERPL-MCNC: 3 G/DL (ref 3.5–5)
ALBUMIN/GLOB SERPL: 0.7 {RATIO} (ref 1.1–2.2)
ALP SERPL-CCNC: 107 U/L (ref 45–117)
ALT SERPL-CCNC: 20 U/L (ref 12–78)
ANION GAP SERPL CALC-SCNC: 11 MMOL/L (ref 5–15)
APPEARANCE UR: CLEAR
ARTERIAL PATENCY WRIST A: YES
AST SERPL-CCNC: 30 U/L (ref 15–37)
ATRIAL RATE: 100 BPM
BACTERIA URNS QL MICRO: ABNORMAL /HPF
BASE EXCESS BLDA CALC-SCNC: 1.8 MMOL/L
BDY SITE: NORMAL
BILIRUB SERPL-MCNC: 1.1 MG/DL (ref 0.2–1)
BILIRUB UR QL: NEGATIVE
BNP SERPL-MCNC: ABNORMAL PG/ML (ref 0–450)
BUN SERPL-MCNC: 25 MG/DL (ref 6–20)
BUN/CREAT SERPL: 28 (ref 12–20)
CALCIUM SERPL-MCNC: 8.9 MG/DL (ref 8.5–10.1)
CALCULATED R AXIS, ECG10: -20 DEGREES
CALCULATED T AXIS, ECG11: -86 DEGREES
CHLORIDE SERPL-SCNC: 103 MMOL/L (ref 97–108)
CO2 SERPL-SCNC: 28 MMOL/L (ref 21–32)
COLOR UR: ABNORMAL
COMMENT, HOLDF: NORMAL
CREAT SERPL-MCNC: 0.9 MG/DL (ref 0.55–1.02)
DIAGNOSIS, 93000: NORMAL
EPITH CASTS URNS QL MICRO: ABNORMAL /LPF
ERYTHROCYTE [DISTWIDTH] IN BLOOD BY AUTOMATED COUNT: 16.7 % (ref 11.5–14.5)
GAS FLOW.O2 O2 DELIVERY SYS: 2 L/MIN
GLOBULIN SER CALC-MCNC: 4.1 G/DL (ref 2–4)
GLUCOSE BLD STRIP.AUTO-MCNC: 73 MG/DL (ref 65–117)
GLUCOSE SERPL-MCNC: 85 MG/DL (ref 65–100)
GLUCOSE UR STRIP.AUTO-MCNC: NEGATIVE MG/DL
HCO3 BLDA-SCNC: 26 MMOL/L (ref 22–26)
HCT VFR BLD AUTO: 38.7 % (ref 35–47)
HGB BLD-MCNC: 12.1 G/DL (ref 11.5–16)
HGB UR QL STRIP: ABNORMAL
KETONES UR QL STRIP.AUTO: ABNORMAL MG/DL
LEUKOCYTE ESTERASE UR QL STRIP.AUTO: NEGATIVE
MAGNESIUM SERPL-MCNC: 2.2 MG/DL (ref 1.6–2.4)
MCH RBC QN AUTO: 28.7 PG (ref 26–34)
MCHC RBC AUTO-ENTMCNC: 31.3 G/DL (ref 30–36.5)
MCV RBC AUTO: 91.9 FL (ref 80–99)
MUCOUS THREADS URNS QL MICRO: ABNORMAL /LPF
NITRITE UR QL STRIP.AUTO: NEGATIVE
NRBC # BLD: 0 K/UL (ref 0–0.01)
NRBC BLD-RTO: 0 PER 100 WBC
PCO2 BLDA: 39 MMHG (ref 35–45)
PH BLDA: 7.44 [PH] (ref 7.35–7.45)
PH UR STRIP: 5.5 [PH] (ref 5–8)
PLATELET # BLD AUTO: 223 K/UL (ref 150–400)
PMV BLD AUTO: 11.1 FL (ref 8.9–12.9)
PO2 BLDA: 88 MMHG (ref 80–100)
POTASSIUM SERPL-SCNC: 4 MMOL/L (ref 3.5–5.1)
PROT SERPL-MCNC: 7.1 G/DL (ref 6.4–8.2)
PROT UR STRIP-MCNC: 30 MG/DL
Q-T INTERVAL, ECG07: 300 MS
QRS DURATION, ECG06: 74 MS
QTC CALCULATION (BEZET), ECG08: 385 MS
RBC # BLD AUTO: 4.21 M/UL (ref 3.8–5.2)
RBC #/AREA URNS HPF: ABNORMAL /HPF (ref 0–5)
SAMPLES BEING HELD,HOLD: NORMAL
SAO2 % BLD: 97 % (ref 92–97)
SAO2% DEVICE SAO2% SENSOR NAME: NORMAL
SERVICE CMNT-IMP: NORMAL
SERVICE CMNT-IMP: NORMAL
SODIUM SERPL-SCNC: 142 MMOL/L (ref 136–145)
SP GR UR REFRACTOMETRY: 1.02 (ref 1–1.03)
SPECIMEN SITE: NORMAL
TROPONIN-HIGH SENSITIVITY: 30 NG/L (ref 0–51)
UA: UC IF INDICATED,UAUC: ABNORMAL
UROBILINOGEN UR QL STRIP.AUTO: 0.2 EU/DL (ref 0.2–1)
VENTRICULAR RATE, ECG03: 99 BPM
WBC # BLD AUTO: 6.5 K/UL (ref 3.6–11)
WBC URNS QL MICRO: ABNORMAL /HPF (ref 0–4)

## 2022-04-12 PROCEDURE — 83735 ASSAY OF MAGNESIUM: CPT

## 2022-04-12 PROCEDURE — 93005 ELECTROCARDIOGRAM TRACING: CPT

## 2022-04-12 PROCEDURE — 65660000000 HC RM CCU STEPDOWN

## 2022-04-12 PROCEDURE — 74011000250 HC RX REV CODE- 250: Performed by: INTERNAL MEDICINE

## 2022-04-12 PROCEDURE — 70450 CT HEAD/BRAIN W/O DYE: CPT

## 2022-04-12 PROCEDURE — 74011000258 HC RX REV CODE- 258: Performed by: INTERNAL MEDICINE

## 2022-04-12 PROCEDURE — 83880 ASSAY OF NATRIURETIC PEPTIDE: CPT

## 2022-04-12 PROCEDURE — 74011250637 HC RX REV CODE- 250/637: Performed by: INTERNAL MEDICINE

## 2022-04-12 PROCEDURE — 85027 COMPLETE CBC AUTOMATED: CPT

## 2022-04-12 PROCEDURE — 71045 X-RAY EXAM CHEST 1 VIEW: CPT

## 2022-04-12 PROCEDURE — 36415 COLL VENOUS BLD VENIPUNCTURE: CPT

## 2022-04-12 PROCEDURE — 82803 BLOOD GASES ANY COMBINATION: CPT

## 2022-04-12 PROCEDURE — 81001 URINALYSIS AUTO W/SCOPE: CPT

## 2022-04-12 PROCEDURE — 82962 GLUCOSE BLOOD TEST: CPT

## 2022-04-12 PROCEDURE — 80053 COMPREHEN METABOLIC PANEL: CPT

## 2022-04-12 PROCEDURE — 77010033678 HC OXYGEN DAILY

## 2022-04-12 PROCEDURE — 36600 WITHDRAWAL OF ARTERIAL BLOOD: CPT

## 2022-04-12 PROCEDURE — 99285 EMERGENCY DEPT VISIT HI MDM: CPT

## 2022-04-12 PROCEDURE — 84484 ASSAY OF TROPONIN QUANT: CPT

## 2022-04-12 PROCEDURE — 74011250636 HC RX REV CODE- 250/636: Performed by: INTERNAL MEDICINE

## 2022-04-12 RX ORDER — ACETAMINOPHEN 500 MG
500 TABLET ORAL
Status: DISCONTINUED | OUTPATIENT
Start: 2022-04-12 | End: 2022-04-14 | Stop reason: HOSPADM

## 2022-04-12 RX ORDER — ONDANSETRON 2 MG/ML
4 INJECTION INTRAMUSCULAR; INTRAVENOUS
Status: DISCONTINUED | OUTPATIENT
Start: 2022-04-12 | End: 2022-04-14 | Stop reason: HOSPADM

## 2022-04-12 RX ORDER — FUROSEMIDE 10 MG/ML
40 INJECTION INTRAMUSCULAR; INTRAVENOUS DAILY
Status: DISCONTINUED | OUTPATIENT
Start: 2022-04-13 | End: 2022-04-14 | Stop reason: HOSPADM

## 2022-04-12 RX ORDER — NYSTATIN 100000 [USP'U]/G
POWDER TOPICAL 2 TIMES DAILY
Status: DISCONTINUED | OUTPATIENT
Start: 2022-04-13 | End: 2022-04-14 | Stop reason: HOSPADM

## 2022-04-12 RX ORDER — SODIUM CHLORIDE 0.9 % (FLUSH) 0.9 %
5-40 SYRINGE (ML) INJECTION AS NEEDED
Status: DISCONTINUED | OUTPATIENT
Start: 2022-04-12 | End: 2022-04-14 | Stop reason: HOSPADM

## 2022-04-12 RX ORDER — SODIUM CHLORIDE 0.9 % (FLUSH) 0.9 %
5-40 SYRINGE (ML) INJECTION EVERY 8 HOURS
Status: DISCONTINUED | OUTPATIENT
Start: 2022-04-12 | End: 2022-04-14 | Stop reason: HOSPADM

## 2022-04-12 RX ORDER — FACIAL-BODY WIPES
10 EACH TOPICAL DAILY PRN
Status: DISCONTINUED | OUTPATIENT
Start: 2022-04-12 | End: 2022-04-14 | Stop reason: HOSPADM

## 2022-04-12 RX ORDER — ONDANSETRON 4 MG/1
4 TABLET, ORALLY DISINTEGRATING ORAL
Status: DISCONTINUED | OUTPATIENT
Start: 2022-04-12 | End: 2022-04-14 | Stop reason: HOSPADM

## 2022-04-12 RX ORDER — LABETALOL 100 MG/1
100 TABLET, FILM COATED ORAL 2 TIMES DAILY
Status: DISCONTINUED | OUTPATIENT
Start: 2022-04-12 | End: 2022-04-14 | Stop reason: HOSPADM

## 2022-04-12 RX ORDER — FACIAL-BODY WIPES
10 EACH TOPICAL
Status: DISCONTINUED | OUTPATIENT
Start: 2022-04-12 | End: 2022-04-13

## 2022-04-12 RX ADMIN — LABETALOL HYDROCHLORIDE 100 MG: 100 TABLET, FILM COATED ORAL at 23:00

## 2022-04-12 RX ADMIN — CEFTRIAXONE SODIUM 1 G: 1 INJECTION, POWDER, FOR SOLUTION INTRAMUSCULAR; INTRAVENOUS at 23:01

## 2022-04-12 RX ADMIN — APIXABAN 2.5 MG: 2.5 TABLET, FILM COATED ORAL at 23:00

## 2022-04-12 RX ADMIN — NITROGLYCERIN 0.5 INCH: 20 OINTMENT TOPICAL at 23:00

## 2022-04-12 RX ADMIN — SODIUM CHLORIDE, PRESERVATIVE FREE 5 ML: 5 INJECTION INTRAVENOUS at 22:00

## 2022-04-12 NOTE — H&P
[facility]   Admission History & Physical        4/12/2022 7:37 PM  Patient: Pietro Inman 3/7/1930  PCP: Svetlana Johnson NP    HISTORY  Chief Complaint:   Chief Complaint   Patient presents with    Altered mental status       HPI: 80 y.o. female presenting for admission to [facility] for further evaluation and treatment for Chronic respiratory failure with hypoxia ([address] She  has a past medical history of Colitis (2013), DJD (degenerative joint disease), Fracture, femoral ([address] (09/2005), HTN (hypertension), Hyperlipemia, Persistent atrial fibrillation ([address] (2018), and Ventricular ectopy. .     Patient returns to the ED from the [facility] living facility 1 day post discharge with altered mental status/obtundation and room air hypoxia. Additional history not available at this time. Patient apparently had low pulse ox in the 85% range on room air. History noted the patient had not kept the oxygen on, and it was difficult for staff to maintain oxygen compliance. Patient's daughter states that she fought the use of oxygen during her hospitalization last week. X-ray findings are nonspecific, primarily noted for small pleural effusions. During the previous admission there was some concern over congestive failure with the association of the elevated BNP as well as possible acute infection. She was treated with Rocephin during the recent admission, switched to oral Ceftin on discharge. There is no fever or significant leukocytosis. She will be restarted on topical nitrates with IV Lasix as well as Rocephin. There is no significant bronchospasm on exam, bronchodilators could be a consideration. She is arousable transiently and provides yes no responses to questions. All sedative type medications will be withheld-only getting low-dose Zoloft and melatonin. Discussed treatment plans with family/POA-daughter who lives in the [place] area.   She notes her mother was adverse to the use of oxygen. Patient apparently has been pulling this off since discharged to the assisted living. She has some mild hypoxia. ABG on oxygen shows no acid-base abnormality or CO2 retention. She will be restarted on empiric antibiotic therapy for possible pneumonia. Procalcitonin will be assessed with uncertainty concerning acute infection. Prognosis guarded. DNR order as per patient's prior wishes. Discussed palliative care with family and patient when able. Past Medical History:  Past Medical History:   Diagnosis Date    Colitis 2013    C diff; no recent flare, normal colonoscopy 2014    DJD (degenerative joint disease)     Fracture, femoral ([facility] 09/2005    HTN (hypertension)     Hyperlipemia     Persistent atrial fibrillation ([facility] 2018    Ventricular ectopy     no symptoms       Past Surgical History:  Past Surgical History:   Procedure Laterality Date    HX CATARACT REMOVAL      bilat    HX COLONOSCOPY  2014    WNL    HX ORTHOPAEDIC  2005    right femur fx    HX ORTHOPAEDIC  2012    Lt TKA    HX REFRACTIVE SURGERY  06/15/2017    rt       Medication:  Prior to Admission medications    Medication Sig Start Date End Date Taking? Authorizing Provider   nystatin (MYCOSTATIN) powder Apply  to affected area two (2) times a day for 15 days. 4/11/22 4/26/22  Laly Augustine MD   cefUROXime (CEFTIN) 500 mg tablet Take 1 Tablet by mouth every twelve (12) hours for 13 doses. 4/11/22 4/18/22  Laly Augustine MD   labetaloL (NORMODYNE) 100 mg tablet Take 100 mg by mouth two (2) times a day. Provider, Historical   pantoprazole (PROTONIX) 40 mg tablet Take 40 mg by mouth daily. Provider, Historical   therapeutic multivitamin (Thera) tablet Take 1 Tablet by mouth daily. Provider, Historical   furosemide (LASIX) 40 mg tablet Take 1 Tablet by mouth daily. 3/16/22   Alan Lin, HANNAH   potassium chloride SR (KLOR-CON 10) 10 mEq tablet Take 1 Tablet by mouth daily. Indications: prevention of low potassium in the blood 2/8/22   Moses Bloch, NP   isosorbide mononitrate ER (IMDUR) 30 mg tablet Take 1 Tablet by mouth daily. 2/8/22   Moses Bloch, NP   bisacodyL (DULCOLAX) 10 mg supp Insert 10 mg into rectum daily as needed for Constipation. 11/24/21   Preet Baum MD   acetaminophen (Tylenol Extra Strength) 500 mg tablet Take 500 mg by mouth every eight (8) hours as needed. 11/24/21   Preet Baum MD   sertraline (ZOLOFT) 25 mg tablet Take 1 Tablet by mouth daily. 11/25/21   Preet Baum MD   apixaban (ELIQUIS) 2.5 mg tablet Take 1 Tablet by mouth two (2) times a day. 8/5/21   Moses Bloch, NP   lovastatin (MEVACOR) 20 mg tablet TAKE 1 TABLET BY MOUTH  NIGHTLY 3/3/21   Moses Bloch, NP   melatonin 10 mg tab Take 10 mg by mouth nightly. 2/8/21   Moses Bloch, NP   ascorbic acid (VITAMIN C PO) Take 1,000 mg by mouth daily. Provider, Historical   cholecalciferol, vitamin D3, (VITAMIN D3 PO) Take 1,000 Units by mouth daily. Provider, Historical   pyridoxine, vitamin B6, (Vitamin B-6) 100 mg tablet Take 100 mg by mouth daily. Provider, Historical   magnesium 200 mg tab Take 1 Tablet by mouth daily. Provider, Historical       Allergies:   Allergies   Allergen Reactions    Ambien [Zolpidem] Other (comments)     Legs got heavy    Aminobenzoic Acid Unknown (comments)    Bactrim [Sulfamethoprim Ds] Unknown (comments)     Patient cant remember her reaction    Hydroxyzine Other (comments)     Legs got heavy    Sulfamethoxazole-Trimethoprim Unknown (comments)     Other reaction(s): vomiting and abd pain       Social History:  Social History     Tobacco Use    Smoking status: Never Smoker    Smokeless tobacco: Never Used   Vaping Use    Vaping Use: Never used   Substance Use Topics    Alcohol use: No     Alcohol/week: 0.0 standard drinks    Drug use: Never       Family History:  Family History   Problem Relation Age of Onset    Cancer Mother Leukemia    Heart Disease Mother     Cancer Sister         Ovarian    No Known Problems Brother     Heart Failure Brother     Cancer Brother         Brain tumor       ROS: (UTO, only as per HPI)  Total of 12 systems reviewed as follows:  POSITIVE= bolded text  Negative = text not bolded       General:  fever, chills, sweats, generalized weakness, weight loss/gain, loss of appetite   Eyes:    blurred vision, eye pain, loss of vision, double vision  ENT:    rhinorrhea, pharyngitis   Respiratory:  cough, sputum production, SOB, [place], wheezing, pleuritic pain   Cardiology:   chest pain, palpitations, orthopnea, PND, edema, syncope   Gastrointestinal:  abdominal pain , N/V, diarrhea, dysphagia, constipation, bleeding   Genitourinary:  frequency, urgency, dysuria, hematuria, incontinence, prostatism   Muskuloskeletal: arthralgia, myalgia, back pain  Hematology:   easy bruising, nose or gum bleeding, lymphadenopathy   Dermatological: rash, ulceration, pruritis, color change / jaundice  Endocrine:   hot flashes or polydipsia   Neurological:  headache, dizziness, confusion, focal weakness, paresthesia, speech difficulties, memory loss, gait difficulty  Psychological: feelings of anxiety, depression, agitation      PHYSICAL EXAM:  Patient Vitals for the past 24 hrs:   Temp Pulse Resp BP SpO2   04/12/22 1919 -- -- -- -- 91 %   04/12/22 1917 -- -- -- -- (!) 87 %   04/12/22 1914 -- 99 15 -- 97 %   04/12/22 1909 -- 99 16 (!) 151/89 97 %   04/12/22 1835 -- 97 16 (!) 144/88 97 %   04/12/22 1806 -- 95 16 (!) 145/85 97 %   04/12/22 1736 -- 98 17 (!) 152/89 97 %   04/12/22 1710 -- 94 17 (!) 162/84 96 %   04/12/22 1650 -- -- -- -- 97 %   04/12/22 1633 -- 99 16 (!) 173/86 97 %   04/12/22 1430 97.3 °F (36.3 °C) 99 20 (!) 154/85 96 %       General:    Obtunded, transient arousal for questions, no distress, appears stated age.      HEENT: Atraumatic, anicteric sclerae, pink conjunctivae     No oral ulcers, mucosa moist, throat clear, dentition fair  Neck:  Supple, symmetrical;   thyroid non tender  Lungs:   Clear to auscultation bilaterally. No wheezing or rhonchi. Scant basilar rales  Chest wall:  No tenderness. No accessory muscle use. Heart:   Irreg Irreg rhythm. No  murmur. Tr edema  Abdomen:   Soft, non-tender. Not distended. Bowel sounds normal  Extremities: Mild distal cyanosis. No clubbing      Capillary refill normal,  Radial pulse 2+,  DP 1/2+  Skin:     Not pale. Not jaundiced. No rashes   Psych:  Not anxious or agitated. Neurologic: EOMs intact. No facial asymmetry. No aphasia or slurred speech. Symmetrical strength, Sensation grossly intact. Obtunded    Lab Data Reviewed:    Recent Results (from the past 24 hour(s))   METABOLIC PANEL, COMPREHENSIVE    Collection Time: 04/12/22  3:18 PM   Result Value Ref Range    Sodium 142 136 - 145 mmol/L    Potassium 4.0 3.5 - 5.1 mmol/L    Chloride 103 97 - 108 mmol/L    CO2 28 21 - 32 mmol/L    Anion gap 11 5 - 15 mmol/L    Glucose 85 65 - 100 mg/dL    BUN 25 (H) 6 - 20 MG/DL    Creatinine 0.90 0.55 - 1.02 MG/DL    BUN/Creatinine ratio 28 (H) 12 - 20      GFR est AA >60 >60 ml/min/1.73m2    GFR est non-AA 59 (L) >60 ml/min/1.73m2    Calcium 8.9 8.5 - 10.1 MG/DL    Bilirubin, total 1.1 (H) 0.2 - 1.0 MG/DL    ALT (SGPT) 20 12 - 78 U/L    AST (SGOT) 30 15 - 37 U/L    Alk.  phosphatase 107 45 - 117 U/L    Protein, total 7.1 6.4 - 8.2 g/dL    Albumin 3.0 (L) 3.5 - 5.0 g/dL    Globulin 4.1 (H) 2.0 - 4.0 g/dL    A-G Ratio 0.7 (L) 1.1 - 2.2     MAGNESIUM    Collection Time: 04/12/22  3:18 PM   Result Value Ref Range    Magnesium 2.2 1.6 - 2.4 mg/dL   NT-PRO BNP    Collection Time: 04/12/22  3:18 PM   Result Value Ref Range    NT pro-BNP 13,207 (H) 0 - 450 PG/ML   TROPONIN-HIGH SENSITIVITY    Collection Time: 04/12/22  3:18 PM   Result Value Ref Range    Troponin-High Sensitivity 30 0 - 51 ng/L   SAMPLES BEING HELD    Collection Time: 04/12/22  3:18 PM   Result Value Ref Range    SAMPLES BEING HELD 1SST,1BLUE,1LAV     COMMENT        Add-on orders for these samples will be processed based on acceptable specimen integrity and analyte stability, which may vary by analyte.    CBC W/O DIFF    Collection Time: 04/12/22  3:18 PM   Result Value Ref Range    WBC 6.5 3.6 - 11.0 K/uL    RBC 4.21 3.80 - 5.20 M/uL    HGB 12.1 11.5 - 16.0 g/dL    HCT 38.7 35.0 - 47.0 %    MCV 91.9 80.0 - 99.0 FL    MCH 28.7 26.0 - 34.0 PG    MCHC 31.3 30.0 - 36.5 g/dL    RDW 16.7 (H) 11.5 - 14.5 %    PLATELET 471 093 - 905 K/uL    MPV 11.1 8.9 - 12.9 FL    NRBC 0.0 0  WBC    ABSOLUTE NRBC 0.00 0.00 - 0.01 K/uL   BLOOD GAS, ARTERIAL    Collection Time: 04/12/22  4:07 PM   Result Value Ref Range    pH 7.44 7.35 - 7.45      PCO2 39 35 - 45 mmHg    PO2 88 80 - 100 mmHg    O2 SAT 97 92 - 97 %    BICARBONATE 26 22 - 26 mmol/L    BASE EXCESS 1.8 mmol/L    O2 METHOD NASAL CANNULA      O2 FLOW RATE 2.00 L/min    Sample source ARTERIAL      SITE RIGHT RADIAL      JONATHAN'S TEST YES      Critical value read back Called to Dr Jimmie Zartae on 04/12/2022 at 16:09    GLUCOSE, POC    Collection Time: 04/12/22  4:36 PM   Result Value Ref Range    Glucose (POC) 73 65 - 117 mg/dL    Performed by Ranjit Hancock    EKG, 12 LEAD, INITIAL    Collection Time: 04/12/22  4:41 PM   Result Value Ref Range    Ventricular Rate 99 BPM    Atrial Rate 100 BPM    QRS Duration 74 ms    Q-T Interval 300 ms    QTC Calculation (Bezet) 385 ms    Calculated R Axis -20 degrees    Calculated T Axis -86 degrees    Diagnosis       Atrial Fibrillation with ectopic premature beats  Low voltage QRS  Septal infarct , age undetermined  Nonspecific intraventricular conduction delay  ST & T wave abnormality, consider inferolateral ischemia  Abnormal ECG  When compared with ECG of 07-APR-2022 11:54,  Septal infarct is now present , possibly due to lead placement  Confirmed by Juan Alberto Matthews MD, Erickson Blanton ([id number]) on 4/12/2022 7:10:01 PM     URINALYSIS W/ REFLEX CULTURE    Collection Time: 04/12/22  5:04 PM    Specimen: Urine   Result Value Ref Range    Color YELLOW/STRAW      Appearance CLEAR CLEAR      Specific gravity 1.024 1.003 - 1.030      pH (UA) 5.5 5.0 - 8.0      Protein 30 (A) NEG mg/dL    Glucose Negative NEG mg/dL    Ketone TRACE (A) NEG mg/dL    Bilirubin Negative NEG      Blood SMALL (A) NEG      Urobilinogen 0.2 0.2 - 1.0 EU/dL    Nitrites Negative NEG      Leukocyte Esterase Negative NEG      WBC 0-4 0 - 4 /hpf    RBC 0-5 0 - 5 /hpf    Epithelial cells MODERATE (A) FEW /lpf    Bacteria 1+ (A) NEG /hpf    UA:UC IF INDICATED CULTURE NOT INDICATED BY UA RESULT CNI      Mucus TRACE /lpf       EKG: Atrial Fib, Frequent ectopy, IVCD, Low Voltage, Septal MI? Radiology:  CT HEAD WO CONT   Final Result   1. No evidence of intracranial hemorrhage. 2. Presence of subtle periventricular low density compatible with white matter   disease. XR CHEST PORT   Final Result   Abnormal prominence of the basilar markings. Presence of bilateral   pleural effusions as described above. Care Plan discussed with:   Patient x    Family Daughter    RN x         Consultant      Expected  Disposition:   Home with Family    HH/PT/OT/RN    SNF/LTC X , ? Hospice   KASSI      TOTAL TIME:  60 Minutes      Comments    x Reviewed previous records   >50% of visit spent in counseling and coordination of care x Discussion with patient and/or family and questions answered       _______________________________________________________  Given the patient's current clinical presentation, I have a high level of concern for decompensation if discharged from the emergency department. Complex decision making was performed, which includes reviewing the patient's available past medical records, laboratory results, and x-ray films. My assessment of this patient's clinical condition and my plan of care is as follows.     ASSESSMENT / PLAN    Principal Problem:    Chronic respiratory failure with hypoxia ([facility] (4/12/2022)  Active Problems:    Obtundation (4/12/2022)  Altered mentation over the past 24 hours  ABG without CO2 retention  Initial oximetry is noted mild hypoxia  No clear precipitating abnormalities  Resume antibiotic therapy for possible pneumonia, hold all sedating medication, maintain adequate oxygenation with nasal cannula  Was discussed with the POA/daughter, maintain DNR status but treat as able  POA to present tomorrow for discussion of palliative care alternative      Chronic systolic heart failure ([facility] (11/19/2021)    A-fib ([facility] (4/7/2022)    Pleural effusion (4/7/2022)    Elevated brain natriuretic peptide (BNP) level (4/7/2022)  BNP remains elevated  Chest x-ray noted for pleural effusion  X-ray with possible CHF/pneumonia  Maintain antibiotic therapy with Rocephin for 7 days  Continue Lasix 40 mg IV daily, plan potassium supplement as needed  Nitrol ointment half-inch every 8 hours pending tolerance for oral treatment      HTN (hypertension) ()  Continue treatment with Normodyne, Lasix, rates as able      Hyperlipemia ()  Resume Mevacor when able      Carotid artery disease, unspecified laterality ([facility] (1/27/2021)  Continue treatment with anticoagulation and risk reduction measures as able  Eliquis, Mevacor, Normodyne      DJD (degenerative joint disease) ()  Resume treatment when able        SAFETY:   Code Status:DNR  DVT prophylaxis:Eliquis  Stress Ulcer prophylaxis: Protonixpo change to IV  Bladder catheter:no  Family Contact Info:  Primary Emergency Contact: SHEFALI PINO, Home Phone: [phone]  Bedded: [facility] Room ED08/08  Disposition: TBD, likely home when stable  Admission status:  Observation    -Tentative plan of care discussed with patient / family, who demonstrated understanding and is in agreement to the above  -Case was reviewed with the ED Provider, Church MD Jacelyn Puffer, MD  [facility] Hospitalist  [phone]

## 2022-04-12 NOTE — ED TRIAGE NOTES
Pt arrived by EMS for altered mental status.   Pt was discharged from this facility yesterday and returns today for altered mental status and weakness

## 2022-04-13 LAB
ANION GAP SERPL CALC-SCNC: 10 MMOL/L (ref 5–15)
BASOPHILS # BLD: 0 K/UL (ref 0–0.1)
BASOPHILS NFR BLD: 0 % (ref 0–1)
BUN SERPL-MCNC: 25 MG/DL (ref 6–20)
BUN/CREAT SERPL: 30 (ref 12–20)
CALCIUM SERPL-MCNC: 8.7 MG/DL (ref 8.5–10.1)
CHLORIDE SERPL-SCNC: 104 MMOL/L (ref 97–108)
CO2 SERPL-SCNC: 29 MMOL/L (ref 21–32)
COMMENT, HOLDF: NORMAL
CREAT SERPL-MCNC: 0.84 MG/DL (ref 0.55–1.02)
DIFFERENTIAL METHOD BLD: ABNORMAL
EOSINOPHIL # BLD: 0.1 K/UL (ref 0–0.4)
EOSINOPHIL NFR BLD: 2 % (ref 0–7)
ERYTHROCYTE [DISTWIDTH] IN BLOOD BY AUTOMATED COUNT: 16.7 % (ref 11.5–14.5)
GLUCOSE SERPL-MCNC: 75 MG/DL (ref 65–100)
HCT VFR BLD AUTO: 38.1 % (ref 35–47)
HGB BLD-MCNC: 11.9 G/DL (ref 11.5–16)
IMM GRANULOCYTES # BLD AUTO: 0 K/UL (ref 0–0.04)
IMM GRANULOCYTES NFR BLD AUTO: 0 % (ref 0–0.5)
LYMPHOCYTES # BLD: 0.7 K/UL (ref 0.8–3.5)
LYMPHOCYTES NFR BLD: 11 % (ref 12–49)
MAGNESIUM SERPL-MCNC: 2.2 MG/DL (ref 1.6–2.4)
MCH RBC QN AUTO: 28.6 PG (ref 26–34)
MCHC RBC AUTO-ENTMCNC: 31.2 G/DL (ref 30–36.5)
MCV RBC AUTO: 91.6 FL (ref 80–99)
MONOCYTES # BLD: 0.7 K/UL (ref 0–1)
MONOCYTES NFR BLD: 11 % (ref 5–13)
NEUTS SEG # BLD: 5.1 K/UL (ref 1.8–8)
NEUTS SEG NFR BLD: 76 % (ref 32–75)
NRBC # BLD: 0 K/UL (ref 0–0.01)
NRBC BLD-RTO: 0 PER 100 WBC
PLATELET # BLD AUTO: 225 K/UL (ref 150–400)
PLATELET COMMENTS,PCOM: ABNORMAL
PMV BLD AUTO: 11 FL (ref 8.9–12.9)
POTASSIUM SERPL-SCNC: 3.8 MMOL/L (ref 3.5–5.1)
RBC # BLD AUTO: 4.16 M/UL (ref 3.8–5.2)
RBC MORPH BLD: ABNORMAL
SAMPLES BEING HELD,HOLD: NORMAL
SODIUM SERPL-SCNC: 143 MMOL/L (ref 136–145)
WBC # BLD AUTO: 6.6 K/UL (ref 3.6–11)

## 2022-04-13 PROCEDURE — 85025 COMPLETE CBC W/AUTO DIFF WBC: CPT

## 2022-04-13 PROCEDURE — 74011000258 HC RX REV CODE- 258: Performed by: INTERNAL MEDICINE

## 2022-04-13 PROCEDURE — 36415 COLL VENOUS BLD VENIPUNCTURE: CPT

## 2022-04-13 PROCEDURE — 80048 BASIC METABOLIC PNL TOTAL CA: CPT

## 2022-04-13 PROCEDURE — 77010033678 HC OXYGEN DAILY

## 2022-04-13 PROCEDURE — 83735 ASSAY OF MAGNESIUM: CPT

## 2022-04-13 PROCEDURE — 65660000000 HC RM CCU STEPDOWN

## 2022-04-13 PROCEDURE — 74011000250 HC RX REV CODE- 250: Performed by: INTERNAL MEDICINE

## 2022-04-13 PROCEDURE — 74011250636 HC RX REV CODE- 250/636: Performed by: INTERNAL MEDICINE

## 2022-04-13 PROCEDURE — 74011250637 HC RX REV CODE- 250/637: Performed by: INTERNAL MEDICINE

## 2022-04-13 PROCEDURE — 94760 N-INVAS EAR/PLS OXIMETRY 1: CPT

## 2022-04-13 RX ADMIN — LABETALOL HYDROCHLORIDE 100 MG: 100 TABLET, FILM COATED ORAL at 12:45

## 2022-04-13 RX ADMIN — NITROGLYCERIN 0.5 INCH: 20 OINTMENT TOPICAL at 15:00

## 2022-04-13 RX ADMIN — CEFTRIAXONE SODIUM 1 G: 1 INJECTION, POWDER, FOR SOLUTION INTRAMUSCULAR; INTRAVENOUS at 21:51

## 2022-04-13 RX ADMIN — SODIUM CHLORIDE, PRESERVATIVE FREE 10 ML: 5 INJECTION INTRAVENOUS at 14:00

## 2022-04-13 RX ADMIN — SODIUM CHLORIDE, PRESERVATIVE FREE 10 ML: 5 INJECTION INTRAVENOUS at 21:55

## 2022-04-13 RX ADMIN — NITROGLYCERIN 0.5 INCH: 20 OINTMENT TOPICAL at 06:31

## 2022-04-13 RX ADMIN — LABETALOL HYDROCHLORIDE 100 MG: 100 TABLET, FILM COATED ORAL at 17:38

## 2022-04-13 RX ADMIN — SODIUM CHLORIDE, PRESERVATIVE FREE 10 ML: 5 INJECTION INTRAVENOUS at 06:31

## 2022-04-13 RX ADMIN — APIXABAN 2.5 MG: 2.5 TABLET, FILM COATED ORAL at 12:45

## 2022-04-13 RX ADMIN — NYSTATIN: 100000 POWDER TOPICAL at 12:50

## 2022-04-13 RX ADMIN — NYSTATIN: 100000 POWDER TOPICAL at 17:38

## 2022-04-13 RX ADMIN — APIXABAN 2.5 MG: 2.5 TABLET, FILM COATED ORAL at 17:38

## 2022-04-13 RX ADMIN — NITROGLYCERIN 0.5 INCH: 20 OINTMENT TOPICAL at 21:51

## 2022-04-13 NOTE — PROGRESS NOTES
Spiritual Care Assessment/Progress Note  Marc Almaguer      NAME: Sumeet Martinez      MRN: [id number]  AGE: 80 y.o.  SEX: female  Restorationist Affiliation: Orthodox   Language: English     4/13/2022     Total Time (in minutes): 10     Spiritual Assessment begun in [facility] MED/SURG through conversation with:         [x]Patient        [] Family    [] Friend(s)        Reason for Consult: Initial/Spiritual assessment, patient floor     Spiritual beliefs: (Please include comment if needed)     [x] Identifies with a carmel tradition:         [] Supported by a carmel community:            [] Claims no spiritual orientation:           [] Seeking spiritual identity:                [] Adheres to an individual form of spirituality:           [] Not able to assess:                           Identified resources for coping:      [] Prayer                               [] Music                  [] Guided Imagery     [x] Family/friends                 [] Pet visits     [] Devotional reading                         [] Unknown     [] Other:                                             Interventions offered during this visit: (See comments for more details)    Patient Interventions: Iconic (affirming the presence of God/Higher Power),Initial/Spiritual assessment, patient floor,Prayer (assurance of)           Plan of Care:     [x] Support spiritual and/or cultural needs    [] Support AMD and/or advance care planning process      [] Support grieving process   [] Coordinate Rites and/or Rituals    [] Coordination with community clergy   [] No spiritual needs identified at this time   [] Detailed Plan of Care below (See Comments)  [] Make referral to Music Therapy  [] Make referral to Pet Therapy     [] Make referral to Addiction services  [] Make referral to [facility]  [] Make referral to Spiritual Care Partner  [] No future visits requested        [] Contact Spiritual Care for further referrals     Comments:Initial spiritual assessment in Rm 116 in MED/SURG  Patient  Was alone in room but confused Provided  Spiritual presence and prayer  Advised of  Availability   [address] [place] [address]

## 2022-04-13 NOTE — PROGRESS NOTES
Bedside and Verbal shift change report given to Eduin Perdomo (oncoming nurse) by Davon Baker RN  (offgoing nurse). Report included the following information SBAR, Kardex, Intake/Output, MAR, Med Rec Status and Quality Measures. yes...

## 2022-04-13 NOTE — PROGRESS NOTES
[facility]  Hospitalist Progress Note    NAME: Ember Inman   :  3/7/1930   MRN:  [id number]     Total duration of encounter: 1 day      Interim Hospital Summary: 80 y.o. female who presented on 2022 with Chronic respiratory failure with hypoxia ([facility] She has a past medical history of Colitis (), DJD (degenerative joint disease), Fracture, femoral ([facility] (2005), HTN (hypertension), Hyperlipemia, Persistent atrial fibrillation ([facility] (), and Ventricular ectopy. .      Patient presenting 1 day post discharge with altered mentation. Found to be with low Pulse Ox on RA. Has not been given sedation ? Cash Jones Did not improved in ED with nasal O2, therefore admitted for IVF and OBS. Subjective:     Chief Complaint / Reason for Physician Visit  \"no pain , no SOB\".   Discussed with RN   Obtunded, but will answer simple question yes / no with eyes closed    Review of Systems:  UTO except per HPI  Symptom Y/N Comments  Symptom Y/N Comments   Fever/Chills    Chest Pain     Poor Appetite    Edema     Cough    Abdominal Pain     Sputum    Joint Pain     SOB/GREENBERG    Pruritis/Rash     Nausea/vomit    Tolerating PT/OT     Diarrhea    Tolerating Diet     Constipation    Other         Current Facility-Administered Medications:     acetaminophen (TYLENOL) tablet 500 mg, 500 mg, Oral, Q8H PRN, Marisa Chatman MD    apixaban Freya Guillen) tablet 2.5 mg, 2.5 mg, Oral, BID, Marisa Chatman MD, 2.5 mg at 22 1738    cefTRIAXone (ROCEPHIN) 1 g in 0.9% sodium chloride (MBP/ADV) 50 mL MBP, 1 g, IntraVENous, Q24H, Marisa Chatman MD, Last Rate: 100 mL/hr at 22 2301, 1 g at 22 2301    furosemide (LASIX) injection 40 mg, 40 mg, IntraVENous, DAILY, Marisa Chatman MD    nitroglycerin (NITROBID) 2 % ointment 0.5 Inch, 0.5 Inch, Topical, Q8H, Marisa Chatman MD, 0.5 Inch at 22 1500    labetaloL (NORMODYNE) tablet 100 mg, 100 mg, Oral, BID, Marisa Chatman MD, 100 mg at 22 3662   nystatin (MYCOSTATIN) 100,000 unit/gram powder, , Topical, BID, Hermelindo Wolf MD, Given at 04/13/22 1738    pantoprazole (PROTONIX) 40 mg in 0.9% sodium chloride 10 mL injection, 40 mg, IntraVENous, DAILY, Hermelindo Wolf MD    sodium chloride (NS) flush 5-40 mL, 5-40 mL, IntraVENous, Q8H, Hermelindo Wolf MD, 10 mL at 04/13/22 0631    sodium chloride (NS) flush 5-40 mL, 5-40 mL, IntraVENous, PRN, Hermelindo Wolf MD    ondansetron (ZOFRAN ODT) tablet 4 mg, 4 mg, Oral, Q8H PRN **OR** ondansetron (ZOFRAN) injection 4 mg, 4 mg, IntraVENous, Q6H PRN, Hermelindo Wolf MD    bisacodyL (DULCOLAX) suppository 10 mg, 10 mg, Rectal, DAILY PRN, Hermelindo Wolf MD    Objective:     VITALS:   Patient Vitals for the past 12 hrs:   Temp Pulse Resp BP SpO2   04/13/22 1620 97.2 °F (36.2 °C) 82 20 124/70 94 %   04/13/22 1500 -- 82 -- 134/70 --   04/13/22 1245 -- (!) 107 -- -- --   04/13/22 1230 97.2 °F (36.2 °C) (!) 107 14 130/65 92 %   04/13/22 0833 -- 97 16 (!) 144/73 95 %   04/13/22 0631 -- 97 -- (!) 144/84 --       Intake/Output Summary (Last 24 hours) at 4/13/2022 1742  Last data filed at 4/13/2022 0900  Gross per 24 hour   Intake 0 ml   Output --   Net 0 ml        PHYSICAL EXAM:  General: WD, WN. Obtunded. No acute distress    EENT:  EOMI. Anicteric sclerae. MMM  Resp:  CTA bilaterally, no wheezing or rales. No accessory muscle use  CV:  Regular  rhythm,  No edema  GI:  Soft, Non distended, Non tender. +Bowel sounds  Neurologic:  Obtunded, normal yes/ no speech when aroused, nonfocal  Psych:   Not anxious / agitated  Skin:  No rashes. No jaundice    LABS:  I reviewed today's most current labs and imaging studies.   Pertinent labs include:  Recent Labs     04/13/22  0543 04/12/22  1518   WBC 6.6 6.5   HGB 11.9 12.1   HCT 38.1 38.7    223     Recent Labs     04/13/22  0543 04/12/22  1518    142   K 3.8 4.0    103   CO2 29 28   GLU 75 85   BUN 25* 25*   CREA 0.84 0.90   CA 8.7 8.9   MG 2.2 2.2   ALB  --  3.0* TBILI  --  1.1*   ALT  --  20       EKG: Atrial Fib, Frequent ectopy, IVCD, Low Voltage, Septal MI?     Radiology:  CT HEAD WO CONT   Final Result   1. No evidence of intracranial hemorrhage. 2. Presence of subtle periventricular low density compatible with white matter   disease.       XR CHEST PORT   Final Result   Abnormal prominence of the basilar markings. Presence of bilateral   pleural effusions as described above.           Procedures: see electronic medical records for all procedures/Xrays and details which were not copied into this note but were reviewed prior to creation of Plan.         Assessment / Plan:  Principal Problem:    Chronic respiratory failure with hypoxia (HCC) (4/12/2022)  Active Problems:    Obtundation (4/12/2022)  Altered mentation over the past 24 hours  ABG without CO2 retention  Initial oximetry is noted mild hypoxia  No clear precipitating abnormalities  Resume antibiotic therapy for possible pneumonia, hold all sedating medication, maintain adequate oxygenation with nasal cannula  Was discussed with the POA/daughter, maintain DNR status but treat as able  POA to present tomorrow for discussion of palliative care alternative       Chronic systolic heart failure ([address] (11/19/2021)    A-fib ([address] (4/7/2022)    Pleural effusion (4/7/2022)    Elevated brain natriuretic peptide (BNP) level (4/7/2022)  BNP remains elevated  Chest x-ray noted for pleural effusion  X-ray with possible CHF/pneumonia  Maintain antibiotic therapy with Rocephin for 7 days  Continue Lasix 40 mg IV daily, plan potassium supplement as needed  Nitrol ointment half-inch every 8 hours pending tolerance for oral treatment       HTN (hypertension) ()  Continue treatment with Normodyne, Lasix, rates as able       Hyperlipemia ()  Resume Mevacor when able       Carotid artery disease, unspecified laterality ([address] (1/27/2021)  Continue treatment with anticoagulation and risk reduction measures as able  Eliquis, Mevacor, Normodyne       DJD (degenerative joint disease) ()  Resume treatment when able           SAFETY:   Code Status:DNR  DVT prophylaxis:Eliquis  Stress Ulcer prophylaxis: Protonix po change to IV since not alert for po tx  Bladder catheter:no  Family Contact Info:  Primary Emergency Contact: SHEFALI PINO, Home Phone: [phone]  Bulmaro Duenas [facility] Room 116/01  Disposition:  Placement ? [facility] or higher level of assistance  Admission status:  Inpatient         Reviewed most current lab test results and cultures  YES  Reviewed most current radiology test results   YES  Review and summation of old records today    NO  Reviewed patient's current orders and MAR    YES  PMH/SH reviewed - no change compared to H&P    Care Plan discussed with:                                   Comments  Patient x     Family      RN x     Care Manager  x     Consultant                         x  Multidiciplinary team rounds were held today with , nursing, pharmacist and clinical coordinator. Patient's plan of care was discussed; medications were reviewed and discharge planning was addressed.         ____________________________________________    Total NON Critical Care TIME:  35   Minutes        Comments   >50% of visit spent in counseling and coordination of care   X      Signed: Tiarra Brothers MD  [facility] Hospitalist  [phone]

## 2022-04-13 NOTE — PROGRESS NOTES
36 - Dr. Alisa Hamm advised to cancel speech therapy since pt passed [place] test and demanding food - Dr. Alisa Hamm advised she needed to have a clear liquid diet to see how it is tolerated and then he will advance as tolerated. 200 - MsMyal Inman is upset about the clear liquids, pt is eating orange sherbet and refusing jello, pudding, and liquid diet - will try again to get pt to understand rationale of clear liquid diet to advance her diet.

## 2022-04-13 NOTE — PROGRESS NOTES
Nutrition Note    Pt does not like ensure only boost which we do not have. She likes sherbet and puddings. Will try magic cup as well.      Electronically signed by Mayte York RD on 4/13/2022 at 5:38 PM

## 2022-04-13 NOTE — PROGRESS NOTES
Problem: Pressure Injury - Risk of  Goal: *Prevention of pressure injury  Description: Document Mike Scale and appropriate interventions in the flowsheet.   Outcome: Progressing Towards Goal  Note: Pressure Injury Interventions:  Sensory Interventions: Assess changes in LOC    Moisture Interventions: Absorbent underpads,Apply protective barrier, creams and emollients    Activity Interventions: Increase time out of bed    Mobility Interventions: HOB 30 degrees or less    Nutrition Interventions: Discuss nutritional consult with provider    Friction and Shear Interventions: Feet elevated on foot rest,HOB 30 degrees or less

## 2022-04-13 NOTE — ED NOTES
TRANSFER - OUT REPORT:    Verbal report given to FITO Gonzales on [address] Sp[place]  being transferred to [address] [place] [address] 116 for routine progression of care       Report consisted of patients Situation, Background, Assessment and   Recommendations(SBAR). Information from the following report(s) SBAR, Kardex, ED Summary, OR Summary, MAR, Recent Results and Med Rec Status was reviewed with the receiving nurse. Lines:       Opportunity for questions and clarification was provided.       Patient transported with:   Registered Nurse

## 2022-04-13 NOTE — PROGRESS NOTES
Medicare pt has received, reviewed, and signed 1st IM letter informing her of her right to appeal the discharge. Signed copied has been placed on pt bedside chart. Copy given to the patient. Patient alert and oriented at this time.

## 2022-04-13 NOTE — PROGRESS NOTES
Care Management Interventions  PCP Verified by CM: Yes David Batista NP Facility doctor)  Palliative Care Criteria Met (RRAT>21 & CHF Dx)?: No (No MD order for Palliative Care)  Transition of Care Consult (CM Consult): Home Health,Discharge Planning,Assisted Living  Discharge Durable Medical Equipment: No  Physical Therapy Consult: No  Occupational Therapy Consult: No  Speech Therapy Consult: No  Support Systems: Child(wen),Assisted Living  Confirm Follow Up Transport: Family  Discharge Location  Patient Expects to be Discharged to[de-identified] Assisted Living     Patient discharged back to Assisted Living at [facility] and returns 1 day post discharge with altered mental status/otundation and room air hypoxia. Patient was discharged with home health care and oxygen. Treated for CHF and pneumonia, treated with antibiotics and discharged on oral antibiotics. Reason for Admission:   Chronic Respiratory failure with hypoxia, obtundation                    RUR Score:     15%  MEDIUM             PCP: First and Last name:   Nadine Worley NP    Name of Practice:  Facility MD/NP  Are you a current patient: Yes/No:  YES     Can you participate in a virtual visit if needed: NO    Do you (patient/family) have any concerns for transition/discharge?      Not at this time              Plan for utilizing home health:  Already has home health with [facility] [address]:  DNR      Healthcare Decision Maker:   Click here to complete [address] including selection of the Healthcare Decision Maker Relationship (ie \"Primary\")            Primary Decision MakeWilfred Diggs - [phone]    Primary Decision Maker: Mariela Lake - Daughter - [phone]    Transition of Care Plan:      Return to [place] [address], [facility] with home health    Janell 13 (ACP) Conversation      Date of Conversation: 4/12/2022  Conducted with: Patient with Decision Making Capacity and Healthcare Decision Maker: Named in Advance Directive or Healthcare Power of [address]:     Primary Decision Maker: Tyree Melo - [phone]    Primary Decision Maker: Leesa Gomez - Daughter - [phone]  Click here to [address] including selection of the Healthcare Decision Maker Relationship (ie \"Primary\")    Patient's son Martha Ferreira [place], POA  Today we documented desired Decision Maker(s), who is (are) NOT Legal Next of Kin. ACP documents are required for decision maker authority. Content/Action Overview:   Has NO ACP documents/care preferences - information provided, considering goals and options  Reviewed DNR/DNI and patient confirms current DNR status - completed forms on file (place new order if needed)  Topics discussed: Patient's family to provide copy of POA/AD  Additional Comments: Patient's family to provide documents     Length of Voluntary ACP Conversation in minutes:  21 minutes    Marguerite Frausto         Reason for Readmission:              RUR Score/Risk Level:     15%    PCP: First and Last name:  Fatmata [place] NP   Name of Practice:  Facility Provider   Are you a current patient: Yes/No:  YES  Can you participate in a virtual visit with your PCP:  NO    Is a Care Conference indicated:  Not at this time      Did you attend your follow up appointment (s): If not, why not:  No, readmitted to hospital post discharge 1 day         Resources/supports as identified by patient/family:          Top Challenges facing patient (as identified by patient/family and CM): Finances/Medication cost?   Not an issue    Transportation      Not an issue  Support system or lack thereof? Son, [place]   Living arrangements? Resident at [facility]       Self-care/ADLs/Cognition? Dependent with self care/ADLs.  Cognition:  Mental status waxes and wanes in regards to cognition. Current Advanced Directive/Advance Care Plan:  Patient's son is named [facility] for utilizing home health:   [facility]             Transition of Care Plan:      Based on readmission, the patient's previous Plan of Care   has been evaluated and/or modified. The current Transition of Care Plan is:                     Readmission Assessment  Number of days since last admission?:  (1 day)  Previous disposition: Other (comment) (Assisted Living: [facility] with home health)  Who is being interviewed?:  (Patient sent to ED by staff at the assisted living.)  What was the patient's/caregiver's perception as to why they think they needed to return back to the hospital?: Other (Comment) (Staff at the facility noticed altered mental status, room air hypoxia. Patient should have had her oxygen on. Oxygen was ordered for her to go to the facility)  Did you visit your Primary Care Physician after you left the hospital, before you returned this time?: No  Why weren't you able to visit your PCP?: Other (Comment) (Admitted to hospital)  Did you see a specialist, such as Cardiac, Pulmonary, Orthopedic Physician, etc. after you left the hospital?: No  Who advised the patient to return to the hospital?: Other (Comment) (Assisted Living Staff)  Does the patient report anything that got in the way of taking their medications?: No  In our efforts to provide the best possible care to you and others like you, can you think of anything that we could have done to help you after you left the hospital the first time, so that you might not have needed to return so soon?: Other (Comment) (Nothing could have been done to prevent readmission.  Patient discharged in stable condition(afebrile, on oxygen, etc))

## 2022-04-13 NOTE — PROGRESS NOTES
Problem: Pressure Injury - Risk of  Goal: *Prevention of pressure injury  Description: Document Mike Scale and appropriate interventions in the flowsheet.   Outcome: Progressing Towards Goal  Note: Pressure Injury Interventions:  Sensory Interventions: Assess changes in LOC    Moisture Interventions: Absorbent underpads    Activity Interventions: Increase time out of bed    Mobility Interventions: HOB 30 degrees or less    Nutrition Interventions: Discuss nutritional consult with provider    Friction and Shear Interventions: HOB 30 degrees or less                Problem: Patient Education: Go to Patient Education Activity  Goal: Patient/Family Education  Outcome: Progressing Towards Goal     Problem: General Medical Care Plan  Goal: *Vital signs within specified parameters  Outcome: Progressing Towards Goal  Goal: *Labs within defined limits  Outcome: Progressing Towards Goal  Goal: *Absence of infection signs and symptoms  Outcome: Progressing Towards Goal  Goal: *Optimal pain control at patient's stated goal  Outcome: Progressing Towards Goal  Goal: *Skin integrity maintained  Outcome: Progressing Towards Goal  Goal: *Fluid volume balance  Outcome: Progressing Towards Goal

## 2022-04-14 VITALS
HEART RATE: 76 BPM | HEIGHT: 65 IN | SYSTOLIC BLOOD PRESSURE: 140 MMHG | BODY MASS INDEX: 21.27 KG/M2 | RESPIRATION RATE: 20 BRPM | WEIGHT: 127.7 LBS | DIASTOLIC BLOOD PRESSURE: 74 MMHG | TEMPERATURE: 97.4 F | OXYGEN SATURATION: 96 %

## 2022-04-14 LAB
BACTERIA SPEC CULT: NORMAL
SERVICE CMNT-IMP: NORMAL

## 2022-04-14 PROCEDURE — C9113 INJ PANTOPRAZOLE SODIUM, VIA: HCPCS | Performed by: INTERNAL MEDICINE

## 2022-04-14 PROCEDURE — 74011250637 HC RX REV CODE- 250/637: Performed by: INTERNAL MEDICINE

## 2022-04-14 PROCEDURE — 74011250636 HC RX REV CODE- 250/636: Performed by: INTERNAL MEDICINE

## 2022-04-14 PROCEDURE — 94760 N-INVAS EAR/PLS OXIMETRY 1: CPT

## 2022-04-14 PROCEDURE — 74011000250 HC RX REV CODE- 250: Performed by: INTERNAL MEDICINE

## 2022-04-14 PROCEDURE — 77010033678 HC OXYGEN DAILY

## 2022-04-14 RX ORDER — ACETAMINOPHEN, DIPHENHYDRAMINE HCL, PHENYLEPHRINE HCL 325; 25; 5 MG/1; MG/1; MG/1
5 TABLET ORAL
Qty: 1 TABLET | Refills: 0 | Status: SHIPPED | COMMUNITY
Start: 2022-04-14

## 2022-04-14 RX ADMIN — SODIUM CHLORIDE 40 MG: 9 INJECTION, SOLUTION INTRAMUSCULAR; INTRAVENOUS; SUBCUTANEOUS at 09:40

## 2022-04-14 RX ADMIN — SODIUM CHLORIDE, PRESERVATIVE FREE 5 ML: 5 INJECTION INTRAVENOUS at 06:02

## 2022-04-14 RX ADMIN — FUROSEMIDE 40 MG: 10 INJECTION, SOLUTION INTRAMUSCULAR; INTRAVENOUS at 09:41

## 2022-04-14 RX ADMIN — LABETALOL HYDROCHLORIDE 100 MG: 100 TABLET, FILM COATED ORAL at 09:41

## 2022-04-14 RX ADMIN — NYSTATIN: 100000 POWDER TOPICAL at 09:40

## 2022-04-14 RX ADMIN — APIXABAN 2.5 MG: 2.5 TABLET, FILM COATED ORAL at 09:41

## 2022-04-14 RX ADMIN — NITROGLYCERIN 0.5 INCH: 20 OINTMENT TOPICAL at 06:01

## 2022-04-14 NOTE — DISCHARGE SUMMARY
[facility]  Hospitalist Discharge Summary    Patient ID:  Chester Segura  [id number]  80 y.o.  3/7/1930    PCP on record: Ethelda Cockayne, NP    Admit date: 4/12/2022  Discharge date and time: 4/14/2022     DISCHARGE DIAGNOSIS:    Principal Problem:    Chronic respiratory failure with hypoxia ([address] (4/12/2022)    Active Problems:    Obtundation (4/12/2022)    Chronic systolic heart failure (HCC) (11/19/2021)    A-fib ([address] (4/7/2022)    Pleural effusion (4/7/2022)    Elevated brain natriuretic peptide (BNP) level (4/7/2022)    HTN (hypertension) ()    Hyperlipemia ()    Carotid artery disease, unspecified laterality ([address] (1/27/2021)    DJD (degenerative joint disease) ()    CONSULTATIONS:  None    Excerpted HPI from H&P of Huber Reynolds MD:  80 y.o. female presenting for admission to [facility] for further evaluation and treatment for Chronic respiratory failure with hypoxia ([address] She  has a past medical history of Colitis (2013), DJD (degenerative joint disease), Fracture, femoral ([address] (09/2005), HTN (hypertension), Hyperlipemia, Persistent atrial fibrillation ([address] (2018), and Ventricular ectopy. .      Patient returns to the ED from the [facility] assisted living facility 1 day post discharge with altered mental status/obtundation and room air hypoxia. Additional history not available at this time. Patient apparently had low pulse ox in the 85% range on room air. History noted the patient had not kept the oxygen on, and it was difficult for staff to maintain oxygen compliance. Patient's daughter states that she fought the use of oxygen during her hospitalization last week. X-ray findings are nonspecific, primarily noted for small pleural effusions. During the previous admission there was some concern over congestive failure with the association of the elevated BNP as well as possible acute infection.   She was treated with Rocephin during the recent admission, switched to oral Ceftin on discharge. There is no fever or significant leukocytosis. She will be restarted on topical nitrates with IV Lasix as well as Rocephin. There is no significant bronchospasm on exam, bronchodilators could be a consideration. She is arousable transiently and provides yes / no responses to questions. All sedative type medications will be withheld-only getting low-dose Zoloft and Melatonin per recent d/c med list.     Discussed treatment plans with family/POA-daughter who lives in the [place] area. She notes her mother was adverse to the use of oxygen. Patient apparently has been pulling this off since discharged to the assisted living. She has some mild hypoxia. ABG on oxygen shows no acid-base abnormality or CO2 retention. She will be restarted on empiric antibiotic therapy for the possible pneumonia treated on last admission. Procalcitonin will be assessed with uncertainty concerning acute infection. Prognosis guarded. DNR order as per patient's prior wishes. Discussed palliative care with family and patient when able. ______________________________________________________________________  DISCHARGE SUMMARY/HOSPITAL COURSE:  for full details see H&P, daily progress notes, labs, consult notes. Patient presenting 1 day post discharge with altered mentation. Found to be with low Pulse Ox on RA. Has not been given sedation ? Wendy Granados Did not improved in ED with nasal O2, therefore admitted for IVF and OBS. Question if she did receive a prn dose of sedative at [facility]. Following 12-24 hrs of admission, she aroused and demanded eating as she was very hungry. Resuming oral meds Rx on d/c 4/11.       Principal Problem:    Chronic respiratory failure with hypoxia ([address] (4/12/2022)  Active Problems:    Obtundation (4/12/2022)  Altered mentation over the past 24 hours since discharge  ABG without CO2 retention  Initial oximetry is noted mild hypoxia, not enough to explain the marked obtundation present on admission  No evidence for acute focal neurologic event. CT unremarkable. No clear precipitating abnormalities  Resume antibiotic therapy for possible partially tx pneumonia, hold all sedating medication, maintain adequate oxygenation with nasal cannula  Was discussed with the POA/daughter, maintain DNR status but treat as able  On 4/13 the patient's son added a alternate POA to the patient's Advanced Directive (not the sister)       Chronic systolic heart failure ([facility] (11/19/2021)    A-fib ([facility] (4/7/2022)    Pleural effusion (4/7/2022)    Elevated brain natriuretic peptide (BNP) level (4/7/2022)  BNP remains elevated  Chest x-ray noted for pleural effusion  X-ray during prior admission concerning for possible CHF/pneumonia  Tx IV Rocephin during this admission - will complete Ceftin Rx on last discharge  Continued Lasix 40 mg IV daily, plan potassium supplement as needed  Nitrol ointment half-inch every 8 hours pending tolerance for oral treatment  To resume usual oral meds on discharge back to [facility]       HTN (hypertension) ()  Continue treatment with Normodyne, Lasix, Imdur on discharge  Was Rx Nitrolointment on admission due to obtundation       Hyperlipemia ()  Resume Mevacor on discharge       Carotid artery disease, unspecified laterality ([facility] (1/27/2021)  Continue treatment with anticoagulation and risk reduction measures as able  Eliquis, Mevacor, Normodyne  No focal neurologic findings on return       DJD (degenerative joint disease) ()  Resume treatment on discharge      _______________________________________________________________________  Patient seen and examined by me on discharge day.   Pertinent Findings:  Visit Vitals  BP (!) 140/74 (BP 1 Location: Left upper arm, BP Patient Position: At rest)   Pulse 76   Temp 97.4 °F (36.3 °C)   Resp 20   Ht 5' 5\" (1.651 m)   Wt 57.9 kg (127 lb 11.2 oz)   SpO2 96%   BMI 21.25 kg/m²     Gen:    Not in distress  Chest: Nonlabored respiration, Clear lungs  CVS:   Regular rhythm. No edema  Abd:  Soft, not distended, not tender  Neuro:  Alert, nonfocal, weak    LABS:  Results for Ivonne Sommers (MRN [id number]) as of 4/14/2022 12:57   4/9/2022 09:03 4/12/2022 15:18 4/13/2022 05:43   WBC 6.8 6.5 6.6   NRBC 0.0 0.0 0.0   RBC 3.81 4.21 4.16   HGB 11.0 (L) 12.1 11.9   HCT 34.8 (L) 38.7 38.1   MCV 91.3 91.9 91.6   MCH 28.9 28.7 28.6   MCHC 31.6 31.3 31.2   RDW 16.5 (H) 16.7 (H) 16.7 (H)   PLATELET 314 100 492   MPV 10.3 11.1 11.0   NEUTROPHILS 76 (H)  76 (H)   LYMPHOCYTE 11 (L)  11 (L)   MONOCYTES 11  11   EOSINOPHILS 2  2     Results for Ivonne Sommers (MRN [id number]) as of 4/14/2022 12:57   4/7/2022 17:52 4/12/2022 17:04   Color YELLOW/STRAW YELLOW/STRAW   Appearance CLEAR CLEAR   Specific gravity 1.010    Specific gravity  1.024   pH (UA) 6.5 5.5   Protein Negative 30 (A)   Glucose Negative Negative   Ketone Negative TRACE (A)   Blood TRACE (A) SMALL (A)   Bilirubin Negative Negative   Urobilinogen 0.2 0.2   Nitrites Negative Negative   Leukocyte Esterase Negative Negative   Epithelial cells FEW MODERATE (A)   Mucus  TRACE   WBC 0-4 0-4   RBC 0-5 0-5   Bacteria Negative 1+ (A)     Results for Ivonne Sommers (MRN [id number]) as of 4/14/2022 12:57   4/7/2022 12:16 4/8/2022 05:17 4/9/2022 09:03 4/12/2022 15:18 4/13/2022 05:43   Sodium 143 141 140 142 143   Potassium 4.4 3.6 3.9 4.0 3.8   Chloride 106 103 102 103 104   CO2 30 30 31 28 29   Anion gap 7 8 7 11 10   Glucose 120 (H) 93 102 (H) 85 75   BUN 24 (H) 23 (H) 20 25 (H) 25 (H)   Creatinine 0.93 0.87 0.96 0.90 0.84   BUN/Cr ratio 26 (H) 26 (H) 21 (H) 28 (H) 30 (H)   Calcium 9.3 9.0 8.3 (L) 8.9 8.7   Magnesium    2.2 2.2   GFR  non-AA 56 (L) >60 54 (L) 59 (L) >60   Bilirubin, total 1.3 (H)  1.0 1.1 (H)    Protein, total 7.5  6.8 7.1    Albumin 3.2 (L)  2.8 (L) 3.0 (L)    Globulin 4.3 (H)  4.0 4.1 (H)    A-G Ratio 0.7 (L)  0.7 (L) 0.7 (L)    ALT 17  13 20    AST 24  22 30    Alk.  phos 104  97 107    Lactic acid 1.4       Troponin-HS 24   30    NT pro-BNP 9,861 (H)   13,207 (H)    Vitamin B12   783       Results for Kim Loza (MRN [id number]) as of 4/14/2022 12:57   4/12/2022 16:07   pH 7.44   PCO2 39   PO2 88   BICARBONATE 26   O2 SAT 97   BASE EXCESS 1.8   Sample source ARTERIAL   SITE RIGHT RADIAL   JONATHAN'S TEST YES   O2 FLOW RATE 2.00   O2 METHOD NASAL CANNULA       EKG: Atrial Fib, Frequent ectopy, IVCD, Low Voltage, Septal MI?     Radiology:  CT HEAD 4/12:  No calvarial abnormalities are detected. There is deviation of the anterior  aspect of the nasal septum towards right. A focal cutaneous lesion is noted in  the region of the left cheek (see axial image 7). This finding measures 6 images  in size. This may represent a cutaneous mole. There is no evidence of  intracranial hemorrhage. There is periventricular low density. This is  compatible with white matter disease.   IMPRESSION  1. No evidence of intracranial hemorrhage. 2. Presence of subtle periventricular low density compatible with white matter disease. pCXR 4/12:  A single AP upright view of the chest was obtained. It is difficult to  accurately assess the size of the cardiac silhouette. Lateral pleural effusions  are present (right greater than left). Superimposed renal disease at the right  lung base cannot be excluded. There is evidence of degenerative change involving both shoulders.   IMPRESSION  Abnormal prominence of the basilar markings. Presence of bilateral  pleural effusions as described above.    _______________________________________________________________________  DISCHARGE MEDICATIONS:   Current Discharge Medication List      CONTINUE these medications which have CHANGED    Details   melatonin 10 mg tab Take 5 mg by mouth nightly.   Qty: 1 Tablet, Refills: 0  Start date: 4/14/2022         CONTINUE these medications which have NOT CHANGED    Details   nystatin (MYCOSTATIN) powder Apply  to affected area two (2) times a day for 15 days. Qty: 60 g, Refills: 0      cefUROXime (CEFTIN) 500 mg tablet Take 1 Tablet by mouth every twelve (12) hours for 13 doses. Qty: 13 Tablet, Refills: 0      labetaloL (NORMODYNE) 100 mg tablet Take 100 mg by mouth two (2) times a day. pantoprazole (PROTONIX) 40 mg tablet Take 40 mg by mouth daily. therapeutic multivitamin (Thera) tablet Take 1 Tablet by mouth daily. furosemide (LASIX) 40 mg tablet Take 1 Tablet by mouth daily. Qty: 90 Tablet, Refills: 1      potassium chloride SR (KLOR-CON 10) 10 mEq tablet Take 1 Tablet by mouth daily. Indications: prevention of low potassium in the blood  Qty: 90 Tablet, Refills: 1      isosorbide mononitrate ER (IMDUR) 30 mg tablet Take 1 Tablet by mouth daily. Qty: 90 Tablet, Refills: 1      bisacodyL (DULCOLAX) 10 mg supp Insert 10 mg into rectum daily as needed for Constipation. acetaminophen (Tylenol Extra Strength) 500 mg tablet Take 500 mg by mouth every eight (8) hours as needed. sertraline (ZOLOFT) 25 mg tablet Take 1 Tablet by mouth daily. Qty: 30 Tablet, Refills: 0      apixaban (ELIQUIS) 2.5 mg tablet Take 1 Tablet by mouth two (2) times a day. Qty: 180 Tablet, Refills: 1      lovastatin (MEVACOR) 20 mg tablet TAKE 1 TABLET BY MOUTH  NIGHTLY  Qty: 90 Tab, Refills: 3    Comments: PATIENT ID : [id number]  Associated Diagnoses: Pure hypercholesterolemia      ascorbic acid (VITAMIN C PO) Take 1,000 mg by mouth daily. cholecalciferol, vitamin D3, (VITAMIN D3 PO) Take 1,000 Units by mouth daily. pyridoxine, vitamin B6, (Vitamin B-6) 100 mg tablet Take 100 mg by mouth daily. magnesium 200 mg tab Take 1 Tablet by mouth daily.              My Recommended  Diet: Regular  Activity: Up only with assistance  Wound Care: none  Follow-up labs: Routine    Hospitalist Recommendations  Avoid sedatives and narcotics  Maintain Nasal Oxygen as much as able  Check Pulse Ox for altered mentation  May use Flonase 1 spray each nostril twice days for nasal irritation from NC  Continue all medications taken PTA, no changes except for sedatives  PAEMLA Luz MD  [phone]      ______________________________________________________________________  DISPOSITION:    Home with Family:    Home with HH/PT/OT/RN:    SNF/LTC: Return to [facility]   KASSI:    OTHER:        Condition at Discharge:  Stable  _____________________________________________________________________  Follow up with:   PCP : Danyell Rivas NP  Follow-up Information     Follow up With Specialties Details Why Contact Info    Danyell Rivas NP Nurse Practitioner Needs f/u in 2-3 days post [address] [place] [address] [phone]          Total time in minutes spent coordinating this discharge (includes going over instructions, follow-up, prescriptions, and preparing report for sign off to her PCP) :35 minutes    Signed:  Angelina Simental MD  [facility] Hospitalist  [phone]

## 2022-04-14 NOTE — PROGRESS NOTES
[facility] called back asking if Ms. Inman came in with call bell/necklace response - nothing is charted in ED, asked Nano and she does not recall pt having one on - asked CT and they do not recall her having one around neck - nothing in security or nursing supervisors office.

## 2022-04-14 NOTE — DISCHARGE INSTRUCTIONS
Hospitalist Recommendations    Avoid sedatives and narcotics  Maintain Nasal Oxygen as much as able  Check Pulse Ox for altered mentation  May use Flonase 1 spray each nostril twice days for nasal irritation from NC  Continue all medications taken PTA, no changes except for sedatives  PAMELA JENSEN MD  [phone]        DISCHARGE SUMMARY from Nurse    PATIENT INSTRUCTIONS:    After general anesthesia or intravenous sedation, for 24 hours or while taking prescription Narcotics:  · Limit your activities  · Do not drive and operate hazardous machinery  · Do not make important personal or business decisions  · Do  not drink alcoholic beverages  · If you have not urinated within 8 hours after discharge, please contact your surgeon on call. Report the following to your surgeon:  · Excessive pain, swelling, redness or odor of or around the surgical area  · Temperature over 100.5  · Nausea and vomiting lasting longer than 4 hours or if unable to take medications  · Any signs of decreased circulation or nerve impairment to extremity: change in color, persistent  numbness, tingling, coldness or increase pain  · Any questions    What to do at Home:  Recommended activity: Activity as tolerated,     If you experience any recurrent symptoms , please follow up with staff at the [facility] or return to the ED. *  Please give a list of your current medications to your Primary Care Provider. *  Please update this list whenever your medications are discontinued, doses are      changed, or new medications (including over-the-counter products) are added. *  Please carry medication information at all times in case of emergency situations. These are general instructions for a healthy lifestyle:    No smoking/ No tobacco products/ Avoid exposure to second hand smoke  Surgeon General's Warning:  Quitting smoking now greatly reduces serious risk to your health.     Obesity, smoking, and sedentary lifestyle greatly increases your risk for illness    A healthy diet, regular physical exercise & weight monitoring are important for maintaining a healthy lifestyle    You may be retaining fluid if you have a history of heart failure or if you experience any of the following symptoms:  Weight gain of 3 pounds or more overnight or 5 pounds in a week, increased swelling in our hands or feet or shortness of breath while lying flat in bed. Please call your doctor as soon as you notice any of these symptoms; do not wait until your next office visit. The discharge information has been reviewed with the patient and caregiver. The patient and caregiver verbalized understanding. Discharge medications reviewed with the patient and caregiver and appropriate educational materials and side effects teaching were provided.   ___________________________________________________________________________________________________________________________________

## 2022-04-14 NOTE — PROGRESS NOTES
Problem: Pressure Injury - Risk of  Goal: *Prevention of pressure injury  Description: Document Mike Scale and appropriate interventions in the flowsheet. Outcome: Progressing Towards Goal  Note: Pressure Injury Interventions:  Sensory Interventions: Assess changes in LOC    Moisture Interventions: Absorbent underpads    Activity Interventions: Increase time out of bed    Mobility Interventions: HOB 30 degrees or less    Nutrition Interventions: Discuss nutritional consult with provider    Friction and Shear Interventions: Minimize layers                Problem: General Medical Care Plan  Goal: *Vital signs within specified parameters  Outcome: Progressing Towards Goal  Goal: *Labs within defined limits  Outcome: Progressing Towards Goal  Goal: *Absence of infection signs and symptoms  Outcome: Progressing Towards Goal  Goal: *Fluid volume balance  Outcome: Progressing Towards Goal  Goal: *Optimize nutritional status  Outcome: Progressing Towards Goal  Goal: *Anxiety reduced or absent  Outcome: Progressing Towards Goal     Problem: Falls - Risk of  Goal: *Absence of Falls  Description: Document Sheila Fall Risk and appropriate interventions in the flowsheet.   Outcome: Progressing Towards Goal  Note: Fall Risk Interventions:  Mobility Interventions: Bed/chair exit alarm,Patient to call before getting OOB,PT Consult for mobility concerns,PT Consult for assist device competence,Utilize walker, cane, or other assistive device    Mentation Interventions: Bed/chair exit alarm,Adequate sleep, hydration, pain control,Update white board    Medication Interventions: Bed/chair exit alarm,Patient to call before getting OOB,Teach patient to arise slowly    Elimination Interventions: Bed/chair exit alarm,Call light in reach,Stay With Me (per policy)    History of Falls Interventions: Bed/chair exit alarm,Door open when patient unattended,Utilize gait belt for transfer/ambulation

## 2022-04-14 NOTE — PROGRESS NOTES
"Nisa Frank is a 67 y.o. female patient.    Temp: (!) 95.8 °F (35.4 °C) (10/03/17 1510)  Pulse: 63 (10/03/17 1551)  Resp: 20 (10/03/17 1551)  BP: (!) 133/57 (10/03/17 1515)  SpO2: 100 % (10/03/17 1551)  Weight: (!) 158.8 kg (350 lb) (10/03/17 1120)  Height: 5' 8" (172.7 cm) (10/03/17 1120)    PICC  Date/Time: 10/3/2017 5:00 PM  Consent Done: Yes  Indications: med administration and vascular access  Anesthesia: local infiltration  Local anesthetic: lidocaine 1% without epinephrine  Anesthetic Total (mL): 5  Preparation: skin prepped with ChloraPrep  Skin prep agent dried: skin prep agent completely dried prior to procedure  Sterile barriers: all five maximum sterile barriers used - cap, mask, sterile gown, sterile gloves, and large sterile sheet  Hand hygiene: hand hygiene performed prior to central venous catheter insertion  Location details: left basilic  Catheter type: double lumen  Catheter size: 5 Fr  Catheter Length: 44cm    Ultrasound guidance: yes  Vessel Caliber: medium and patent, compressibility normal  Needle advanced into vessel with real time Ultrasound guidance.  Guidewire confirmed in vessel.  Sterile sheath used.  Number of attempts: 1  Post-procedure: blood return through all ports, chlorhexidine patch and sterile dressing applied  Estimated blood loss (mL): 2          Ryna Vora  10/3/2017  " Bedside and Verbal shift change report given to T. Severiano Imus (oncoming nurse) by Harper Hines RN   (offgoing nurse). Report included the following information SBAR, Kardex, Procedure Summary, Intake/Output, MAR, Accordion, Recent Results and Med Rec Status. Alba score 5  Bed/chair alarm Yes in use. If in use it is set at the highest volume. Intravenous fluids were administered, none   Patient refused IV  Lasix and IV protonix  Patient Vitals for the past 12 hrs:   Temp Pulse Resp BP SpO2   04/13/22 2058 98.5 °F (36.9 °C) 92 20 119/64 93 %   04/13/22 1620 97.2 °F (36.2 °C) 82 20 124/70 94 %   04/13/22 1500 -- 82 -- 134/70 --   04/13/22 1245 -- (!) 107 -- -- --   04/13/22 1230 97.2 °F (36.2 °C) (!) 107 14 130/65 92 %     No flowsheet data found. All lab results for the last 24 hours reviewed. MD aware that patient refused IV medications Protonix and Lasix. Patient agreed to take pill form of both medications if MD changes order. MD discontinued frequent Neuro checks at am rounds.

## 2022-04-14 NOTE — PROGRESS NOTES
Bedside and Verbal shift change report given to PANKAJ Barr Rn (oncoming nurse) by RUPA Sen (offgoing nurse). Report included the following information SBAR, Kardex, Intake/Output, MAR, Accordion and Quality Measures.

## 2022-04-14 NOTE — PROGRESS NOTES
Discharge instructions reviewed with patient and Critical access hospital staff (here to ). Personal belongings returned: n/a  Home meds returned: n/a  To front entrance via w/c  Discharged home with  Nutritics staff @ 5. Patient left with her own oxygen tank. She is wearing 2 liters.

## 2022-04-15 NOTE — PROGRESS NOTES
Care Management Interventions  PCP Verified by CM: Yes Hans Pierson NP Facility doctor)  Palliative Care Criteria Met (RRAT>21 & CHF Dx)?: No (No MD order for Palliative Care)  Transition of Care Consult (CM Consult): Home Health,Discharge Planning,Assisted Living  Discharge Durable Medical Equipment: No  Physical Therapy Consult: No  Occupational Therapy Consult: No  Speech Therapy Consult: No  Support Systems: Child(wen),Assisted Living  Confirm Follow Up Transport: Family  Discharge Location  Patient Expects to be Discharged to[de-identified] Assisted Living ([facility])    Patient was discharged yesterday 4/14 back to [facility]. Patient already has home o2 established through [facility]. Patient/family aware and agree with discharge plan. Patient/family/[facility] aware to call case management for any questions or concerns. RUR: 16% MODERATE     Transition of Care (PRASANNA) Plan:  Return to [facility]     [facility] Transportation:       How is patient being transported at discharge? Reino Fass     When? 24--48 hours      Is transport scheduled? No     Follow-up appointment and transportation: Physician at [facility] to see patient. Communication plan (with patient/family): Patient/famiyl aware and agree with discharge plan. Who is being called? Patient or Next of Kin? Responsible party? Patient       What number(s) is to be used? [phone]      What service provider is calling for [employer] services? [facility]       When are they calling?  Unknown      Click here to complete [id number] including selection of the Healthcare Decision Maker Relationship (ie \"Primary\")  [email or web address]

## 2022-04-15 NOTE — PROGRESS NOTES
Comprehensive Nutrition Assessment    Type and Reason for Visit: Initial,Positive nutrition screen    Nutrition Recommendations/Plan: regular diet, sherbet, puddings and magic cup with meals, encourage po intake     Nutrition Assessment:    Present on Admission:   Chronic respiratory failure with hypoxia (HCC)   Obtundation   A-fib ([address]   Carotid artery disease, unspecified laterality (HCC)   Chronic systolic heart failure (HCC)   DJD (degenerative joint disease)   Elevated brain natriuretic peptide (BNP) level   HTN (hypertension)   Pleural effusion   Hyperlipemia    Pt admitted with above. She was unable to eat on admission r/t alertness. She came around and started eating a full liquid diet then regular. She is very picky about what she likes and doesn't like so she will let you know. Intake was poor here but she did eat some tee the sweets/cold treats. She has some sig wt loss 10.5% in 4 months. Meds-reviewed. Labs all WNL except BUN high 25. Pt can fed herself. Encourage po intake and supplement. Malnutrition Assessment:  Malnutrition Status:  Severe malnutrition    Context:  Acute illness     Findings of the 6 clinical characteristics of malnutrition:   Energy Intake:  7 - 50% or less of est energy requirements for 5 or more days  Weight Loss:  7.00 - Greater than 7.5% over 3 months     Body Fat Loss:  1 - Mild body fat loss,     Muscle Mass Loss:  1 - Mild muscle mass loss,    Fluid Accumulation:  No significant fluid accumulation,     Strength:  Not performed         Estimated Daily Nutrient Needs:  Energy (kcal): 1200; Weight Used for Energy Requirements: Current  Protein (g): 58g (1g/kg);  Weight Used for Protein Requirements: Current  Fluid (ml/day): 1200; Method Used for Fluid Requirements: 1 ml/kcal      Nutrition Related Findings:       Patient Vitals for the past 168 hrs:   % Diet Eaten   04/14/22 1200 1 - 25%   04/14/22 0900 0%   04/13/22 1800 0%   04/13/22 1620 26 - 50%   04/13/22 0900 0%   04/13/22 0800 0%       Wounds:    None       Current Nutrition Therapies:  No diet orders on file    Anthropometric Measures:  · Height:  5' 5\" (165.1 cm)  · Current Body Wt:  57.6 kg (127 lb)   · Admission Body Wt:       · Usual Body Wt:        · Ideal Body Wt:  125 lbs:  101.6 %   · Adjusted Body Weight:   ; Weight Adjustment for: No adjustment   · Adjusted BMI:       · BMI Category:  Underweight (BMI less than 22) age over 72       Weight Loss Metrics 4/14/2022 4/11/2022 3/16/2022 11/21/2021 9/16/2021 6/3/2021 3/4/2021   Today's Wt 127 lb 11.2 oz 130 lb 142 lb 142 lb 12.8 oz 134 lb 137 lb 135 lb   BMI 21.25 kg/m2 21.63 kg/m2 23.63 kg/m2 23.76 kg/m2 22.3 kg/m2 22.8 kg/m2 22.47 kg/m2         Nutrition Diagnosis:   · Severe malnutrition related to cognitive or neurological impairment,acute injury/trauma as evidenced by intake 0-25%,intake 26-50%,weight loss 7.5% in 3 months      Nutrition Interventions:   Food and/or Nutrient Delivery: Continue current diet,Start oral nutrition supplement  Nutrition Education and Counseling: No recommendations at this time  Coordination of Nutrition Care: Continue to monitor while inpatient,Coordination of community care    Goals:  po intake at least 50% of most meals x 3-5 days       Nutrition Monitoring and Evaluation:   Behavioral-Environmental Outcomes: None identified  Food/Nutrient Intake Outcomes: Diet advancement/tolerance,Food and nutrient intake,Supplement intake  Physical Signs/Symptoms Outcomes: Weight,Nutrition focused physical findings,Meal time behavior    Discharge Planning:    Continue oral nutrition supplement,Continue current diet     Electronically signed by Lety Carter RD

## 2022-04-19 LAB
BACTERIA SPEC CULT: NORMAL
SERVICE CMNT-IMP: NORMAL

## 2022-04-19 NOTE — ED PROVIDER NOTES
EMERGENCY DEPARTMENT HISTORY AND PHYSICAL EXAM      Date: 4/12/2022  Patient Name: Shalini Inman    History of Presenting Illness     Chief Complaint   Patient presents with    Altered mental status       History Provided By: Patient    HPI: Shalini Inman, 80 y.o. female with PMHx as noted below presents to the emergency department by EMS for evaluation of altered mental status. History is limited secondary to altered mental status. Patient had just been discharged from her facility 1 day ago after being admitted with similar presentation. Per EMS when they arrived patient was found to have oxygen saturation of 85% on room air, not on any supplemental oxygen and staff reported that patient was continually removing her oxygen. PCP: Everett Valentin NP    Current Outpatient Medications   Medication Sig Dispense Refill    melatonin 10 mg tab Take 5 mg by mouth nightly. 1 Tablet 0    nystatin (MYCOSTATIN) powder Apply  to affected area two (2) times a day for 15 days. 60 g 0    cefUROXime (CEFTIN) 500 mg tablet Take 1 Tablet by mouth every twelve (12) hours for 13 doses. 13 Tablet 0    labetaloL (NORMODYNE) 100 mg tablet Take 100 mg by mouth two (2) times a day.  pantoprazole (PROTONIX) 40 mg tablet Take 40 mg by mouth daily.  therapeutic multivitamin (Thera) tablet Take 1 Tablet by mouth daily.  furosemide (LASIX) 40 mg tablet Take 1 Tablet by mouth daily. 90 Tablet 1    potassium chloride SR (KLOR-CON 10) 10 mEq tablet Take 1 Tablet by mouth daily. Indications: prevention of low potassium in the blood 90 Tablet 1    isosorbide mononitrate ER (IMDUR) 30 mg tablet Take 1 Tablet by mouth daily. 90 Tablet 1    bisacodyL (DULCOLAX) 10 mg supp Insert 10 mg into rectum daily as needed for Constipation.  acetaminophen (Tylenol Extra Strength) 500 mg tablet Take 500 mg by mouth every eight (8) hours as needed.  sertraline (ZOLOFT) 25 mg tablet Take 1 Tablet by mouth daily.  [address] Tablet 0    apixaban (ELIQUIS) 2.5 mg tablet Take 1 Tablet by mouth two (2) times a day. 180 Tablet 1    lovastatin (MEVACOR) 20 mg tablet TAKE 1 TABLET BY MOUTH  NIGHTLY 90 Tab 3    ascorbic acid (VITAMIN C PO) Take 1,000 mg by mouth daily.  cholecalciferol, vitamin D3, (VITAMIN D3 PO) Take 1,000 Units by mouth daily.  pyridoxine, vitamin B6, (Vitamin B-6) 100 mg tablet Take 100 mg by mouth daily.  magnesium 200 mg tab Take 1 Tablet by mouth daily. Past History     Past Medical History:  Past Medical History:   Diagnosis Date    Colitis 2013    C diff; no recent flare, normal colonoscopy 2014    DJD (degenerative joint disease)     Fracture, femoral ([address] 09/2005    HTN (hypertension)     Hyperlipemia     Persistent atrial fibrillation (HCC) 2018    Ventricular ectopy     no symptoms       Past Surgical History:  Past Surgical History:   Procedure Laterality Date    HX CATARACT REMOVAL      bilat    HX COLONOSCOPY  2014    WNL    HX ORTHOPAEDIC  2005    right femur fx    HX ORTHOPAEDIC  2012    Lt TKA    HX REFRACTIVE SURGERY  06/15/2017    rt       Family History:  Family History   Problem Relation Age of Onset    Cancer Mother         Leukemia    Heart Disease Mother     Cancer Sister         Ovarian    No Known Problems Brother     Heart Failure Brother     Cancer Brother         Brain tumor       Social History:  Social History     Tobacco Use    Smoking status: Never Smoker    Smokeless tobacco: Never Used   Vaping Use    Vaping Use: Never used   Substance Use Topics    Alcohol use: No     Alcohol/week: 0.0 standard drinks    Drug use: Never       Allergies:   Allergies   Allergen Reactions    Ambien [Zolpidem] Other (comments)     Legs got heavy    Aminobenzoic Acid Unknown (comments)    Bactrim [Sulfamethoprim Ds] Unknown (comments)     Patient cant remember her reaction    Hydroxyzine Other (comments)     Legs got heavy    Sulfamethoxazole-Trimethoprim Unknown (comments)     Other reaction(s): vomiting and abd pain         Review of Systems   Review of Systems   Unable to perform ROS: Mental status change       Physical Exam   Physical Exam    GENERAL: Drowsy, will open eyes and verbally respond to painful stimuli  EYES: PEERL, No injection, discharge or icterus. ENT: Mucous membranes pink and moist.  NECK: Supple  LUNGS: Airway patent. Non-labored respirations. Breath sounds clear with good air entry bilaterally. HEART: Regular rate and rhythm. No peripheral edema  ABDOMEN: Non-distended and non-tender, without guarding or rebound. SKIN:  warm, dry  EXTREMITIES: Without swelling, tenderness or deformity, symmetric with normal ROM  NEUROLOGICAL: Sleepy but will respond to painful stimuli, will open eyes, will follow some basic commands      Diagnostic Study Results     Labs -   No results found for this or any previous visit (from the past 12 hour(s)). Radiologic Studies -   CT HEAD WO CONT   Final Result   1. No evidence of intracranial hemorrhage. 2. Presence of subtle periventricular low density compatible with white matter   disease. XR CHEST PORT   Final Result   Abnormal prominence of the basilar markings. Presence of bilateral   pleural effusions as described above. CT Results  (Last 48 hours)    None        CXR Results  (Last 48 hours)    None            Medical Decision Making     I, Gwenevere Bumpers, MD am the first provider for this patient and am the attending of record for this patient encounter. I reviewed the vital signs, available nursing notes, past medical history, past surgical history, family history and social history. Vital Signs-Reviewed the patient's vital signs. No data found.         Records Reviewed: Nursing Notes and Old Medical Records    Provider Notes (Medical Decision Making):   80-year-old female presented emergency department with altered mental status, reportedly hypoxic with history of noncompliance with her home oxygen. Differential includes respiratory failure, encephalopathy, infection, CNS pathology, electrolyte abnormalities, metabolic abnormalities among others. Basic work-up was overall reassuring, no obvious cause for the patient's altered mental status except possibly induced from prolonged hypoxia. CT showed no acute findings, no obvious infectious source. Patient has been on her oxygen while in the ED and is showing some improvement and has become more responsive so we will plan to admit for further management in our facility. ED Course:       Medications - No data to display      PROGRESS:  The patient has been re-evaluated and sx have improved. CONSULT:  Alfred Keene MD spoke with the hospitalist.  Discussed HPI and PE, available diagnostic tests and clinical findings. He is in agreement with care plans as outlined and will evaluate for admission     Admit Note  Patient is being admitted to the hospitalist.      Disposition:  admission    PLAN:  1. Admit     Diagnosis     Clinical Impression:   1. Acute respiratory failure with hypoxia ([address]    2. Encephalopathy        Please note that this dictation was completed with Dragon, computer voice recognition software. Quite often unanticipated grammatical, syntax, homophones, and other interpretive errors are inadvertently transcribed by the computer software. Please disregard these errors. Additionally, please excuse any errors that have escaped final proofreading.

## 2022-04-25 NOTE — TELEPHONE ENCOUNTER
Spoke with the patients point of contact, Tamela Sanford. Verified patient with two patient identifiers. Results given and questions answered. Pat verbalized understanding.

## 2022-04-25 NOTE — PROGRESS NOTES
Pietro Inman is a 80 y.o. female is here for routine f/u. Pmhx  chronic systolic/diastolic CHF. Persistent/chronic afib on OAC, rate controlled. Hx hypertension, LBBB, ASCVD, colitis  Last seen by Dr Alana Hill in 9/2021Sviktor Samuel at [facility] 10/6-10/14/20--acute on chronic systolic/diastolic CHF. Persistent/chronic afib on OAC, rate controlled. Hx hypertension, LBBB, ASCVD, colitis. Lost  in January. Mild stable [place], no other CV sx or complaints     She was admitted at [facility] in 11/19-11/24/2021 s/p GLF, Closed fracture of multiple pubic rami, right--non surgical management. She was dc to SNF with PT/OT. Recently treated for PNA    Seen by me in 3/2021:   c/o leg swelling, some residual sob, not any worse from previous. Wt up 8 lbs. No cp. At that visit, we increased Lasix to 40 mg daily    Admitted at [facility] 4/12 - 4/14/2022, hospital course as posted below---acute hypoxic RF, HF exacerbation / pleural effusion. Today, feeling better. Wt down 6 lbs. On oxygen therapy now. The patient denies chest pain/ shortness of breath, orthopnea, PND,  palpitations, syncope, presyncope or fatigue. Hospital course 4/12 - 4/14/2022  Principal Problem:    Chronic respiratory failure with hypoxia ([address] (4/12/2022)  Active Problems:    Obtundation (4/12/2022)  Altered mentation over the past 24 hours since discharge  ABG without CO2 retention  Initial oximetry is noted mild hypoxia, not enough to explain the marked obtundation present on admission  No evidence for acute focal neurologic event. CT unremarkable.    No clear precipitating abnormalities  Resume antibiotic therapy for possible partially tx pneumonia, hold all sedating medication, maintain adequate oxygenation with nasal cannula  Was discussed with the POA/daughter, maintain DNR status but treat as able  On 4/13 the patient's son added a alternate POA to the patient's Advanced Directive (not the sister)       Chronic systolic heart failure ([address] (11/19/2021)    A-fib ([address] (4/7/2022)    Pleural effusion (4/7/2022)    Elevated brain natriuretic peptide (BNP) level (4/7/2022)  BNP remains elevated  Chest x-ray noted for pleural effusion  X-ray during prior admission concerning for possible CHF/pneumonia  Tx IV Rocephin during this admission - will complete Ceftin Rx on last discharge  Continued Lasix 40 mg IV daily, plan potassium supplement as needed  Nitrol ointment half-inch every 8 hours pending tolerance for oral treatment  To resume usual oral meds on discharge back to [facility]       HTN (hypertension) ()  Continue treatment with Normodyne, Lasix, Imdur on discharge  Was Rx Nitrolointment on admission due to obtundation       Hyperlipemia ()  Resume Mevacor on discharge       Carotid artery disease, unspecified laterality ([facility] (1/27/2021)  Continue treatment with anticoagulation and risk reduction measures as able  Eliquis, Mevacor, Normodyne  No focal neurologic findings on return       DJD (degenerative joint disease) ()  Resume treatment on discharge       Patient Active Problem List    Diagnosis Date Noted    Acute pancreatitis 07/23/2018    Respiratory failure ([address] 04/12/2022    Chronic respiratory failure with hypoxia ([address] 04/12/2022    Obtundation 04/12/2022    A-fib ([facility] 04/07/2022    Pulmonary edema 04/07/2022    Pleural effusion 04/07/2022    Congestive heart failure (CHF) ([address] 04/07/2022    Elevated brain natriuretic peptide (BNP) level 04/07/2022    Cystitis 11/24/2021    Closed fracture of multiple pubic rami, right, initial encounter ([address] 11/19/2021    Fall from ground level 11/19/2021    Chronic systolic heart failure ([address] 11/19/2021    Carotid artery disease, unspecified laterality ([address] 44/53/3814    Systolic CHF, acute ([address] 10/11/2020    Acute left-sided CHF (congestive heart failure) ([address] 10/06/2020    New onset left bundle branch block (LBBB) 10/06/2020    Coumadin toxicity 10/06/2020    Persistent atrial fibrillation ([facility] 07/22/2018    Abnormal EKG 10/13/2016    PAC (premature atrial contraction) 10/13/2016    Primary osteoarthritis 10/03/2016    HTN (hypertension)     DJD (degenerative joint disease)     Colitis     Ventricular ectopy     Hyperlipemia       Deyanira Whitney NP  Past Medical History:   Diagnosis Date    Colitis 2013    C diff; no recent flare, normal colonoscopy 2014    DJD (degenerative joint disease)     Fracture, femoral ([facility] 09/2005    HTN (hypertension)     Hyperlipemia     Persistent atrial fibrillation ([facility] 2018    Ventricular ectopy     no symptoms      Past Surgical History:   Procedure Laterality Date    HX CATARACT REMOVAL      bilat    HX COLONOSCOPY  2014    WNL    HX ORTHOPAEDIC  2005    right femur fx    HX ORTHOPAEDIC  2012    Lt TKA    HX REFRACTIVE SURGERY  06/15/2017    rt     Allergies   Allergen Reactions    Ambien [Zolpidem] Other (comments)     Legs got heavy    Aminobenzoic Acid Unknown (comments)    Bactrim [Sulfamethoprim Ds] Unknown (comments)     Patient cant remember her reaction    Hydroxyzine Other (comments)     Legs got heavy    Sulfamethoxazole-Trimethoprim Unknown (comments)     Other reaction(s): vomiting and abd pain      Family History   Problem Relation Age of Onset    Cancer Mother         Leukemia    Heart Disease Mother     Cancer Sister         Ovarian    No Known Problems Brother     Heart Failure Brother     Cancer Brother         Brain tumor      Social History     Socioeconomic History    Marital status:      Spouse name: Not on file    Number of children: Not on file    Years of education: Not on file    Highest education level: Not on file   Occupational History    Not on file   Tobacco Use    Smoking status: Never Smoker    Smokeless tobacco: Never Used   Vaping Use    Vaping Use: Never used   Substance and Sexual Activity    Alcohol use: No     Alcohol/week: 0.0 standard drinks    Drug use: Never    Sexual activity: Not on file   Other Topics Concern     Service No    Blood Transfusions Yes    Caffeine Concern No    Occupational Exposure No    Hobby Hazards No    Sleep Concern Yes     Comment: insomnia    Stress Concern No    Weight Concern Yes     Comment: over weight    Special Diet No    Back Care No    Exercise Yes    Bike Helmet No    Seat Belt Yes    Self-Exams Yes   Social History Narrative    Not on file     Social Determinants of Health     Financial Resource Strain:     Difficulty of Paying Living Expenses: Not on file   Food Insecurity:     Worried About Running Out of Food in the Last Year: Not on file    Jacob of Food in the Last Year: Not on file   Transportation Needs:     Lack of Transportation (Medical): Not on file    Lack of Transportation (Non-Medical): Not on file   Physical Activity:     Days of Exercise per Week: Not on file    Minutes of Exercise per Session: Not on file   Stress:     Feeling of Stress : Not on file   Social Connections:     Frequency of Communication with Friends and Family: Not on file    Frequency of Social Gatherings with Friends and Family: Not on file    Attends Catholic Services: Not on file    Active Member of [address] [place] [address] or Organizations: Not on file    Attends Club or Organization Meetings: Not on file    Marital Status: Not on file   Intimate Partner Violence:     Fear of Current or Ex-Partner: Not on file    Emotionally Abused: Not on file    Physically Abused: Not on file    Sexually Abused: Not on file   Housing Stability:     Unable to Pay for Housing in the Last Year: Not on file    Number of [place] in the Last Year: Not on file    Unstable Housing in the Last Year: Not on file      Current Outpatient Medications   Medication Sig    melatonin 10 mg tab Take 5 mg by mouth nightly.  nystatin (MYCOSTATIN) powder Apply  to affected area two (2) times a day for 15 days.     labetaloL (NORMODYNE) 100 mg tablet Take 100 mg by mouth two (2) times a day.  pantoprazole (PROTONIX) 40 mg tablet Take 40 mg by mouth daily.  therapeutic multivitamin (Thera) tablet Take 1 Tablet by mouth daily.  furosemide (LASIX) 40 mg tablet Take 1 Tablet by mouth daily.  potassium chloride SR (KLOR-CON 10) 10 mEq tablet Take 1 Tablet by mouth daily. Indications: prevention of low potassium in the blood    isosorbide mononitrate ER (IMDUR) 30 mg tablet Take 1 Tablet by mouth daily.  bisacodyL (DULCOLAX) 10 mg supp Insert 10 mg into rectum daily as needed for Constipation.  acetaminophen (Tylenol Extra Strength) 500 mg tablet Take 500 mg by mouth every eight (8) hours as needed.  sertraline (ZOLOFT) 25 mg tablet Take 1 Tablet by mouth daily.  apixaban (ELIQUIS) 2.5 mg tablet Take 1 Tablet by mouth two (2) times a day.  lovastatin (MEVACOR) 20 mg tablet TAKE 1 TABLET BY MOUTH  NIGHTLY    ascorbic acid (VITAMIN C PO) Take 1,000 mg by mouth daily.  cholecalciferol, vitamin D3, (VITAMIN D3 PO) Take 1,000 Units by mouth daily.  pyridoxine, vitamin B6, (Vitamin B-6) 100 mg tablet Take 100 mg by mouth daily.  magnesium 200 mg tab Take 1 Tablet by mouth daily. No current facility-administered medications for this visit. Review of Symptoms:    CONST  No weight change. No fever, chills, sweats    ENT No visual changes, URI sx, sore throat    CV  See HPI   RESP  No cough, or sputum, wheezing. Also see HPI   GI  No abdominal pain or change in bowel habits. No heartburn or dysphagia. No melena or rectal bleeding.   No dysuria, urgency, frequency, hematuria   MSKEL  No joint pain, swelling. No muscle pain. SKIN  No rash or lesions. NEURO  No headache, syncope, or seizure. No weakness, loss of sensation, or paresthesias. PSYCH  No low mood or depression  No anxiety. HE/LYMPH  No easy bruising, abnormal bleeding, or enlarged glands.         Physical ExamPhysical Exam:    There were no vitals taken for this visit. Gen: NAD  HEENT:  PERRL, throat clear  Neck: no adenopathy, no thyromegaly, no JVD   Heart:  irregular,Nl S1S2,  I/VI murmur, no gallop or rub. Lungs:  clear  Abdomen:   Soft, non-tender, bowel sounds are active.    Extremities:  + 2  Pulse: symmetric  Neuro: A&O times 3, No focal neuro deficits    Cardiographics    ECG:     Labs:   Lab Results   Component Value Date/Time    Sodium 143 04/13/2022 05:43 AM    Sodium 142 04/12/2022 03:18 PM    Sodium 140 04/09/2022 09:03 AM    Sodium 141 04/08/2022 05:17 AM    Sodium 143 04/07/2022 12:16 PM    Potassium 3.8 04/13/2022 05:43 AM    Potassium 4.0 04/12/2022 03:18 PM    Potassium 3.9 04/09/2022 09:03 AM    Potassium 3.6 04/08/2022 05:17 AM    Potassium 4.4 04/07/2022 12:16 PM    Chloride 104 04/13/2022 05:43 AM    Chloride 103 04/12/2022 03:18 PM    Chloride 102 04/09/2022 09:03 AM    Chloride 103 04/08/2022 05:17 AM    Chloride 106 04/07/2022 12:16 PM    CO2 29 04/13/2022 05:43 AM    CO2 28 04/12/2022 03:18 PM    CO2 31 04/09/2022 09:03 AM    CO2 30 04/08/2022 05:17 AM    CO2 30 04/07/2022 12:16 PM    Anion gap 10 04/13/2022 05:43 AM    Anion gap 11 04/12/2022 03:18 PM    Anion gap 7 04/09/2022 09:03 AM    Anion gap 8 04/08/2022 05:17 AM    Anion gap 7 04/07/2022 12:16 PM    Glucose 75 04/13/2022 05:43 AM    Glucose 85 04/12/2022 03:18 PM    Glucose 102 (H) 04/09/2022 09:03 AM    Glucose 93 04/08/2022 05:17 AM    Glucose 120 (H) 04/07/2022 12:16 PM    BUN 25 (H) 04/13/2022 05:43 AM    BUN 25 (H) 04/12/2022 03:18 PM    BUN 20 04/09/2022 09:03 AM    BUN 23 (H) 04/08/2022 05:17 AM    BUN 24 (H) 04/07/2022 12:16 PM    Creatinine 0.84 04/13/2022 05:43 AM    Creatinine 0.90 04/12/2022 03:18 PM    Creatinine 0.96 04/09/2022 09:03 AM    Creatinine 0.87 04/08/2022 05:17 AM    Creatinine 0.93 04/07/2022 12:16 PM    BUN/Creatinine ratio 30 (H) 04/13/2022 05:43 AM    BUN/Creatinine ratio 28 (H) 04/12/2022 03:18 PM    BUN/Creatinine ratio 21 (H) 04/09/2022 09:03 AM    BUN/Creatinine ratio 26 (H) 04/08/2022 05:17 AM    BUN/Creatinine ratio 26 (H) 04/07/2022 12:16 PM    GFR est AA >60 04/13/2022 05:43 AM    GFR est AA >60 04/12/2022 03:18 PM    GFR est AA >60 04/09/2022 09:03 AM    GFR est AA >60 04/08/2022 05:17 AM    GFR est AA >60 04/07/2022 12:16 PM    GFR est non-AA >60 04/13/2022 05:43 AM    GFR est non-AA 59 (L) 04/12/2022 03:18 PM    GFR est non-AA 54 (L) 04/09/2022 09:03 AM    GFR est non-AA >60 04/08/2022 05:17 AM    GFR est non-AA 56 (L) 04/07/2022 12:16 PM    Calcium 8.7 04/13/2022 05:43 AM    Calcium 8.9 04/12/2022 03:18 PM    Calcium 8.3 (L) 04/09/2022 09:03 AM    Calcium 9.0 04/08/2022 05:17 AM    Calcium 9.3 04/07/2022 12:16 PM    Bilirubin, total 1.1 (H) 04/12/2022 03:18 PM    Bilirubin, total 1.0 04/09/2022 09:03 AM    Bilirubin, total 1.3 (H) 04/07/2022 12:16 PM    Bilirubin, total 0.9 11/19/2021 08:09 AM    Bilirubin, total 1.2 (H) 10/14/2020 06:19 AM    Alk. phosphatase 107 04/12/2022 03:18 PM    Alk. phosphatase 97 04/09/2022 09:03 AM    Alk. phosphatase 104 04/07/2022 12:16 PM    Alk. phosphatase 122 (H) 11/19/2021 08:09 AM    Alk.  phosphatase 75 10/14/2020 06:19 AM    Protein, total 7.1 04/12/2022 03:18 PM    Protein, total 6.8 04/09/2022 09:03 AM    Protein, total 7.5 04/07/2022 12:16 PM    Protein, total 8.0 11/19/2021 08:09 AM    Protein, total 6.3 (L) 10/14/2020 06:19 AM    Albumin 3.0 (L) 04/12/2022 03:18 PM    Albumin 2.8 (L) 04/09/2022 09:03 AM    Albumin 3.2 (L) 04/07/2022 12:16 PM    Albumin 3.4 (L) 11/19/2021 08:09 AM    Albumin 2.3 (L) 10/14/2020 06:19 AM    Globulin 4.1 (H) 04/12/2022 03:18 PM    Globulin 4.0 04/09/2022 09:03 AM    Globulin 4.3 (H) 04/07/2022 12:16 PM    Globulin 4.6 (H) 11/19/2021 08:09 AM    Globulin 4.0 10/14/2020 06:19 AM    A-G Ratio 0.7 (L) 04/12/2022 03:18 PM    A-G Ratio 0.7 (L) 04/09/2022 09:03 AM    A-G Ratio 0.7 (L) 04/07/2022 12:16 PM    A-G Ratio 0.7 (L) 11/19/2021 08:09 AM    A-G Ratio 0.6 (L) 10/14/2020 06:19 AM    ALT (SGPT) 20 04/12/2022 03:18 PM    ALT (SGPT) 13 04/09/2022 09:03 AM    ALT (SGPT) 17 04/07/2022 12:16 PM    ALT (SGPT) 30 11/19/2021 08:09 AM    ALT (SGPT) 25 10/14/2020 06:19 AM     Lab Results   Component Value Date/Time    CK 45 10/07/2020 05:41 AM     Lab Results   Component Value Date/Time    Cholesterol, total 164 12/12/2018 09:28 AM    Cholesterol, total 154 07/23/2018 05:35 AM    Cholesterol, total 159 06/13/2018 10:26 AM    Cholesterol, total 163 12/19/2017 09:09 AM    Cholesterol, total 154 06/20/2017 09:29 AM    HDL Cholesterol 49 12/12/2018 09:28 AM    HDL Cholesterol 55 07/23/2018 05:35 AM    HDL Cholesterol 48 06/13/2018 10:26 AM    HDL Cholesterol 51 12/19/2017 09:09 AM    HDL Cholesterol 47 06/20/2017 09:29 AM    LDL, calculated 95 12/12/2018 09:28 AM    LDL, calculated 89.2 07/23/2018 05:35 AM    LDL, calculated 92 06/13/2018 10:26 AM    LDL, calculated 95 12/19/2017 09:09 AM    LDL, calculated 87 06/20/2017 09:29 AM    Triglyceride 102 12/12/2018 09:28 AM    Triglyceride 49 07/23/2018 05:35 AM    Triglyceride 95 06/13/2018 10:26 AM    Triglyceride 85 12/19/2017 09:09 AM    Triglyceride 98 06/20/2017 09:29 AM    CHOL/HDL Ratio 2.8 07/23/2018 05:35 AM     No results found for this or any previous visit.     Assessment:         Patient Active Problem List    Diagnosis Date Noted    Acute pancreatitis 07/23/2018    Respiratory failure ([facility] 04/12/2022    Chronic respiratory failure with hypoxia ([facility] 04/12/2022    Obtundation 04/12/2022    A-fib ([facility] 04/07/2022    Pulmonary edema 04/07/2022    Pleural effusion 04/07/2022    Congestive heart failure (CHF) ([facility] 04/07/2022    Elevated brain natriuretic peptide (BNP) level 04/07/2022    Cystitis 11/24/2021    Closed fracture of multiple pubic rami, right, initial encounter ([facility] [facility] 11/19/2021    Fall from ground level 11/19/2021    Chronic systolic heart failure ([facility] 11/19/2021    Carotid artery disease, unspecified laterality ([facility] 99/47/8807    Systolic CHF, acute ([facility] 10/11/2020    Acute left-sided CHF (congestive heart failure) ([facility] 10/06/2020    New onset left bundle branch block (LBBB) 10/06/2020    Coumadin toxicity 10/06/2020    Persistent atrial fibrillation ([facility] 07/22/2018    Abnormal EKG 10/13/2016    PAC (premature atrial contraction) 10/13/2016    Primary osteoarthritis 10/03/2016    HTN (hypertension)     DJD (degenerative joint disease)     Colitis     Ventricular ectopy     Hyperlipemia           Plan:     Chronic AF  Continue BB for rate control  Continue low dose eliquis for embolic CVA prophylaxis  Hgb 11.9 in in 4/2022    Combined chronic systolic / diastolic HF  EF 97-78% mild MR/TR per echo in 10/2020  Continue BB  Wt down 6 lbs (142 to 136 lbs(  Continue Furosemide 40 mg daily  Continue Leg elevation, compression stockings  Continue K supplement  Mag and K ok in 4/2022  Serum Cr 0.84 in 4/2022; Serum Cr 0.96 in 11/2021  Check labs 5/23/2022    HTN  Controlled with current therapy      HLD  On statin  Lipids and labs followed by PCP    Chronic hypoxic RF  Continue oxygen therapy    Continue current care and f/u in June      Romulo Lara NP

## 2022-04-27 ENCOUNTER — OFFICE VISIT (OUTPATIENT)
Dept: CARDIOLOGY CLINIC | Age: 87
End: 2022-04-27
Payer: MEDICARE

## 2022-04-27 VITALS
OXYGEN SATURATION: 94 % | HEART RATE: 96 BPM | WEIGHT: 136 LBS | TEMPERATURE: 98.7 F | RESPIRATION RATE: 18 BRPM | SYSTOLIC BLOOD PRESSURE: 110 MMHG | BODY MASS INDEX: 22.66 KG/M2 | DIASTOLIC BLOOD PRESSURE: 64 MMHG | HEIGHT: 65 IN

## 2022-04-27 DIAGNOSIS — E78.2 MIXED HYPERLIPIDEMIA: ICD-10-CM

## 2022-04-27 DIAGNOSIS — I48.19 PERSISTENT ATRIAL FIBRILLATION (HCC): Primary | ICD-10-CM

## 2022-04-27 DIAGNOSIS — I10 ESSENTIAL HYPERTENSION: ICD-10-CM

## 2022-04-27 DIAGNOSIS — I25.10 ASCVD (ARTERIOSCLEROTIC CARDIOVASCULAR DISEASE): ICD-10-CM

## 2022-04-27 DIAGNOSIS — I50.22 CHRONIC SYSTOLIC HEART FAILURE (HCC): ICD-10-CM

## 2022-04-27 DIAGNOSIS — I44.7 LBBB (LEFT BUNDLE BRANCH BLOCK): ICD-10-CM

## 2022-04-27 DIAGNOSIS — I49.1 PAC (PREMATURE ATRIAL CONTRACTION): ICD-10-CM

## 2022-04-27 PROCEDURE — G8432 DEP SCR NOT DOC, RNG: HCPCS | Performed by: NURSE PRACTITIONER

## 2022-04-27 PROCEDURE — 1090F PRES/ABSN URINE INCON ASSESS: CPT | Performed by: NURSE PRACTITIONER

## 2022-04-27 PROCEDURE — G8427 DOCREV CUR MEDS BY ELIG CLIN: HCPCS | Performed by: NURSE PRACTITIONER

## 2022-04-27 PROCEDURE — 1101F PT FALLS ASSESS-DOCD LE1/YR: CPT | Performed by: NURSE PRACTITIONER

## 2022-04-27 PROCEDURE — G8536 NO DOC ELDER MAL SCRN: HCPCS | Performed by: NURSE PRACTITIONER

## 2022-04-27 PROCEDURE — G8420 CALC BMI NORM PARAMETERS: HCPCS | Performed by: NURSE PRACTITIONER

## 2022-04-27 PROCEDURE — 99214 OFFICE O/P EST MOD 30 MIN: CPT | Performed by: NURSE PRACTITIONER

## 2022-04-27 PROCEDURE — 1111F DSCHRG MED/CURRENT MED MERGE: CPT | Performed by: NURSE PRACTITIONER

## 2022-04-27 NOTE — PROGRESS NOTES
Identified pt with two pt identifiers(name and ). Reviewed record in preparation for visit and have obtained necessary documentation. Chief Complaint   Patient presents with    Irregular Heart Beat     1 month follow up    CHF    Hypertension      Vitals:    22 1047   BP: 110/64   Pulse: 96   Resp: 18   Temp: 98.7 °F (37.1 °C)   TempSrc: Temporal   SpO2: 94%   Weight: 136 lb (61.7 kg)   Height: 5' 5\" (1.651 m)   PainSc:   0 - No pain       Medications reviewed/approved by provider. Health Maintenance Review: Patient reminded of \"due or due soon\" health maintenance. I have asked the patient to contact his/her primary care provider (PCP) for follow-up on his/her health maintenance. Coordination of Care Questionnaire:  :   1) Have you been to an emergency room, urgent care, or hospitalized since your last visit? If yes, where when, and reason for visit? Yes, [facility] ER for afib, and admission for respiratory failure. 2. Have seen or consulted any other health care provider since your last visit? If yes, where when, and reason for visit? NO      Patient is accompanied by self I have received verbal consent from Abhinav Inman to discuss any/all medical information while they are present in the room.

## 2022-07-10 NOTE — PROGRESS NOTES
[address], [address] [place]  [phone]  [address] [facility], [address]     Subjective:      Theodore Gratn, MAAME-BC    Adi Inman is a 80 y.o. female is here for routine f/u. Pmhx  chronic systolic/diastolic CHF. Persistent/chronic afib on OAC, rate controlled.  Hx hypertension, LBBB, ASCVD, colitis    Last seen by Dr Leandro Klein in 9/2021Ljabier Lopez at [facility] 10/6-10/14/20--acute on chronic systolic/diastolic CHF. Persistent/chronic afib on OAC, rate controlled.  Hx hypertension, LBBB, ASCVD, colitis. Lost  in January.  Mild stable [place]EN[place], no other CV sx or complaints      Seen by me in 3/2022:   c/o leg swelling, some residual sob, not any worse from previous. Wt up 8 lbs. No cp. At that visit, we increased Lasix to 40 mg daily     Admitted at [facility] 4/12 - 4/14/2022, hospital course as posted below---acute hypoxic RF, HF exacerbation / pleural effusion.      Seen by me 4/27/2022:   Wt down 6 lbs. On oxygen therapy now.       Today, wt down 17 lbs. \"I feel really well. \"  She  is on [address] [place] [address] Road, reports no melena, hematuria, or obvious signs of bleeding. No falls. The patient denies chest pain/ shortness of breath, orthopnea, PND, LE edema, palpitations, syncope, presyncope or fatigue. Hospital course 4/12 - 4/14/2022  Principal Problem:    Chronic respiratory failure with hypoxia ([address] (4/12/2022)  Active Problems:    Obtundation (4/12/2022)  Altered mentation over the past 24 hours since discharge  ABG without CO2 retention  Initial oximetry is noted mild hypoxia, not enough to explain the marked obtundation present on admission  No evidence for acute focal neurologic event.  CT unremarkable.    No clear precipitating abnormalities  Resume antibiotic therapy for possible partially tx pneumonia, hold all sedating medication, maintain adequate oxygenation with nasal cannula  Was discussed with the POA/daughter, maintain DNR status but treat as able  On 4/13 the patient's son added a alternate POA to the patient's Advanced Directive (not the sister)       Chronic systolic heart failure ([address] (11/19/2021)    A-fib ([address] (4/7/2022)    Pleural effusion (4/7/2022)    Elevated brain natriuretic peptide (BNP) level (4/7/2022)  BNP remains elevated  Chest x-ray noted for pleural effusion  X-ray during prior admission concerning for possible CHF/pneumonia  Tx IV Rocephin during this admission - will complete Ceftin Rx on last discharge  Continued Lasix 40 mg IV daily, plan potassium supplement as needed  Nitrol ointment half-inch every 8 hours pending tolerance for oral treatment  To resume usual oral meds on discharge back to [facility]       HTN (hypertension) ()  Continue treatment with Normodyne, Lasix, Imdur on discharge  Was Rx Nitrolointment on admission due to obtundation       Hyperlipemia ()  Resume Mevacor on discharge       Carotid artery disease, unspecified laterality ([address] (1/27/2021)  Continue treatment with anticoagulation and risk reduction measures as able  Eliquis, Mevacor, Normodyne  No focal neurologic findings on return       DJD (degenerative joint disease) ()  Resume treatment on discharge         Patient Active Problem List    Diagnosis Date Noted    Acute pancreatitis 07/23/2018    Respiratory failure ([address] 04/12/2022    Chronic respiratory failure with hypoxia ([address] 04/12/2022    Obtundation 04/12/2022    A-fib ([address] 04/07/2022    Pulmonary edema 04/07/2022    Pleural effusion 04/07/2022    Congestive heart failure (CHF) ([address] 04/07/2022    Elevated brain natriuretic peptide (BNP) level 04/07/2022    Cystitis 11/24/2021    Closed fracture of multiple pubic rami, right, initial encounter ([address] 11/19/2021    Fall from ground level 11/19/2021    Chronic systolic heart failure ([address] 11/19/2021    Carotid artery disease, unspecified laterality ([address] 12/04/8959    Systolic CHF, acute ([address] 10/11/2020    Acute left-sided CHF (congestive heart failure) ([facility] 10/06/2020    New onset left bundle branch block (LBBB) 10/06/2020    Coumadin toxicity 10/06/2020    Persistent atrial fibrillation ([address] 07/22/2018    Abnormal EKG 10/13/2016    PAC (premature atrial contraction) 10/13/2016    Primary osteoarthritis 10/03/2016    HTN (hypertension)     DJD (degenerative joint disease)     Colitis     Ventricular ectopy     Hyperlipemia       Jas Jewcatie, NP  Past Medical History:   Diagnosis Date    Colitis 2013    C diff; no recent flare, normal colonoscopy 2014    DJD (degenerative joint disease)     Fracture, femoral ([address] 09/2005    HTN (hypertension)     Hyperlipemia     Persistent atrial fibrillation ([address] 2018    Ventricular ectopy     no symptoms      Past Surgical History:   Procedure Laterality Date    HX CATARACT REMOVAL      bilat    HX COLONOSCOPY  2014    WNL    HX ORTHOPAEDIC  2005    right femur fx    HX ORTHOPAEDIC  2012    Lt TKA    HX REFRACTIVE SURGERY  06/15/2017    rt     Allergies   Allergen Reactions    Ambien [Zolpidem] Other (comments)     Legs got heavy    Aminobenzoic Acid Unknown (comments)    Bactrim [Sulfamethoprim Ds] Unknown (comments)     Patient cant remember her reaction    Hydroxyzine Other (comments)     Legs got heavy    Sulfamethoxazole-Trimethoprim Unknown (comments)     Other reaction(s): vomiting and abd pain      Family History   Problem Relation Age of Onset    Cancer Mother         Leukemia    Heart Disease Mother     Cancer Sister         Ovarian    No Known Problems Brother     Heart Failure Brother     Cancer Brother         Brain tumor      Social History     Socioeconomic History    Marital status:      Spouse name: Not on file    Number of children: Not on file    Years of education: Not on file    Highest education level: Not on file   Occupational History    Not on file   Tobacco Use    Smoking status: Never Smoker    Smokeless tobacco: Never Used   Vaping Use    Vaping Use: Never used   Substance and Sexual Activity    Alcohol use: No     Alcohol/week: 0.0 standard drinks    Drug use: Never    Sexual activity: Not on file   Other Topics Concern     Service No    Blood Transfusions Yes    Caffeine Concern No    Occupational Exposure No    Hobby Hazards No    Sleep Concern Yes     Comment: insomnia    Stress Concern No    Weight Concern Yes     Comment: over weight    Special Diet No    Back Care No    Exercise Yes    Bike Helmet No    Seat Belt Yes    Self-Exams Yes   Social History Narrative    Not on file     Social Determinants of Health     Financial Resource Strain:     Difficulty of Paying Living Expenses: Not on file   Food Insecurity:     Worried About Running Out of Food in the Last Year: Not on file    Jacob of Food in the Last Year: Not on file   Transportation Needs:     Lack of Transportation (Medical): Not on file    Lack of Transportation (Non-Medical):  Not on file   Physical Activity:     Days of Exercise per Week: Not on file    Minutes of Exercise per Session: Not on file   Stress:     Feeling of Stress : Not on file   Social Connections:     Frequency of Communication with Friends and Family: Not on file    Frequency of Social Gatherings with Friends and Family: Not on file    Attends Bahai Services: Not on file    Active Member of [address] [place] [address] [employer] or Organizations: Not on file    Attends Club or Organization Meetings: Not on file    Marital Status: Not on file   Intimate Partner Violence:     Fear of Current or Ex-Partner: Not on file    Emotionally Abused: Not on file    Physically Abused: Not on file    Sexually Abused: Not on file   Housing Stability:     Unable to Pay for Housing in the Last Year: Not on file    Number of [place] in the Last Year: Not on file    Unstable Housing in the Last Year: Not on file      Current Outpatient Medications   Medication Sig    Oxygen 2 L CONTINUOUSLY    melatonin 10 mg tab Take 5 mg by mouth nightly.  labetaloL (NORMODYNE) 100 mg tablet Take 100 mg by mouth two (2) times a day.  pantoprazole (PROTONIX) 40 mg tablet Take 40 mg by mouth daily.  therapeutic multivitamin (Thera) tablet Take 1 Tablet by mouth daily.  furosemide (LASIX) 40 mg tablet Take 1 Tablet by mouth daily. (Patient taking differently: Take  by mouth daily. 20 mg daily. 40 mg on Wednesdays.)    potassium chloride SR (KLOR-CON 10) 10 mEq tablet Take 1 Tablet by mouth daily. Indications: prevention of low potassium in the blood    isosorbide mononitrate ER (IMDUR) 30 mg tablet Take 1 Tablet by mouth daily.  acetaminophen (Tylenol Extra Strength) 500 mg tablet Take 500 mg by mouth every eight (8) hours as needed.  sertraline (ZOLOFT) 25 mg tablet Take 1 Tablet by mouth daily.  apixaban (ELIQUIS) 2.5 mg tablet Take 1 Tablet by mouth two (2) times a day.  lovastatin (MEVACOR) 20 mg tablet TAKE 1 TABLET BY MOUTH  NIGHTLY    pyridoxine, vitamin B6, (Vitamin B-6) 100 mg tablet Take 100 mg by mouth daily.  magnesium 200 mg tab Take 1 Tablet by mouth daily. No current facility-administered medications for this visit. Review of Symptoms:  11 systems reviewed, negative other than as stated in the HPI    Physical ExamPhysical Exam:    There were no vitals filed for this visit. There is no height or weight on file to calculate BMI. General PE   General:  Well developed, in no acute distress, cooperative and alert  HEENT: No carotid bruits, no JVD, trach is midline. Neck Supple, PEERL, EOM intact. Heart:  irreg rate and rhythm; normal S1/S2; no murmurs, gallops or rubs. Respiratory: Clear bilaterally x 4, no wheezing or rales  Abdomen:   Soft, non-tender, no distention, no masses. + BS. Extremities:  Normal cap refill, no cyanosis, atraumatic. No edema. Neuro: A&Ox3, speech clear, gait stable. Skin: Skin color is normal. No rashes or lesions.  Non diaphoretic  Vascular: 2+ pulses symmetric in all extremities    Labs:   Lab Results   Component Value Date/Time    Cholesterol, total 164 12/12/2018 09:28 AM    Cholesterol, total 154 07/23/2018 05:35 AM    Cholesterol, total 159 06/13/2018 10:26 AM    Cholesterol, total 163 12/19/2017 09:09 AM    Cholesterol, total 154 06/20/2017 09:29 AM    HDL Cholesterol 49 12/12/2018 09:28 AM    HDL Cholesterol 55 07/23/2018 05:35 AM    HDL Cholesterol 48 06/13/2018 10:26 AM    HDL Cholesterol 51 12/19/2017 09:09 AM    HDL Cholesterol 47 06/20/2017 09:29 AM    LDL, calculated 95 12/12/2018 09:28 AM    LDL, calculated 89.2 07/23/2018 05:35 AM    LDL, calculated 92 06/13/2018 10:26 AM    LDL, calculated 95 12/19/2017 09:09 AM    LDL, calculated 87 06/20/2017 09:29 AM    Triglyceride 102 12/12/2018 09:28 AM    Triglyceride 49 07/23/2018 05:35 AM    Triglyceride 95 06/13/2018 10:26 AM    Triglyceride 85 12/19/2017 09:09 AM    Triglyceride 98 06/20/2017 09:29 AM    CHOL/HDL Ratio 2.8 07/23/2018 05:35 AM     Lab Results   Component Value Date/Time    CK 45 10/07/2020 05:41 AM     Lab Results   Component Value Date/Time    Sodium 143 04/13/2022 05:43 AM    Potassium 3.8 04/13/2022 05:43 AM    Chloride 104 04/13/2022 05:43 AM    CO2 29 04/13/2022 05:43 AM    Anion gap 10 04/13/2022 05:43 AM    Glucose 75 04/13/2022 05:43 AM    BUN 25 (H) 04/13/2022 05:43 AM    Creatinine 0.84 04/13/2022 05:43 AM    BUN/Creatinine ratio 30 (H) 04/13/2022 05:43 AM    GFR est AA >60 04/13/2022 05:43 AM    GFR est non-AA >60 04/13/2022 05:43 AM    Calcium 8.7 04/13/2022 05:43 AM    Bilirubin, total 1.1 (H) 04/12/2022 03:18 PM    Alk. phosphatase 107 04/12/2022 03:18 PM    Protein, total 7.1 04/12/2022 03:18 PM    Albumin 3.0 (L) 04/12/2022 03:18 PM    Globulin 4.1 (H) 04/12/2022 03:18 PM    A-G Ratio 0.7 (L) 04/12/2022 03:18 PM    ALT (SGPT) 20 04/12/2022 03:18 PM       EKG:  AF     Assessment:     Assessment:        ICD-10-CM ICD-9-CM    1.  Persistent atrial fibrillation ([facility] I48.19 427.31    2. Essential hypertension  I10 401.9    3. Mixed hyperlipidemia  E78.2 272.2    4. Chronic systolic heart failure (HCC)  I50.22 428.22    5. PAC (premature atrial contraction)  I49.1 427.61    6. LBBB (left bundle branch block)  I44.7 426.3        No orders of the defined types were placed in this encounter. 04/07/22    ECHO ADULT COMPLETE 04/08/2022 4/8/2022    Interpretation Summary    Left Ventricle: Left ventricle size is normal. Normal wall thickness. There are regional wall motion abnormalities. Moderately reduced left ventricular systolic function with a visually estimated EF of 35 - 40%.   Right Ventricle: Right ventricle is mildly dilated. Severely reduced systolic function.   Aortic Valve: Mild sclerosis of the aortic valve cusp. Mild regurgitation. No stenosis.   Mitral Valve: Mild to moderate transvalvular regurgitation with a posterior directed jet.   Tricuspid Valve: Moderate to severe transvalvular regurgitation. Severely elevated RVSP. RVSP is 64 mmHg.   Left Atrium: Left atrium is severely dilated. Left atrial volume index is severely increased (>48 mL/m2).   Right Atrium: Right atrium is dilated.     Signed by: Miriam Kimball MD on 4/8/2022  2:18 PM         Plan:     Chronic AF  Continue BB for rate control  Continue low dose eliquis for embolic CVA prophylaxis  Hgb 11.9 in in 4/2022     Combined chronic systolic / diastolic HF   EF 48-37% mild-mod MR; mod-severe TR per echo in 4/2022  EF 40-45% mild MR/TR per echo in 10/2020  Continue BB  Wt down 19 lbs, total 25 lbs  Will go back to previous dose of lasix 20 mg daily  Continue Leg elevation, compression stockings  Continue K supplement  Mag and K ok in 4/2022  Serum Cr 0.84 in 4/2022; Serum Cr 0.96 in 11/2021  Defers any stress test  Change labetalol to toprol XL 50 mg daily  Start Entresto 24/26 mg BID  Check BMP and Magnesium in 1 mos  Repeat echo in 3 mos      HTN  Controlled with current therapy        HLD  On statin  Lipids and labs followed by PCP     Chronic hypoxic RF  Continue oxygen therapy     Patient was made aware during visit today that all testing completed would be instantaneously available on their MyChart for review. Discussed that these results will be made available to the provider at the same time. They were advised to wait at least 3 business days to allow for provider's interpretation of results with follow-up before calling our office with concerns about their results.       Continue current care and f/u in 6 mos    Jeremiah Strickland NP

## 2022-07-13 ENCOUNTER — OFFICE VISIT (OUTPATIENT)
Dept: CARDIOLOGY CLINIC | Age: 87
End: 2022-07-13
Payer: MEDICARE

## 2022-07-13 VITALS
WEIGHT: 119 LBS | SYSTOLIC BLOOD PRESSURE: 118 MMHG | DIASTOLIC BLOOD PRESSURE: 70 MMHG | HEIGHT: 65 IN | HEART RATE: 88 BPM | RESPIRATION RATE: 22 BRPM | OXYGEN SATURATION: 92 % | TEMPERATURE: 98.5 F | BODY MASS INDEX: 19.83 KG/M2

## 2022-07-13 DIAGNOSIS — I50.22 CHRONIC SYSTOLIC HEART FAILURE (HCC): ICD-10-CM

## 2022-07-13 DIAGNOSIS — I48.19 PERSISTENT ATRIAL FIBRILLATION (HCC): Primary | ICD-10-CM

## 2022-07-13 DIAGNOSIS — I44.7 LBBB (LEFT BUNDLE BRANCH BLOCK): ICD-10-CM

## 2022-07-13 DIAGNOSIS — I49.1 PAC (PREMATURE ATRIAL CONTRACTION): ICD-10-CM

## 2022-07-13 DIAGNOSIS — I10 ESSENTIAL HYPERTENSION: ICD-10-CM

## 2022-07-13 DIAGNOSIS — E78.2 MIXED HYPERLIPIDEMIA: ICD-10-CM

## 2022-07-13 PROCEDURE — 99214 OFFICE O/P EST MOD 30 MIN: CPT | Performed by: NURSE PRACTITIONER

## 2022-07-13 PROCEDURE — G8536 NO DOC ELDER MAL SCRN: HCPCS | Performed by: NURSE PRACTITIONER

## 2022-07-13 PROCEDURE — 93000 ELECTROCARDIOGRAM COMPLETE: CPT | Performed by: NURSE PRACTITIONER

## 2022-07-13 PROCEDURE — 1123F ACP DISCUSS/DSCN MKR DOCD: CPT | Performed by: NURSE PRACTITIONER

## 2022-07-13 PROCEDURE — G8432 DEP SCR NOT DOC, RNG: HCPCS | Performed by: NURSE PRACTITIONER

## 2022-07-13 PROCEDURE — 1101F PT FALLS ASSESS-DOCD LE1/YR: CPT | Performed by: NURSE PRACTITIONER

## 2022-07-13 PROCEDURE — G8427 DOCREV CUR MEDS BY ELIG CLIN: HCPCS | Performed by: NURSE PRACTITIONER

## 2022-07-13 PROCEDURE — G8420 CALC BMI NORM PARAMETERS: HCPCS | Performed by: NURSE PRACTITIONER

## 2022-07-13 PROCEDURE — 1090F PRES/ABSN URINE INCON ASSESS: CPT | Performed by: NURSE PRACTITIONER

## 2022-07-13 RX ORDER — FUROSEMIDE 20 MG/1
20 TABLET ORAL DAILY
Qty: 90 TABLET | Refills: 1
Start: 2022-07-13

## 2022-07-13 RX ORDER — METOPROLOL SUCCINATE 50 MG/1
50 TABLET, EXTENDED RELEASE ORAL DAILY
Qty: 90 TABLET | Refills: 1
Start: 2022-07-13

## 2022-07-13 RX ORDER — DICLOFENAC SODIUM 10 MG/G
1 GEL TOPICAL 4 TIMES DAILY
COMMUNITY

## 2022-07-13 RX ORDER — SACUBITRIL AND VALSARTAN 24; 26 MG/1; MG/1
1 TABLET, FILM COATED ORAL 2 TIMES DAILY
Qty: 180 TABLET | Refills: 1
Start: 2022-07-13

## 2022-07-13 NOTE — PROGRESS NOTES
Identified pt with two pt identifiers(name and ). Reviewed record in preparation for visit and have obtained necessary documentation. Chief Complaint   Patient presents with    Irregular Heart Beat     3 month follow up    CHF    Hypertension    Cholesterol Problem      Vitals:    22 1357   BP: 118/70   Pulse: 88   Resp: 22   Temp: 98.5 °F (36.9 °C)   TempSrc: Temporal   SpO2: 92%   Weight: 119 lb (54 kg)   Height: 5' 5\" (1.651 m)   PainSc:   0 - No pain       Medications reviewed/approved by provider. Health Maintenance Review: Patient reminded of \"due or due soon\" health maintenance. I have asked the patient to contact his/her primary care provider (PCP) for follow-up on his/her health maintenance. Coordination of Care Questionnaire:  :   1) Have you been to an emergency room, urgent care, or hospitalized since your last visit? If yes, where when, and reason for visit? no       2. Have seen or consulted any other health care provider since your last visit? If yes, where when, and reason for visit? NO      Patient is accompanied by friend I have received verbal consent from Abhinav Inman to discuss any/all medical information while they are present in the room.

## 2022-08-23 ENCOUNTER — HOSPITAL ENCOUNTER (EMERGENCY)
Age: 87
Discharge: HOME | End: 2022-08-24
Attending: EMERGENCY MEDICINE
Payer: MEDICARE

## 2022-08-23 ENCOUNTER — TELEPHONE (OUTPATIENT)
Dept: CARDIOLOGY CLINIC | Age: 87
End: 2022-08-23

## 2022-08-23 DIAGNOSIS — N30.00 ACUTE CYSTITIS WITHOUT HEMATURIA: ICD-10-CM

## 2022-08-23 DIAGNOSIS — N17.9 AKI (ACUTE KIDNEY INJURY) (HCC): ICD-10-CM

## 2022-08-23 DIAGNOSIS — R00.0 TACHYCARDIA: Primary | ICD-10-CM

## 2022-08-23 DIAGNOSIS — E86.0 DEHYDRATION: ICD-10-CM

## 2022-08-23 LAB
ALBUMIN SERPL-MCNC: 2.6 G/DL (ref 3.5–5)
ALBUMIN/GLOB SERPL: 0.6 {RATIO} (ref 1.1–2.2)
ALP SERPL-CCNC: 114 U/L (ref 45–117)
ALT SERPL-CCNC: 18 U/L (ref 12–78)
ANION GAP SERPL CALC-SCNC: 12 MMOL/L (ref 5–15)
APPEARANCE UR: CLEAR
AST SERPL-CCNC: 25 U/L (ref 15–37)
BACTERIA URNS QL MICRO: ABNORMAL /HPF
BASOPHILS # BLD: 0 K/UL (ref 0–0.1)
BASOPHILS NFR BLD: 0 % (ref 0–1)
BILIRUB SERPL-MCNC: 1.1 MG/DL (ref 0.2–1)
BILIRUB UR QL: NEGATIVE
BUN SERPL-MCNC: 22 MG/DL (ref 6–20)
BUN/CREAT SERPL: 20 (ref 12–20)
CALCIUM SERPL-MCNC: 8.7 MG/DL (ref 8.5–10.1)
CHLORIDE SERPL-SCNC: 103 MMOL/L (ref 97–108)
CO2 SERPL-SCNC: 25 MMOL/L (ref 21–32)
COLOR UR: ABNORMAL
CREAT SERPL-MCNC: 1.12 MG/DL (ref 0.55–1.02)
DIFFERENTIAL METHOD BLD: ABNORMAL
EOSINOPHIL # BLD: 0.1 K/UL (ref 0–0.4)
EOSINOPHIL NFR BLD: 1 % (ref 0–7)
EPITH CASTS URNS QL MICRO: ABNORMAL /LPF
ERYTHROCYTE [DISTWIDTH] IN BLOOD BY AUTOMATED COUNT: 18.5 % (ref 11.5–14.5)
GLOBULIN SER CALC-MCNC: 4.6 G/DL (ref 2–4)
GLUCOSE SERPL-MCNC: 136 MG/DL (ref 65–100)
GLUCOSE UR STRIP.AUTO-MCNC: NEGATIVE MG/DL
HCT VFR BLD AUTO: 35.6 % (ref 35–47)
HGB BLD-MCNC: 11.4 G/DL (ref 11.5–16)
HGB UR QL STRIP: ABNORMAL
IMM GRANULOCYTES # BLD AUTO: 0 K/UL (ref 0–0.04)
IMM GRANULOCYTES NFR BLD AUTO: 0 % (ref 0–0.5)
KETONES UR QL STRIP.AUTO: NEGATIVE MG/DL
LACTATE SERPL-SCNC: 1.7 MMOL/L (ref 0.4–2)
LEUKOCYTE ESTERASE UR QL STRIP.AUTO: ABNORMAL
LYMPHOCYTES # BLD: 0.4 K/UL (ref 0.8–3.5)
LYMPHOCYTES NFR BLD: 4 % (ref 12–49)
MAGNESIUM SERPL-MCNC: 1.9 MG/DL (ref 1.6–2.4)
MCH RBC QN AUTO: 29.7 PG (ref 26–34)
MCHC RBC AUTO-ENTMCNC: 32 G/DL (ref 30–36.5)
MCV RBC AUTO: 92.7 FL (ref 80–99)
MONOCYTES # BLD: 0.5 K/UL (ref 0–1)
MONOCYTES NFR BLD: 5 % (ref 5–13)
NEUTS BAND NFR BLD MANUAL: 4 %
NEUTS SEG # BLD: 8.1 K/UL (ref 1.8–8)
NEUTS SEG NFR BLD: 86 % (ref 32–75)
NITRITE UR QL STRIP.AUTO: NEGATIVE
NRBC # BLD: 0.02 K/UL (ref 0–0.01)
NRBC BLD-RTO: 0.2 PER 100 WBC
PH UR STRIP: 5.5 [PH] (ref 5–8)
PLATELET # BLD AUTO: 359 K/UL (ref 150–400)
PMV BLD AUTO: 10.4 FL (ref 8.9–12.9)
POTASSIUM SERPL-SCNC: 3.8 MMOL/L (ref 3.5–5.1)
PROT SERPL-MCNC: 7.2 G/DL (ref 6.4–8.2)
PROT UR STRIP-MCNC: NEGATIVE MG/DL
RBC # BLD AUTO: 3.84 M/UL (ref 3.8–5.2)
RBC #/AREA URNS HPF: ABNORMAL /HPF (ref 0–5)
RBC MORPH BLD: ABNORMAL
SODIUM SERPL-SCNC: 140 MMOL/L (ref 136–145)
SP GR UR REFRACTOMETRY: 1.03 (ref 1–1.03)
UROBILINOGEN UR QL STRIP.AUTO: 0.2 EU/DL (ref 0.2–1)
WBC # BLD AUTO: 9.1 K/UL (ref 3.6–11)
WBC URNS QL MICRO: ABNORMAL /HPF (ref 0–4)

## 2022-08-23 PROCEDURE — 74011250636 HC RX REV CODE- 250/636: Performed by: EMERGENCY MEDICINE

## 2022-08-23 PROCEDURE — 77030019903 HC CATH URET INT BARD -A

## 2022-08-23 PROCEDURE — 87086 URINE CULTURE/COLONY COUNT: CPT

## 2022-08-23 PROCEDURE — 99284 EMERGENCY DEPT VISIT MOD MDM: CPT

## 2022-08-23 PROCEDURE — 83605 ASSAY OF LACTIC ACID: CPT

## 2022-08-23 PROCEDURE — 96365 THER/PROPH/DIAG IV INF INIT: CPT

## 2022-08-23 PROCEDURE — 81001 URINALYSIS AUTO W/SCOPE: CPT

## 2022-08-23 PROCEDURE — 51701 INSERT BLADDER CATHETER: CPT

## 2022-08-23 PROCEDURE — 74011000258 HC RX REV CODE- 258: Performed by: EMERGENCY MEDICINE

## 2022-08-23 PROCEDURE — 36415 COLL VENOUS BLD VENIPUNCTURE: CPT

## 2022-08-23 PROCEDURE — 87077 CULTURE AEROBIC IDENTIFY: CPT

## 2022-08-23 PROCEDURE — 96361 HYDRATE IV INFUSION ADD-ON: CPT

## 2022-08-23 PROCEDURE — 87186 SC STD MICRODIL/AGAR DIL: CPT

## 2022-08-23 PROCEDURE — 85025 COMPLETE CBC W/AUTO DIFF WBC: CPT

## 2022-08-23 PROCEDURE — 87040 BLOOD CULTURE FOR BACTERIA: CPT

## 2022-08-23 PROCEDURE — 93005 ELECTROCARDIOGRAM TRACING: CPT

## 2022-08-23 PROCEDURE — 74011000250 HC RX REV CODE- 250: Performed by: EMERGENCY MEDICINE

## 2022-08-23 PROCEDURE — 83735 ASSAY OF MAGNESIUM: CPT

## 2022-08-23 PROCEDURE — 80053 COMPREHEN METABOLIC PANEL: CPT

## 2022-08-23 PROCEDURE — 96375 TX/PRO/DX INJ NEW DRUG ADDON: CPT

## 2022-08-23 RX ORDER — METOPROLOL TARTRATE 5 MG/5ML
2.5 INJECTION INTRAVENOUS
Status: COMPLETED | OUTPATIENT
Start: 2022-08-23 | End: 2022-08-23

## 2022-08-23 RX ORDER — SODIUM CHLORIDE 0.9 % (FLUSH) 0.9 %
5-10 SYRINGE (ML) INJECTION ONCE
Status: DISCONTINUED | OUTPATIENT
Start: 2022-08-23 | End: 2022-08-24 | Stop reason: HOSPADM

## 2022-08-23 RX ORDER — CEPHALEXIN 500 MG/1
500 CAPSULE ORAL 3 TIMES DAILY
Qty: 21 CAPSULE | Refills: 0 | Status: SHIPPED | OUTPATIENT
Start: 2022-08-23 | End: 2022-08-30

## 2022-08-23 RX ADMIN — METOPROLOL TARTRATE 2.5 MG: 5 INJECTION INTRAVENOUS at 23:32

## 2022-08-23 RX ADMIN — SODIUM CHLORIDE 500 ML: 9 INJECTION, SOLUTION INTRAVENOUS at 19:53

## 2022-08-23 RX ADMIN — CEFTRIAXONE SODIUM 1 G: 1 INJECTION, POWDER, FOR SOLUTION INTRAMUSCULAR; INTRAVENOUS at 19:55

## 2022-08-23 RX ADMIN — SODIUM CHLORIDE 1000 ML: 9 INJECTION, SOLUTION INTRAVENOUS at 16:58

## 2022-08-23 NOTE — ED NOTES
Bedside shift change report given to [address] [place] [address] (oncoming nurse) by Oliver Jeong RN (offgoing nurse). Report included the following information SBAR, Kardex and ED Summary.

## 2022-08-23 NOTE — ED TRIAGE NOTES
Patient states she feels weak. Some medication changes. Not eating well per the [facility] staff.

## 2022-08-23 NOTE — ED PROVIDER NOTES
EMERGENCY DEPARTMENT HISTORY AND PHYSICAL EXAM          Date: 8/23/2022  Patient Name: Virginia Inman    History of Presenting Illness     Chief Complaint   Patient presents with    Fatigue       History Provided By: Patient    HPI: Virginia Inman is a 80 y.o. female, pmhx atrial fibrillation, hypertension, high cholesterol, colitis, who presents via EMS to the ED c/o weakness    Patient states she has been feeling weak the last couple of days and she thinks it could be related to the medications are giving her for her atrial fibrillation. She denies any fevers, chills, nausea, vomiting and diarrhea as well as any chest pain and shortness of breath but notes she does have a mild cough. She admits that she is not eating as much as she should. PCP: Mirian Green NP    Allergies: Multiple  Social Hx: Lives at [facility]    There are no other complaints, changes, or physical findings at this time. Current Facility-Administered Medications   Medication Dose Route Frequency Provider Last Rate Last Admin    sodium chloride (NS) flush 5-10 mL  5-10 mL IntraVENous ONCE TermeerAnjelica MD         Current Outpatient Medications   Medication Sig Dispense Refill    diclofenac (VOLTAREN) 1 % gel Apply 1 g to affected area four (4) times daily. furosemide (LASIX) 20 mg tablet Take 1 Tablet by mouth daily. 90 Tablet 1    metoprolol succinate (TOPROL-XL) 50 mg XL tablet Take 1 Tablet by mouth daily. 90 Tablet 1    sacubitriL-valsartan (Entresto) 24-26 mg tablet Take 1 Tablet by mouth two (2) times a day. 180 Tablet 1    Oxygen 2 L CONTINUOUSLY      melatonin 10 mg tab Take 5 mg by mouth nightly. 1 Tablet 0    pantoprazole (PROTONIX) 40 mg tablet Take 40 mg by mouth daily. therapeutic multivitamin (Thera) tablet Take 1 Tablet by mouth daily. potassium chloride SR (KLOR-CON 10) 10 mEq tablet Take 1 Tablet by mouth daily.  Indications: prevention of low potassium in the blood 90 Tablet 1    acetaminophen (Tylenol Extra Strength) 500 mg tablet Take 500 mg by mouth every eight (8) hours as needed. sertraline (ZOLOFT) 25 mg tablet Take 1 Tablet by mouth daily. 30 Tablet 0    apixaban (ELIQUIS) 2.5 mg tablet Take 1 Tablet by mouth two (2) times a day. 180 Tablet 1    lovastatin (MEVACOR) 20 mg tablet TAKE 1 TABLET BY MOUTH  NIGHTLY 90 Tab 3    pyridoxine, vitamin B6, (Vitamin B-6) 100 mg tablet Take 100 mg by mouth daily. magnesium 200 mg tab Take 1 Tablet by mouth daily. Past History     Past Medical History:  Past Medical History:   Diagnosis Date    Colitis 2013    C diff; no recent flare, normal colonoscopy 2014    DJD (degenerative joint disease)     Fracture, femoral ([facility] [address] 09/2005    HTN (hypertension)     Hyperlipemia     Persistent atrial fibrillation ([facility] [address] 2018    Ventricular ectopy     no symptoms       Past Surgical History:  Past Surgical History:   Procedure Laterality Date    HX CATARACT REMOVAL      bilat    HX COLONOSCOPY  2014    WNL    HX ORTHOPAEDIC  2005    right femur fx    HX ORTHOPAEDIC  2012    Lt TKA    HX REFRACTIVE SURGERY  06/15/2017    rt       Family History:  Family History   Problem Relation Age of Onset    Cancer Mother         Leukemia    Heart Disease Mother     Cancer Sister         Ovarian    No Known Problems Brother     Heart Failure Brother     Cancer Brother         Brain tumor       Social History:  Social History     Tobacco Use    Smoking status: Never    Smokeless tobacco: Never   Vaping Use    Vaping Use: Never used   Substance Use Topics    Alcohol use: No     Alcohol/week: 0.0 standard drinks    Drug use: Never       Allergies:   Allergies   Allergen Reactions    Ambien [Zolpidem] Other (comments)     Legs got heavy    Aminobenzoic Acid Unknown (comments)    Bactrim [Sulfamethoprim Ds] Unknown (comments)     Patient cant remember her reaction    Hydroxyzine Other (comments)     Legs got heavy    Sulfamethoxazole-Trimethoprim Unknown (comments)     Other reaction(s): vomiting and abd pain         Review of Systems   Review of Systems   Constitutional:  Positive for appetite change and fatigue. Negative for activity change, chills, fever and unexpected weight change. HENT:  Negative for congestion. Eyes:  Negative for pain and visual disturbance. Respiratory:  Negative for cough and shortness of breath. Cardiovascular:  Negative for chest pain. Gastrointestinal:  Negative for abdominal pain, diarrhea, nausea and vomiting. Genitourinary:  Negative for dysuria. Musculoskeletal:  Negative for back pain. Skin:  Negative for rash. Neurological:  Positive for weakness. Negative for headaches. Physical Exam   Physical Exam  Vitals and nursing note reviewed. Constitutional:       Appearance: She is well-developed. She is not diaphoretic. Comments: This is a thin elderly female, sitting comfortably with elevated heart rate, otherwise in minimal acute distress   HENT:      Head: Normocephalic and atraumatic. Eyes:      General:         Right eye: No discharge. Left eye: No discharge. Conjunctiva/sclera: Conjunctivae normal.      Pupils: Pupils are equal, round, and reactive to light. Cardiovascular:      Rate and Rhythm: Tachycardia present. Rhythm irregular. Heart sounds: Normal heart sounds. No murmur heard. Pulmonary:      Effort: Pulmonary effort is normal. No respiratory distress. Breath sounds: Normal breath sounds. No wheezing or rales. Abdominal:      General: Bowel sounds are normal. There is no distension. Palpations: Abdomen is soft. Tenderness: There is no abdominal tenderness. Musculoskeletal:         General: Normal range of motion. Cervical back: Normal range of motion and neck supple. Skin:     General: Skin is warm and dry. Findings: No rash.       Comments: Patient feels warm to touch   Neurological:      Mental Status: She is alert and oriented to person, place, and time. Cranial Nerves: No cranial nerve deficit. Motor: No abnormal muscle tone. Diagnostic Study Results     Labs -     Recent Results (from the past 12 hour(s))   URINALYSIS W/ RFLX MICROSCOPIC    Collection Time: 08/23/22  6:50 PM   Result Value Ref Range    Color YELLOW/STRAW      Appearance CLEAR CLEAR      Specific gravity 1.028 1.003 - 1.030      pH (UA) 5.5 5.0 - 8.0      Protein Negative NEG mg/dL    Glucose Negative NEG mg/dL    Ketone Negative NEG mg/dL    Bilirubin Negative NEG      Blood SMALL (A) NEG      Urobilinogen 0.2 0.2 - 1.0 EU/dL    Nitrites Negative NEG      Leukocyte Esterase MODERATE (A) NEG     URINE MICROSCOPIC ONLY    Collection Time: 08/23/22  6:50 PM   Result Value Ref Range    WBC  0 - 4 /hpf    RBC 0-5 0 - 5 /hpf    Epithelial cells FEW FEW /lpf    Bacteria 3+ (A) NEG /hpf   LACTIC ACID    Collection Time: 08/23/22  8:12 PM   Result Value Ref Range    Lactic acid 1.7 0.4 - 2.0 MMOL/L       Radiologic Studies -   No orders to display     CT Results  (Last 48 hours)      None          CXR Results  (Last 48 hours)      None              Medical Decision Making   I am the first provider for this patient. I reviewed the vital signs, available nursing notes, past medical history, past surgical history, family history and social history. Vital Signs-Reviewed the patient's vital signs.   Patient Vitals for the past 12 hrs:   Temp Pulse Resp BP SpO2   08/23/22 1731 -- (!) 114 23 115/72 100 %   08/23/22 1716 -- (!) 115 25 119/75 96 %   08/23/22 1701 -- (!) 124 24 131/86 96 %   08/23/22 1659 -- -- -- -- 98 %   08/23/22 1657 -- -- -- 130/66 --   08/23/22 1649 99 °F (37.2 °C) (!) 113 18 -- 97 %       Pulse Oximetry Analysis - 98% on 2lNC    Cardiac Monitor:   Rate: 110bpm  Rhythm: Atrial Fibrillation      Records Reviewed: Nursing Notes, Old Medical Records, Previous electrocardiograms, Ambulance Run Sheet, Previous Radiology Studies, and Previous Laboratory Studies    Provider Notes (Medical Decision Making):   MDM: Elderly female feeling weak in atrial fibrillation with RVR. Given decreased appetite, cough will check labs and imaging and provide IVF hydration and hold AVN blocking agents for HR control. ED Course:   Initial assessment performed. The patients presenting problems have been discussed, and they are in agreement with the care plan formulated and outlined with them. I have encouraged them to ask questions as they arise throughout their visit. EKG interpretation: (Preliminary)  Rhythm: Atrial fibrillation at a rate of 118 bpm with occasional PACs. Widened QRS; prolonged QTC; left axis deviation. There is no obvious ST elevation/acute pathology  This EKG was interpreted by ED Provider Karson Mishra MD    PROGRESS NOTE:    ED Course as of 08/26/22 0825   Tue Aug 23, 2022   1724 Sitting comfortably with IV fluids infusing. Heart rate 109. Await results. [JT]   1740 CMP(!):    Sodium 140   Potassium 3.8   Chloride 103   CO2 25   Anion gap 12   Glucose 136(!)   BUN 22(!)   Creatinine 1.12(!)   BUN/Creatinine ratio 20   GFR est AA 55(!)   GFR est non-AA 46(!)   Calcium 8.7   Bilirubin, total PENDING   ALT PENDING   AST PENDING   Alk. phosphatase PENDING   Protein, total PENDING   Albumin PENDING   Globulin PENDING   A-G Ratio PENDING [JT]   1904 Signed out to Dr. Erika Golden. Urinalysis pending. [JT]   2219 Patient signed out by Dr. Alejandra Bran to follow-up on laboratory studies, she does have a UTI she was given antibiotics, urine culture blood culture sent lactic acid normal white blood cell count normal.  Recommended admission patient declined she wants to be discharged home on antibiotics. Patient does have atrial fibrillation and has intermittent tachycardia we will give her dose of metoprolol prior to discharge.    [MF]      ED Course User Index  [JT] Rosanna Price MD  [MF] Sebastian Mcdaniels MD        Discharge note:    10:24 PM    Signed out by Dr. Ofelia Roper  to follow-up on laboratory studies, urinalysis revealed UTI. Patient does have A. fib and she was intermittent tachycardic, lactic acid returned normal, recommend admission however patient declines when to be discharged home. She will be given antibiotics to take as instructed see her PCP for follow-up and return for change or worsening symptoms any fever. Pt has been re-examined and states that they are feeling better and have no new complaints. Laboratory tests, medications, x-rays, diagnosis, follow up plan and return instructions have been reviewed and discussed with the patient and/or family. Pt and/or family were instructed on symptoms that may arise after discharge requiring re-evaluation by a physician. Pt and/or family have had the opportunity to ask questions about their care. Patient and/or family verbalized understanding and agreement with care plan, follow up and return instructions. Patient and/or family agree to return in 50 hours if their symptoms are not improving or immediately if they have any change in their condition. Abx were prescribed, pt advised that diarrhea and rash are possible side effects of the medications. I have also put together some discharge instructions for patient that include: 1) educational information regarding their diagnosis, 2) how to care for their diagnosis at home, as well a 3) list of reasons why they would want to return to the ED prior to their follow-up appointment, should their condition change. Raj West MD      Critical Care Time:   0      Diagnosis     Clinical Impression:   1. Tachycardia    2. Dehydration    3. MOISE (acute kidney injury) ([facility] [address]    4. Acute cystitis without hematuria        PLAN:  1. Discharge Medication List as of 8/23/2022 10:32 PM        START taking these medications    Details   cephALEXin (Keflex) 500 mg capsule Take 1 Capsule by mouth three (3) times daily for 7 days. , Normal, Disp-21 Capsule, R-0           CONTINUE these medications which have NOT CHANGED    Details   diclofenac (VOLTAREN) 1 % gel Apply 1 g to affected area four (4) times daily. , Historical Med      furosemide (LASIX) 20 mg tablet Take 1 Tablet by mouth daily. , No Print, Disp-90 Tablet, R-1      metoprolol succinate (TOPROL-XL) 50 mg XL tablet Take 1 Tablet by mouth daily. , No Print, Disp-90 Tablet, R-1      sacubitriL-valsartan (Entresto) 24-26 mg tablet Take 1 Tablet by mouth two (2) times a day., No Print, Disp-180 Tablet, R-1      Oxygen 2 L CONTINUOUSLY, Historical Med      melatonin 10 mg tab Take 5 mg by mouth nightly., OTC, Disp-1 Tablet, R-0, SILVA      pantoprazole (PROTONIX) 40 mg tablet Take 40 mg by mouth daily. , Historical Med      therapeutic multivitamin (Thera) tablet Take 1 Tablet by mouth daily. , Historical Med      potassium chloride SR (KLOR-CON 10) 10 mEq tablet Take 1 Tablet by mouth daily. Indications: prevention of low potassium in the blood, Normal, Disp-90 Tablet, R-1      acetaminophen (Tylenol Extra Strength) 500 mg tablet Take 500 mg by mouth every eight (8) hours as needed., OTC      sertraline (ZOLOFT) 25 mg tablet Take 1 Tablet by mouth daily. , No Print, Disp-30 Tablet, R-0      apixaban (ELIQUIS) 2.5 mg tablet Take 1 Tablet by mouth two (2) times a day., Normal, Disp-180 Tablet, R-1      lovastatin (MEVACOR) 20 mg tablet TAKE 1 TABLET BY MOUTH  NIGHTLY, Normal, Disp-90 Tab, R-3PATIENT ID : [id number]      pyridoxine, vitamin B6, (Vitamin B-6) 100 mg tablet Take 100 mg by mouth daily. , Historical Med      magnesium 200 mg tab Take 1 Tablet by mouth daily. , Historical Med           2.    Follow-up Information       Follow up With Specialties Details Why Contact Marta Lockwood NP Nurse Practitioner Schedule an appointment as soon as possible for a visit in 2 days For follow up [address] [phone]            Return to ED if worse     Disposition:  Home      Please note, this dictation was completed with [employer], the computer voice recognition software. Quite often unanticipated grammatical, syntax, homophones, and other interpretive errors are inadvertently transcribed by the computer software. Please disregard these errors. Please excuse any errors that have escaped final proof reading.

## 2022-08-23 NOTE — TELEPHONE ENCOUNTER
Patients friend Elva Romero called on her behalf. She is stating that the patient over the past three weeks has developed some slight shortness of breath, worsening appetite, and has become very tired. She was wondering if it had anything to do with the [place] that was started 7/13 when she was last here. Patient is in [facility] their phone number is [phone].

## 2022-08-24 VITALS
WEIGHT: 119 LBS | HEART RATE: 100 BPM | OXYGEN SATURATION: 95 % | TEMPERATURE: 99 F | RESPIRATION RATE: 22 BRPM | BODY MASS INDEX: 19.83 KG/M2 | HEIGHT: 65 IN | SYSTOLIC BLOOD PRESSURE: 132 MMHG | DIASTOLIC BLOOD PRESSURE: 83 MMHG

## 2022-08-24 NOTE — PROGRESS NOTES
Contacted [facility] to arrange S transport of patient back to [facility]. Spoke with Ashish. Discussed patient condition, and need for transport. [facility] will call back with ELDA.

## 2022-08-25 LAB
ATRIAL RATE: 136 BPM
CALCULATED R AXIS, ECG10: -77 DEGREES
CALCULATED T AXIS, ECG11: 102 DEGREES
DIAGNOSIS, 93000: NORMAL
Q-T INTERVAL, ECG07: 376 MS
QRS DURATION, ECG06: 128 MS
QTC CALCULATION (BEZET), ECG08: 527 MS
VENTRICULAR RATE, ECG03: 118 BPM

## 2022-08-26 LAB
BACTERIA SPEC CULT: ABNORMAL
CC UR VC: ABNORMAL
SERVICE CMNT-IMP: ABNORMAL

## 2022-08-26 NOTE — TELEPHONE ENCOUNTER
Reviewed ER ntoe from 8/23/22:  \"Signed out by Dr. Елена Chavez  to follow-up on laboratory studies, urinalysis revealed UTI. Patient does have A. fib and she was intermittent tachycardic, lactic acid returned normal, recommend admission however patient declines when to be discharged home. She will be given antibiotics to take as instructed see her PCP for follow-up and return for change or worsening symptoms any fever. \"    Discharged to skilled nursing facility. Normal rate, regular rhythm.  Heart sounds S1, S2.  No murmurs, rubs or gallops.

## 2022-08-29 LAB
BACTERIA SPEC CULT: NORMAL
BACTERIA SPEC CULT: NORMAL
SERVICE CMNT-IMP: NORMAL
SERVICE CMNT-IMP: NORMAL

## 2025-03-24 NOTE — PROGRESS NOTES
Agree with nursing to help with skin tear. Please have nursing assess and manage skin tear. Thanks!   Leighann Medley NP-C 2 (mild pain)